# Patient Record
Sex: FEMALE | Race: WHITE | NOT HISPANIC OR LATINO
[De-identification: names, ages, dates, MRNs, and addresses within clinical notes are randomized per-mention and may not be internally consistent; named-entity substitution may affect disease eponyms.]

---

## 2017-04-05 ENCOUNTER — APPOINTMENT (OUTPATIENT)
Dept: ORTHOPEDIC SURGERY | Facility: CLINIC | Age: 82
End: 2017-04-05

## 2017-04-05 VITALS
HEART RATE: 76 BPM | DIASTOLIC BLOOD PRESSURE: 85 MMHG | SYSTOLIC BLOOD PRESSURE: 186 MMHG | WEIGHT: 112 LBS | BODY MASS INDEX: 21.14 KG/M2 | HEIGHT: 61 IN

## 2017-04-05 DIAGNOSIS — S70.01XA CONTUSION OF RIGHT HIP, INITIAL ENCOUNTER: ICD-10-CM

## 2017-04-05 DIAGNOSIS — M79.9 SOFT TISSUE DISORDER, UNSPECIFIED: ICD-10-CM

## 2017-04-05 DIAGNOSIS — Z96.649 PRESENCE OF UNSPECIFIED ARTIFICIAL HIP JOINT: ICD-10-CM

## 2017-04-08 PROBLEM — S70.01XA HEMATOMA OF RIGHT HIP, INITIAL ENCOUNTER: Status: ACTIVE | Noted: 2017-04-08

## 2017-04-08 PROBLEM — Z96.649 S/P HIP HEMIARTHROPLASTY: Status: ACTIVE | Noted: 2017-04-08

## 2018-05-29 ENCOUNTER — APPOINTMENT (OUTPATIENT)
Dept: ORTHOPEDIC SURGERY | Facility: CLINIC | Age: 83
End: 2018-05-29
Payer: MEDICARE

## 2018-05-29 VITALS
HEIGHT: 61 IN | DIASTOLIC BLOOD PRESSURE: 85 MMHG | WEIGHT: 112 LBS | HEART RATE: 76 BPM | BODY MASS INDEX: 21.14 KG/M2 | SYSTOLIC BLOOD PRESSURE: 179 MMHG

## 2018-05-29 DIAGNOSIS — M25.519 PAIN IN UNSPECIFIED SHOULDER: ICD-10-CM

## 2018-05-29 DIAGNOSIS — S83.91XA SPRAIN OF UNSPECIFIED SITE OF RIGHT KNEE, INITIAL ENCOUNTER: ICD-10-CM

## 2018-05-29 DIAGNOSIS — M71.21 SYNOVIAL CYST OF POPLITEAL SPACE [BAKER], RIGHT KNEE: ICD-10-CM

## 2018-05-29 DIAGNOSIS — M17.11 UNILATERAL PRIMARY OSTEOARTHRITIS, RIGHT KNEE: ICD-10-CM

## 2018-05-29 PROCEDURE — 99215 OFFICE O/P EST HI 40 MIN: CPT

## 2018-10-22 ENCOUNTER — APPOINTMENT (OUTPATIENT)
Dept: ORTHOPEDIC SURGERY | Facility: CLINIC | Age: 83
End: 2018-10-22
Payer: MEDICARE

## 2018-10-22 VITALS
SYSTOLIC BLOOD PRESSURE: 204 MMHG | HEIGHT: 61 IN | HEART RATE: 70 BPM | WEIGHT: 110 LBS | BODY MASS INDEX: 20.77 KG/M2 | DIASTOLIC BLOOD PRESSURE: 80 MMHG

## 2018-10-22 VITALS — SYSTOLIC BLOOD PRESSURE: 192 MMHG | HEART RATE: 69 BPM | DIASTOLIC BLOOD PRESSURE: 72 MMHG

## 2018-10-22 DIAGNOSIS — M16.10 UNILATERAL PRIMARY OSTEOARTHRITIS, UNSPECIFIED HIP: ICD-10-CM

## 2018-10-22 DIAGNOSIS — M70.61 TROCHANTERIC BURSITIS, RIGHT HIP: ICD-10-CM

## 2018-10-22 PROCEDURE — 73502 X-RAY EXAM HIP UNI 2-3 VIEWS: CPT

## 2018-10-22 PROCEDURE — 73552 X-RAY EXAM OF FEMUR 2/>: CPT | Mod: RT

## 2018-10-22 PROCEDURE — 99214 OFFICE O/P EST MOD 30 MIN: CPT

## 2018-10-26 PROBLEM — M70.61 GREATER TROCHANTERIC BURSITIS OF RIGHT HIP: Status: ACTIVE | Noted: 2018-10-26

## 2019-10-01 ENCOUNTER — INPATIENT (INPATIENT)
Facility: HOSPITAL | Age: 84
LOS: 5 days | Discharge: HOME | DRG: 872 | End: 2019-10-07
Attending: INTERNAL MEDICINE | Admitting: FAMILY MEDICINE
Payer: MEDICARE

## 2019-10-01 VITALS
TEMPERATURE: 100 F | HEART RATE: 63 BPM | RESPIRATION RATE: 18 BRPM | DIASTOLIC BLOOD PRESSURE: 64 MMHG | HEIGHT: 60 IN | SYSTOLIC BLOOD PRESSURE: 122 MMHG | WEIGHT: 98.99 LBS | OXYGEN SATURATION: 97 %

## 2019-10-01 DIAGNOSIS — L03.116 CELLULITIS OF LEFT LOWER LIMB: ICD-10-CM

## 2019-10-01 DIAGNOSIS — Z96.60 PRESENCE OF UNSPECIFIED ORTHOPEDIC JOINT IMPLANT: Chronic | ICD-10-CM

## 2019-10-01 DIAGNOSIS — A41.9 SEPSIS, UNSPECIFIED ORGANISM: ICD-10-CM

## 2019-10-01 DIAGNOSIS — D72.829 ELEVATED WHITE BLOOD CELL COUNT, UNSPECIFIED: ICD-10-CM

## 2019-10-01 DIAGNOSIS — I10 ESSENTIAL (PRIMARY) HYPERTENSION: ICD-10-CM

## 2019-10-01 DIAGNOSIS — Z98.89 OTHER SPECIFIED POSTPROCEDURAL STATES: Chronic | ICD-10-CM

## 2019-10-01 DIAGNOSIS — E78.5 HYPERLIPIDEMIA, UNSPECIFIED: ICD-10-CM

## 2019-10-01 DIAGNOSIS — Z29.9 ENCOUNTER FOR PROPHYLACTIC MEASURES, UNSPECIFIED: ICD-10-CM

## 2019-10-01 DIAGNOSIS — M81.0 AGE-RELATED OSTEOPOROSIS WITHOUT CURRENT PATHOLOGICAL FRACTURE: ICD-10-CM

## 2019-10-01 LAB
ALBUMIN SERPL ELPH-MCNC: 3.1 G/DL — LOW (ref 3.3–5)
ALP SERPL-CCNC: 94 U/L — SIGNIFICANT CHANGE UP (ref 40–120)
ALT FLD-CCNC: 21 U/L — SIGNIFICANT CHANGE UP (ref 12–78)
ANION GAP SERPL CALC-SCNC: 7 MMOL/L — SIGNIFICANT CHANGE UP (ref 5–17)
APPEARANCE UR: CLEAR — SIGNIFICANT CHANGE UP
APTT BLD: 31.3 SEC — SIGNIFICANT CHANGE UP (ref 28.5–37)
AST SERPL-CCNC: 17 U/L — SIGNIFICANT CHANGE UP (ref 15–37)
BACTERIA # UR AUTO: ABNORMAL
BASOPHILS # BLD AUTO: 0.02 K/UL — SIGNIFICANT CHANGE UP (ref 0–0.2)
BASOPHILS NFR BLD AUTO: 0.1 % — SIGNIFICANT CHANGE UP (ref 0–2)
BILIRUB SERPL-MCNC: 0.8 MG/DL — SIGNIFICANT CHANGE UP (ref 0.2–1.2)
BILIRUB UR-MCNC: NEGATIVE — SIGNIFICANT CHANGE UP
BUN SERPL-MCNC: 23 MG/DL — SIGNIFICANT CHANGE UP (ref 7–23)
CALCIUM SERPL-MCNC: 8.4 MG/DL — LOW (ref 8.5–10.1)
CHLORIDE SERPL-SCNC: 103 MMOL/L — SIGNIFICANT CHANGE UP (ref 96–108)
CK SERPL-CCNC: 57 U/L — SIGNIFICANT CHANGE UP (ref 26–192)
CO2 SERPL-SCNC: 26 MMOL/L — SIGNIFICANT CHANGE UP (ref 22–31)
COLOR SPEC: YELLOW — SIGNIFICANT CHANGE UP
CREAT SERPL-MCNC: 0.72 MG/DL — SIGNIFICANT CHANGE UP (ref 0.5–1.3)
DIFF PNL FLD: ABNORMAL
EOSINOPHIL # BLD AUTO: 0 K/UL — SIGNIFICANT CHANGE UP (ref 0–0.5)
EOSINOPHIL NFR BLD AUTO: 0 % — SIGNIFICANT CHANGE UP (ref 0–6)
EPI CELLS # UR: SIGNIFICANT CHANGE UP
GLUCOSE SERPL-MCNC: 188 MG/DL — HIGH (ref 70–99)
GLUCOSE UR QL: 100 MG/DL
HCT VFR BLD CALC: 37.1 % — SIGNIFICANT CHANGE UP (ref 34.5–45)
HGB BLD-MCNC: 12.1 G/DL — SIGNIFICANT CHANGE UP (ref 11.5–15.5)
IMM GRANULOCYTES NFR BLD AUTO: 0.6 % — SIGNIFICANT CHANGE UP (ref 0–1.5)
INR BLD: 1.12 RATIO — SIGNIFICANT CHANGE UP (ref 0.88–1.16)
KETONES UR-MCNC: NEGATIVE — SIGNIFICANT CHANGE UP
LACTATE SERPL-SCNC: 2.7 MMOL/L — HIGH (ref 0.7–2)
LACTATE SERPL-SCNC: 3 MMOL/L — HIGH (ref 0.7–2)
LEUKOCYTE ESTERASE UR-ACNC: ABNORMAL
LIDOCAIN IGE QN: 27 U/L — LOW (ref 73–393)
LYMPHOCYTES # BLD AUTO: 0.77 K/UL — LOW (ref 1–3.3)
LYMPHOCYTES # BLD AUTO: 5.1 % — LOW (ref 13–44)
MCHC RBC-ENTMCNC: 29.3 PG — SIGNIFICANT CHANGE UP (ref 27–34)
MCHC RBC-ENTMCNC: 32.6 GM/DL — SIGNIFICANT CHANGE UP (ref 32–36)
MCV RBC AUTO: 89.8 FL — SIGNIFICANT CHANGE UP (ref 80–100)
MONOCYTES # BLD AUTO: 0.46 K/UL — SIGNIFICANT CHANGE UP (ref 0–0.9)
MONOCYTES NFR BLD AUTO: 3.1 % — SIGNIFICANT CHANGE UP (ref 2–14)
NEUTROPHILS # BLD AUTO: 13.67 K/UL — HIGH (ref 1.8–7.4)
NEUTROPHILS NFR BLD AUTO: 91.1 % — HIGH (ref 43–77)
NITRITE UR-MCNC: POSITIVE
NRBC # BLD: 0 /100 WBCS — SIGNIFICANT CHANGE UP (ref 0–0)
PH UR: 5 — SIGNIFICANT CHANGE UP (ref 5–8)
PLATELET # BLD AUTO: 186 K/UL — SIGNIFICANT CHANGE UP (ref 150–400)
POTASSIUM SERPL-MCNC: 4.3 MMOL/L — SIGNIFICANT CHANGE UP (ref 3.5–5.3)
POTASSIUM SERPL-SCNC: 4.3 MMOL/L — SIGNIFICANT CHANGE UP (ref 3.5–5.3)
PROT SERPL-MCNC: 6.4 G/DL — SIGNIFICANT CHANGE UP (ref 6–8.3)
PROT UR-MCNC: 25 MG/DL
PROTHROM AB SERPL-ACNC: 12.7 SEC — SIGNIFICANT CHANGE UP (ref 10–12.9)
RBC # BLD: 4.13 M/UL — SIGNIFICANT CHANGE UP (ref 3.8–5.2)
RBC # FLD: 13.2 % — SIGNIFICANT CHANGE UP (ref 10.3–14.5)
RBC CASTS # UR COMP ASSIST: ABNORMAL /HPF (ref 0–4)
SODIUM SERPL-SCNC: 136 MMOL/L — SIGNIFICANT CHANGE UP (ref 135–145)
SP GR SPEC: 1.02 — SIGNIFICANT CHANGE UP (ref 1.01–1.02)
TROPONIN I SERPL-MCNC: 0.27 NG/ML — HIGH (ref 0.01–0.04)
UROBILINOGEN FLD QL: NEGATIVE — SIGNIFICANT CHANGE UP
WBC # BLD: 15.01 K/UL — HIGH (ref 3.8–10.5)
WBC # FLD AUTO: 15.01 K/UL — HIGH (ref 3.8–10.5)
WBC UR QL: ABNORMAL

## 2019-10-01 PROCEDURE — 71045 X-RAY EXAM CHEST 1 VIEW: CPT | Mod: 26

## 2019-10-01 PROCEDURE — 74176 CT ABD & PELVIS W/O CONTRAST: CPT | Mod: 26

## 2019-10-01 PROCEDURE — 99223 1ST HOSP IP/OBS HIGH 75: CPT | Mod: AI,GC

## 2019-10-01 PROCEDURE — 99285 EMERGENCY DEPT VISIT HI MDM: CPT

## 2019-10-01 PROCEDURE — 73590 X-RAY EXAM OF LOWER LEG: CPT | Mod: 26,LT

## 2019-10-01 PROCEDURE — 93010 ELECTROCARDIOGRAM REPORT: CPT

## 2019-10-01 RX ORDER — PIPERACILLIN AND TAZOBACTAM 4; .5 G/20ML; G/20ML
3.38 INJECTION, POWDER, LYOPHILIZED, FOR SOLUTION INTRAVENOUS EVERY 8 HOURS
Refills: 0 | Status: DISCONTINUED | OUTPATIENT
Start: 2019-10-01 | End: 2019-10-03

## 2019-10-01 RX ORDER — SODIUM CHLORIDE 9 MG/ML
1000 INJECTION INTRAMUSCULAR; INTRAVENOUS; SUBCUTANEOUS
Refills: 0 | Status: DISCONTINUED | OUTPATIENT
Start: 2019-10-01 | End: 2019-10-03

## 2019-10-01 RX ORDER — ENOXAPARIN SODIUM 100 MG/ML
40 INJECTION SUBCUTANEOUS DAILY
Refills: 0 | Status: DISCONTINUED | OUTPATIENT
Start: 2019-10-01 | End: 2019-10-07

## 2019-10-01 RX ORDER — SODIUM CHLORIDE 9 MG/ML
1000 INJECTION INTRAMUSCULAR; INTRAVENOUS; SUBCUTANEOUS
Refills: 0 | Status: COMPLETED | OUTPATIENT
Start: 2019-10-01 | End: 2019-10-01

## 2019-10-01 RX ORDER — SENNA PLUS 8.6 MG/1
2 TABLET ORAL AT BEDTIME
Refills: 0 | Status: DISCONTINUED | OUTPATIENT
Start: 2019-10-01 | End: 2019-10-07

## 2019-10-01 RX ORDER — SODIUM CHLORIDE 9 MG/ML
1000 INJECTION INTRAMUSCULAR; INTRAVENOUS; SUBCUTANEOUS ONCE
Refills: 0 | Status: COMPLETED | OUTPATIENT
Start: 2019-10-01 | End: 2019-10-01

## 2019-10-01 RX ORDER — PIPERACILLIN AND TAZOBACTAM 4; .5 G/20ML; G/20ML
3.38 INJECTION, POWDER, LYOPHILIZED, FOR SOLUTION INTRAVENOUS ONCE
Refills: 0 | Status: COMPLETED | OUTPATIENT
Start: 2019-10-01 | End: 2019-10-01

## 2019-10-01 RX ORDER — ATORVASTATIN CALCIUM 80 MG/1
20 TABLET, FILM COATED ORAL AT BEDTIME
Refills: 0 | Status: DISCONTINUED | OUTPATIENT
Start: 2019-10-01 | End: 2019-10-07

## 2019-10-01 RX ORDER — CYCLOSPORINE 0.5 MG/ML
1 EMULSION OPHTHALMIC
Refills: 0 | Status: DISCONTINUED | OUTPATIENT
Start: 2019-10-01 | End: 2019-10-07

## 2019-10-01 RX ORDER — ASPIRIN/CALCIUM CARB/MAGNESIUM 324 MG
325 TABLET ORAL ONCE
Refills: 0 | Status: COMPLETED | OUTPATIENT
Start: 2019-10-01 | End: 2019-10-01

## 2019-10-01 RX ORDER — VANCOMYCIN HCL 1 G
1000 VIAL (EA) INTRAVENOUS ONCE
Refills: 0 | Status: COMPLETED | OUTPATIENT
Start: 2019-10-01 | End: 2019-10-01

## 2019-10-01 RX ORDER — VANCOMYCIN HCL 1 G
750 VIAL (EA) INTRAVENOUS EVERY 24 HOURS
Refills: 0 | Status: DISCONTINUED | OUTPATIENT
Start: 2019-10-02 | End: 2019-10-04

## 2019-10-01 RX ORDER — METOPROLOL TARTRATE 50 MG
25 TABLET ORAL DAILY
Refills: 0 | Status: DISCONTINUED | OUTPATIENT
Start: 2019-10-01 | End: 2019-10-07

## 2019-10-01 RX ORDER — METOPROLOL TARTRATE 50 MG
0 TABLET ORAL
Qty: 0 | Refills: 0 | DISCHARGE

## 2019-10-01 RX ORDER — AMLODIPINE BESYLATE 2.5 MG/1
5 TABLET ORAL DAILY
Refills: 0 | Status: DISCONTINUED | OUTPATIENT
Start: 2019-10-01 | End: 2019-10-07

## 2019-10-01 RX ORDER — ASPIRIN/CALCIUM CARB/MAGNESIUM 324 MG
81 TABLET ORAL DAILY
Refills: 0 | Status: DISCONTINUED | OUTPATIENT
Start: 2019-10-01 | End: 2019-10-07

## 2019-10-01 RX ORDER — VANCOMYCIN HCL 1 G
1000 VIAL (EA) INTRAVENOUS EVERY 12 HOURS
Refills: 0 | Status: DISCONTINUED | OUTPATIENT
Start: 2019-10-01 | End: 2019-10-01

## 2019-10-01 RX ORDER — LACTOBACILLUS ACIDOPHILUS 100MM CELL
1 CAPSULE ORAL THREE TIMES A DAY
Refills: 0 | Status: DISCONTINUED | OUTPATIENT
Start: 2019-10-01 | End: 2019-10-07

## 2019-10-01 RX ADMIN — SODIUM CHLORIDE 75 MILLILITER(S): 9 INJECTION INTRAMUSCULAR; INTRAVENOUS; SUBCUTANEOUS at 21:13

## 2019-10-01 RX ADMIN — PIPERACILLIN AND TAZOBACTAM 25 GRAM(S): 4; .5 INJECTION, POWDER, LYOPHILIZED, FOR SOLUTION INTRAVENOUS at 21:13

## 2019-10-01 RX ADMIN — Medication 1000 MILLIGRAM(S): at 15:30

## 2019-10-01 RX ADMIN — SODIUM CHLORIDE 1000 MILLILITER(S): 9 INJECTION INTRAMUSCULAR; INTRAVENOUS; SUBCUTANEOUS at 14:19

## 2019-10-01 RX ADMIN — SODIUM CHLORIDE 1000 MILLILITER(S): 9 INJECTION INTRAMUSCULAR; INTRAVENOUS; SUBCUTANEOUS at 16:13

## 2019-10-01 RX ADMIN — ENOXAPARIN SODIUM 40 MILLIGRAM(S): 100 INJECTION SUBCUTANEOUS at 18:19

## 2019-10-01 RX ADMIN — SODIUM CHLORIDE 1000 MILLILITER(S): 9 INJECTION INTRAMUSCULAR; INTRAVENOUS; SUBCUTANEOUS at 14:20

## 2019-10-01 RX ADMIN — SODIUM CHLORIDE 1000 MILLILITER(S): 9 INJECTION INTRAMUSCULAR; INTRAVENOUS; SUBCUTANEOUS at 13:34

## 2019-10-01 RX ADMIN — PIPERACILLIN AND TAZOBACTAM 200 GRAM(S): 4; .5 INJECTION, POWDER, LYOPHILIZED, FOR SOLUTION INTRAVENOUS at 13:34

## 2019-10-01 RX ADMIN — Medication 1 TABLET(S): at 21:13

## 2019-10-01 RX ADMIN — Medication 250 MILLIGRAM(S): at 14:20

## 2019-10-01 RX ADMIN — CYCLOSPORINE 1 DROP(S): 0.5 EMULSION OPHTHALMIC at 21:13

## 2019-10-01 RX ADMIN — ATORVASTATIN CALCIUM 20 MILLIGRAM(S): 80 TABLET, FILM COATED ORAL at 21:13

## 2019-10-01 RX ADMIN — PIPERACILLIN AND TAZOBACTAM 3.38 GRAM(S): 4; .5 INJECTION, POWDER, LYOPHILIZED, FOR SOLUTION INTRAVENOUS at 14:18

## 2019-10-01 RX ADMIN — Medication 325 MILLIGRAM(S): at 14:38

## 2019-10-01 NOTE — H&P ADULT - HISTORY OF PRESENT ILLNESS
**CHARTING IN PROGRESS  89 y/o F with a PMHx of ?Cellulitis, HTN, HLD, Osteoporosis, IBS, and peptic ulcer presented to the ED with worsening LLE cellulitis with associated nausea and chills.      IN THE ED:  Temp 99.7, HR 63, / 64, Resp 18, SpO2 97, WBC 15.01, Lactate 3.0, Troponin .269, Given 1L NS, Given IV Zosyn x 1, IV Vancomycin x 1, EKG showed ______ , CXR showed ______ , AdditionalImaging showed ________ , AdditionalImaging showed ________ , AdditionalImaging showed ________     89 yo F p/w nausea, slight chills since last night. No cp/sob/palp. No known fever. No vomiting, diarrhea. Some recent constipation,. Some lower abd discomfort. Pt co LLE cellulitis, was treated last week - now again worsening x past 2 days. No neck / back pain. no dysuria/ hematuria. No agg/allev factors. No other inj or co. 89 y/o F with a PMHx of Cellulitis, HTN, HLD, Osteoporosis, IBS, and peptic ulcer presented to the ED with worsening LLE cellulitis with associated nausea and chills. Per patient, patient was diagnosed with cellulitis of the R LE last month, with associated chills and was given a 10 day course of antibiotics (unable to specify which one) from [facility], and her symptoms improved and the cellulitic rash went away completely, but patient continued to have intermittent chills throughout the month. 2 days ago, patient felt similar chills to what she felt last month with additional nausea. Yesterday, patient noticed worsening swelling and pain in the same R LE area. Additionally, patient accidently scratched herself on the back of her L leg several weeks ago, slightly lateral and inferior to her popliteal fossa, and her wound in the area has been healing slowly ever since. Patient denies chest pain, SOB, palpations, vomiting diarrhea. Of note, patient also complaining of recent constipation.      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 89 y/o F with a PMHx of Cellulitis, HTN, HLD, Osteoporosis, IBS, and peptic ulcer presented to the ED with worsening LLE cellulitis with associated nausea and chills. Per patient, patient was diagnosed with cellulitis of the L LE last month, with associated chills and was given a 10 day course of antibiotics (unable to specify which one) from [facility], and her symptoms improved and the cellulitic rash went away completely, but patient continued to have intermittent chills throughout the month. 2 days ago, patient felt similar chills to what she felt last month with additional nausea. Yesterday, patient noticed worsening swelling and pain in the same L LE area. Additionally, patient accidently scratched herself on the back of her R leg several weeks ago, slightly lateral and inferior to her popliteal fossa, and her wound in the area has been healing slowly ever since. Patient denies chest pain, SOB, palpations, vomiting diarrhea. Of note, patient also complaining of recent constipation.      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

## 2019-10-01 NOTE — H&P ADULT - NSHPPHYSICALEXAM_GEN_ALL_CORE
Physical Exam:  General: Well developed, well nourished, NAD  HEENT: NCAT, PERRLA, EOMI bl, moist mucous membranes   Neck: Supple, nontender, no mass  Neurology: A&Ox3, nonfocal, CN II-XII grossly intact, sensation intact   Respiratory: CTA B/L, No W/R/R  CV: RRR, +S1/S2, no murmurs, rubs or gallops  Abdominal: Soft, NT, ND +BSx4, no palpable masses  Extremities: No C/C/E, + peripheral pulses  Skin: R LE swelling, with redness, and slightly painful to touch. L LE wound covered with bandaging noted, intact and without discharge

## 2019-10-01 NOTE — H&P ADULT - PROBLEM SELECTOR PLAN 2
Previously had cellulitis last month, treated. Continued to have chills over a month  - S/p IV Vancomycin x1 + Zosyn x 1 in ED. Continue IV Vancomycin and Zosyn.   - S/p 2L NS  - f/u blood x2 and urine cultures

## 2019-10-01 NOTE — ED ADULT NURSE NOTE - OBJECTIVE STATEMENT
received pt in bed #4b Pt A&O w/ multiple complaints Constipated since yesterday, c/o chills & headache on/off x 1 month Pt also w/ left lower leg cellulitis x 1 month completed antibxs & still has redness. Pt seen by Dr toney Will monitor

## 2019-10-01 NOTE — H&P ADULT - PROBLEM SELECTOR PLAN 1
Presented with elevated WBC 15.01, Lactate 3.0  - S/p IV Vancomycin x1 + Zosyn x 1 in ED. Continue IV Vancomycin and Zosyn.   - S/p 2L NS  - Trend lactate  - f/u blood x2 and urine cultures

## 2019-10-01 NOTE — ED PROVIDER NOTE - OBJECTIVE STATEMENT
89 yo F p/w nausea, slight chills since last night. No cp/sob/palp. No known fever. No vomiting, diarrhea. Some recent constipation,. Some lower abd discomfort. Pt co LLE cellulitis, was treated last week - now again worsening x past 2 days. No neck / back pain. no dysuria/ hematuria. No agg/allev factors. No other inj or co.

## 2019-10-01 NOTE — H&P ADULT - NSICDXPASTSURGICALHX_GEN_ALL_CORE_FT
PAST SURGICAL HISTORY:  History of appendectomy     History of tonsillectomy     S/P hip replacement partial, left

## 2019-10-01 NOTE — ED PROVIDER NOTE - CHPI ED SYMPTOMS NEG
no fever/no burning urination/no chills/no blood in stool/no diarrhea/no abdominal distension/no hematuria

## 2019-10-01 NOTE — H&P ADULT - NSICDXPASTMEDICALHX_GEN_ALL_CORE_FT
PAST MEDICAL HISTORY:  Cellulitis     HTN (hypertension)     Hyperlipidemia     Irritable bowel syndrome     Osteoporosis     Peptic ulcer

## 2019-10-01 NOTE — H&P ADULT - ASSESSMENT
89 y/o F with a PMHx of ?Cellulitis, HTN, HLD, Osteoporosis, IBS, and peptic ulcer presented to the ED with worsening LLE cellulitis with associated nausea and chills.

## 2019-10-01 NOTE — H&P ADULT - NSHPSOCIALHISTORY_GEN_ALL_CORE
Lives with  Ambulates with      Denies current tobacco, alcohol and drug use Lives Alone,   several years ago. Has a 5 day a week, 10 hour a day, home health aid  ADLs - Per patient, mostly independent. Can cook and eat by herself, and wash herself. Home health aid provides assistance throughout the day with random activities.   Ambulates with a walker  Denies current tobacco, alcohol and drug use

## 2019-10-01 NOTE — ED PROVIDER NOTE - CARE PLAN
Principal Discharge DX:	Cellulitis of left lower extremity  Secondary Diagnosis:	Chills  Secondary Diagnosis:	Leukocytosis, unspecified type

## 2019-10-01 NOTE — ED ADULT NURSE REASSESSMENT NOTE - COMFORT CARE
assisted to bathroom/side rails up/wait time explained/warm blanket provided/ambulated to bathroom/plan of care explained

## 2019-10-01 NOTE — ED ADULT NURSE NOTE - PMH
Cellulitis    HTN (hypertension)    Hyperlipidemia    Irritable bowel syndrome    Osteoporosis    Peptic ulcer

## 2019-10-01 NOTE — H&P ADULT - NSHPREVIEWOFSYSTEMS_GEN_ALL_CORE
Constitutional: denies fever. Admits to chills  HEENT: denies blurry vision, difficulty hearing  Respiratory: denies SOB, SOLIS, cough, sputum production, wheezing  Cardiovascular: denies CP, palpitations  Gastrointestinal: denies vomiting, diarrhea, constipation, abdominal pain. Admits to nausea.  Genitourinary: denies dysuria, frequency, urgency, hematuria   Skin: Admits to R LE redness and swelling of 1 day duration, as well as a wound on her L LE that is slowly healing.  Musculoskeletal: denies myalgias, joint swelling, muscle weakness  Neurologic: denies weakness, dizziness, numbness/tingling. Admits to occasional headaches.   Psych: Denies changes in mood  ROS negative except as noted above

## 2019-10-01 NOTE — H&P ADULT - PROBLEM SELECTOR PLAN 5
Lovenox 40mg    IMPROVE VTE Individual Risk Assessment        RISK                                                          Points  [  ] Previous VTE                                                3  [  ] Thrombophilia                                             2  [  ] Lower limb paralysis                                   2        (unable to hold up >15 seconds)    [  ] Current Cancer                                             2         (within 6 months)  [  ] Immobilization > 24 hrs                              1  [  ] ICU/CCU stay > 24 hours                             1  [ x ] Age > 60                                                         1  IMPROVE VTE Score:         [     1    ]  Total Risk Factor Score:    0 - 1:   Consider IPC  >2 - 3:  Thromboprophylaxis required (enoxaparin or SQ heparin)        >4:   High Risk: Thromboprophylaxis required (enoxaparin or SQ heparin), optional add IPC  **If CONTRAINDICATION to enoxaparin or SQ heparin, USE IPCs**

## 2019-10-02 LAB
ALBUMIN SERPL ELPH-MCNC: 2.3 G/DL — LOW (ref 3.3–5)
ALP SERPL-CCNC: 67 U/L — SIGNIFICANT CHANGE UP (ref 40–120)
ALT FLD-CCNC: 28 U/L — SIGNIFICANT CHANGE UP (ref 12–78)
ANION GAP SERPL CALC-SCNC: 6 MMOL/L — SIGNIFICANT CHANGE UP (ref 5–17)
AST SERPL-CCNC: 26 U/L — SIGNIFICANT CHANGE UP (ref 15–37)
BASOPHILS # BLD AUTO: 0.04 K/UL — SIGNIFICANT CHANGE UP (ref 0–0.2)
BASOPHILS NFR BLD AUTO: 0.4 % — SIGNIFICANT CHANGE UP (ref 0–2)
BILIRUB SERPL-MCNC: 0.7 MG/DL — SIGNIFICANT CHANGE UP (ref 0.2–1.2)
BUN SERPL-MCNC: 16 MG/DL — SIGNIFICANT CHANGE UP (ref 7–23)
CALCIUM SERPL-MCNC: 7.7 MG/DL — LOW (ref 8.5–10.1)
CHLORIDE SERPL-SCNC: 111 MMOL/L — HIGH (ref 96–108)
CK MB BLD-MCNC: 1 % — SIGNIFICANT CHANGE UP (ref 0–3.5)
CK MB CFR SERPL CALC: 1.7 NG/ML — SIGNIFICANT CHANGE UP (ref 0–3.6)
CK SERPL-CCNC: 172 U/L — SIGNIFICANT CHANGE UP (ref 26–192)
CO2 SERPL-SCNC: 25 MMOL/L — SIGNIFICANT CHANGE UP (ref 22–31)
CREAT SERPL-MCNC: 0.51 MG/DL — SIGNIFICANT CHANGE UP (ref 0.5–1.3)
CRP SERPL-MCNC: 16.91 MG/DL — HIGH (ref 0–0.4)
EOSINOPHIL # BLD AUTO: 0.01 K/UL — SIGNIFICANT CHANGE UP (ref 0–0.5)
EOSINOPHIL NFR BLD AUTO: 0.1 % — SIGNIFICANT CHANGE UP (ref 0–6)
ERYTHROCYTE [SEDIMENTATION RATE] IN BLOOD: 17 MM/HR — SIGNIFICANT CHANGE UP (ref 0–20)
FERRITIN SERPL-MCNC: 230 NG/ML — HIGH (ref 15–150)
FOLATE SERPL-MCNC: 13.5 NG/ML — SIGNIFICANT CHANGE UP
GLUCOSE SERPL-MCNC: 119 MG/DL — HIGH (ref 70–99)
HCT VFR BLD CALC: 33.2 % — LOW (ref 34.5–45)
HGB BLD-MCNC: 10.6 G/DL — LOW (ref 11.5–15.5)
IMM GRANULOCYTES NFR BLD AUTO: 0.5 % — SIGNIFICANT CHANGE UP (ref 0–1.5)
IRON SATN MFR SERPL: 13 UG/DL — LOW (ref 30–160)
IRON SATN MFR SERPL: 6 % — LOW (ref 14–50)
LYMPHOCYTES # BLD AUTO: 1.18 K/UL — SIGNIFICANT CHANGE UP (ref 1–3.3)
LYMPHOCYTES # BLD AUTO: 11.2 % — LOW (ref 13–44)
MCHC RBC-ENTMCNC: 29 PG — SIGNIFICANT CHANGE UP (ref 27–34)
MCHC RBC-ENTMCNC: 31.9 GM/DL — LOW (ref 32–36)
MCV RBC AUTO: 91 FL — SIGNIFICANT CHANGE UP (ref 80–100)
MONOCYTES # BLD AUTO: 0.5 K/UL — SIGNIFICANT CHANGE UP (ref 0–0.9)
MONOCYTES NFR BLD AUTO: 4.8 % — SIGNIFICANT CHANGE UP (ref 2–14)
NEUTROPHILS # BLD AUTO: 8.71 K/UL — HIGH (ref 1.8–7.4)
NEUTROPHILS NFR BLD AUTO: 83 % — HIGH (ref 43–77)
NRBC # BLD: 0 /100 WBCS — SIGNIFICANT CHANGE UP (ref 0–0)
PLATELET # BLD AUTO: 145 K/UL — LOW (ref 150–400)
POTASSIUM SERPL-MCNC: 3.4 MMOL/L — LOW (ref 3.5–5.3)
POTASSIUM SERPL-SCNC: 3.4 MMOL/L — LOW (ref 3.5–5.3)
PROT SERPL-MCNC: 4.9 G/DL — LOW (ref 6–8.3)
RBC # BLD: 3.55 M/UL — LOW (ref 3.8–5.2)
RBC # BLD: 3.65 M/UL — LOW (ref 3.8–5.2)
RBC # FLD: 13.4 % — SIGNIFICANT CHANGE UP (ref 10.3–14.5)
RETICS #: 53.3 K/UL — SIGNIFICANT CHANGE UP (ref 25–125)
RETICS/RBC NFR: 1.5 % — SIGNIFICANT CHANGE UP (ref 0.5–2.5)
SODIUM SERPL-SCNC: 142 MMOL/L — SIGNIFICANT CHANGE UP (ref 135–145)
TIBC SERPL-MCNC: 201 UG/DL — LOW (ref 220–430)
TROPONIN I SERPL-MCNC: 0.4 NG/ML — HIGH (ref 0.01–0.04)
UIBC SERPL-MCNC: 188 UG/DL — SIGNIFICANT CHANGE UP (ref 110–370)
VIT B12 SERPL-MCNC: 238 PG/ML — SIGNIFICANT CHANGE UP (ref 232–1245)
WBC # BLD: 10.49 K/UL — SIGNIFICANT CHANGE UP (ref 3.8–10.5)
WBC # FLD AUTO: 10.49 K/UL — SIGNIFICANT CHANGE UP (ref 3.8–10.5)

## 2019-10-02 PROCEDURE — 99233 SBSQ HOSP IP/OBS HIGH 50: CPT

## 2019-10-02 RX ORDER — DOCUSATE SODIUM 100 MG
100 CAPSULE ORAL THREE TIMES A DAY
Refills: 0 | Status: DISCONTINUED | OUTPATIENT
Start: 2019-10-02 | End: 2019-10-07

## 2019-10-02 RX ORDER — POLYETHYLENE GLYCOL 3350 17 G/17G
17 POWDER, FOR SOLUTION ORAL DAILY
Refills: 0 | Status: DISCONTINUED | OUTPATIENT
Start: 2019-10-02 | End: 2019-10-07

## 2019-10-02 RX ORDER — ACETAMINOPHEN 500 MG
650 TABLET ORAL EVERY 6 HOURS
Refills: 0 | Status: DISCONTINUED | OUTPATIENT
Start: 2019-10-02 | End: 2019-10-07

## 2019-10-02 RX ADMIN — CYCLOSPORINE 1 DROP(S): 0.5 EMULSION OPHTHALMIC at 17:27

## 2019-10-02 RX ADMIN — Medication 81 MILLIGRAM(S): at 11:42

## 2019-10-02 RX ADMIN — CYCLOSPORINE 1 DROP(S): 0.5 EMULSION OPHTHALMIC at 05:55

## 2019-10-02 RX ADMIN — POLYETHYLENE GLYCOL 3350 17 GRAM(S): 17 POWDER, FOR SOLUTION ORAL at 11:41

## 2019-10-02 RX ADMIN — SODIUM CHLORIDE 75 MILLILITER(S): 9 INJECTION INTRAMUSCULAR; INTRAVENOUS; SUBCUTANEOUS at 12:31

## 2019-10-02 RX ADMIN — Medication 100 MILLIGRAM(S): at 11:41

## 2019-10-02 RX ADMIN — ATORVASTATIN CALCIUM 20 MILLIGRAM(S): 80 TABLET, FILM COATED ORAL at 22:05

## 2019-10-02 RX ADMIN — Medication 100 MILLIGRAM(S): at 13:31

## 2019-10-02 RX ADMIN — PIPERACILLIN AND TAZOBACTAM 25 GRAM(S): 4; .5 INJECTION, POWDER, LYOPHILIZED, FOR SOLUTION INTRAVENOUS at 22:05

## 2019-10-02 RX ADMIN — Medication 250 MILLIGRAM(S): at 13:30

## 2019-10-02 RX ADMIN — Medication 100 MILLIGRAM(S): at 22:05

## 2019-10-02 RX ADMIN — Medication 1 TABLET(S): at 13:31

## 2019-10-02 RX ADMIN — Medication 1 TABLET(S): at 22:05

## 2019-10-02 RX ADMIN — PIPERACILLIN AND TAZOBACTAM 25 GRAM(S): 4; .5 INJECTION, POWDER, LYOPHILIZED, FOR SOLUTION INTRAVENOUS at 05:55

## 2019-10-02 RX ADMIN — Medication 1 TABLET(S): at 05:54

## 2019-10-02 RX ADMIN — SODIUM CHLORIDE 75 MILLILITER(S): 9 INJECTION INTRAMUSCULAR; INTRAVENOUS; SUBCUTANEOUS at 22:12

## 2019-10-02 RX ADMIN — PIPERACILLIN AND TAZOBACTAM 25 GRAM(S): 4; .5 INJECTION, POWDER, LYOPHILIZED, FOR SOLUTION INTRAVENOUS at 15:15

## 2019-10-02 RX ADMIN — ENOXAPARIN SODIUM 40 MILLIGRAM(S): 100 INJECTION SUBCUTANEOUS at 11:42

## 2019-10-02 NOTE — PROGRESS NOTE ADULT - ASSESSMENT
89 y/o F with a PMHx of ?Cellulitis, HTN, HLD, Osteoporosis, IBS, and peptic ulcer presented to the ED with worsening LLE cellulitis with associated nausea and chills.

## 2019-10-02 NOTE — PROGRESS NOTE ADULT - PROBLEM SELECTOR PLAN 1
Resolved, with normal WBC, afebrile and and will follow with LA.  - c/w IVF and monitor LA.  - f/u blood x2 and urine cultures R/O for sepsis.   - normal WBC, afebrile and and will follow with LA.  - c/w IVF and monitor LA.  - Continue IV Vancomycin and d/c Zosyn.   - f/u blood x2 and urine cultures

## 2019-10-02 NOTE — CONSULT NOTE ADULT - SUBJECTIVE AND OBJECTIVE BOX
Patient is a 89y old  Female who presents with a chief complaint of Sepsis & Cellulitis (02 Oct 2019 11:38)      HPI:  89 y/o F with a PMHx of Cellulitis, HTN, HLD, Osteoporosis, IBS, and peptic ulcer presented to the ED with worsening LLE cellulitis with associated nausea and chills. Per patient, patient was diagnosed with cellulitis of the L LE last month, with associated chills and was given a 10 day course of antibiotics (unable to specify which one) from [facility], and her symptoms improved and the cellulitic rash went away completely, but patient continued to have intermittent chills throughout the month. 2 days ago, patient felt similar chills to what she felt last month with additional nausea. Yesterday, patient noticed worsening swelling and pain in the same L LE area. Additionally, patient accidently scratched herself on the back of her R leg several weeks ago, slightly lateral and inferior to her popliteal fossa, and her wound in the area has been healing slowly ever since. Patient denies chest pain, SOB, palpations, vomiting diarrhea. Of note, patient also complaining of recent constipation.      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   Pt states she had seen 2 neurosurgeons prior for eval of paraspinal tumor, and was recommend against surgery unless symptomatic.   Per pt, does not want invasive procedures unless absolutely necessary.   Also aware of pancrease lesion, and sees GI outpt.     Pt admitted with cellulitis, leg pain.     PAST MEDICAL & SURGICAL HISTORY:  Cellulitis  Irritable bowel syndrome  Osteoporosis  HTN (hypertension)  Peptic ulcer  Hyperlipidemia  History of tonsillectomy  History of appendectomy  S/P hip replacement: partial, left      HEALTH ISSUES - PROBLEM Dx:  Need for prophylactic measure: Need for prophylactic measure  Osteoporosis: Osteoporosis  Hyperlipidemia: Hyperlipidemia  HTN (hypertension): HTN (hypertension)  Leukocytosis, unspecified type: Leukocytosis, unspecified type  Cellulitis of left lower extremity: Cellulitis of left lower extremity  Sepsis: Sepsis          Cellulitis of left lower extremity (L03.116)  Cellulitis (L03.90)  DM (diabetes mellitus) (250.00)  Irritable bowel syndrome (564.1)  Osteoporosis (733.00)  HTN (hypertension) (401.9)  Peptic ulcer (533.90)  Hyperlipidemia (272.4)  History of tonsillectomy (V45.89)  History of appendectomy (V45.89)  S/P hip replacement (V43.64)  Leukocytosis, unspecified type (D72.829)  Chills (R68.83)      FAMILY HISTORY:  no family hx cancer      [SOCIAL HISTORY: ]     smoking:  non-smoker     EtOH:  no EtOh     illicit drugs:  denied     occupation:  retired      marital status:   x2yrs     Other:       [ALLERGIES/INTOLERANCES:]  Allergies     sulfa drugs (Hives)  Intolerances          [MEDICATIONS]  MEDICATIONS  (STANDING):  amLODIPine   Tablet 5 milliGRAM(s) Oral daily  aspirin  chewable 81 milliGRAM(s) Oral daily  atorvastatin 20 milliGRAM(s) Oral at bedtime  cycloSPORINE (RESTASIS) 0.05% Emulsion 1 Drop(s) Both EYES two times a day  docusate sodium 100 milliGRAM(s) Oral three times a day  enoxaparin Injectable 40 milliGRAM(s) SubCutaneous daily  lactobacillus acidophilus 1 Tablet(s) Oral three times a day  metoprolol succinate ER 25 milliGRAM(s) Oral daily  piperacillin/tazobactam IVPB.. 3.375 Gram(s) IV Intermittent every 8 hours  polyethylene glycol 3350 17 Gram(s) Oral daily  sodium chloride 0.9%. 1000 milliLiter(s) (75 mL/Hr) IV Continuous <Continuous>  vancomycin  IVPB 750 milliGRAM(s) IV Intermittent every 24 hours    MEDICATIONS  (PRN):  acetaminophen   Tablet .. 650 milliGRAM(s) Oral every 6 hours PRN Mild Pain (1 - 3)  bisacodyl 5 milliGRAM(s) Oral daily PRN Constipation  senna 2 Tablet(s) Oral at bedtime PRN Constipation        [REVIEW OF SYSTEMS: ]  CONSTITUTIONAL: normal, no fever, no shakes, no chills   EYES: No eye pain, no visual disturbances, no discharge  ENMT:  no discharge  NECK: No pain, no stiffness  BREASTS: No pain, no masses, no nipple discharge  RESPIRATORY: No cough, no wheezing, no chills, no hemoptysis; No shortness of breath  CARDIOVASCULAR: No chest pain, no palpitations, no dizziness, no leg swelling  GASTROINTESTINAL: No abdominal, no epigastric pain. No nausea, no vomiting, no hematemesis; No diarrhea , +constipation. No melena, no hematochezia.  GENITOURINARY: No dysuria, no frequency, no hematuria, no incontinence  NEUROLOGICAL: No headaches, no memory loss, no loss of strength, no numbness, no tremors  SKIN: No itching, no burning, no rashes, no lesions   LYMPH NODES: No enlarged glands  ENDOCRINE: No heat or cold intolerance; No hair loss  MUSCULOSKELETAL: No joint pain or swelling; No muscle, no back, +extremity pain, + edema  PSYCHIATRIC: No depression, no anxiety, no mood swings, no difficulty sleeping  HEME/LYMPH: No easy bruising, no bleeding gums      [VITALS SIGNS 24hrs]  Vital Signs Last 24 Hrs  T(C): 37.4 (02 Oct 2019 05:18), Max: 37.6 (01 Oct 2019 12:55)  T(F): 99.3 (02 Oct 2019 05:18), Max: 99.7 (01 Oct 2019 12:55)  HR: 78 (02 Oct 2019 05:18) (63 - 78)  BP: 106/46 (02 Oct 2019 05:56) (90/51 - 122/64)  BP(mean): 69 (01 Oct 2019 21:35) (69 - 78)  RR: 18 (02 Oct 2019 05:18) (16 - 18)  SpO2: 90% (02 Oct 2019 05:18) (90% - 99%)    [PHYSICAL EXAM]  General: adult in NAD,  WN,  WD.  HEENT: clear oropharynx, anicteric sclera, pink conjunctivae.  Neck: supple, no masses.  CV: normal S1S2, no murmur, no rubs, no gallops.  Lungs: clear to auscultation, no wheezes, no rales, no rhonchi.  Abdomen: soft, non-tender, non-distended, no hepatosplenomegaly, normal BS, no guarding.  Ext: no clubbing, no cyanosis, +edemaL>R ., +erythema L  Skin: no rashes,  no petechiae, no venous stasis changes.  Neuro: alert and oriented X4, no focal motor deficits.  LN: no SC ANASTACIO.      [LABS:]                        10.6   10.49 )-----------( 145      ( 02 Oct 2019 08:13 )             33.2     CBC Full  -  ( 02 Oct 2019 08:13 )  WBC Count : 10.49 K/uL  RBC Count : 3.65 M/uL  Hemoglobin : 10.6 g/dL  Hematocrit : 33.2 %  Platelet Count - Automated : 145 K/uL  Mean Cell Volume : 91.0 fl  Mean Cell Hemoglobin : 29.0 pg  Mean Cell Hemoglobin Concentration : 31.9 gm/dL  Auto Neutrophil # : 8.71 K/uL  Auto Lymphocyte # : 1.18 K/uL  Auto Monocyte # : 0.50 K/uL  Auto Eosinophil # : 0.01 K/uL  Auto Basophil # : 0.04 K/uL  Auto Neutrophil % : 83.0 %  Auto Lymphocyte % : 11.2 %  Auto Monocyte % : 4.8 %  Auto Eosinophil % : 0.1 %  Auto Basophil % : 0.4 %    10    142  |  111<H>  |  16  ----------------------------<  119<H>  3.4<L>   |  25  |  0.51    Ca    7.7<L>      02 Oct 2019 08:13    TPro  4.9<L>  /  Alb  2.3<L>  /  TBili  0.7  /  DBili  x   /  AST  26  /  ALT  28  /  AlkPhos  67  10-    PT/INR - ( 01 Oct 2019 13:46 )   PT: 12.7 sec;   INR: 1.12 ratio         PTT - ( 01 Oct 2019 13:46 )  PTT:31.3 sec  LIVER FUNCTIONS - ( 02 Oct 2019 08:13 )  Alb: 2.3 g/dL / Pro: 4.9 g/dL / ALK PHOS: 67 U/L / ALT: 28 U/L / AST: 26 U/L / GGT: x           CARDIAC MARKERS ( 01 Oct 2019 13:46 )  .269 ng/mL / x     / 57 U/L / x     / x          Urinalysis Basic - ( 01 Oct 2019 14:55 )    Color: Yellow / Appearance: Clear / S.020 / pH: x  Gluc: x / Ketone: Negative  / Bili: Negative / Urobili: Negative   Blood: x / Protein: 25 mg/dL / Nitrite: Positive   Leuk Esterase: Moderate / RBC: 3-5 /HPF / WBC 26-50   Sq Epi: x / Non Sq Epi: Occasional / Bacteria: Many        WBC  TREND (5 Days)  WBC Count: 10.49 K/uL (10-02 @ 08:13)  WBC Count: 15.01 K/uL (10-01 @ 13:46)    HGB  TREND (5 Days)  Hemoglobin: 10.6 g/dL (10-02 @ :13)  Hemoglobin: 12.1 g/dL (10-01 @ 13:46)    HCT  TREND (5 Days)  Hematocrit: 33.2 % (10-02 @ :13)  Hematocrit: 37.1 % (10-01 @ 13:46)    PLT  TREND (5 Days)  Platelet Count - Automated: 145 K/uL (10-02 @ 08:13)  Platelet Count - Automated: 186 K/uL (10-01 @ 13:46)      Anemia Studies                   [RADIOLOGY & ADDITIONAL STUDIES:]    < from: CT Abdomen and Pelvis No Cont (10.01.19 @ 13:57) >  EXAM:  CT ABDOMEN AND PELVIS                        PROCEDURE DATE:  10/01/2019    INTERPRETATION:  CLINICAL INFORMATION: Chills, weakness, left lower extremity cellulitis, abdominal discomfort, hard stools.  COMPARISON: None.    PROCEDURE: CT of the Abdomen and Pelvis was performed without intravenous contrast.   Intravenous contrast: None.  Oral contrast: None.  Sagittal and coronal reformats were performed.    FINDINGS:    LOWER CHEST: Minimal bibasilar dependent and platelike atelectasis.   Coronary artery atherosclerotic calcifications. Thoracic aortic atherosclerotic calcifications.    LIVER: Within normal limits.  BILE DUCTS: Mildly dilated common bile duct measuring 9-10 mm. No discrete choledocholithiasis.  GALLBLADDER: Surgically absent.  SPLEEN: Within normal limits.  PANCREAS: Atrophy of the pancreas. Cystic lesion in the pancreatic head measuring up to 2.3 cm.  ADRENALS: Within normal limits.  KIDNEYS/URETERS: No right or left hydronephrosis or nephrolithiasis.   Parapelvic renal cysts.    BLADDER: Poorly visualized due to streak artifact from bilateral femoral hardware.  REPRODUCTIVE ORGANS: Calcified uterine fibroids. The adnexa are grossly unremarkable.    BOWEL: No bowel obstruction. Appendix is not visualized. Few scattered colonic diverticula without evidence of diverticulitis. Rectum is poorly evaluated due to streak artifact from bilateral femoral hardware.  PERITONEUM: No ascites or pneumoperitoneum. No mesenteric adenopathy.  VESSELS: Atherosclerotic calcifications of the aortoiliac tree. Normal caliber abdominal aorta.  RETROPERITONEUM/LYMPH NODES: No lymphadenopathy. No retroperitoneal hematoma.  ABDOMINAL WALL: Masslike smooth ovoid density in the left paraspinal musculature at the level of L5 and the sacrum with density slightly higher than that of adjacent musculature measuring up to 9.8 x 3.4 x 4.7 cm with extension into a sacral foramina on the left.  BONES: Left hip total arthroplasty. Intramedullary prince with interlocking screw in the proximal right hip. Multilevel degenerative changes of the spine and lumbar dextroscoliosis.    IMPRESSION:   No acute intra-abdominal or pelvic findings on this unenhanced exam; evaluation of the pelvis is degraded by streak artifact from femoral hardware.    Cystic lesion in the pancreatic head measuring up to 2.3 cm which may represent a benign cyst, IPMN, or cystic neoplasm. Nonemergent MRI of the abdomen or correlation with prior imaging would be helpful to better characterize and to assess stability.    Masslike smooth ovoid density in the left paraspinal musculature at the level of L5 and the sacrum with density slightly higher than that of adjacent musculature measuring up to 9.8 x3.4 x 4.7 cm with extension into a sacral foramina on the left which could represent a neurofibroma or schwannoma. Nonemergent MRI of the lumbosacral spine would be helpful for better characterization.  < end of copied text >

## 2019-10-02 NOTE — GOALS OF CARE CONVERSATION - ADVANCED CARE PLANNING - CONVERSATION DETAILS
met pt with daughter, Eleazar, pt has hcp, at home, unsure if can locate before pt goes home. pt daughter is hcp. no directives in place at this time.  PC RN contact # given

## 2019-10-02 NOTE — CONSULT NOTE ADULT - ASSESSMENT
[ASSESSMENT and  PLAN]  Pancreatic head cystic lesion, uncertain etiology.     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     Cellulitis LLE.     Advanced age.   Cognitively intact  Refuses invasive procedures.       Anemia of chronic disease.   Mild thrombocytopenia    Mild leukocytosis due to infection.     Constipation  RECOMMENDATIONS  Follow CBC. Hgb adequate. No symptomatic.   Abx per medicine.     Oupt followup with usual GI for pancreatic cyst.   Oupt followup with usual neurosurgeon for mass.     DVT Prophylaxis: Lovenox    Constipation meds    Thank you for consulting us.     I have discussed the above plan of care with patient/family in detail. They expressed understanding of the treatment plan . Risks, benefits and alternatives discussed in detail. I have asked if they have any questions or concerns and appropriately addressed them; all questions answered to their satisfactions and in lay terms.

## 2019-10-02 NOTE — PROGRESS NOTE ADULT - SUBJECTIVE AND OBJECTIVE BOX
Patient is a 89y old  Female who presents with a chief complaint of Sepsis & Cellulitis (01 Oct 2019 15:24)      INTERVAL HPI: Pt seen and examined bedside, has no complaints. dnies any pain fever or chills.  OVERNIGHT EVENTS:  T(F): 99.3 (10-02-19 @ 05:18), Max: 99.7 (10-01-19 @ 12:55)  HR: 78 (10-02-19 @ 05:18) (63 - 78)  BP: 106/46 (10-02-19 @ 05:56) (90/51 - 122/64)  RR: 18 (10-02-19 @ 05:18) (16 - 18)  SpO2: 90% (10-02-19 @ 05:18) (90% - 99%)  Wt(kg): --  I&O's Summary    01 Oct 2019 07:01  -  02 Oct 2019 07:00  --------------------------------------------------------  IN: 600 mL / OUT: 0 mL / NET: 600 mL        REVIEW OF SYSTEM:    Constitutional: No fever, chills, fatigue  Neuro: No headache, numbness, weakness  Resp: No cough, wheezing, shortness of breath  CVS: No chest pain, palpitations, leg swelling  GI: No abdominal pain, nausea, vomiting, diarrhea   : No dysuria, frequency, incontinence  Skin: No itching, burning, rashes, or lesions   Msk: No joint pain or swelling  Psych: No depression, anxiety, mood swings          PHYSICAL EXAM:  GENERAL: NAD, well-developed  HEAD:  Atraumatic, Normocephalic  EYES: EOMI, PERRLA, conjunctiva and sclera clear  ENMT: No tonsillar erythema, exudates, or enlargement; Moist mucous membranes,   NECK: Supple, No JVD, Normal thyroid  HEART: Regular rate and rhythm; No murmurs, rubs, or gallops  RESPIRATORY: CTA B/L, No wheezing / rhonchi  ABDOMEN: Soft, Nontender, Nondistended; Bowel sounds present  NEUROLOGY: A&Ox3, nonfocal, moving all extremities.  EXTREMITIES:  2+ Peripheral Pulses, No clubbing, cyanosis, or edema  SKIN: warm, dry,  erythema on LLE, no rash or abnormal lesions        LABS:                        10.6   10.49 )-----------( 145      ( 02 Oct 2019 08:13 )             33.2     10    142  |  111<H>  |  16  ----------------------------<  119<H>  3.4<L>   |  25  |  0.51    Ca    7.7<L>      02 Oct 2019 08:13    TPro  4.9<L>  /  Alb  2.3<L>  /  TBili  0.7  /  DBili  x   /  AST  26  /  ALT  28  /  AlkPhos  67  10-02    PT/INR - ( 01 Oct 2019 13:46 )   PT: 12.7 sec;   INR: 1.12 ratio         PTT - ( 01 Oct 2019 13:46 )  PTT:31.3 sec  Urinalysis Basic - ( 01 Oct 2019 14:55 )    Color: Yellow / Appearance: Clear / S.020 / pH: x  Gluc: x / Ketone: Negative  / Bili: Negative / Urobili: Negative   Blood: x / Protein: 25 mg/dL / Nitrite: Positive   Leuk Esterase: Moderate / RBC: 3-5 /HPF / WBC 26-50   Sq Epi: x / Non Sq Epi: Occasional / Bacteria: Many      CAPILLARY BLOOD GLUCOSE    CT A/P:   No acute intra-abdominal or pelvic findings on this unenhanced exam;                 MEDICATIONS  (STANDING):  amLODIPine   Tablet 5 milliGRAM(s) Oral daily  aspirin  chewable 81 milliGRAM(s) Oral daily  atorvastatin 20 milliGRAM(s) Oral at bedtime  cycloSPORINE (RESTASIS) 0.05% Emulsion 1 Drop(s) Both EYES two times a day  docusate sodium 100 milliGRAM(s) Oral three times a day  enoxaparin Injectable 40 milliGRAM(s) SubCutaneous daily  lactobacillus acidophilus 1 Tablet(s) Oral three times a day  metoprolol succinate ER 25 milliGRAM(s) Oral daily  piperacillin/tazobactam IVPB.. 3.375 Gram(s) IV Intermittent every 8 hours  polyethylene glycol 3350 17 Gram(s) Oral daily  sodium chloride 0.9%. 1000 milliLiter(s) (75 mL/Hr) IV Continuous <Continuous>  vancomycin  IVPB 750 milliGRAM(s) IV Intermittent every 24 hours    MEDICATIONS  (PRN):  acetaminophen   Tablet .. 650 milliGRAM(s) Oral every 6 hours PRN Mild Pain (1 - 3)  bisacodyl 5 milliGRAM(s) Oral daily PRN Constipation  senna 2 Tablet(s) Oral at bedtime PRN Constipation Patient is a 89y old  Female who presents with a chief complaint of Sepsis & Cellulitis (01 Oct 2019 15:24)      INTERVAL HPI: Pt seen and examined bedside, has no complaints. denies any fever or chills. No urinary symptoms.   OVERNIGHT EVENTS:  T(F): 99.3 (10-02-19 @ 05:18), Max: 99.7 (10-01-19 @ 12:55)  HR: 78 (10-02-19 @ 05:18) (63 - 78)  BP: 106/46 (10-02-19 @ 05:56) (90/51 - 122/64)  RR: 18 (10-02-19 @ 05:18) (16 - 18)  SpO2: 90% (10-02-19 @ 05:18) (90% - 99%)  Wt(kg): --  I&O's Summary    01 Oct 2019 07:01  -  02 Oct 2019 07:00  --------------------------------------------------------  IN: 600 mL / OUT: 0 mL / NET: 600 mL        REVIEW OF SYSTEM:    Constitutional: No fever, chills, fatigue  Neuro: No headache, numbness, weakness  Resp: No cough, wheezing, shortness of breath  CVS: No chest pain, palpitations, leg swelling  GI: No abdominal pain, nausea, vomiting, diarrhea   : No dysuria, frequency, incontinence  Skin: No itching, burning, rashes, or lesions   Msk: LLE  pain and edema   Psych: No depression, anxiety, mood swings          PHYSICAL EXAM:  GENERAL: NAD, well-developed  HEAD:  Atraumatic, Normocephalic  EYES: EOMI, PERRLA, conjunctiva and sclera clear  ENMT: No tonsillar erythema, exudates, or enlargement; Moist mucous membranes,   NECK: Supple, No JVD, Normal thyroid  HEART: Regular rate and rhythm; No murmurs, rubs, or gallops  RESPIRATORY: CTA B/L, No wheezing / rhonchi  ABDOMEN: Soft, Nontender, Nondistended; Bowel sounds present  NEUROLOGY: A&Ox3, nonfocal, moving all extremities.  EXTREMITIES:  2+ Peripheral Pulses, + edema L>R  SKIN: warm, dry, erythema on LLE, and tenderness. RLE a laceration upper post leg.      LABS:                        10.6   10.49 )-----------( 145      ( 02 Oct 2019 08:13 )             33.2     10    142  |  111<H>  |  16  ----------------------------<  119<H>  3.4<L>   |  25  |  0.51    Ca    7.7<L>      02 Oct 2019 08:13    TPro  4.9<L>  /  Alb  2.3<L>  /  TBili  0.7  /  DBili  x   /  AST  26  /  ALT  28  /  AlkPhos  67  10-02    PT/INR - ( 01 Oct 2019 13:46 )   PT: 12.7 sec;   INR: 1.12 ratio         PTT - ( 01 Oct 2019 13:46 )  PTT:31.3 sec  Urinalysis Basic - ( 01 Oct 2019 14:55 )    Color: Yellow / Appearance: Clear / S.020 / pH: x  Gluc: x / Ketone: Negative  / Bili: Negative / Urobili: Negative   Blood: x / Protein: 25 mg/dL / Nitrite: Positive   Leuk Esterase: Moderate / RBC: 3-5 /HPF / WBC 26-50   Sq Epi: x / Non Sq Epi: Occasional / Bacteria: Many      CAPILLARY BLOOD GLUCOSE    CT A/P:   No acute intra-abdominal or pelvic findings on this unenhanced exam;                 MEDICATIONS  (STANDING):  amLODIPine   Tablet 5 milliGRAM(s) Oral daily  aspirin  chewable 81 milliGRAM(s) Oral daily  atorvastatin 20 milliGRAM(s) Oral at bedtime  cycloSPORINE (RESTASIS) 0.05% Emulsion 1 Drop(s) Both EYES two times a day  docusate sodium 100 milliGRAM(s) Oral three times a day  enoxaparin Injectable 40 milliGRAM(s) SubCutaneous daily  lactobacillus acidophilus 1 Tablet(s) Oral three times a day  metoprolol succinate ER 25 milliGRAM(s) Oral daily  piperacillin/tazobactam IVPB.. 3.375 Gram(s) IV Intermittent every 8 hours  polyethylene glycol 3350 17 Gram(s) Oral daily  sodium chloride 0.9%. 1000 milliLiter(s) (75 mL/Hr) IV Continuous <Continuous>  vancomycin  IVPB 750 milliGRAM(s) IV Intermittent every 24 hours    MEDICATIONS  (PRN):  acetaminophen   Tablet .. 650 milliGRAM(s) Oral every 6 hours PRN Mild Pain (1 - 3)  bisacodyl 5 milliGRAM(s) Oral daily PRN Constipation  senna 2 Tablet(s) Oral at bedtime PRN Constipation

## 2019-10-02 NOTE — PROGRESS NOTE ADULT - PROBLEM SELECTOR PLAN 5
Lovenox 40mg    IMPROVE VTE Individual Risk Assessment        RISK                                                          Points  [  ] Previous VTE                                                3  [  ] Thrombophilia                                             2  [  ] Lower limb paralysis                                   2        (unable to hold up >15 seconds)    [  ] Current Cancer                                             2         (within 6 months)  [  ] Immobilization > 24 hrs                              1  [  ] ICU/CCU stay > 24 hours                             1  [ x ] Age > 60                                                         1  IMPROVE VTE Score:         [     1    ]  Total Risk Factor Score:    0 - 1:   Consider IPC  >2 - 3:  Thromboprophylaxis required (enoxaparin or SQ heparin)        >4:   High Risk: Thromboprophylaxis required (enoxaparin or SQ heparin), optional add IPC  **If CONTRAINDICATION to enoxaparin or SQ heparin, USE IPCs** Lovenox 40mg

## 2019-10-02 NOTE — PROGRESS NOTE ADULT - PROBLEM SELECTOR PLAN 2
- S/p IV Vancomycin x1 + Zosyn x 1 in ED.   - Continue IV Vancomycin and d/c Zosyn.   - S/p 2L NS, c/w 75 cc/hr   - f/u blood x2 and urine cultures - S/p IV Vancomycin x1 + Zosyn x 1 in ED.   - Continue IV Vancomycin and d/c Zosyn.   - S/p 2L NS, c/w 75 cc/hr   - f/u blood x2 and urine cultures  - Elevation of LLE ( hx of venous stasis)

## 2019-10-02 NOTE — PROGRESS NOTE ADULT - ATTENDING COMMENTS
On CT A/P: has pancreatic cyst and neurofibroma. d/w Dr Paredes. pt has been following with GI and neurosurgeon as out pt.

## 2019-10-03 DIAGNOSIS — K59.00 CONSTIPATION, UNSPECIFIED: ICD-10-CM

## 2019-10-03 DIAGNOSIS — N39.0 URINARY TRACT INFECTION, SITE NOT SPECIFIED: ICD-10-CM

## 2019-10-03 DIAGNOSIS — D64.9 ANEMIA, UNSPECIFIED: ICD-10-CM

## 2019-10-03 LAB
BASOPHILS # BLD AUTO: 0.03 K/UL — SIGNIFICANT CHANGE UP (ref 0–0.2)
BASOPHILS NFR BLD AUTO: 0.3 % — SIGNIFICANT CHANGE UP (ref 0–2)
EOSINOPHIL # BLD AUTO: 0.06 K/UL — SIGNIFICANT CHANGE UP (ref 0–0.5)
EOSINOPHIL NFR BLD AUTO: 0.7 % — SIGNIFICANT CHANGE UP (ref 0–6)
HCT VFR BLD CALC: 30.4 % — LOW (ref 34.5–45)
HGB BLD-MCNC: 9.9 G/DL — LOW (ref 11.5–15.5)
IMM GRANULOCYTES NFR BLD AUTO: 0.5 % — SIGNIFICANT CHANGE UP (ref 0–1.5)
LYMPHOCYTES # BLD AUTO: 1.18 K/UL — SIGNIFICANT CHANGE UP (ref 1–3.3)
LYMPHOCYTES # BLD AUTO: 13.4 % — SIGNIFICANT CHANGE UP (ref 13–44)
MCHC RBC-ENTMCNC: 28.9 PG — SIGNIFICANT CHANGE UP (ref 27–34)
MCHC RBC-ENTMCNC: 32.6 GM/DL — SIGNIFICANT CHANGE UP (ref 32–36)
MCV RBC AUTO: 88.9 FL — SIGNIFICANT CHANGE UP (ref 80–100)
MONOCYTES # BLD AUTO: 0.53 K/UL — SIGNIFICANT CHANGE UP (ref 0–0.9)
MONOCYTES NFR BLD AUTO: 6 % — SIGNIFICANT CHANGE UP (ref 2–14)
NEUTROPHILS # BLD AUTO: 6.97 K/UL — SIGNIFICANT CHANGE UP (ref 1.8–7.4)
NEUTROPHILS NFR BLD AUTO: 79.1 % — HIGH (ref 43–77)
NRBC # BLD: 0 /100 WBCS — SIGNIFICANT CHANGE UP (ref 0–0)
PLATELET # BLD AUTO: 145 K/UL — LOW (ref 150–400)
RBC # BLD: 3.42 M/UL — LOW (ref 3.8–5.2)
RBC # FLD: 13.5 % — SIGNIFICANT CHANGE UP (ref 10.3–14.5)
WBC # BLD: 8.81 K/UL — SIGNIFICANT CHANGE UP (ref 3.8–10.5)
WBC # FLD AUTO: 8.81 K/UL — SIGNIFICANT CHANGE UP (ref 3.8–10.5)

## 2019-10-03 PROCEDURE — 99233 SBSQ HOSP IP/OBS HIGH 50: CPT | Mod: 25,GC

## 2019-10-03 RX ORDER — LACTULOSE 10 G/15ML
20 SOLUTION ORAL ONCE
Refills: 0 | Status: DISCONTINUED | OUTPATIENT
Start: 2019-10-03 | End: 2019-10-07

## 2019-10-03 RX ORDER — CEFUROXIME AXETIL 250 MG
500 TABLET ORAL EVERY 12 HOURS
Refills: 0 | Status: DISCONTINUED | OUTPATIENT
Start: 2019-10-03 | End: 2019-10-04

## 2019-10-03 RX ORDER — LACTULOSE 10 G/15ML
30 SOLUTION ORAL ONCE
Refills: 0 | Status: DISCONTINUED | OUTPATIENT
Start: 2019-10-03 | End: 2019-10-03

## 2019-10-03 RX ORDER — FUROSEMIDE 40 MG
20 TABLET ORAL ONCE
Refills: 0 | Status: COMPLETED | OUTPATIENT
Start: 2019-10-03 | End: 2019-10-03

## 2019-10-03 RX ORDER — FLUTICASONE PROPIONATE 50 MCG
1 SPRAY, SUSPENSION NASAL
Refills: 0 | Status: DISCONTINUED | OUTPATIENT
Start: 2019-10-03 | End: 2019-10-07

## 2019-10-03 RX ADMIN — Medication 1 TABLET(S): at 13:41

## 2019-10-03 RX ADMIN — Medication 250 MILLIGRAM(S): at 13:41

## 2019-10-03 RX ADMIN — CYCLOSPORINE 1 DROP(S): 0.5 EMULSION OPHTHALMIC at 17:41

## 2019-10-03 RX ADMIN — Medication 1 TABLET(S): at 21:28

## 2019-10-03 RX ADMIN — Medication 650 MILLIGRAM(S): at 05:44

## 2019-10-03 RX ADMIN — Medication 100 MILLIGRAM(S): at 05:43

## 2019-10-03 RX ADMIN — CYCLOSPORINE 1 DROP(S): 0.5 EMULSION OPHTHALMIC at 05:44

## 2019-10-03 RX ADMIN — ENOXAPARIN SODIUM 40 MILLIGRAM(S): 100 INJECTION SUBCUTANEOUS at 11:38

## 2019-10-03 RX ADMIN — Medication 20 MILLIGRAM(S): at 11:38

## 2019-10-03 RX ADMIN — AMLODIPINE BESYLATE 5 MILLIGRAM(S): 2.5 TABLET ORAL at 05:43

## 2019-10-03 RX ADMIN — Medication 100 MILLIGRAM(S): at 21:28

## 2019-10-03 RX ADMIN — POLYETHYLENE GLYCOL 3350 17 GRAM(S): 17 POWDER, FOR SOLUTION ORAL at 11:38

## 2019-10-03 RX ADMIN — Medication 81 MILLIGRAM(S): at 11:38

## 2019-10-03 RX ADMIN — PIPERACILLIN AND TAZOBACTAM 25 GRAM(S): 4; .5 INJECTION, POWDER, LYOPHILIZED, FOR SOLUTION INTRAVENOUS at 05:43

## 2019-10-03 RX ADMIN — Medication 25 MILLIGRAM(S): at 05:43

## 2019-10-03 RX ADMIN — ATORVASTATIN CALCIUM 20 MILLIGRAM(S): 80 TABLET, FILM COATED ORAL at 21:28

## 2019-10-03 RX ADMIN — Medication 500 MILLIGRAM(S): at 17:41

## 2019-10-03 RX ADMIN — Medication 100 MILLIGRAM(S): at 13:41

## 2019-10-03 RX ADMIN — Medication 650 MILLIGRAM(S): at 07:12

## 2019-10-03 RX ADMIN — Medication 1 SPRAY(S): at 21:28

## 2019-10-03 RX ADMIN — Medication 1 TABLET(S): at 05:43

## 2019-10-03 NOTE — PROGRESS NOTE ADULT - PROBLEM SELECTOR PLAN 2
- S/p IV Vancomycin x1 + Zosyn x 1 in ED.   - Continue IV Vancomycin and d/c Zosyn.   - S/p 2L NS  - f/u blood x2 and urine cultures  - Elevation of LLE ( hx of venous stasis) - S/p IV Vancomycin x1 + Zosyn x 1 in ED.   - Continue IV Vancomycin and d/c Zosyn.   - f/u blood x2 and urine cultures  - Elevation of LLE ( hx of venous stasis)

## 2019-10-03 NOTE — PROGRESS NOTE ADULT - ASSESSMENT
[ASSESSMENT and  PLAN]  Pancreatic head cystic lesion, uncertain etiology.     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     Cellulitis LLE. Stable.   Slight improvement.     Advanced age.   Cognitively intact  Refuses invasive procedures.       Anemia of chronic disease.   Mild thrombocytopenia, no change    Mild leukocytosis due to infection.     Constipation    RECOMMENDATIONS  Follow CBC. Hgb adequate. No symptomatic.   Abx per medicine team.   Monitor for improvement.     Oupt followup with usual GI for pancreatic cyst.  Oupt followup with usual neurosurgeon /orthopaedics for mass.   If refuses then follow with PMD      DVT Prophylaxis: Lovenox    Constipation meds    Thank you for consulting us.   No additional recommendations at current time.   Will sign off on case for now.   Please call, or re-consult if needed.

## 2019-10-03 NOTE — PROGRESS NOTE ADULT - PROBLEM SELECTOR PLAN 1
R/O for sepsis.   - normal WBC, afebrile and and will follow with [place].  - Will discontinue IVF due to LE edema   - Continue IV Vancomycin and d/c Zosyn.   - Blood cultures show no growth R/O for sepsis.   - normal WBC, afebrile   - Will discontinue IVF due to LE edema   - Continue IV Vancomycin and d/c Zosyn.   - Blood cultures show no growth

## 2019-10-03 NOTE — PROGRESS NOTE ADULT - SUBJECTIVE AND OBJECTIVE BOX
Patient is a 89y old  Female who presents with a chief complaint of Sepsis & Cellulitis (03 Oct 2019 12:38)  *******CHARTING IN PROGRESS*************    INTERVAL HPI/OVERNIGHT EVENTS: Patient seen and examined at bedside. Patient states she has had chills this morning.     MEDICATIONS  (STANDING):  amLODIPine   Tablet 5 milliGRAM(s) Oral daily  aspirin  chewable 81 milliGRAM(s) Oral daily  atorvastatin 20 milliGRAM(s) Oral at bedtime  cefuroxime   Tablet 500 milliGRAM(s) Oral every 12 hours  cycloSPORINE (RESTASIS) 0.05% Emulsion 1 Drop(s) Both EYES two times a day  docusate sodium 100 milliGRAM(s) Oral three times a day  enoxaparin Injectable 40 milliGRAM(s) SubCutaneous daily  lactobacillus acidophilus 1 Tablet(s) Oral three times a day  lactulose Syrup 20 Gram(s) Oral once  metoprolol succinate ER 25 milliGRAM(s) Oral daily  polyethylene glycol 3350 17 Gram(s) Oral daily  vancomycin  IVPB 750 milliGRAM(s) IV Intermittent every 24 hours    MEDICATIONS  (PRN):  acetaminophen   Tablet .. 650 milliGRAM(s) Oral every 6 hours PRN Mild Pain (1 - 3)  bisacodyl 5 milliGRAM(s) Oral daily PRN Constipation  fluticasone propionate 50 MICROgram(s)/spray Nasal Spray 1 Spray(s) Both Nostrils two times a day PRN Nasal congestion, rhinorrhea  senna 2 Tablet(s) Oral at bedtime PRN Constipation      Allergies    sulfa drugs (Hives)    Intolerances        REVIEW OF SYSTEMS:  CONSTITUTIONAL: No fever or chills  HEENT: No headache, no sore throat  RESPIRATORY: No cough, wheezing, or shortness of breath  CARDIOVASCULAR: No chest pain, palpitations, or leg swelling  GASTROINTESTINAL: No abd pain, nausea, vomiting, or diarrhea  GENITOURINARY: No dysuria, frequency, or hematuria  NEUROLOGICAL: No focal weakness or dizziness  MUSCULOSKELETAL: No myalgias     Vital Signs Last 24 Hrs  T(C): 36.4 (03 Oct 2019 13:27), Max: 36.7 (03 Oct 2019 05:55)  T(F): 97.5 (03 Oct 2019 13:27), Max: 98.1 (03 Oct 2019 05:55)  HR: 64 (03 Oct 2019 13:27) (64 - 79)  BP: 129/765 (03 Oct 2019 13:27) (117/62 - 131/66)  BP(mean): --  RR: 17 (03 Oct 2019 13:27) (17 - 19)  SpO2: 93% (03 Oct 2019 13:27) (85% - 93%)    PHYSICAL EXAM:  GENERAL: NAD  HEENT: EOMI, moist mucous membranes  CHEST/LUNG: CTA b/l, no rales, wheezes, or rhonchi  HEART: RRR, S1, S2  ABDOMEN: BS+, soft, nontender, nondistended  EXTREMITIES: No edema, cyanosis, or calf tenderness  NERVOUS SYSTEM: AA&Ox3    LABS:                        9.9    8.81  )-----------( 145      ( 03 Oct 2019 08:18 )             30.4     CBC Full  -  ( 03 Oct 2019 08:18 )  WBC Count : 8.81 K/uL  Hemoglobin : 9.9 g/dL  Hematocrit : 30.4 %  Platelet Count - Automated : 145 K/uL  Mean Cell Volume : 88.9 fl  Mean Cell Hemoglobin : 28.9 pg  Mean Cell Hemoglobin Concentration : 32.6 gm/dL  Auto Neutrophil # : 6.97 K/uL  Auto Lymphocyte # : 1.18 K/uL  Auto Monocyte # : 0.53 K/uL  Auto Eosinophil # : 0.06 K/uL  Auto Basophil # : 0.03 K/uL  Auto Neutrophil % : 79.1 %  Auto Lymphocyte % : 13.4 %  Auto Monocyte % : 6.0 %  Auto Eosinophil % : 0.7 %  Auto Basophil % : 0.3 %    03 Oct 2019 09:20    142    |  112    |  13     ----------------------------<  205    3.5     |  25     |  0.54     Ca    8.3        03 Oct 2019 09:20        Urinalysis Basic - ( 01 Oct 2019 14:55 )    Color: Yellow / Appearance: Clear / S.020 / pH: x  Gluc: x / Ketone: Negative  / Bili: Negative / Urobili: Negative   Blood: x / Protein: 25 mg/dL / Nitrite: Positive   Leuk Esterase: Moderate / RBC: 3-5 /HPF / WBC 26-50   Sq Epi: x / Non Sq Epi: Occasional / Bacteria: Many      CAPILLARY BLOOD GLUCOSE            Culture - Blood (collected 10-01-19 @ 21:13)  Source: .Blood Blood-Peripheral  Preliminary Report (10-02-19 @ 22:01):    No growth to date.    Culture - Blood (collected 10-01-19 @ 21:13)  Source: .Blood Blood-Peripheral  Preliminary Report (10-02-19 @ 22:01):    No growth to date.        RADIOLOGY & ADDITIONAL TESTS:    Personally reviewed.     Consultant(s) Notes Reviewed:  [x] YES  [ ] NO Patient is a 89y old  Female who presents with a chief complaint of Sepsis & Cellulitis (03 Oct 2019 12:38)    INTERVAL HPI/OVERNIGHT EVENTS: Patient seen and examined at bedside. Patient states she has had chills this morning. Also noticed that BL LE are more edematous.     MEDICATIONS  (STANDING):  amLODIPine   Tablet 5 milliGRAM(s) Oral daily  aspirin  chewable 81 milliGRAM(s) Oral daily  atorvastatin 20 milliGRAM(s) Oral at bedtime  cefuroxime   Tablet 500 milliGRAM(s) Oral every 12 hours  cycloSPORINE (RESTASIS) 0.05% Emulsion 1 Drop(s) Both EYES two times a day  docusate sodium 100 milliGRAM(s) Oral three times a day  enoxaparin Injectable 40 milliGRAM(s) SubCutaneous daily  lactobacillus acidophilus 1 Tablet(s) Oral three times a day  lactulose Syrup 20 Gram(s) Oral once  metoprolol succinate ER 25 milliGRAM(s) Oral daily  polyethylene glycol 3350 17 Gram(s) Oral daily  vancomycin  IVPB 750 milliGRAM(s) IV Intermittent every 24 hours    MEDICATIONS  (PRN):  acetaminophen   Tablet .. 650 milliGRAM(s) Oral every 6 hours PRN Mild Pain (1 - 3)  bisacodyl 5 milliGRAM(s) Oral daily PRN Constipation  fluticasone propionate 50 MICROgram(s)/spray Nasal Spray 1 Spray(s) Both Nostrils two times a day PRN Nasal congestion, rhinorrhea  senna 2 Tablet(s) Oral at bedtime PRN Constipation      Allergies    sulfa drugs (Hives)    Intolerances        REVIEW OF SYSTEMS:  CONSTITUTIONAL: No fever ++ chills  HEENT: No headache, no sore throat  RESPIRATORY: No cough, wheezing, or shortness of breath  CARDIOVASCULAR: No chest pain, palpitations,  + leg swelling  GASTROINTESTINAL: No abd pain, nausea, vomiting, or diarrhea  GENITOURINARY: No dysuria, frequency, or hematuria  NEUROLOGICAL: No focal weakness or dizziness  MUSCULOSKELETAL: No myalgias     Vital Signs Last 24 Hrs  T(C): 36.4 (03 Oct 2019 13:27), Max: 36.7 (03 Oct 2019 05:55)  T(F): 97.5 (03 Oct 2019 13:27), Max: 98.1 (03 Oct 2019 05:55)  HR: 64 (03 Oct 2019 13:27) (64 - 79)  BP: 129/765 (03 Oct 2019 13:27) (117/62 - 131/66)  BP(mean): --  RR: 17 (03 Oct 2019 13:27) (17 - 19)  SpO2: 93% (03 Oct 2019 13:27) (85% - 93%)    PHYSICAL EXAM:  GENERAL: NAD  HEENT: EOMI, moist mucous membranes  CHEST/LUNG: CTA b/l, no rales, wheezes, or rhonchi  HEART: RRR, S1, S2  ABDOMEN: BS+, soft, nontender, nondistended  EXTREMITIES: L lower extremity erythematous +water vesicles  NERVOUS SYSTEM: AA&Ox3    LABS:                        9.9    8.81  )-----------( 145      ( 03 Oct 2019 08:18 )             30.4     CBC Full  -  ( 03 Oct 2019 08:18 )  WBC Count : 8.81 K/uL  Hemoglobin : 9.9 g/dL  Hematocrit : 30.4 %  Platelet Count - Automated : 145 K/uL  Mean Cell Volume : 88.9 fl  Mean Cell Hemoglobin : 28.9 pg  Mean Cell Hemoglobin Concentration : 32.6 gm/dL  Auto Neutrophil # : 6.97 K/uL  Auto Lymphocyte # : 1.18 K/uL  Auto Monocyte # : 0.53 K/uL  Auto Eosinophil # : 0.06 K/uL  Auto Basophil # : 0.03 K/uL  Auto Neutrophil % : 79.1 %  Auto Lymphocyte % : 13.4 %  Auto Monocyte % : 6.0 %  Auto Eosinophil % : 0.7 %  Auto Basophil % : 0.3 %    03 Oct 2019 09:20    142    |  112    |  13     ----------------------------<  205    3.5     |  25     |  0.54     Ca    8.3        03 Oct 2019 09:20        Urinalysis Basic - ( 01 Oct 2019 14:55 )    Color: Yellow / Appearance: Clear / S.020 / pH: x  Gluc: x / Ketone: Negative  / Bili: Negative / Urobili: Negative   Blood: x / Protein: 25 mg/dL / Nitrite: Positive   Leuk Esterase: Moderate / RBC: 3-5 /HPF / WBC 26-50   Sq Epi: x / Non Sq Epi: Occasional / Bacteria: Many      CAPILLARY BLOOD GLUCOSE            Culture - Blood (collected 10-01-19 @ 21:13)  Source: .Blood Blood-Peripheral  Preliminary Report (10-02-19 @ 22:01):    No growth to date.    Culture - Blood (collected 10-01-19 @ 21:13)  Source: .Blood Blood-Peripheral  Preliminary Report (10-02-19 @ 22:01):    No growth to date.        RADIOLOGY & ADDITIONAL TESTS:    Personally reviewed.     Consultant(s) Notes Reviewed:  [x] YES  [ ] NO

## 2019-10-03 NOTE — PROGRESS NOTE ADULT - ASSESSMENT
89 y/o F with a PMHx of ?Cellulitis, HTN, HLD, Osteoporosis, IBS, and peptic ulcer presented to the ED with worsening LLE cellulitis with associated nausea and chills. Today pt w lower extremity edema like 2/2 IVF.

## 2019-10-03 NOTE — PROGRESS NOTE ADULT - SUBJECTIVE AND OBJECTIVE BOX
[INTERVAL HX: ]  Patient seen and examined;  Chart reviewed and events noted;   Pt notes more LE edema, IV abx change to PO.   Still feels red and hot to touch per pt.     Seen today, as apparently family did not know about masses.  Discussed with pt this AM. Saw GI for pancrease lesion but now thinking does not want EGD even if gets bigger, plans to follow with PMD.   States saw orthopaedic surgeon or neurosurgeon re mass, but did not want surgery, and not recommended by surgeons due to age and no significant sx.   Reiterates does not want aggressive surgery/measures.     Stated pt herself spoke with dtr, yesterday, and now they are aware.       Patient is a 89y Female with a known history of :  Cellulitis of left lower extremity (L03.116) [Active]  Cellulitis (L03.90) [Active]  DM (diabetes mellitus) (250.00) [Inactive]  Irritable bowel syndrome (564.1) [Active]  Osteoporosis (733.00) [Active]  HTN (hypertension) (401.9) [Active]  Peptic ulcer (533.90) [Active]  Hyperlipidemia (272.4) [Active]  History of tonsillectomy (V45.89) [Active]  History of appendectomy (V45.89) [Active]  S/P hip replacement (V43.64) [Active]    HPI:  89 y/o F with a PMHx of Cellulitis, HTN, HLD, Osteoporosis, IBS, and peptic ulcer presented to the ED with worsening LLE cellulitis with associated nausea and chills. Per patient, patient was diagnosed with cellulitis of the L LE last month, with associated chills and was given a 10 day course of antibiotics (unable to specify which one) from [facility], and her symptoms improved and the cellulitic rash went away completely, but patient continued to have intermittent chills throughout the month. 2 days ago, patient felt similar chills to what she felt last month with additional nausea. Yesterday, patient noticed worsening swelling and pain in the same L LE area. Additionally, patient accidently scratched herself on the back of her R leg several weeks ago, slightly lateral and inferior to her popliteal fossa, and her wound in the area has been healing slowly ever since. Patient denies chest pain, SOB, palpations, vomiting diarrhea. Of note, patient also complaining of recent constipation.      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 (01 Oct 2019 15:24)            MEDICATIONS  (STANDING):  amLODIPine   Tablet 5 milliGRAM(s) Oral daily  aspirin  chewable 81 milliGRAM(s) Oral daily  atorvastatin 20 milliGRAM(s) Oral at bedtime  cefuroxime   Tablet 500 milliGRAM(s) Oral every 12 hours  cycloSPORINE (RESTASIS) 0.05% Emulsion 1 Drop(s) Both EYES two times a day  docusate sodium 100 milliGRAM(s) Oral three times a day  enoxaparin Injectable 40 milliGRAM(s) SubCutaneous daily  lactobacillus acidophilus 1 Tablet(s) Oral three times a day  lactulose Syrup 20 Gram(s) Oral once  metoprolol succinate ER 25 milliGRAM(s) Oral daily  polyethylene glycol 3350 17 Gram(s) Oral daily  vancomycin  IVPB 750 milliGRAM(s) IV Intermittent every 24 hours    MEDICATIONS  (PRN):  acetaminophen   Tablet .. 650 milliGRAM(s) Oral every 6 hours PRN Mild Pain (1 - 3)  bisacodyl 5 milliGRAM(s) Oral daily PRN Constipation  fluticasone propionate 50 MICROgram(s)/spray Nasal Spray 1 Spray(s) Both Nostrils two times a day PRN Nasal congestion, rhinorrhea  senna 2 Tablet(s) Oral at bedtime PRN Constipation      Vital Signs Last 24 Hrs  T(C): 36.7 (03 Oct 2019 05:55), Max: 36.7 (03 Oct 2019 05:55)  T(F): 98.1 (03 Oct 2019 05:55), Max: 98.1 (03 Oct 2019 05:55)  HR: 79 (03 Oct 2019 05:55) (63 - 79)  BP: 131/66 (03 Oct 2019 05:55) (93/55 - 131/66)  BP(mean): --  RR: 18 (03 Oct 2019 05:55) (18 - 19)  SpO2: 91% (03 Oct 2019 05:55) (85% - 92%)      [PHYSICAL EXAM]  General: adult in NAD,  WN,  WD.  HEENT: clear oropharynx, anicteric sclera, pink conjunctivae.  Neck: supple, no masses.  CV: normal S1S2, no murmur, no rubs, no gallops.  Lungs: clear to auscultation, no wheezes, no rales, no rhonchi.  Abdomen: soft, non-tender, non-distended, no hepatosplenomegaly, normal BS, no guarding.  Ext: no clubbing, no cyanosis, +LLE erythema and edema.  Skin: no rashes,  no petechiae, no venous stasis changes.  Neuro: alert and oriented X4  , no focal motor deficits.  LN: no SC ANASTACIO.      [LABS:]                        9.9    8.81  )-----------( 145      ( 03 Oct 2019 08:18 )             30.4     10-03    142  |  112<H>  |  13  ----------------------------<  205<H>  3.5   |  25  |  0.54    Ca    8.3<L>      03 Oct 2019 09:20    TPro  4.9<L>  /  Alb  2.3<L>  /  TBili  0.7  /  DBili  x   /  AST  26  /  ALT  28  /  AlkPhos  67  10-02    PT/INR - ( 01 Oct 2019 13:46 )   PT: 12.7 sec;   INR: 1.12 ratio         PTT - ( 01 Oct 2019 13:46 )  PTT:31.3 sec              [RADIOLOGY STUDIES:]

## 2019-10-03 NOTE — PROGRESS NOTE ADULT - PROBLEM SELECTOR PROBLEM 6
Called patient regarding the e-advice message.   Unable to reach patient.   Left message for patient to return call.    Hyperlipidemia

## 2019-10-03 NOTE — PROGRESS NOTE ADULT - PROBLEM SELECTOR PLAN 3
UA+ nitrates and bacteria   - Ceftin 500 mg BID x5 days UA+ nitrates and bacteria  Cx + for Ecoli  ceftriaxone x3 days

## 2019-10-03 NOTE — PROGRESS NOTE ADULT - PROBLEM SELECTOR PLAN 4
h/h trending down likely 2/2 dilutional anemia   - Iron studies consistent with anemia of chronic disease  - Will continue to trend

## 2019-10-04 LAB
ANION GAP SERPL CALC-SCNC: 5 MMOL/L — SIGNIFICANT CHANGE UP (ref 5–17)
BASOPHILS # BLD AUTO: 0.03 K/UL — SIGNIFICANT CHANGE UP (ref 0–0.2)
BASOPHILS NFR BLD AUTO: 0.4 % — SIGNIFICANT CHANGE UP (ref 0–2)
BUN SERPL-MCNC: 9 MG/DL — SIGNIFICANT CHANGE UP (ref 7–23)
CALCIUM SERPL-MCNC: 8.2 MG/DL — LOW (ref 8.5–10.1)
CHLORIDE SERPL-SCNC: 110 MMOL/L — HIGH (ref 96–108)
CO2 SERPL-SCNC: 27 MMOL/L — SIGNIFICANT CHANGE UP (ref 22–31)
CREAT SERPL-MCNC: 0.35 MG/DL — LOW (ref 0.5–1.3)
EOSINOPHIL # BLD AUTO: 0.16 K/UL — SIGNIFICANT CHANGE UP (ref 0–0.5)
EOSINOPHIL NFR BLD AUTO: 2.2 % — SIGNIFICANT CHANGE UP (ref 0–6)
GLUCOSE SERPL-MCNC: 129 MG/DL — HIGH (ref 70–99)
HCT VFR BLD CALC: 30.2 % — LOW (ref 34.5–45)
HGB BLD-MCNC: 9.9 G/DL — LOW (ref 11.5–15.5)
IMM GRANULOCYTES NFR BLD AUTO: 0.3 % — SIGNIFICANT CHANGE UP (ref 0–1.5)
LYMPHOCYTES # BLD AUTO: 1.61 K/UL — SIGNIFICANT CHANGE UP (ref 1–3.3)
LYMPHOCYTES # BLD AUTO: 22.3 % — SIGNIFICANT CHANGE UP (ref 13–44)
MCHC RBC-ENTMCNC: 28.9 PG — SIGNIFICANT CHANGE UP (ref 27–34)
MCHC RBC-ENTMCNC: 32.8 GM/DL — SIGNIFICANT CHANGE UP (ref 32–36)
MCV RBC AUTO: 88 FL — SIGNIFICANT CHANGE UP (ref 80–100)
MONOCYTES # BLD AUTO: 0.55 K/UL — SIGNIFICANT CHANGE UP (ref 0–0.9)
MONOCYTES NFR BLD AUTO: 7.6 % — SIGNIFICANT CHANGE UP (ref 2–14)
NEUTROPHILS # BLD AUTO: 4.85 K/UL — SIGNIFICANT CHANGE UP (ref 1.8–7.4)
NEUTROPHILS NFR BLD AUTO: 67.2 % — SIGNIFICANT CHANGE UP (ref 43–77)
NRBC # BLD: 0 /100 WBCS — SIGNIFICANT CHANGE UP (ref 0–0)
PLATELET # BLD AUTO: 164 K/UL — SIGNIFICANT CHANGE UP (ref 150–400)
POTASSIUM SERPL-MCNC: 3 MMOL/L — LOW (ref 3.5–5.3)
POTASSIUM SERPL-SCNC: 3 MMOL/L — LOW (ref 3.5–5.3)
RBC # BLD: 3.43 M/UL — LOW (ref 3.8–5.2)
RBC # FLD: 13.1 % — SIGNIFICANT CHANGE UP (ref 10.3–14.5)
SODIUM SERPL-SCNC: 142 MMOL/L — SIGNIFICANT CHANGE UP (ref 135–145)
VANCOMYCIN TROUGH SERPL-MCNC: 3.3 UG/ML — LOW (ref 10–20)
WBC # BLD: 7.22 K/UL — SIGNIFICANT CHANGE UP (ref 3.8–10.5)
WBC # FLD AUTO: 7.22 K/UL — SIGNIFICANT CHANGE UP (ref 3.8–10.5)

## 2019-10-04 PROCEDURE — 99232 SBSQ HOSP IP/OBS MODERATE 35: CPT

## 2019-10-04 RX ORDER — POTASSIUM CHLORIDE 20 MEQ
40 PACKET (EA) ORAL ONCE
Refills: 0 | Status: COMPLETED | OUTPATIENT
Start: 2019-10-04 | End: 2019-10-04

## 2019-10-04 RX ORDER — CEFTAROLINE FOSAMIL 600 MG/20ML
600 POWDER, FOR SOLUTION INTRAVENOUS EVERY 12 HOURS
Refills: 0 | Status: COMPLETED | OUTPATIENT
Start: 2019-10-04 | End: 2019-10-07

## 2019-10-04 RX ORDER — FUROSEMIDE 40 MG
20 TABLET ORAL DAILY
Refills: 0 | Status: DISCONTINUED | OUTPATIENT
Start: 2019-10-04 | End: 2019-10-04

## 2019-10-04 RX ADMIN — ATORVASTATIN CALCIUM 20 MILLIGRAM(S): 80 TABLET, FILM COATED ORAL at 21:31

## 2019-10-04 RX ADMIN — AMLODIPINE BESYLATE 5 MILLIGRAM(S): 2.5 TABLET ORAL at 05:58

## 2019-10-04 RX ADMIN — Medication 1 TABLET(S): at 21:31

## 2019-10-04 RX ADMIN — Medication 100 MILLIGRAM(S): at 14:36

## 2019-10-04 RX ADMIN — Medication 500 MILLIGRAM(S): at 05:58

## 2019-10-04 RX ADMIN — ENOXAPARIN SODIUM 40 MILLIGRAM(S): 100 INJECTION SUBCUTANEOUS at 13:03

## 2019-10-04 RX ADMIN — CYCLOSPORINE 1 DROP(S): 0.5 EMULSION OPHTHALMIC at 17:36

## 2019-10-04 RX ADMIN — Medication 1 TABLET(S): at 05:58

## 2019-10-04 RX ADMIN — Medication 250 MILLIGRAM(S): at 14:36

## 2019-10-04 RX ADMIN — Medication 81 MILLIGRAM(S): at 13:03

## 2019-10-04 RX ADMIN — Medication 40 MILLIEQUIVALENT(S): at 13:03

## 2019-10-04 RX ADMIN — CEFTAROLINE FOSAMIL 50 MILLIGRAM(S): 600 POWDER, FOR SOLUTION INTRAVENOUS at 18:52

## 2019-10-04 RX ADMIN — Medication 100 MILLIGRAM(S): at 05:58

## 2019-10-04 RX ADMIN — Medication 20 MILLIGRAM(S): at 13:03

## 2019-10-04 RX ADMIN — Medication 1 TABLET(S): at 14:36

## 2019-10-04 RX ADMIN — CYCLOSPORINE 1 DROP(S): 0.5 EMULSION OPHTHALMIC at 05:58

## 2019-10-04 RX ADMIN — Medication 100 MILLIGRAM(S): at 21:32

## 2019-10-04 RX ADMIN — Medication 25 MILLIGRAM(S): at 05:58

## 2019-10-04 RX ADMIN — POLYETHYLENE GLYCOL 3350 17 GRAM(S): 17 POWDER, FOR SOLUTION ORAL at 08:48

## 2019-10-04 NOTE — PROGRESS NOTE ADULT - PROBLEM SELECTOR PLAN 1
R/O for sepsis.   - normal WBC, afebrile   - Will discontinue IVF due to LE edema   - Continue IV Vancomycin and d/c Zosyn.   - Blood cultures show no growth

## 2019-10-04 NOTE — PROGRESS NOTE ADULT - SUBJECTIVE AND OBJECTIVE BOX
Patient is a 89y old  Female who presents with a chief complaint of Sepsis & Cellulitis (03 Oct 2019 12:38)    INTERVAL HPI/OVERNIGHT EVENTS: Patient seen and examined at bedside. Pt feels her legs are filled with water again. She denies any F/CH/N/V. has pain in LLE   MEDICATIONS  (STANDING):  amLODIPine   Tablet 5 milliGRAM(s) Oral daily  aspirin  chewable 81 milliGRAM(s) Oral daily  atorvastatin 20 milliGRAM(s) Oral at bedtime  cycloSPORINE (RESTASIS) 0.05% Emulsion 1 Drop(s) Both EYES two times a day  docusate sodium 100 milliGRAM(s) Oral three times a day  enoxaparin Injectable 40 milliGRAM(s) SubCutaneous daily  lactobacillus acidophilus 1 Tablet(s) Oral three times a day  lactulose Syrup 20 Gram(s) Oral once  metoprolol succinate ER 25 milliGRAM(s) Oral daily  polyethylene glycol 3350 17 Gram(s) Oral daily  vancomycin  IVPB 750 milliGRAM(s) IV Intermittent every 24 hours    MEDICATIONS  (PRN):  acetaminophen   Tablet .. 650 milliGRAM(s) Oral every 6 hours PRN Mild Pain (1 - 3)  bisacodyl 5 milliGRAM(s) Oral daily PRN Constipation  fluticasone propionate 50 MICROgram(s)/spray Nasal Spray 1 Spray(s) Both Nostrils two times a day PRN Nasal congestion, rhinorrhea  senna 2 Tablet(s) Oral at bedtime PRN Constipation      Allergies    sulfa drugs (Hives)    Intolerances        REVIEW OF SYSTEMS:  CONSTITUTIONAL: No fever, chills  HEENT: No headache, no sore throat  RESPIRATORY: No cough, wheezing, or shortness of breath  CARDIOVASCULAR: No chest pain, palpitations,  + leg swelling  GASTROINTESTINAL: No abd pain, nausea, vomiting, or diarrhea  GENITOURINARY: No dysuria, frequency, or hematuria  NEUROLOGICAL: No focal weakness or dizziness  MUSCULOSKELETAL: pain and swelling of LLE    Vital Signs Last 24 Hrs  T(C): 36.9 (04 Oct 2019 05:30), Max: 36.9 (03 Oct 2019 21:52)  T(F): 98.4 (04 Oct 2019 05:30), Max: 98.4 (03 Oct 2019 21:52)  HR: 66 (04 Oct 2019 05:30) (64 - 75)  BP: 127/75 (04 Oct 2019 05:30) (127/75 - 135/71)  BP(mean): --  RR: 16 (04 Oct 2019 05:30) (16 - 17)  SpO2: 91% (04 Oct 2019 05:30) (91% - 94%)  ffilled  PHYSICAL EXAM:  GENERAL: NAD  HEENT: EOMI, moist mucous membranes  CHEST/LUNG: CTA b/l, no rales, wheezes, or rhonchi  HEART: RRR, S1, S2  ABDOMEN: BS+, soft, nontender, nondistended  EXTREMITIES: L lower extremity erythematous +water filled pockets on shin of leg.  NERVOUS SYSTEM: AA&Ox3    LABS:                        9.9    7.22  )-----------( 164      ( 04 Oct 2019 07:54 )             30.2     04 Oct 2019 07:54    142    |  110    |  9      ----------------------------<  129    3.0     |  27     |  0.35     Ca    8.2        04 Oct 2019 07:54          CAPILLARY BLOOD GLUCOSE        UCx       RADIOLOGY & ADDITIONAL TESTS:              Culture - Blood (collected 10-01-19 @ 21:13)  Source: .Blood Blood-Peripheral  Preliminary Report (10-02-19 @ 22:01):    No growth to date.    Culture - Blood (collected 10-01-19 @ 21:13)  Source: .Blood Blood-Peripheral  Preliminary Report (10-02-19 @ 22:01):    No growth to date.        RADIOLOGY & ADDITIONAL TESTS:    Personally reviewed.     Consultant(s) Notes Reviewed:  [x] YES  [ ] NO

## 2019-10-04 NOTE — PROGRESS NOTE ADULT - PROBLEM SELECTOR PLAN 2
- S/p IV Vancomycin x1 + Zosyn x 1 in ED.   - Continue IV Vancomycin and d/c Zosyn.   - blood x2 neg  - Elevation of LLE ( hx of venous stasis)  - lasix 20 mg x1 and replete K+

## 2019-10-04 NOTE — CONSULT NOTE ADULT - SUBJECTIVE AND OBJECTIVE BOX
Infectious Diseases Consult by Clifford Nesbitt MD    Reason for Consult :    HPI:  89 y/o F with a PMHx of Cellulitis, HTN, HLD, Osteoporosis, IBS, and peptic ulcer presented to the ED with worsening LLE cellulitis with associated nausea and chills. Per patient, patient was diagnosed with cellulitis of the L LE last month, with associated chills and was given a 10 day course of antibiotics (unable to specify which one) from [facility], and her symptoms improved and the cellulitic rash went away completely, but patient continued to have intermittent chills throughout the month. 2 days ago, patient felt similar chills to what she felt last month with additional nausea. Yesterday, patient noticed worsening swelling and pain in the same L LE area. Additionally, patient accidently scratched herself on the back of her R leg several weeks ago, slightly lateral and inferior to her popliteal fossa, and her wound in the area has been healing slowly ever since. Patient denies chest pain, SOB, palpations, vomiting diarrhea. Of note, patient also complaining of recent constipation.  She had seen Skye Henry and was advised to  wear compression stocking . Her left leg is more swollen than right leg since she  has left hip surgery . 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 (01 Oct 2019 15:24)    She has been afebrile , has normal WBC . She has very poor IV access . No hx of DVT in past , no hx of heart disease of CHF       Past Medical & Surgical Hx:  PAST MEDICAL & SURGICAL HISTORY:  Cellulitis  Irritable bowel syndrome  Osteoporosis  HTN (hypertension)  Peptic ulcer  Hyperlipidemia  History of tonsillectomy  History of appendectomy  S/P hip replacement: partial, left      Social History-- Retired   EtOH: denies   Tobacco: denies   Drug Use: denies       FAMILY HISTORY:  Allergies    sulfa drugs (Hives)    Intolerances  Home/ Out patient  Medications :  Home Medications:  amLODIPine 5 mg oral tablet: 1 tab(s) orally once a day (01 Oct 2019 14:41)  aspirin 81 mg oral tablet: 2 tab(s) orally once a day (01 Oct 2019 14:41)  atorvastatin 20 mg oral tablet: 1 tab(s) orally once a day (01 Oct 2019 14:41)  metoprolol succinate 25 mg oral tablet, extended release: 1 tab(s) orally once a day (01 Oct 2019 14:41)  MiraLax oral powder for reconstitution:  (01 Oct 2019 14:41)  Restasis 0.05% ophthalmic emulsion: 1 drop(s) to each affected eye every 12 hours  both eyes (01 Oct 2019 14:41)      Current Inpatient Medications :    ANTIBIOTICS:   vancomycin  IVPB 750 milliGRAM(s) IV Intermittent every 24 hours      OTHER RELEVANT MEDICATIONS :  acetaminophen   Tablet .. 650 milliGRAM(s) Oral every 6 hours PRN  amLODIPine   Tablet 5 milliGRAM(s) Oral daily  aspirin  chewable 81 milliGRAM(s) Oral daily  atorvastatin 20 milliGRAM(s) Oral at bedtime  bisacodyl 5 milliGRAM(s) Oral daily PRN  cycloSPORINE (RESTASIS) 0.05% Emulsion 1 Drop(s) Both EYES two times a day  docusate sodium 100 milliGRAM(s) Oral three times a day  enoxaparin Injectable 40 milliGRAM(s) SubCutaneous daily  fluticasone propionate 50 MICROgram(s)/spray Nasal Spray 1 Spray(s) Both Nostrils two times a day PRN  furosemide    Tablet 20 milliGRAM(s) Oral daily  lactulose Syrup 20 Gram(s) Oral once  metoprolol succinate ER 25 milliGRAM(s) Oral daily  polyethylene glycol 3350 17 Gram(s) Oral daily  senna 2 Tablet(s) Oral at bedtime PRN        ROS:  CONSTITUTIONAL:  Negative fever or chills, feels well, good appetite  EYES:  Negative  blurry vision or double vision  CARDIOVASCULAR:  Negative for chest pain or palpitations  RESPIRATORY:  Negative for cough, wheezing, or SOB   GASTROINTESTINAL:  Negative for nausea, vomiting, diarrhea, constipation, or abdominal pain  GENITOURINARY:  Negative frequency, urgency , dysuria or hematuria   NEUROLOGIC:  No headache, confusion, dizziness, lightheadedness  All other systems were reviewed and are negative          I&O's Detail    03 Oct 2019 07:01  -  04 Oct 2019 07:00  --------------------------------------------------------  IN:    IV PiggyBack: 250 mL    Oral Fluid: 340 mL  Total IN: 590 mL    OUT:  Total OUT: 0 mL    Total NET: 590 mL          Physical Exam:  Vital Signs Last 24 Hrs  T(C): 36.9 (04 Oct 2019 13:11), Max: 36.9 (03 Oct 2019 21:52)  T(F): 98.5 (04 Oct 2019 13:11), Max: 98.5 (04 Oct 2019 13:11)  HR: 68 (04 Oct 2019 13:11) (66 - 75)  BP: 133/69 (04 Oct 2019 13:11) (127/75 - 135/71)  BP(mean): --  RR: 17 (04 Oct 2019 13:11) (16 - 17)  SpO2: 95% (04 Oct 2019 13:11) (91% - 95%)      General: frail poorly nourished, in no acute distress  Eyes: sclera anicteric, pupils equal and reactive to light  ENMT: buccal mucosa moist, pharynx not injected  Neck: supple, trachea midline  Lungs: clear, no wheeze/rhonchi  Cardiovascular: regular rate and rhythm, S1 S2  Abdomen: soft, nontender, no organomegaly present, bowel sounds normal  Neurological:  alert and oriented x3, Cranial Nerves II-XII grossly intact  Skin: increased ecchymosis, paper thin skin  Lymph Nodes: no palpable cervical/supraclavicular lymph nodes enlargements  Extremities: no cyanosis/clubbing, has 2 + leg edema , diffuse erythema and skin blister , no skin break down , tender and warm to touch     Labs:                            9.9    7.22   )----------(  164       ( 04 Oct 2019 07:54 )               30.2      142    |  110    |  9      ----------------------------<  129        ( 04 Oct 2019 07:54 )  3.0     |  27     |  0.35     Ca    8.2        ( 04 Oct 2019 07:54 )    Sedimentation Rate, Erythrocyte (10.02.19 @ 14:10)    Sedimentation Rate, Erythrocyte: 17 mm/hr    C-Reactive Protein, Serum (10.02.19 @ 19:43)    C-Reactive Protein, Serum: 16.91 mg/dL    Lactate, Blood (10.01.19 @ 15:31)    Lactate, Blood: 2.7 mmol/L    Lactate, Blood (10.01.19 @ 13:46)    Lactate, Blood: 3.0 mmol/L      RECENT CULTURES:    Culture - Blood (collected 10-01-19 @ 21:13)  Source: .Blood Blood-Peripheral  Preliminary Report (10-02-19 @ 22:01):    No growth to date.    Culture - Blood (collected 10-01-19 @ 21:13)  Source: .Blood Blood-Peripheral  Preliminary Report (10-02-19 @ 22:01):    No growth to date.        RADIOLOGY & ADDITIONAL STUDIES:  CT Abdomen and Pelvis No Cont (10.01.19 @ 13:57) >  IMPRESSION:     No acute intra-abdominal or pelvic findings on this unenhanced exam;   evaluation of the pelvis is degraded by streak artifact from femoral   hardware.    Cystic lesion in the pancreatic head measuring up to 2.3 cm which may   represent a benign cyst, IPMN, or cystic neoplasm. Nonemergent MRI of the   abdomen or correlation with prior imaging would be helpful to better   characterize and to assess stability.    Masslike smooth ovoid density in the left paraspinal musculature at the   level of L5 and the sacrum with density slightly higher than that of   adjacent musculature measuring up to 9.8 x3.4 x 4.7 cm with extension   into a sacral foramina on the left which could represent a neurofibroma   or schwannoma. Nonemergent MRI of the lumbosacral spine would be helpful   for better characterization.      Assessment :     89 year old female with multiple medical problems with hx of probably chronic venous insufficiency left leg with recurrent cellulitis left leg was seen by vascular surgery Dr. Cheung suggested compression stockings, was placed on po abx x 2 weeks with  improvement , then relapsed . No systemic signs of infection  except elevated CRP. had elevated lactate on admission doubt she had sepsis . has very poor IV access and renal function . She has a left paraspinal mass , doubt if its causing the left leg swelling     Plan :   - will change abx to Teflaro 600 mg q 12 x 3 days then change to po doxycycline   - get procalcitonin, hga1c ,  - vascular surgery evaluation for compression dressing , message sent to DR. Cheung, he will see late tomorrow   - keep leg elevated   - trend cbc   - doubt need for diuretics as she has unilateral edema       Continue with present regime .  Appropriate use of antibiotics and adverse effects reviewed.      I have discussed the above plan of care with patient and her daughter in detail. They expressed understanding of the treatment plan . Risks, benefits and alternatives discussed in detail. I have asked if they have any questions or concerns and appropriately addressed them to the best of my ability .      > 65  minutes spent in direct patient care reviewing  the notes, lab data/ imaging , discussion with multidisciplinary team. All questions were addressed and answered to the best of my capacity .    Thank you for allowing me to participate in the care of your patient .      Clifford Nesbitt MD  [phone] Infectious Diseases Consult by Clifford Nesbitt MD    Reason for Consult :    HPI:  89 y/o F with a PMHx of Cellulitis, HTN, HLD, Osteoporosis, IBS, and peptic ulcer presented to the ED with worsening LLE cellulitis with associated nausea and chills. Per patient, patient was diagnosed with cellulitis of the L LE last month, with associated chills and was given a 10 day course of antibiotics (unable to specify which one) from [facility], and her symptoms improved and the cellulitic rash went away completely, but patient continued to have intermittent chills throughout the month. 2 days ago, patient felt similar chills to what she felt last month with additional nausea. Yesterday, patient noticed worsening swelling and pain in the same L LE area. Additionally, patient accidently scratched herself on the back of her R leg several weeks ago, slightly lateral and inferior to her popliteal fossa, and her wound in the area has been healing slowly ever since. Patient denies chest pain, SOB, palpations, vomiting diarrhea. Of note, patient also complaining of recent constipation.  She had seen Skye Henry and was advised to  wear compression stocking . Her left leg is more swollen than right leg since she  has left hip surgery . 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 (01 Oct 2019 15:24)    She has been afebrile , has normal WBC . She has very poor IV access . No hx of DVT in past , no hx of heart disease of CHF     No recent venous doppler done , had one last year       Past Medical & Surgical Hx:  PAST MEDICAL & SURGICAL HISTORY:  Cellulitis  Irritable bowel syndrome  Osteoporosis  HTN (hypertension)  Peptic ulcer  Hyperlipidemia  History of tonsillectomy  History of appendectomy  S/P hip replacement: partial, left      Social History-- Retired   EtOH: denies   Tobacco: denies   Drug Use: denies       FAMILY HISTORY:  Allergies    sulfa drugs (Hives)    Intolerances  Home/ Out patient  Medications :  Home Medications:  amLODIPine 5 mg oral tablet: 1 tab(s) orally once a day (01 Oct 2019 14:41)  aspirin 81 mg oral tablet: 2 tab(s) orally once a day (01 Oct 2019 14:41)  atorvastatin 20 mg oral tablet: 1 tab(s) orally once a day (01 Oct 2019 14:41)  metoprolol succinate 25 mg oral tablet, extended release: 1 tab(s) orally once a day (01 Oct 2019 14:41)  MiraLax oral powder for reconstitution:  (01 Oct 2019 14:41)  Restasis 0.05% ophthalmic emulsion: 1 drop(s) to each affected eye every 12 hours  both eyes (01 Oct 2019 14:41)      Current Inpatient Medications :    ANTIBIOTICS:   vancomycin  IVPB 750 milliGRAM(s) IV Intermittent every 24 hours      OTHER RELEVANT MEDICATIONS :  acetaminophen   Tablet .. 650 milliGRAM(s) Oral every 6 hours PRN  amLODIPine   Tablet 5 milliGRAM(s) Oral daily  aspirin  chewable 81 milliGRAM(s) Oral daily  atorvastatin 20 milliGRAM(s) Oral at bedtime  bisacodyl 5 milliGRAM(s) Oral daily PRN  cycloSPORINE (RESTASIS) 0.05% Emulsion 1 Drop(s) Both EYES two times a day  docusate sodium 100 milliGRAM(s) Oral three times a day  enoxaparin Injectable 40 milliGRAM(s) SubCutaneous daily  fluticasone propionate 50 MICROgram(s)/spray Nasal Spray 1 Spray(s) Both Nostrils two times a day PRN  furosemide    Tablet 20 milliGRAM(s) Oral daily  lactulose Syrup 20 Gram(s) Oral once  metoprolol succinate ER 25 milliGRAM(s) Oral daily  polyethylene glycol 3350 17 Gram(s) Oral daily  senna 2 Tablet(s) Oral at bedtime PRN        ROS:  CONSTITUTIONAL:  Negative fever or chills, feels well, good appetite  EYES:  Negative  blurry vision or double vision  CARDIOVASCULAR:  Negative for chest pain or palpitations  RESPIRATORY:  Negative for cough, wheezing, or SOB   GASTROINTESTINAL:  Negative for nausea, vomiting, diarrhea, constipation, or abdominal pain  GENITOURINARY:  Negative frequency, urgency , dysuria or hematuria   NEUROLOGIC:  No headache, confusion, dizziness, lightheadedness  All other systems were reviewed and are negative          I&O's Detail    03 Oct 2019 07:01  -  04 Oct 2019 07:00  --------------------------------------------------------  IN:    IV PiggyBack: 250 mL    Oral Fluid: 340 mL  Total IN: 590 mL    OUT:  Total OUT: 0 mL    Total NET: 590 mL          Physical Exam:  Vital Signs Last 24 Hrs  T(C): 36.9 (04 Oct 2019 13:11), Max: 36.9 (03 Oct 2019 21:52)  T(F): 98.5 (04 Oct 2019 13:11), Max: 98.5 (04 Oct 2019 13:11)  HR: 68 (04 Oct 2019 13:11) (66 - 75)  BP: 133/69 (04 Oct 2019 13:11) (127/75 - 135/71)  BP(mean): --  RR: 17 (04 Oct 2019 13:11) (16 - 17)  SpO2: 95% (04 Oct 2019 13:11) (91% - 95%)      General: frail poorly nourished, in no acute distress  Eyes: sclera anicteric, pupils equal and reactive to light  ENMT: buccal mucosa moist, pharynx not injected  Neck: supple, trachea midline  Lungs: clear, no wheeze/rhonchi  Cardiovascular: regular rate and rhythm, S1 S2  Abdomen: soft, nontender, no organomegaly present, bowel sounds normal  Neurological:  alert and oriented x3, Cranial Nerves II-XII grossly intact  Skin: increased ecchymosis, paper thin skin  Lymph Nodes: no palpable cervical/supraclavicular lymph nodes enlargements  Extremities: no cyanosis/clubbing, has 2 + leg edema , diffuse erythema and skin blister , no skin break down , tender and warm to touch     Labs:                            9.9    7.22   )----------(  164       ( 04 Oct 2019 07:54 )               30.2      142    |  110    |  9      ----------------------------<  129        ( 04 Oct 2019 07:54 )  3.0     |  27     |  0.35     Ca    8.2        ( 04 Oct 2019 07:54 )    Sedimentation Rate, Erythrocyte (10.02.19 @ 14:10)    Sedimentation Rate, Erythrocyte: 17 mm/hr    C-Reactive Protein, Serum (10.02.19 @ 19:43)    C-Reactive Protein, Serum: 16.91 mg/dL    Lactate, Blood (10.01.19 @ 15:31)    Lactate, Blood: 2.7 mmol/L    Lactate, Blood (10.01.19 @ 13:46)    Lactate, Blood: 3.0 mmol/L      RECENT CULTURES:    Culture - Blood (collected 10-01-19 @ 21:13)  Source: .Blood Blood-Peripheral  Preliminary Report (10-02-19 @ 22:01):    No growth to date.    Culture - Blood (collected 10-01-19 @ 21:13)  Source: .Blood Blood-Peripheral  Preliminary Report (10-02-19 @ 22:01):    No growth to date.        RADIOLOGY & ADDITIONAL STUDIES:  CT Abdomen and Pelvis No Cont (10.01.19 @ 13:57) >  IMPRESSION:     No acute intra-abdominal or pelvic findings on this unenhanced exam;   evaluation of the pelvis is degraded by streak artifact from femoral   hardware.    Cystic lesion in the pancreatic head measuring up to 2.3 cm which may   represent a benign cyst, IPMN, or cystic neoplasm. Nonemergent MRI of the   abdomen or correlation with prior imaging would be helpful to better   characterize and to assess stability.    Masslike smooth ovoid density in the left paraspinal musculature at the   level of L5 and the sacrum with density slightly higher than that of   adjacent musculature measuring up to 9.8 x3.4 x 4.7 cm with extension   into a sacral foramina on the left which could represent a neurofibroma   or schwannoma. Nonemergent MRI of the lumbosacral spine would be helpful   for better characterization.      Assessment :     89 year old female with multiple medical problems with hx of probably chronic venous insufficiency left leg with recurrent cellulitis left leg was seen by vascular surgery Dr. Cheung suggested compression stockings, was placed on po abx x 2 weeks with  improvement , then relapsed . No systemic signs of infection  except elevated CRP. had elevated lactate on admission doubt she had sepsis . has very poor IV access and renal function . She has a left paraspinal mass , doubt if its causing the left leg swelling     Plan :   - will change abx to Teflaro 600 mg q 12 x 3 days then change to po doxycycline   - get procalcitonin, hga1c ,  - vascular surgery evaluation for compression dressing , message sent to DR. Cheung, he will see late tomorrow   - keep leg elevated   - trend cbc   - doubt need for diuretics as she has unilateral edema   - venous doppler left leg r/o DVT      Continue with present regime .  Appropriate use of antibiotics and adverse effects reviewed.      I have discussed the above plan of care with patient and her daughter in detail. They expressed understanding of the treatment plan . Risks, benefits and alternatives discussed in detail. I have asked if they have any questions or concerns and appropriately addressed them to the best of my ability .      > 65  minutes spent in direct patient care reviewing  the notes, lab data/ imaging , discussion with multidisciplinary team. All questions were addressed and answered to the best of my capacity .    Thank you for allowing me to participate in the care of your patient .      Clifford Nesbitt MD  [phone]

## 2019-10-05 LAB
ANION GAP SERPL CALC-SCNC: 5 MMOL/L — SIGNIFICANT CHANGE UP (ref 5–17)
BASOPHILS # BLD AUTO: 0.05 K/UL — SIGNIFICANT CHANGE UP (ref 0–0.2)
BASOPHILS NFR BLD AUTO: 0.7 % — SIGNIFICANT CHANGE UP (ref 0–2)
BUN SERPL-MCNC: 8 MG/DL — SIGNIFICANT CHANGE UP (ref 7–23)
CALCIUM SERPL-MCNC: 8.5 MG/DL — SIGNIFICANT CHANGE UP (ref 8.5–10.1)
CHLORIDE SERPL-SCNC: 106 MMOL/L — SIGNIFICANT CHANGE UP (ref 96–108)
CO2 SERPL-SCNC: 30 MMOL/L — SIGNIFICANT CHANGE UP (ref 22–31)
CREAT SERPL-MCNC: 0.47 MG/DL — LOW (ref 0.5–1.3)
EOSINOPHIL # BLD AUTO: 0.16 K/UL — SIGNIFICANT CHANGE UP (ref 0–0.5)
EOSINOPHIL NFR BLD AUTO: 2.2 % — SIGNIFICANT CHANGE UP (ref 0–6)
GLUCOSE SERPL-MCNC: 193 MG/DL — HIGH (ref 70–99)
HBA1C BLD-MCNC: 7.2 % — HIGH (ref 4–5.6)
HCT VFR BLD CALC: 35 % — SIGNIFICANT CHANGE UP (ref 34.5–45)
HGB BLD-MCNC: 11.4 G/DL — LOW (ref 11.5–15.5)
IMM GRANULOCYTES NFR BLD AUTO: 0.4 % — SIGNIFICANT CHANGE UP (ref 0–1.5)
LYMPHOCYTES # BLD AUTO: 1.56 K/UL — SIGNIFICANT CHANGE UP (ref 1–3.3)
LYMPHOCYTES # BLD AUTO: 21.4 % — SIGNIFICANT CHANGE UP (ref 13–44)
MCHC RBC-ENTMCNC: 28.6 PG — SIGNIFICANT CHANGE UP (ref 27–34)
MCHC RBC-ENTMCNC: 32.6 GM/DL — SIGNIFICANT CHANGE UP (ref 32–36)
MCV RBC AUTO: 87.9 FL — SIGNIFICANT CHANGE UP (ref 80–100)
MONOCYTES # BLD AUTO: 0.5 K/UL — SIGNIFICANT CHANGE UP (ref 0–0.9)
MONOCYTES NFR BLD AUTO: 6.8 % — SIGNIFICANT CHANGE UP (ref 2–14)
NEUTROPHILS # BLD AUTO: 5 K/UL — SIGNIFICANT CHANGE UP (ref 1.8–7.4)
NEUTROPHILS NFR BLD AUTO: 68.5 % — SIGNIFICANT CHANGE UP (ref 43–77)
NRBC # BLD: 0 /100 WBCS — SIGNIFICANT CHANGE UP (ref 0–0)
PLATELET # BLD AUTO: 211 K/UL — SIGNIFICANT CHANGE UP (ref 150–400)
POTASSIUM SERPL-MCNC: 3.7 MMOL/L — SIGNIFICANT CHANGE UP (ref 3.5–5.3)
POTASSIUM SERPL-SCNC: 3.7 MMOL/L — SIGNIFICANT CHANGE UP (ref 3.5–5.3)
PROCALCITONIN SERPL-MCNC: 0.11 NG/ML — HIGH (ref 0–0.04)
RBC # BLD: 3.98 M/UL — SIGNIFICANT CHANGE UP (ref 3.8–5.2)
RBC # FLD: 13 % — SIGNIFICANT CHANGE UP (ref 10.3–14.5)
SODIUM SERPL-SCNC: 141 MMOL/L — SIGNIFICANT CHANGE UP (ref 135–145)
WBC # BLD: 7.3 K/UL — SIGNIFICANT CHANGE UP (ref 3.8–10.5)
WBC # FLD AUTO: 7.3 K/UL — SIGNIFICANT CHANGE UP (ref 3.8–10.5)

## 2019-10-05 PROCEDURE — 12345: CPT | Mod: NC

## 2019-10-05 PROCEDURE — 93971 EXTREMITY STUDY: CPT | Mod: 26,LT

## 2019-10-05 RX ADMIN — AMLODIPINE BESYLATE 5 MILLIGRAM(S): 2.5 TABLET ORAL at 06:19

## 2019-10-05 RX ADMIN — CYCLOSPORINE 1 DROP(S): 0.5 EMULSION OPHTHALMIC at 17:16

## 2019-10-05 RX ADMIN — ATORVASTATIN CALCIUM 20 MILLIGRAM(S): 80 TABLET, FILM COATED ORAL at 22:41

## 2019-10-05 RX ADMIN — CYCLOSPORINE 1 DROP(S): 0.5 EMULSION OPHTHALMIC at 06:19

## 2019-10-05 RX ADMIN — Medication 100 MILLIGRAM(S): at 06:19

## 2019-10-05 RX ADMIN — Medication 81 MILLIGRAM(S): at 15:28

## 2019-10-05 RX ADMIN — Medication 1 SPRAY(S): at 06:19

## 2019-10-05 RX ADMIN — CEFTAROLINE FOSAMIL 50 MILLIGRAM(S): 600 POWDER, FOR SOLUTION INTRAVENOUS at 06:18

## 2019-10-05 RX ADMIN — ENOXAPARIN SODIUM 40 MILLIGRAM(S): 100 INJECTION SUBCUTANEOUS at 14:04

## 2019-10-05 RX ADMIN — POLYETHYLENE GLYCOL 3350 17 GRAM(S): 17 POWDER, FOR SOLUTION ORAL at 09:12

## 2019-10-05 RX ADMIN — Medication 1 TABLET(S): at 14:04

## 2019-10-05 RX ADMIN — Medication 1 TABLET(S): at 06:19

## 2019-10-05 RX ADMIN — Medication 100 MILLIGRAM(S): at 22:41

## 2019-10-05 RX ADMIN — Medication 100 MILLIGRAM(S): at 14:04

## 2019-10-05 RX ADMIN — Medication 1 TABLET(S): at 22:41

## 2019-10-05 RX ADMIN — CEFTAROLINE FOSAMIL 50 MILLIGRAM(S): 600 POWDER, FOR SOLUTION INTRAVENOUS at 17:16

## 2019-10-05 RX ADMIN — Medication 25 MILLIGRAM(S): at 06:19

## 2019-10-05 NOTE — CONSULT NOTE ADULT - SUBJECTIVE AND OBJECTIVE BOX
History of Present Illness:  89y.o. Female with a history of chronic venous htn presents with recurrent LLE  cellulitis. She was admitted with worsening edema and erythema coupled with chills. Venous doppler is negative for DVT. I was requested to reevaluate her LE circulation.    PAST MEDICAL & SURGICAL HISTORY:  Cellulitis  Irritable bowel syndrome  Osteoporosis  HTN (hypertension)  Peptic ulcer  Hyperlipidemia  History of tonsillectomy  History of appendectomy  S/P hip replacement: partial, left      Allergies    sulfa drugs (Hives)    Intolerances        MEDICATIONS  (STANDING):  amLODIPine   Tablet 5 milliGRAM(s) Oral daily  aspirin  chewable 81 milliGRAM(s) Oral daily  atorvastatin 20 milliGRAM(s) Oral at bedtime  ceftaroline fosamil IVPB 600 milliGRAM(s) IV Intermittent every 12 hours  cycloSPORINE (RESTASIS) 0.05% Emulsion 1 Drop(s) Both EYES two times a day  docusate sodium 100 milliGRAM(s) Oral three times a day  enoxaparin Injectable 40 milliGRAM(s) SubCutaneous daily  lactobacillus acidophilus 1 Tablet(s) Oral three times a day  lactulose Syrup 20 Gram(s) Oral once  metoprolol succinate ER 25 milliGRAM(s) Oral daily  polyethylene glycol 3350 17 Gram(s) Oral daily    MEDICATIONS  (PRN):  acetaminophen   Tablet .. 650 milliGRAM(s) Oral every 6 hours PRN Mild Pain (1 - 3)  bisacodyl 5 milliGRAM(s) Oral daily PRN Constipation  fluticasone propionate 50 MICROgram(s)/spray Nasal Spray 1 Spray(s) Both Nostrils two times a day PRN Nasal congestion, rhinorrhea  senna 2 Tablet(s) Oral at bedtime PRN Constipation      Social History:  Smoking History:denies  Alcohol Use: denies    REVIEW OF SYSTEMS:  CONSTITUTIONAL:   ++ chills. No weakness, or  fevers.  EYES/ENT: No visual changes;  No vertigo or throat pain   NECK: No pain or stiffness  RESPIRATORY: No cough, wheezing, hemoptysis; No shortness of breath  CARDIOVASCULAR: No chest pain or palpitations  GASTROINTESTINAL: No abdominal or epigastric pain. No nausea, vomiting, or hematemesis; No diarrhea or constipation. No melena or hematochezia.  GENITOURINARY: No dysuria, frequency or hematuria  NEUROLOGICAL: No numbness or weakness  SKIN: No itching, burning, rashes, or lesions   Vascular:  No lower extremity claudication, pedal rest pain or digital ulcers.+++ edema and erythema of the lower left leg with inflammation and tenderness  All other review of systems is negative unless indicated above.    PHYSICAL EXAM:  General:  On exam, the patient is alert nontoxic appearing Female in no acute distress.  Vital Signs Last 24 Hrs  T(C): 36.9 (05 Oct 2019 05:12), Max: 36.9 (05 Oct 2019 05:12)  T(F): 98.5 (05 Oct 2019 05:12), Max: 98.5 (05 Oct 2019 05:12)  HR: 64 (05 Oct 2019 05:12) (64 - 71)  BP: 159/77 (05 Oct 2019 05:12) (155/70 - 159/77)  BP(mean): --  RR: 17 (05 Oct 2019 05:12) (17 - 17)  SpO2: 94% (05 Oct 2019 05:12) (93% - 94%)    Neck:  4+/4+ bilateral carotid pulses; no carotid bruit, no palpable cervical masses.  Heart:  Regular, no murmurs, rubs or gallops.    Lungs:  Clear to auscultation.    Chest:  No chest wall deformities  Symmetrical chest expansion.   Abdomen: Soft and nontender.  No palpable masses.  No rebound, guarding or rigidity.  Extremities:  There is 2+ edema of the left calf and ankle coupled with acute inflammation and erythema. Both feet  are warm, pink with normal capillary refill times.  There are no digital ulcers or heel decubiti.  The calf and thigh muscles are soft.   There are no palpable cords.   Holger's in is negative bilaterally.  There is no lower extremity  cyanosis.  On examination of the peripheral pulses:  Left leg femoral pulse is  4/4  , popliteal pulse is 4/4   ,PT Pulse is 3/4   , DP Pulse is 3/4   Right leg femoral pulse is  4/4  ,popliteal pulse is 4/4   , PT Pulse is  3/4 , DP Pulse is 3/4   Neurological:  There are no motor or sensory deficits in either lower extremity.                          11.4   7.30  )-----------( 211      ( 05 Oct 2019 09:10 )             35.0     10-05    141  |  106  |  8   ----------------------------<  193<H>  3.7   |  30  |  0.47<L>    Ca    8.5      05 Oct 2019 09:10              Radiology:

## 2019-10-05 NOTE — CONSULT NOTE ADULT - ASSESSMENT
89 y.o. female with chronic venous htn. presents with recurrent LLE cellulitis. She needs to elevate her legs above her heart level to decrease the LLE edema and to increase venous return to the heart. Continue IV antibiotics for 48 hours then transition to po antibiotics x 10 -14 days. After the edema and tenderness resolves then resume compression stockings daily. The patient and her daughter were given informed consent.

## 2019-10-05 NOTE — PROGRESS NOTE ADULT - SUBJECTIVE AND OBJECTIVE BOX
LEVON SRIVASTAVA is a 89yFemale , patient examined and chart reviewed.     INTERVAL HPI/ OVERNIGHT EVENTS:   Doing well. Afebrile.   Ambulated without difficulty with walker.    PAST MEDICAL & SURGICAL HISTORY:  Cellulitis  Irritable bowel syndrome  Osteoporosis  HTN (hypertension)  Peptic ulcer  Hyperlipidemia  History of tonsillectomy  History of appendectomy  S/P hip replacement: partial, left      For details regarding the patient's social history, family history, and other miscellaneous elements, please refer the initial infectious diseases consultation and/or the admitting history and physical examination for this admission.    ROS:  CONSTITUTIONAL:  Negative fever or chills  EYES:  Negative  blurry vision or double vision  CARDIOVASCULAR:  Negative for chest pain or palpitations  RESPIRATORY:  Negative for cough, wheezing, or SOB   GASTROINTESTINAL:  Negative for nausea, vomiting, diarrhea, constipation, or abdominal pain  GENITOURINARY:  Negative frequency, urgency or dysuria  NEUROLOGIC:  No headache, confusion, dizziness, lightheadedness  All other systems were reviewed and are negative     ALLERGIES:  sulfa drugs (Hives)      Current inpatient medications :    ANTIBIOTICS/RELEVANT:  ceftaroline fosamil IVPB 600 milliGRAM(s) IV Intermittent every 12 hours  lactobacillus acidophilus 1 Tablet(s) Oral three times a day      acetaminophen   Tablet .. 650 milliGRAM(s) Oral every 6 hours PRN  amLODIPine   Tablet 5 milliGRAM(s) Oral daily  aspirin  chewable 81 milliGRAM(s) Oral daily  atorvastatin 20 milliGRAM(s) Oral at bedtime  bisacodyl 5 milliGRAM(s) Oral daily PRN  cycloSPORINE (RESTASIS) 0.05% Emulsion 1 Drop(s) Both EYES two times a day  docusate sodium 100 milliGRAM(s) Oral three times a day  enoxaparin Injectable 40 milliGRAM(s) SubCutaneous daily  fluticasone propionate 50 MICROgram(s)/spray Nasal Spray 1 Spray(s) Both Nostrils two times a day PRN  lactulose Syrup 20 Gram(s) Oral once  metoprolol succinate ER 25 milliGRAM(s) Oral daily  polyethylene glycol 3350 17 Gram(s) Oral daily  senna 2 Tablet(s) Oral at bedtime PRN      Objective:    T(C): 36.9 (10-05-19 @ 05:12), Max: 36.9 (10-05-19 @ 05:12)  HR: 64 (10-05-19 @ 05:12) (64 - 71)  BP: 159/77 (10-05-19 @ 05:12) (155/70 - 159/77)  RR: 17 (10-05-19 @ 05:12) (17 - 17)  SpO2: 94% (10-05-19 @ 05:12) (93% - 94%)    Physical Exam:  General: no acute distress  Eyes: sclera anicteric, pupils equal and reactive to light  ENMT: buccal mucosa moist, pharynx not injected  Neck: supple, trachea midline  Lungs: clear, no wheeze/rhonchi  Cardiovascular: regular rate and rhythm, S1 S2  Abdomen: soft, nontender, no organomegaly present, bowel sounds normal  Neurological: alert and oriented x3, Cranial Nerves II-XII grossly intact  Skin: no increased ecchymosis/petechiae/purpura  Lymph Nodes: no palpable cervical/supraclavicular lymph nodes enlargements  Extremities: Left LE edema and erythema resolving  Paulie LE chronic stasis    LABS:                        11.4   7.30  )-----------( 211      ( 05 Oct 2019 09:10 )             35.0       10-05    141  |  106  |  8   ----------------------------<  193<H>  3.7   |  30  |  0.47<L>    Ca    8.5      05 Oct 2019 09:10    Vancomycin Level, Trough: 3.3 ug/mL (10-04 @ 16:00)    MICROBIOLOGY:  Culture - Blood (10.01.19 @ 21:13)    Specimen Source: .Blood Blood-Peripheral    Culture Results:   No growth to date.    RADIOLOGY & ADDITIONAL STUDIES:  CT Abdomen and Pelvis No Cont (10.01.19 @ 13:57) >  IMPRESSION:     No acute intra-abdominal or pelvic findings on this unenhanced exam;   evaluation of the pelvis is degraded by streak artifact from femoral   hardware.    Cystic lesion in the pancreatic head measuring up to 2.3 cm which may   represent a benign cyst, IPMN, or cystic neoplasm. Nonemergent MRI of the   abdomen or correlation with prior imaging would be helpful to better   characterize and to assess stability.    Masslike smooth ovoid density in the left paraspinal musculature at the   level of L5 and the sacrum with density slightly higher than that of   adjacent musculature measuring up to 9.8 x3.4 x 4.7 cm with extension   into a sacral foramina on the left which could represent a neurofibroma   or schwannoma. Nonemergent MRI of the lumbosacral spine would be helpful   for better characterization.      EXAM:  US DPLX LWR EXT VEINS LTD LT                            PROCEDURE DATE:  10/05/2019          INTERPRETATION:  CLINICAL INFORMATION: Left leg swelling    COMPARISON: 12/14/2015.    TECHNIQUE: Duplex sonography of the LEFT LOWER extremity veins with color   and spectral Doppler, with and without compression.      FINDINGS:    There is normal compressibility of the left common femoral, femoral and   popliteal veins.     The contralateral common femoral vein is patent.    Doppler examinationshows normal spontaneous and phasic flow.    No calf vein thrombosis is detected.    IMPRESSION:     No evidence of left lower extremity deep venous thrombosis.      Assessment :     89 year old female with multiple medical problems with hx of probably chronic venous insufficiency left leg with recurrent cellulitis left leg admitted with recurrent left leg cellulitis, Seen by vascular surgery.    Plan :   - Cont Teflaro 600 mg q 12 x 3 days then change to po doxycycline   - keep leg elevated   - trend cbc     D/w Dr Cheung    Continue with present regiment.  Appropriate use of antibiotics and adverse effects reviewed.      I have discussed the above plan of care with patient and daughter in detail. They expressed understanding of the  treatment plan . Risks, benefits and alternatives discussed in detail. I have asked if they have any questions or concerns and appropriately addressed them to the best of my ability .    > 35 minutes were spent in direct patient care reviewing notes, medications ,labs data/ imaging , discussion with multidisciplinary team.    Thank you for allowing me to participate in care of your patient .    Mannie Paredes MD  Infectious Disease  [phone]

## 2019-10-06 DIAGNOSIS — R22.9 LOCALIZED SWELLING, MASS AND LUMP, UNSPECIFIED: ICD-10-CM

## 2019-10-06 LAB
CULTURE RESULTS: SIGNIFICANT CHANGE UP
CULTURE RESULTS: SIGNIFICANT CHANGE UP
SPECIMEN SOURCE: SIGNIFICANT CHANGE UP
SPECIMEN SOURCE: SIGNIFICANT CHANGE UP

## 2019-10-06 PROCEDURE — 99233 SBSQ HOSP IP/OBS HIGH 50: CPT

## 2019-10-06 RX ADMIN — Medication 100 MILLIGRAM(S): at 21:26

## 2019-10-06 RX ADMIN — Medication 25 MILLIGRAM(S): at 06:28

## 2019-10-06 RX ADMIN — CYCLOSPORINE 1 DROP(S): 0.5 EMULSION OPHTHALMIC at 17:50

## 2019-10-06 RX ADMIN — AMLODIPINE BESYLATE 5 MILLIGRAM(S): 2.5 TABLET ORAL at 06:28

## 2019-10-06 RX ADMIN — Medication 81 MILLIGRAM(S): at 13:02

## 2019-10-06 RX ADMIN — ATORVASTATIN CALCIUM 20 MILLIGRAM(S): 80 TABLET, FILM COATED ORAL at 21:26

## 2019-10-06 RX ADMIN — Medication 1 TABLET(S): at 06:28

## 2019-10-06 RX ADMIN — POLYETHYLENE GLYCOL 3350 17 GRAM(S): 17 POWDER, FOR SOLUTION ORAL at 08:06

## 2019-10-06 RX ADMIN — Medication 1 TABLET(S): at 13:02

## 2019-10-06 RX ADMIN — CEFTAROLINE FOSAMIL 50 MILLIGRAM(S): 600 POWDER, FOR SOLUTION INTRAVENOUS at 06:28

## 2019-10-06 RX ADMIN — Medication 100 MILLIGRAM(S): at 13:02

## 2019-10-06 RX ADMIN — CYCLOSPORINE 1 DROP(S): 0.5 EMULSION OPHTHALMIC at 06:28

## 2019-10-06 RX ADMIN — CEFTAROLINE FOSAMIL 50 MILLIGRAM(S): 600 POWDER, FOR SOLUTION INTRAVENOUS at 17:49

## 2019-10-06 RX ADMIN — Medication 1 TABLET(S): at 21:26

## 2019-10-06 RX ADMIN — ENOXAPARIN SODIUM 40 MILLIGRAM(S): 100 INJECTION SUBCUTANEOUS at 13:02

## 2019-10-06 RX ADMIN — Medication 100 MILLIGRAM(S): at 06:28

## 2019-10-06 NOTE — PROGRESS NOTE ADULT - PROBLEM SELECTOR PLAN 2
- S/p IV Vancomycin x1 + Zosyn x 1 in ED.   - blood x2 neg  - Elevation of LLE ( hx of venous stasis)  - lasix 20 mg x1 and replete K+ cystic mass in pancreas head. daughter knows. heme/onc follow up out pt or in pstient if symptom worsen    there is a spinal mass too that family and patient know about

## 2019-10-06 NOTE — PROGRESS NOTE ADULT - PROBLEM SELECTOR PLAN 4
h/h trending down likely 2/2 dilutional anemia   - Iron studies consistent with anemia of chronic disease  - Will continue to trend UA+ nitrates and bacteria  Cx + for Ecoli  ceftriaxone x3 days completed

## 2019-10-06 NOTE — PROGRESS NOTE ADULT - PROBLEM SELECTOR PLAN 6
Chronic stable  - Continue home dose of Lipitor Chronic stable  - Continue home dose of amlodipine and metoprolol

## 2019-10-06 NOTE — PHARMACOTHERAPY INTERVENTION NOTE - COMMENTS
Spoke to Dr. Novak about entering stop date for Ceftaroline IV and start date and time for doxycycline PO as per Infectious Disease note.  Recommendation accepted and orders entered.

## 2019-10-06 NOTE — PROGRESS NOTE ADULT - SUBJECTIVE AND OBJECTIVE BOX
INTERVAL HPI/OVERNIGHT EVENTS:   Patient seen and examined. d/w daughter also. cont abc for now. chqnge to po doxy tomorrow    MEDICATIONS  (STANDING):  amLODIPine   Tablet 5 milliGRAM(s) Oral daily  aspirin  chewable 81 milliGRAM(s) Oral daily  atorvastatin 20 milliGRAM(s) Oral at bedtime  ceftaroline fosamil IVPB 600 milliGRAM(s) IV Intermittent every 12 hours  cycloSPORINE (RESTASIS) 0.05% Emulsion 1 Drop(s) Both EYES two times a day  docusate sodium 100 milliGRAM(s) Oral three times a day  enoxaparin Injectable 40 milliGRAM(s) SubCutaneous daily  lactobacillus acidophilus 1 Tablet(s) Oral three times a day  lactulose Syrup 20 Gram(s) Oral once  metoprolol succinate ER 25 milliGRAM(s) Oral daily  polyethylene glycol 3350 17 Gram(s) Oral daily    MEDICATIONS  (PRN):  acetaminophen   Tablet .. 650 milliGRAM(s) Oral every 6 hours PRN Mild Pain (1 - 3)  bisacodyl 5 milliGRAM(s) Oral daily PRN Constipation  fluticasone propionate 50 MICROgram(s)/spray Nasal Spray 1 Spray(s) Both Nostrils two times a day PRN Nasal congestion, rhinorrhea  senna 2 Tablet(s) Oral at bedtime PRN Constipation      REVIEW OF SYSTEMS:  See HPI,  all others negative    PHYSICAL EXAM:  Vital Signs Last 24 Hrs  T(C): 36.6 (06 Oct 2019 05:21), Max: 36.6 (06 Oct 2019 05:21)  T(F): 97.8 (06 Oct 2019 05:21), Max: 97.8 (06 Oct 2019 05:21)  HR: 63 (06 Oct 2019 05:21) (63 - 64)  BP: 149/79 (06 Oct 2019 05:21) (138/79 - 149/79)  BP(mean): --  RR: 16 (06 Oct 2019 05:21) (16 - 18)  SpO2: 94% (06 Oct 2019 05:21) (94% - 95%)    GENERAL: NAD, well-groomed, well-developed, awake, alert, oriented x 3, fluent and coherent speech  HEAD:  Atraumatic, Normocephalic  EYES: EOMI, PERRLA, conjunctiva and sclera clear  ENMT: No tonsillar erythema, exudates, or enlargement; Moist mucous membranes, Good dentition, No lesions  NECK: Supple, No JVD, No Cervical LAD, No thyromegaly, No thyroid nodules felt  NERVOUS SYSTEM:  Good concentration; Moving all 4 extremities; No gross sensory deficits, No facial droop  CHEST WALL: No masses  CHEST/LUNG: Clear to auscultation bilaterally; No rales, rhonchi, wheezing, or rubs  HEART: Regular rate and rhythm; No murmurs, rubs, or gallops  ABDOMEN: Soft, Nontender, Nondistended, Bowel sounds present, No palpable masses or organomegaly, No bruits  EXTREMITIES:  b/l le swelling improving  LYMPH: No lymphadenopathy  SKIN: No rashes or lesions    LABS:      Ca    8.5        05 Oct 2019 09:10             RADIOLOGY & ADDITIONAL TESTS: INTERVAL HPI/OVERNIGHT EVENTS:   Patient seen and examined. d/w daughter also. cont abc for now. chqnge to po doxy tomorrow. c/o cosntipation too    MEDICATIONS  (STANDING):  amLODIPine   Tablet 5 milliGRAM(s) Oral daily  aspirin  chewable 81 milliGRAM(s) Oral daily  atorvastatin 20 milliGRAM(s) Oral at bedtime  ceftaroline fosamil IVPB 600 milliGRAM(s) IV Intermittent every 12 hours  cycloSPORINE (RESTASIS) 0.05% Emulsion 1 Drop(s) Both EYES two times a day  docusate sodium 100 milliGRAM(s) Oral three times a day  enoxaparin Injectable 40 milliGRAM(s) SubCutaneous daily  lactobacillus acidophilus 1 Tablet(s) Oral three times a day  lactulose Syrup 20 Gram(s) Oral once  metoprolol succinate ER 25 milliGRAM(s) Oral daily  polyethylene glycol 3350 17 Gram(s) Oral daily    MEDICATIONS  (PRN):  acetaminophen   Tablet .. 650 milliGRAM(s) Oral every 6 hours PRN Mild Pain (1 - 3)  bisacodyl 5 milliGRAM(s) Oral daily PRN Constipation  fluticasone propionate 50 MICROgram(s)/spray Nasal Spray 1 Spray(s) Both Nostrils two times a day PRN Nasal congestion, rhinorrhea  senna 2 Tablet(s) Oral at bedtime PRN Constipation      REVIEW OF SYSTEMS:  See HPI,  all others negative    PHYSICAL EXAM:  Vital Signs Last 24 Hrs  T(C): 36.6 (06 Oct 2019 05:21), Max: 36.6 (06 Oct 2019 05:21)  T(F): 97.8 (06 Oct 2019 05:21), Max: 97.8 (06 Oct 2019 05:21)  HR: 63 (06 Oct 2019 05:21) (63 - 64)  BP: 149/79 (06 Oct 2019 05:21) (138/79 - 149/79)  BP(mean): --  RR: 16 (06 Oct 2019 05:21) (16 - 18)  SpO2: 94% (06 Oct 2019 05:21) (94% - 95%)    GENERAL: NAD, well-groomed, well-developed, awake, alert, oriented x 3, fluent and coherent speech  HEAD:  Atraumatic, Normocephalic  EYES: EOMI, PERRLA, conjunctiva and sclera clear  ENMT: No tonsillar erythema, exudates, or enlargement; Moist mucous membranes, Good dentition, No lesions  NECK: Supple, No JVD, No Cervical LAD, No thyromegaly, No thyroid nodules felt  NERVOUS SYSTEM:  Good concentration; Moving all 4 extremities; No gross sensory deficits, No facial droop  CHEST WALL: No masses  CHEST/LUNG: Clear to auscultation bilaterally; No rales, rhonchi, wheezing, or rubs  HEART: Regular rate and rhythm; No murmurs, rubs, or gallops  ABDOMEN: Soft, Nontender, Nondistended, Bowel sounds present, No palpable masses or organomegaly, No bruits  EXTREMITIES:  b/l le swelling improving  LYMPH: No lymphadenopathy  SKIN: No rashes or lesions    LABS:      Ca    8.5        05 Oct 2019 09:10             RADIOLOGY & ADDITIONAL TESTS:

## 2019-10-06 NOTE — PROGRESS NOTE ADULT - PROBLEM SELECTOR PLAN 1
R/O for sepsis.   - normal WBC, afebrile   - Blood cultures show no growth  - pt on teflaro and change to doxy tomorrow

## 2019-10-06 NOTE — PROGRESS NOTE ADULT - ASSESSMENT
89 y/o F with a PMHx of ?Cellulitis, HTN, HLD, Osteoporosis, IBS, and peptic ulcer presented to the ED with worsening LLE cellulitis with associated nausea and chills. 89 y/o F with a PMHx of ?Cellulitis, HTN, HLD, Osteoporosis, IBS, and peptic ulcer presented to the ED with worsening LLE cellulitis with associated nausea and chills.    10/6: also reviewed ct from 10/1 she has cystic lesionof pancreatic head that is a new finding. daughter knows about it. onco consult?

## 2019-10-06 NOTE — PROGRESS NOTE ADULT - PROBLEM SELECTOR PLAN 3
UA+ nitrates and bacteria  Cx + for Ecoli  ceftriaxone x3 days completed - S/p IV Vancomycin x1 + Zosyn x 1 in ED.   - blood x2 neg  - Elevation of LLE ( hx of venous stasis)  - lasix 20 mg x1 and replete K+

## 2019-10-06 NOTE — PROGRESS NOTE ADULT - PROBLEM SELECTOR PLAN 5
Chronic stable  - Continue home dose of amlodipine and metoprolol h/h trending down likely 2/2 dilutional anemia   - Iron studies consistent with anemia of chronic disease  - Will continue to trend

## 2019-10-06 NOTE — PROGRESS NOTE ADULT - SUBJECTIVE AND OBJECTIVE BOX
LEVON SRIVASTAVA is a 89yFemale , patient examined and chart reviewed.     INTERVAL HPI/ OVERNIGHT EVENTS:   Doing well. Afebrile.     PAST MEDICAL & SURGICAL HISTORY:  Cellulitis  Irritable bowel syndrome  Osteoporosis  HTN (hypertension)  Peptic ulcer  Hyperlipidemia  History of tonsillectomy  History of appendectomy  S/P hip replacement: partial, left      For details regarding the patient's social history, family history, and other miscellaneous elements, please refer the initial infectious diseases consultation and/or the admitting history and physical examination for this admission.    ROS:  CONSTITUTIONAL:  Negative fever or chills  EYES:  Negative  blurry vision or double vision  CARDIOVASCULAR:  Negative for chest pain or palpitations  RESPIRATORY:  Negative for cough, wheezing, or SOB   GASTROINTESTINAL:  Negative for nausea, vomiting, diarrhea, constipation, or abdominal pain  GENITOURINARY:  Negative frequency, urgency or dysuria  NEUROLOGIC:  No headache, confusion, dizziness, lightheadedness  All other systems were reviewed and are negative     ALLERGIES:  sulfa drugs (Hives)      Current inpatient medications :    ANTIBIOTICS/RELEVANT:  ceftaroline fosamil IVPB 600 milliGRAM(s) IV Intermittent every 12 hours    MEDICATIONS  (STANDING):  amLODIPine   Tablet 5 milliGRAM(s) Oral daily  aspirin  chewable 81 milliGRAM(s) Oral daily  atorvastatin 20 milliGRAM(s) Oral at bedtime  cycloSPORINE (RESTASIS) 0.05% Emulsion 1 Drop(s) Both EYES two times a day  docusate sodium 100 milliGRAM(s) Oral three times a day  enoxaparin Injectable 40 milliGRAM(s) SubCutaneous daily  lactobacillus acidophilus 1 Tablet(s) Oral three times a day  lactulose Syrup 20 Gram(s) Oral once  metoprolol succinate ER 25 milliGRAM(s) Oral daily  polyethylene glycol 3350 17 Gram(s) Oral daily    MEDICATIONS  (PRN):  acetaminophen   Tablet .. 650 milliGRAM(s) Oral every 6 hours PRN Mild Pain (1 - 3)  bisacodyl 5 milliGRAM(s) Oral daily PRN Constipation  fluticasone propionate 50 MICROgram(s)/spray Nasal Spray 1 Spray(s) Both Nostrils two times a day PRN Nasal congestion, rhinorrhea  senna 2 Tablet(s) Oral at bedtime PRN Constipation      Objective:  Vital Signs Last 24 Hrs  T(C): 36.6 (06 Oct 2019 20:47), Max: 36.6 (06 Oct 2019 05:21)  T(F): 97.9 (06 Oct 2019 20:47), Max: 97.9 (06 Oct 2019 20:47)  HR: 61 (06 Oct 2019 20:47) (61 - 70)  BP: 143/72 (06 Oct 2019 20:47) (137/62 - 149/79)  RR: 18 (06 Oct 2019 20:47) (16 - 18)  SpO2: 93% (06 Oct 2019 20:47) (93% - 94%)    Physical Exam:  General: no acute distress  Eyes: sclera anicteric, pupils equal and reactive to light  ENMT: buccal mucosa moist, pharynx not injected  Neck: supple, trachea midline  Lungs: clear, no wheeze/rhonchi  Cardiovascular: regular rate and rhythm, S1 S2  Abdomen: soft, nontender, no organomegaly present, bowel sounds normal  Neurological: alert and oriented x3, Cranial Nerves II-XII grossly intact  Skin: no increased ecchymosis/petechiae/purpura  Lymph Nodes: no palpable cervical/supraclavicular lymph nodes enlargements  Extremities: Left LE edema and erythema resolving nicely  Paulie LE chronic stasis    LABS:                        11.4   7.30  )-----------( 211      ( 05 Oct 2019 09:10 )             35.0   10-05    141  |  106  |  8   ----------------------------<  193<H>  3.7   |  30  |  0.47<L>    Ca    8.5      05 Oct 2019 09:10      MICROBIOLOGY:  Culture - Blood (10.01.19 @ 21:13)    Specimen Source: .Blood Blood-Peripheral    Culture Results:   No growth to date.    RADIOLOGY & ADDITIONAL STUDIES:  CT Abdomen and Pelvis No Cont (10.01.19 @ 13:57) >  IMPRESSION:     No acute intra-abdominal or pelvic findings on this unenhanced exam;   evaluation of the pelvis is degraded by streak artifact from femoral   hardware.    Cystic lesion in the pancreatic head measuring up to 2.3 cm which may   represent a benign cyst, IPMN, or cystic neoplasm. Nonemergent MRI of the   abdomen or correlation with prior imaging would be helpful to better   characterize and to assess stability.    Masslike smooth ovoid density in the left paraspinal musculature at the   level of L5 and the sacrum with density slightly higher than that of   adjacent musculature measuring up to 9.8 x3.4 x 4.7 cm with extension   into a sacral foramina on the left which could represent a neurofibroma   or schwannoma. Nonemergent MRI of the lumbosacral spine would be helpful   for better characterization.      EXAM:  US DPLX LWR EXT VEINS LTD LT                            PROCEDURE DATE:  10/05/2019          INTERPRETATION:  CLINICAL INFORMATION: Left leg swelling    COMPARISON: 12/14/2015.    TECHNIQUE: Duplex sonography of the LEFT LOWER extremity veins with color   and spectral Doppler, with and without compression.      FINDINGS:    There is normal compressibility of the left common femoral, femoral and   popliteal veins.     The contralateral common femoral vein is patent.    Doppler examinationshows normal spontaneous and phasic flow.    No calf vein thrombosis is detected.    IMPRESSION:     No evidence of left lower extremity deep venous thrombosis.      Assessment :     89 year old female with multiple medical problems with hx of probably chronic venous insufficiency left leg with recurrent cellulitis left leg admitted with recurrent left leg cellulitis, Seen by vascular surgery. Overall doing well.    Plan :   - Cont Teflaro 600 mg q 12 x 3 days then change to po doxycycline   - keep leg elevated   - trend cbc     D/w hospitalist    Continue with present regiment.  Appropriate use of antibiotics and adverse effects reviewed.      I have discussed the above plan of care with patient and daughter in detail. They expressed understanding of the  treatment plan . Risks, benefits and alternatives discussed in detail. I have asked if they have any questions or concerns and appropriately addressed them to the best of my ability .    > 35 minutes were spent in direct patient care reviewing notes, medications ,labs data/ imaging , discussion with multidisciplinary team.    Thank you for allowing me to participate in care of your patient .    Mannie Paredes MD  Infectious Disease  [phone]

## 2019-10-07 ENCOUNTER — TRANSCRIPTION ENCOUNTER (OUTPATIENT)
Age: 84
End: 2019-10-07

## 2019-10-07 VITALS
SYSTOLIC BLOOD PRESSURE: 129 MMHG | OXYGEN SATURATION: 96 % | DIASTOLIC BLOOD PRESSURE: 69 MMHG | HEART RATE: 68 BPM | TEMPERATURE: 98 F | RESPIRATION RATE: 18 BRPM

## 2019-10-07 LAB
APPEARANCE UR: CLEAR — SIGNIFICANT CHANGE UP
BILIRUB UR-MCNC: NEGATIVE — SIGNIFICANT CHANGE UP
COLOR SPEC: SIGNIFICANT CHANGE UP
DIFF PNL FLD: NEGATIVE — SIGNIFICANT CHANGE UP
GLUCOSE UR QL: 100 MG/DL
KETONES UR-MCNC: NEGATIVE — SIGNIFICANT CHANGE UP
LEUKOCYTE ESTERASE UR-ACNC: NEGATIVE — SIGNIFICANT CHANGE UP
NITRITE UR-MCNC: NEGATIVE — SIGNIFICANT CHANGE UP
PH UR: 6.5 — SIGNIFICANT CHANGE UP (ref 5–8)
PROT UR-MCNC: NEGATIVE — SIGNIFICANT CHANGE UP
SP GR SPEC: 1 — LOW (ref 1.01–1.02)
UROBILINOGEN FLD QL: NEGATIVE — SIGNIFICANT CHANGE UP

## 2019-10-07 PROCEDURE — 83036 HEMOGLOBIN GLYCOSYLATED A1C: CPT

## 2019-10-07 PROCEDURE — 74176 CT ABD & PELVIS W/O CONTRAST: CPT

## 2019-10-07 PROCEDURE — 87086 URINE CULTURE/COLONY COUNT: CPT

## 2019-10-07 PROCEDURE — 81001 URINALYSIS AUTO W/SCOPE: CPT

## 2019-10-07 PROCEDURE — 82553 CREATINE MB FRACTION: CPT

## 2019-10-07 PROCEDURE — 82550 ASSAY OF CK (CPK): CPT

## 2019-10-07 PROCEDURE — 83540 ASSAY OF IRON: CPT

## 2019-10-07 PROCEDURE — 83605 ASSAY OF LACTIC ACID: CPT

## 2019-10-07 PROCEDURE — 82746 ASSAY OF FOLIC ACID SERUM: CPT

## 2019-10-07 PROCEDURE — 99239 HOSP IP/OBS DSCHRG MGMT >30: CPT | Mod: 25

## 2019-10-07 PROCEDURE — 82728 ASSAY OF FERRITIN: CPT

## 2019-10-07 PROCEDURE — 87040 BLOOD CULTURE FOR BACTERIA: CPT

## 2019-10-07 PROCEDURE — 85652 RBC SED RATE AUTOMATED: CPT

## 2019-10-07 PROCEDURE — 71045 X-RAY EXAM CHEST 1 VIEW: CPT

## 2019-10-07 PROCEDURE — 84484 ASSAY OF TROPONIN QUANT: CPT

## 2019-10-07 PROCEDURE — 85045 AUTOMATED RETICULOCYTE COUNT: CPT

## 2019-10-07 PROCEDURE — 81003 URINALYSIS AUTO W/O SCOPE: CPT

## 2019-10-07 PROCEDURE — 96365 THER/PROPH/DIAG IV INF INIT: CPT

## 2019-10-07 PROCEDURE — 84145 PROCALCITONIN (PCT): CPT

## 2019-10-07 PROCEDURE — 83550 IRON BINDING TEST: CPT

## 2019-10-07 PROCEDURE — 73590 X-RAY EXAM OF LOWER LEG: CPT

## 2019-10-07 PROCEDURE — 94640 AIRWAY INHALATION TREATMENT: CPT

## 2019-10-07 PROCEDURE — 99285 EMERGENCY DEPT VISIT HI MDM: CPT | Mod: 25

## 2019-10-07 PROCEDURE — 80053 COMPREHEN METABOLIC PANEL: CPT

## 2019-10-07 PROCEDURE — 85027 COMPLETE CBC AUTOMATED: CPT

## 2019-10-07 PROCEDURE — 80048 BASIC METABOLIC PNL TOTAL CA: CPT

## 2019-10-07 PROCEDURE — 93971 EXTREMITY STUDY: CPT

## 2019-10-07 PROCEDURE — 93005 ELECTROCARDIOGRAM TRACING: CPT

## 2019-10-07 PROCEDURE — 86140 C-REACTIVE PROTEIN: CPT

## 2019-10-07 PROCEDURE — 36415 COLL VENOUS BLD VENIPUNCTURE: CPT

## 2019-10-07 PROCEDURE — 96367 TX/PROPH/DG ADDL SEQ IV INF: CPT

## 2019-10-07 PROCEDURE — 80202 ASSAY OF VANCOMYCIN: CPT

## 2019-10-07 PROCEDURE — 85610 PROTHROMBIN TIME: CPT

## 2019-10-07 PROCEDURE — 83690 ASSAY OF LIPASE: CPT

## 2019-10-07 PROCEDURE — 82607 VITAMIN B-12: CPT

## 2019-10-07 PROCEDURE — 85730 THROMBOPLASTIN TIME PARTIAL: CPT

## 2019-10-07 RX ORDER — LACTOBACILLUS ACIDOPHILUS 100MM CELL
1 CAPSULE ORAL
Qty: 63 | Refills: 0
Start: 2019-10-07 | End: 2019-10-27

## 2019-10-07 RX ORDER — FLUTICASONE PROPIONATE 50 MCG
1 SPRAY, SUSPENSION NASAL
Qty: 0 | Refills: 0 | DISCHARGE
Start: 2019-10-07

## 2019-10-07 RX ORDER — DOCUSATE SODIUM 100 MG
1 CAPSULE ORAL
Qty: 0 | Refills: 0 | DISCHARGE
Start: 2019-10-07

## 2019-10-07 RX ADMIN — Medication 100 MILLIGRAM(S): at 15:35

## 2019-10-07 RX ADMIN — CYCLOSPORINE 1 DROP(S): 0.5 EMULSION OPHTHALMIC at 17:07

## 2019-10-07 RX ADMIN — Medication 1 TABLET(S): at 06:27

## 2019-10-07 RX ADMIN — Medication 100 MILLIGRAM(S): at 06:27

## 2019-10-07 RX ADMIN — Medication 25 MILLIGRAM(S): at 06:27

## 2019-10-07 RX ADMIN — CYCLOSPORINE 1 DROP(S): 0.5 EMULSION OPHTHALMIC at 06:28

## 2019-10-07 RX ADMIN — ENOXAPARIN SODIUM 40 MILLIGRAM(S): 100 INJECTION SUBCUTANEOUS at 11:10

## 2019-10-07 RX ADMIN — AMLODIPINE BESYLATE 5 MILLIGRAM(S): 2.5 TABLET ORAL at 06:27

## 2019-10-07 RX ADMIN — CEFTAROLINE FOSAMIL 50 MILLIGRAM(S): 600 POWDER, FOR SOLUTION INTRAVENOUS at 06:27

## 2019-10-07 RX ADMIN — Medication 81 MILLIGRAM(S): at 11:10

## 2019-10-07 RX ADMIN — Medication 1 TABLET(S): at 15:35

## 2019-10-07 RX ADMIN — Medication 100 MILLIGRAM(S): at 17:07

## 2019-10-07 RX ADMIN — POLYETHYLENE GLYCOL 3350 17 GRAM(S): 17 POWDER, FOR SOLUTION ORAL at 08:42

## 2019-10-07 NOTE — PROGRESS NOTE ADULT - REASON FOR ADMISSION
Sepsis & Cellulitis

## 2019-10-07 NOTE — DISCHARGE NOTE PROVIDER - NSDCCPCAREPLAN_GEN_ALL_CORE_FT
PRINCIPAL DISCHARGE DIAGNOSIS  Diagnosis: Cellulitis of left lower extremity  Assessment and Plan of Treatment: You were hospitalized with cellulitis of the left leg. You received IV antibiotics and are being discharged on oral doxycycline. Please continue to take doxycycline as directed. Please also take probiotic as directed.  Please follow up with your PCP within 3 days of discharge from hospital.      SECONDARY DISCHARGE DIAGNOSES  Diagnosis: Chronic venous hypertension, bilateral  Assessment and Plan of Treatment: Please continue to wear compression stockings and elevate legs above the level of your heart while you are at rest. Please follow up with Dr. Cheung, vascular surgeon.    Diagnosis: Acute UTI  Assessment and Plan of Treatment: You were treated with Rocephin for a UTI. Please see your PCP if you develop painful urination, blood in your urine, difficulty urinating or if you become confused or disoriented.    Diagnosis: Constipation  Assessment and Plan of Treatment: Please continue miralax and colace.  Please follow up with your PCP as directed.    Diagnosis: Hyperlipidemia  Assessment and Plan of Treatment: Please continue to take atorvastatin as directed.    Diagnosis: Hypertension  Assessment and Plan of Treatment: Pleaes continue to take amlodipine and metoprolol as directed.    Diagnosis: Pancreatic mass  Assessment and Plan of Treatment: You have a known mass on your pancreas. Please follow up with your oncologist.

## 2019-10-07 NOTE — DISCHARGE NOTE PROVIDER - HOSPITAL COURSE
HPI        89 y/o F with a PMHx of Cellulitis, HTN, HLD, Osteoporosis, IBS, and peptic ulcer presented to the ED with worsening LLE cellulitis with associated nausea and chills. Per patient, patient was diagnosed with cellulitis of the L LE last month, with associated chills and was given a 10 day course of antibiotics (unable to specify which one) from [facility], and her symptoms improved and the cellulitic rash went away completely, but patient continued to have intermittent chills throughout the month. 2 days ago, patient felt similar chills to what she felt last month with additional nausea. Yesterday, patient noticed worsening swelling and pain in the same L LE area. Additionally, patient accidently scratched herself on the back of her R leg several weeks ago, slightly lateral and inferior to her popliteal fossa, and her wound in the area has been healing slowly ever since. Patient denies chest pain, SOB, palpations, vomiting diarrhea. Of note, patient also complaining of recent constipation.          IN THE ED:    Temp 99.7, HR 63, / 64, Resp 18, SpO2 97, WBC 15.01, Lactate 3.0, Troponin .269, Given 1L NS, Given IV Zosyn x 1, IV Vancomycin x 1, EKG showed Sinus Rhythm 78 bpm, with 1st degree AV block, CXR showed no active disease, X-ray tiba + fibula R showed no fracture or other acute process, CT of abdomen/pelvis showed a cystic lesion in pancreatic head (may be a benign cyst, IPMN or cystic neoplasm) and a smooth ovoid mass in the L paraspinal musculature at level of L5.        Hospital Course        Admitted for sepsis 2/2 to LLE cellulitis. Placed on vancomycin and Zosyn. They switched to Ceftin. Evaluated by Dr. Nesbitt, ID, who began ceftaroline for MRSA coverage. On day of discharge patient was started on po antibiotics. Evaluated by Dr. Cheung, vascular surgery for chronic venous hypertension. Patient's legs were elevated above the level of her heart while at rest. After edema and erythema improved, she resumed use of her compression stockings.        Constitutional: Denies fever, chills, general malaise    HEENT: Denies sore throat, runny nose, photophobia, blurry vision, double vision, eye pain, difficulty hearing, dizziness, dysphagia, epistaxis    Respiratory: Denies shortness of breath, dyspnea on exertion, cough, sputum production, wheezing, hemoptysis    Cardiovascular: Denies chest pain, palpitations, edema    Gastrointestinal: Denies nausea, vomiting, diarrhea, constipation, abdominal pain, melena, hematochezia     Genitourinary: + frequency    Skin/Breast: Denies rash, hives, itching    Musculoskeletal: Denies muscle pains, muscle weakness, joint pain or swelling    Neurologic: Denies syncope, loss of consciousness, headache, weakness, dizziness, paresthesias, numbness, tingling, confusion, dementia     Hematology/Oncology: Denies abnormal bruising, tender or enlarged lymph nodes     ROS negative except as noted above        Vital Signs Last 24 Hrs    T(C): 36.7 (07 Oct 2019 05:33), Max: 36.7 (07 Oct 2019 05:33)    T(F): 98 (07 Oct 2019 05:33), Max: 98 (07 Oct 2019 05:33)    HR: 65 (07 Oct 2019 05:33) (61 - 65)    BP: 149/76 (07 Oct 2019 05:33) (143/72 - 149/76)    BP(mean): --    RR: 18 (07 Oct 2019 05:33) (18 - 18)    SpO2: 92% (07 Oct 2019 05:33) (92% - 93%)        Physical Exam:    General: Well developed,  No Acute Distress    HEENT: Normocephallic Atraumatic, PERRLA, EOMI bl, moist mucous membranes     Neck: Supple, nontender, no mass    Neurology: AA&Ox3, CN II-XII grossly intact, sensation intact    Respiratory: Clear To Auscultation B/L, No Wheezes, rhonchi or rales    CV: Regular Rate and Rhythm, +S1/S2, no murmurs, rubs or gallops    Abdominal: Soft, Non-Tender, Non-Distended +Bowel Soundsx4    Extremities: Improved LE edema. No Clubbing, cyanosis, + peripheral pulses    Skin: Improved LE erythema, healing blisters on  BL LE, L>R HPI        89 y/o F with a PMHx of Cellulitis, HTN, HLD, Osteoporosis, IBS, and peptic ulcer presented to the ED with worsening LLE cellulitis with associated nausea and chills. Per patient, patient was diagnosed with cellulitis of the L LE last month, with associated chills and was given a 10 day course of antibiotics (unable to specify which one) from [facility], and her symptoms improved and the cellulitic rash went away completely, but patient continued to have intermittent chills throughout the month. 2 days ago, patient felt similar chills to what she felt last month with additional nausea. Yesterday, patient noticed worsening swelling and pain in the same L LE area. Additionally, patient accidently scratched herself on the back of her R leg several weeks ago, slightly lateral and inferior to her popliteal fossa, and her wound in the area has been healing slowly ever since. Patient denies chest pain, SOB, palpations, vomiting diarrhea. Of note, patient also complaining of recent constipation.          IN THE ED:    Temp 99.7, HR 63, / 64, Resp 18, SpO2 97, WBC 15.01, Lactate 3.0, Troponin .269, Given 1L NS, Given IV Zosyn x 1, IV Vancomycin x 1, EKG showed Sinus Rhythm 78 bpm, with 1st degree AV block, CXR showed no active disease, X-ray tiba + fibula R showed no fracture or other acute process, CT of abdomen/pelvis showed a cystic lesion in pancreatic head (may be a benign cyst, IPMN or cystic neoplasm) and a smooth ovoid mass in the L paraspinal musculature at level of L5.        Hospital Course        Admitted for sepsis 2/2 to LLE cellulitis. Placed on vancomycin and Zosyn. They switched to Ceftin for E. coli UTI . Evaluated by Dr. Nesbitt, ID, who began ceftaroline for MRSA coverage. On day of discharge patient was started on po antibiotics. Evaluated by Dr. Cheung, vascular surgery for chronic venous hypertension. Patient's legs were elevated above the level of her heart while at rest. After edema and erythema improved, she resumed use of her compression stockings. She was also treated for UTI. pt is stable for d/c on po antibx for 7 more days.         Constitutional: Denies fever, chills, general malaise    HEENT: Denies sore throat, runny nose, photophobia, blurry vision, double vision, eye pain, difficulty hearing, dizziness, dysphagia, epistaxis    Respiratory: Denies shortness of breath, dyspnea on exertion, cough, sputum production, wheezing, hemoptysis    Cardiovascular: Denies chest pain, palpitations, edema    Gastrointestinal: Denies nausea, vomiting, diarrhea, constipation, abdominal pain, melena, hematochezia     Genitourinary: + frequency    Skin/Breast: Denies rash, hives, itching    Musculoskeletal: Denies muscle pains, muscle weakness, joint pain or swelling    Neurologic: Denies syncope, loss of consciousness, headache, weakness, dizziness, paresthesias, numbness, tingling, confusion, dementia     Hematology/Oncology: Denies abnormal bruising, tender or enlarged lymph nodes     ROS negative except as noted above        Vital Signs Last 24 Hrs    T(C): 36.7 (07 Oct 2019 05:33), Max: 36.7 (07 Oct 2019 05:33)    T(F): 98 (07 Oct 2019 05:33), Max: 98 (07 Oct 2019 05:33)    HR: 65 (07 Oct 2019 05:33) (61 - 65)    BP: 149/76 (07 Oct 2019 05:33) (143/72 - 149/76)    BP(mean): --    RR: 18 (07 Oct 2019 05:33) (18 - 18)    SpO2: 92% (07 Oct 2019 05:33) (92% - 93%)        Physical Exam:    General: Well developed,  No Acute Distress    HEENT: Normocephallic Atraumatic, PERRLA, EOMI bl, moist mucous membranes     Neck: Supple, nontender, no mass    Neurology: AA&Ox3, CN II-XII grossly intact, sensation intact    Respiratory: Clear To Auscultation B/L, No Wheezes, rhonchi or rales    CV: Regular Rate and Rhythm, +S1/S2, no murmurs, rubs or gallops    Abdominal: Soft, Non-Tender, Non-Distended +Bowel Soundsx4    Extremities: Improved LE edema. No Clubbing, cyanosis, + peripheral pulses    Skin: Improved LE erythema, healing blisters on  BL LE, L>R HPI        89 y/o F with a PMHx of Cellulitis, HTN, HLD, Osteoporosis, IBS, and peptic ulcer presented to the ED with worsening LLE cellulitis with associated nausea and chills. Per patient, patient was diagnosed with cellulitis of the L LE last month, with associated chills and was given a 10 day course of antibiotics (unable to specify which one) from [facility], and her symptoms improved and the cellulitic rash went away completely, but patient continued to have intermittent chills throughout the month. 2 days ago, patient felt similar chills to what she felt last month with additional nausea. Yesterday, patient noticed worsening swelling and pain in the same L LE area. Additionally, patient accidently scratched herself on the back of her R leg several weeks ago, slightly lateral and inferior to her popliteal fossa, and her wound in the area has been healing slowly ever since. Patient denies chest pain, SOB, palpations, vomiting diarrhea. Of note, patient also complaining of recent constipation.          IN THE ED:    Temp 99.7, HR 63, / 64, Resp 18, SpO2 97, WBC 15.01, Lactate 3.0, Troponin .269, Given 1L NS, Given IV Zosyn x 1, IV Vancomycin x 1, EKG showed Sinus Rhythm 78 bpm, with 1st degree AV block, CXR showed no active disease, X-ray tiba + fibula R showed no fracture or other acute process, CT of abdomen/pelvis showed a cystic lesion in pancreatic head (may be a benign cyst, IPMN or cystic neoplasm) and a smooth ovoid mass in the L paraspinal musculature at level of L5.        Hospital Course        Admitted for sepsis 2/2 to LLE cellulitis. Placed on vancomycin and Zosyn. They switched to Ceftin for E. coli UTI . Evaluated by Dr. Nesbitt, ID, who began ceftaroline for MRSA coverage. On day of discharge patient was started on po antibiotics. Evaluated by Dr. Cheung, vascular surgery for chronic venous hypertension. Patient's legs were elevated above the level of her heart while at rest. After edema and erythema improved, she resumed use of her compression stockings. She was also treated for UTI. pt is stable for d/c on po antibx for 7 more days. To follow with GI for pancreatic cyst and neurology for paraspinal smooth ovoid mass in the L paraspinal musculature at level of L5. Pt is aware and has been following regularly.        Constitutional: Denies fever, chills, general malaise    HEENT: Denies sore throat, runny nose, photophobia, blurry vision, double vision, eye pain, difficulty hearing, dizziness, dysphagia, epistaxis    Respiratory: Denies shortness of breath, dyspnea on exertion, cough, sputum production, wheezing, hemoptysis    Cardiovascular: Denies chest pain, palpitations, edema    Gastrointestinal: Denies nausea, vomiting, diarrhea, constipation, abdominal pain, melena, hematochezia     Genitourinary: + frequency    Skin/Breast: Denies rash, hives, itching    Musculoskeletal: Denies muscle pains, muscle weakness, joint pain or swelling    Neurologic: Denies syncope, loss of consciousness, headache, weakness, dizziness, paresthesias, numbness, tingling, confusion, dementia     Hematology/Oncology: Denies abnormal bruising, tender or enlarged lymph nodes     ROS negative except as noted above        Vital Signs Last 24 Hrs    T(C): 36.7 (07 Oct 2019 05:33), Max: 36.7 (07 Oct 2019 05:33)    T(F): 98 (07 Oct 2019 05:33), Max: 98 (07 Oct 2019 05:33)    HR: 65 (07 Oct 2019 05:33) (61 - 65)    BP: 149/76 (07 Oct 2019 05:33) (143/72 - 149/76)    BP(mean): --    RR: 18 (07 Oct 2019 05:33) (18 - 18)    SpO2: 92% (07 Oct 2019 05:33) (92% - 93%)        Physical Exam:    General: Well developed,  No Acute Distress    HEENT: Normocephallic Atraumatic, PERRLA, EOMI bl, moist mucous membranes     Neck: Supple, nontender, no mass    Neurology: AA&Ox3, CN II-XII grossly intact, sensation intact    Respiratory: Clear To Auscultation B/L, No Wheezes, rhonchi or rales    CV: Regular Rate and Rhythm, +S1/S2, no murmurs, rubs or gallops    Abdominal: Soft, Non-Tender, Non-Distended +Bowel Soundsx4    Extremities: Improved LE edema. No Clubbing, cyanosis, + peripheral pulses    Skin: Improved LE erythema, healing blisters on  BL LE, L>R

## 2019-10-07 NOTE — DISCHARGE NOTE PROVIDER - CARE PROVIDER_API CALL
Thomas Crowder)  Internal Medicine  [address] [place] [address]  Phone: [phone]  Fax: [phone]  Follow Up Time:     Andrew Cheung)  Surgery  [address], [address]  [place], [place] [id number]  Phone: [phone]  Fax: [phone]  Follow Up Time:

## 2019-10-07 NOTE — PROGRESS NOTE ADULT - SUBJECTIVE AND OBJECTIVE BOX
ID progress note     Name: LEVON SRIVASTAVA  Age: 89y  Gender: Female  MRN: [id number]    Interval History-- Events noted, doing well, improved left leg swelling and erythema , keeping leg elevated, changed to po abx   Notes reviewed    Past Medical History--  Cellulitis  DM (diabetes mellitus)  Irritable bowel syndrome  Osteoporosis  HTN (hypertension)  Peptic ulcer  Hyperlipidemia  History of tonsillectomy  History of appendectomy  S/P hip replacement      For details regarding the patient's social history, family history, and other miscellaneous elements, please refer the initial infectious diseases consultation and/or the admitting history and physical examination for this admission.    Allergies--  Allergies    sulfa drugs (Hives)    Intolerances        Medications--  Antibiotics:  doxycycline hyclate Capsule 100 milliGRAM(s) Oral every 12 hours    Immunologic:    Other:  acetaminophen   Tablet .. PRN  amLODIPine   Tablet  aspirin  chewable  atorvastatin  bisacodyl PRN  cycloSPORINE (RESTASIS) 0.05% Emulsion  docusate sodium  enoxaparin Injectable  fluticasone propionate 50 MICROgram(s)/spray Nasal Spray PRN  lactobacillus acidophilus  lactulose Syrup  metoprolol succinate ER  polyethylene glycol 3350  senna PRN      Review of Systems--  A 10-point review of systems was obtained.     Pertinent positives and negatives--  Constitutional: No fevers. No Chills. No Rigors.   Cardiovascular: No chest pain. No palpitations.  Respiratory: No shortness of breath. No cough.  Gastrointestinal: No nausea or vomiting. No diarrhea or constipation.   Psychiatric: Pleasant. Appropriate affect.    Review of systems otherwise negative except as previously noted.    Physical Examination--  Vital Signs: T(F): 98 (10-07-19 @ 05:33), Max: 98 (10-07-19 @ 05:33)  HR: 65 (10-07-19 @ 05:33)  BP: 149/76 (10-07-19 @ 05:33)  RR: 18 (10-07-19 @ 05:33)  SpO2: 92% (10-07-19 @ 05:33)  Wt(kg): --  General: Nontoxic-appearing Female in no acute distress.  HEENT: AT/NC. PERRL. EOMI. Anicteric. Conjunctiva pink and moist. Oropharynx clear. Dentition fair.  Neck: Not rigid. No sense of mass.  Nodes: None palpable.  Lungs: Clear bilaterally without rales, wheezing or rhonchi  Heart: Regular rate and rhythm. No Murmur. No rub. No gallop. No palpable thrill.  Abdomen: Bowel sounds present and normoactive. Soft. Nondistended. Nontender. No sense of mass. No organomegaly.  Back: No spinal tenderness. No costovertebral angle tenderness.   Extremities: No cyanosis or clubbing. has 1 + leg edema , varicose veins   Skin: Warm. Dry. Good turgor. No rash. No vasculitic stigmata.  Psychiatric: Appropriate affect and mood for situation.         Laboratory Studies--  CBC      Chemistries          Culture Data    Culture - Blood (collected 01 Oct 2019 21:13)  Source: .Blood Blood-Peripheral  Final Report (06 Oct 2019 22:01):    No growth at 5 days.    Culture - Blood (collected 01 Oct 2019 21:13)  Source: .Blood Blood-Peripheral  Final Report (06 Oct 2019 22:01):    No growth at 5 days.    RECENT CULTURES  RADIOLOGY:  US Duplex Venous Lower Ext Ltd, Left (10.05.19 @ 14:19) >  FINDINGS:    There is normal compressibility of the left common femoral, femoral and   popliteal veins.     The contralateral common femoral vein is patent.    Doppler examinationshows normal spontaneous and phasic flow.    No calf vein thrombosis is detected.    IMPRESSION:     No evidence of left lower extremity deep venous thrombosis.    CT Abdomen and Pelvis No Cont (10.01.19 @ 13:57) >  IMPRESSION:     No acute intra-abdominal or pelvic findings on this unenhanced exam;   evaluation of the pelvis is degraded by streak artifact from femoral   hardware.    Cystic lesion in the pancreatic head measuring up to 2.3 cm which may   represent a benign cyst, IPMN, or cystic neoplasm. Nonemergent MRI of the   abdomen or correlation with prior imaging would be helpful to better   characterize and to assess stability.    Masslike smooth ovoid density in the left paraspinal musculature at the   level of L5 and the sacrum with density slightly higher than that of   adjacent musculature measuring up to 9.8 x3.4 x 4.7 cm with extension   into a sacral foramina on the left which could represent a neurofibroma   or schwannoma. Nonemergent MRI of the lumbosacral spine would be helpful   for better characterization.    Assessment--  89 year old female with multiple medical problems with hx of probably chronic venous insufficiency left leg with recurrent cellulitis left leg admitted with recurrent left leg cellulitis, Seen by vascular surgery. Overall doing well.    Plan :   - will change to po doxycycline 100 mg bid x 7 days   - keep leg elevated   - trend cbc   - stable for dc   - need to wear compressing stocking   - follow up with Dr. Cheung     D/w hospitalist    Continue with present regiment.  Appropriate use of antibiotics and adverse effects reviewed.    I have discussed the above plan of care with patient and family in detail. They expressed understanding of the treatment plan . Risks, benefits and alternatives discussed in detail. I have asked if they have any questions or concerns and appropriately addressed them to the best of my ability .      > 35 minutes spent in direct patient care reviewing  the notes, lab data/ imaging , discussion with multidisciplinary team. All questions were addressed and answered to the best of my capacity .    Thank you for allowing me to participate in the care of your patient .        Clifford Nesbitt MD  [phone]

## 2019-10-07 NOTE — DISCHARGE NOTE NURSING/CASE MANAGEMENT/SOCIAL WORK - PATIENT PORTAL LINK FT
You can access the FollowMyHealth Patient Portal offered by [facility] by registering at the following website: [email or web address]. By joining [facility]’s FollowMyHealth portal, you will also be able to view your health information using other applications (apps) compatible with our system.

## 2019-10-08 LAB
CULTURE RESULTS: NO GROWTH — SIGNIFICANT CHANGE UP
SPECIMEN SOURCE: SIGNIFICANT CHANGE UP

## 2019-10-18 ENCOUNTER — INPATIENT (INPATIENT)
Facility: HOSPITAL | Age: 84
LOS: 6 days | Discharge: HOME | DRG: 871 | End: 2019-10-25
Attending: HOSPITALIST | Admitting: FAMILY MEDICINE
Payer: MEDICARE

## 2019-10-18 VITALS
RESPIRATION RATE: 18 BRPM | TEMPERATURE: 98 F | SYSTOLIC BLOOD PRESSURE: 126 MMHG | OXYGEN SATURATION: 96 % | DIASTOLIC BLOOD PRESSURE: 67 MMHG | HEIGHT: 61 IN | HEART RATE: 74 BPM | WEIGHT: 100.09 LBS

## 2019-10-18 DIAGNOSIS — I10 ESSENTIAL (PRIMARY) HYPERTENSION: ICD-10-CM

## 2019-10-18 DIAGNOSIS — K27.9 PEPTIC ULCER, SITE UNSPECIFIED, UNSPECIFIED AS ACUTE OR CHRONIC, WITHOUT HEMORRHAGE OR PERFORATION: ICD-10-CM

## 2019-10-18 DIAGNOSIS — Z29.9 ENCOUNTER FOR PROPHYLACTIC MEASURES, UNSPECIFIED: ICD-10-CM

## 2019-10-18 DIAGNOSIS — Z98.89 OTHER SPECIFIED POSTPROCEDURAL STATES: Chronic | ICD-10-CM

## 2019-10-18 DIAGNOSIS — Z96.60 PRESENCE OF UNSPECIFIED ORTHOPEDIC JOINT IMPLANT: Chronic | ICD-10-CM

## 2019-10-18 DIAGNOSIS — E78.5 HYPERLIPIDEMIA, UNSPECIFIED: ICD-10-CM

## 2019-10-18 DIAGNOSIS — L03.116 CELLULITIS OF LEFT LOWER LIMB: ICD-10-CM

## 2019-10-18 DIAGNOSIS — K59.00 CONSTIPATION, UNSPECIFIED: ICD-10-CM

## 2019-10-18 PROBLEM — L03.90 CELLULITIS, UNSPECIFIED: Chronic | Status: ACTIVE | Noted: 2019-10-01

## 2019-10-18 LAB
ALBUMIN SERPL ELPH-MCNC: 3.3 G/DL — SIGNIFICANT CHANGE UP (ref 3.3–5)
ALP SERPL-CCNC: 88 U/L — SIGNIFICANT CHANGE UP (ref 40–120)
ALT FLD-CCNC: 25 U/L — SIGNIFICANT CHANGE UP (ref 12–78)
ANION GAP SERPL CALC-SCNC: 7 MMOL/L — SIGNIFICANT CHANGE UP (ref 5–17)
APPEARANCE UR: CLEAR — SIGNIFICANT CHANGE UP
APTT BLD: 33.1 SEC — SIGNIFICANT CHANGE UP (ref 28.5–37)
AST SERPL-CCNC: 16 U/L — SIGNIFICANT CHANGE UP (ref 15–37)
BASOPHILS # BLD AUTO: 0.07 K/UL — SIGNIFICANT CHANGE UP (ref 0–0.2)
BASOPHILS NFR BLD AUTO: 0.6 % — SIGNIFICANT CHANGE UP (ref 0–2)
BILIRUB SERPL-MCNC: 0.7 MG/DL — SIGNIFICANT CHANGE UP (ref 0.2–1.2)
BILIRUB UR-MCNC: NEGATIVE — SIGNIFICANT CHANGE UP
BUN SERPL-MCNC: 21 MG/DL — SIGNIFICANT CHANGE UP (ref 7–23)
CALCIUM SERPL-MCNC: 8.9 MG/DL — SIGNIFICANT CHANGE UP (ref 8.5–10.1)
CHLORIDE SERPL-SCNC: 105 MMOL/L — SIGNIFICANT CHANGE UP (ref 96–108)
CO2 SERPL-SCNC: 27 MMOL/L — SIGNIFICANT CHANGE UP (ref 22–31)
COLOR SPEC: YELLOW — SIGNIFICANT CHANGE UP
CREAT SERPL-MCNC: 0.6 MG/DL — SIGNIFICANT CHANGE UP (ref 0.5–1.3)
CRP SERPL-MCNC: 8.64 MG/DL — HIGH (ref 0–0.4)
DIFF PNL FLD: NEGATIVE — SIGNIFICANT CHANGE UP
EOSINOPHIL # BLD AUTO: 0 K/UL — SIGNIFICANT CHANGE UP (ref 0–0.5)
EOSINOPHIL NFR BLD AUTO: 0 % — SIGNIFICANT CHANGE UP (ref 0–6)
ERYTHROCYTE [SEDIMENTATION RATE] IN BLOOD: 12 MM/HR — SIGNIFICANT CHANGE UP (ref 0–20)
GLUCOSE SERPL-MCNC: 185 MG/DL — HIGH (ref 70–99)
GLUCOSE UR QL: 50 MG/DL
HCT VFR BLD CALC: 38.7 % — SIGNIFICANT CHANGE UP (ref 34.5–45)
HGB BLD-MCNC: 12.4 G/DL — SIGNIFICANT CHANGE UP (ref 11.5–15.5)
IMM GRANULOCYTES NFR BLD AUTO: 0.9 % — SIGNIFICANT CHANGE UP (ref 0–1.5)
INR BLD: 1.11 RATIO — SIGNIFICANT CHANGE UP (ref 0.88–1.16)
KETONES UR-MCNC: NEGATIVE — SIGNIFICANT CHANGE UP
LACTATE SERPL-SCNC: 1.9 MMOL/L — SIGNIFICANT CHANGE UP (ref 0.7–2)
LEUKOCYTE ESTERASE UR-ACNC: NEGATIVE — SIGNIFICANT CHANGE UP
LYMPHOCYTES # BLD AUTO: 0.63 K/UL — LOW (ref 1–3.3)
LYMPHOCYTES # BLD AUTO: 5.1 % — LOW (ref 13–44)
MCHC RBC-ENTMCNC: 28.8 PG — SIGNIFICANT CHANGE UP (ref 27–34)
MCHC RBC-ENTMCNC: 32 GM/DL — SIGNIFICANT CHANGE UP (ref 32–36)
MCV RBC AUTO: 90 FL — SIGNIFICANT CHANGE UP (ref 80–100)
MONOCYTES # BLD AUTO: 0.31 K/UL — SIGNIFICANT CHANGE UP (ref 0–0.9)
MONOCYTES NFR BLD AUTO: 2.5 % — SIGNIFICANT CHANGE UP (ref 2–14)
NEUTROPHILS # BLD AUTO: 11.33 K/UL — HIGH (ref 1.8–7.4)
NEUTROPHILS NFR BLD AUTO: 90.9 % — HIGH (ref 43–77)
NITRITE UR-MCNC: NEGATIVE — SIGNIFICANT CHANGE UP
NRBC # BLD: 0 /100 WBCS — SIGNIFICANT CHANGE UP (ref 0–0)
PH UR: 5 — SIGNIFICANT CHANGE UP (ref 5–8)
PLATELET # BLD AUTO: 268 K/UL — SIGNIFICANT CHANGE UP (ref 150–400)
POTASSIUM SERPL-MCNC: 4.2 MMOL/L — SIGNIFICANT CHANGE UP (ref 3.5–5.3)
POTASSIUM SERPL-SCNC: 4.2 MMOL/L — SIGNIFICANT CHANGE UP (ref 3.5–5.3)
PROCALCITONIN SERPL-MCNC: 0.3 NG/ML — HIGH (ref 0–0.04)
PROT SERPL-MCNC: 6.6 G/DL — SIGNIFICANT CHANGE UP (ref 6–8.3)
PROT UR-MCNC: NEGATIVE — SIGNIFICANT CHANGE UP
PROTHROM AB SERPL-ACNC: 12.7 SEC — SIGNIFICANT CHANGE UP (ref 10–12.9)
RBC # BLD: 4.3 M/UL — SIGNIFICANT CHANGE UP (ref 3.8–5.2)
RBC # FLD: 13.8 % — SIGNIFICANT CHANGE UP (ref 10.3–14.5)
SODIUM SERPL-SCNC: 139 MMOL/L — SIGNIFICANT CHANGE UP (ref 135–145)
SP GR SPEC: 1.02 — SIGNIFICANT CHANGE UP (ref 1.01–1.02)
UROBILINOGEN FLD QL: NEGATIVE — SIGNIFICANT CHANGE UP
VANCOMYCIN TROUGH SERPL-MCNC: 13.8 UG/ML — SIGNIFICANT CHANGE UP (ref 10–20)
WBC # BLD: 12.45 K/UL — HIGH (ref 3.8–10.5)
WBC # FLD AUTO: 12.45 K/UL — HIGH (ref 3.8–10.5)

## 2019-10-18 PROCEDURE — 93010 ELECTROCARDIOGRAM REPORT: CPT

## 2019-10-18 PROCEDURE — 99223 1ST HOSP IP/OBS HIGH 75: CPT | Mod: GC,AI

## 2019-10-18 PROCEDURE — 71045 X-RAY EXAM CHEST 1 VIEW: CPT | Mod: 26

## 2019-10-18 PROCEDURE — 99285 EMERGENCY DEPT VISIT HI MDM: CPT

## 2019-10-18 PROCEDURE — 93970 EXTREMITY STUDY: CPT | Mod: 26

## 2019-10-18 RX ORDER — CEFTRIAXONE 500 MG/1
INJECTION, POWDER, FOR SOLUTION INTRAMUSCULAR; INTRAVENOUS
Refills: 0 | Status: DISCONTINUED | OUTPATIENT
Start: 2019-10-18 | End: 2019-10-21

## 2019-10-18 RX ORDER — METOPROLOL TARTRATE 50 MG
25 TABLET ORAL DAILY
Refills: 0 | Status: DISCONTINUED | OUTPATIENT
Start: 2019-10-18 | End: 2019-10-25

## 2019-10-18 RX ORDER — SENNA PLUS 8.6 MG/1
2 TABLET ORAL AT BEDTIME
Refills: 0 | Status: DISCONTINUED | OUTPATIENT
Start: 2019-10-18 | End: 2019-10-18

## 2019-10-18 RX ORDER — AMLODIPINE BESYLATE 2.5 MG/1
5 TABLET ORAL DAILY
Refills: 0 | Status: DISCONTINUED | OUTPATIENT
Start: 2019-10-18 | End: 2019-10-21

## 2019-10-18 RX ORDER — ATORVASTATIN CALCIUM 80 MG/1
20 TABLET, FILM COATED ORAL AT BEDTIME
Refills: 0 | Status: DISCONTINUED | OUTPATIENT
Start: 2019-10-18 | End: 2019-10-25

## 2019-10-18 RX ORDER — ASPIRIN/CALCIUM CARB/MAGNESIUM 324 MG
81 TABLET ORAL DAILY
Refills: 0 | Status: DISCONTINUED | OUTPATIENT
Start: 2019-10-18 | End: 2019-10-25

## 2019-10-18 RX ORDER — POLYETHYLENE GLYCOL 3350 17 G/17G
17 POWDER, FOR SOLUTION ORAL DAILY
Refills: 0 | Status: DISCONTINUED | OUTPATIENT
Start: 2019-10-18 | End: 2019-10-18

## 2019-10-18 RX ORDER — SODIUM CHLORIDE 9 MG/ML
1000 INJECTION INTRAMUSCULAR; INTRAVENOUS; SUBCUTANEOUS ONCE
Refills: 0 | Status: COMPLETED | OUTPATIENT
Start: 2019-10-18 | End: 2019-10-18

## 2019-10-18 RX ORDER — DOCUSATE SODIUM 100 MG
100 CAPSULE ORAL THREE TIMES A DAY
Refills: 0 | Status: DISCONTINUED | OUTPATIENT
Start: 2019-10-18 | End: 2019-10-25

## 2019-10-18 RX ORDER — CEFTRIAXONE 500 MG/1
1000 INJECTION, POWDER, FOR SOLUTION INTRAMUSCULAR; INTRAVENOUS EVERY 24 HOURS
Refills: 0 | Status: DISCONTINUED | OUTPATIENT
Start: 2019-10-19 | End: 2019-10-21

## 2019-10-18 RX ORDER — SENNA PLUS 8.6 MG/1
1 TABLET ORAL DAILY
Refills: 0 | Status: DISCONTINUED | OUTPATIENT
Start: 2019-10-18 | End: 2019-10-25

## 2019-10-18 RX ORDER — FAMOTIDINE 10 MG/ML
20 INJECTION INTRAVENOUS DAILY
Refills: 0 | Status: DISCONTINUED | OUTPATIENT
Start: 2019-10-18 | End: 2019-10-25

## 2019-10-18 RX ORDER — ENOXAPARIN SODIUM 100 MG/ML
40 INJECTION SUBCUTANEOUS DAILY
Refills: 0 | Status: DISCONTINUED | OUTPATIENT
Start: 2019-10-18 | End: 2019-10-25

## 2019-10-18 RX ORDER — POLYETHYLENE GLYCOL 3350 17 G/17G
17 POWDER, FOR SOLUTION ORAL DAILY
Refills: 0 | Status: DISCONTINUED | OUTPATIENT
Start: 2019-10-18 | End: 2019-10-25

## 2019-10-18 RX ORDER — LACTOBACILLUS ACIDOPHILUS 100MM CELL
1 CAPSULE ORAL THREE TIMES A DAY
Refills: 0 | Status: DISCONTINUED | OUTPATIENT
Start: 2019-10-18 | End: 2019-10-25

## 2019-10-18 RX ORDER — VANCOMYCIN HCL 1 G
1000 VIAL (EA) INTRAVENOUS EVERY 24 HOURS
Refills: 0 | Status: DISCONTINUED | OUTPATIENT
Start: 2019-10-19 | End: 2019-10-19

## 2019-10-18 RX ORDER — DOCUSATE SODIUM 100 MG
100 CAPSULE ORAL
Refills: 0 | Status: DISCONTINUED | OUTPATIENT
Start: 2019-10-18 | End: 2019-10-18

## 2019-10-18 RX ORDER — ACETAMINOPHEN 500 MG
650 TABLET ORAL EVERY 8 HOURS
Refills: 0 | Status: DISCONTINUED | OUTPATIENT
Start: 2019-10-18 | End: 2019-10-20

## 2019-10-18 RX ORDER — SODIUM CHLORIDE 9 MG/ML
350 INJECTION INTRAMUSCULAR; INTRAVENOUS; SUBCUTANEOUS ONCE
Refills: 0 | Status: COMPLETED | OUTPATIENT
Start: 2019-10-18 | End: 2019-10-18

## 2019-10-18 RX ORDER — VANCOMYCIN HCL 1 G
1000 VIAL (EA) INTRAVENOUS ONCE
Refills: 0 | Status: COMPLETED | OUTPATIENT
Start: 2019-10-18 | End: 2019-10-18

## 2019-10-18 RX ORDER — CEFTRIAXONE 500 MG/1
1000 INJECTION, POWDER, FOR SOLUTION INTRAMUSCULAR; INTRAVENOUS ONCE
Refills: 0 | Status: COMPLETED | OUTPATIENT
Start: 2019-10-18 | End: 2019-10-18

## 2019-10-18 RX ORDER — FLUTICASONE PROPIONATE 50 MCG
1 SPRAY, SUSPENSION NASAL
Refills: 0 | Status: DISCONTINUED | OUTPATIENT
Start: 2019-10-18 | End: 2019-10-25

## 2019-10-18 RX ADMIN — Medication 1 TABLET(S): at 22:07

## 2019-10-18 RX ADMIN — Medication 1 TABLET(S): at 16:42

## 2019-10-18 RX ADMIN — Medication 250 MILLIGRAM(S): at 10:04

## 2019-10-18 RX ADMIN — SODIUM CHLORIDE 1000 MILLILITER(S): 9 INJECTION INTRAMUSCULAR; INTRAVENOUS; SUBCUTANEOUS at 10:04

## 2019-10-18 RX ADMIN — Medication 1000 MILLIGRAM(S): at 11:05

## 2019-10-18 RX ADMIN — CEFTRIAXONE 100 MILLIGRAM(S): 500 INJECTION, POWDER, FOR SOLUTION INTRAMUSCULAR; INTRAVENOUS at 13:51

## 2019-10-18 RX ADMIN — Medication 650 MILLIGRAM(S): at 17:16

## 2019-10-18 RX ADMIN — SODIUM CHLORIDE 1000 MILLILITER(S): 9 INJECTION INTRAMUSCULAR; INTRAVENOUS; SUBCUTANEOUS at 11:05

## 2019-10-18 RX ADMIN — SODIUM CHLORIDE 1050 MILLILITER(S): 9 INJECTION INTRAMUSCULAR; INTRAVENOUS; SUBCUTANEOUS at 11:00

## 2019-10-18 RX ADMIN — ATORVASTATIN CALCIUM 20 MILLIGRAM(S): 80 TABLET, FILM COATED ORAL at 22:07

## 2019-10-18 NOTE — ED ADULT NURSE REASSESSMENT NOTE - NS ED NURSE REASSESS COMMENT FT1
Pt rectal temp 102.2, Dr Manuel choi RN obtained PRN tylenol order, Receiving DEEP Don aware of pt's current temp.

## 2019-10-18 NOTE — ED PROVIDER NOTE - PROGRESS NOTE DETAILS
results dw pt and family, pt still has angry red leg but not as angry as before no sq air   aware of need for admissoin case dw dr koroma accepts case will consult id and vascular

## 2019-10-18 NOTE — GOALS OF CARE CONVERSATION - ADVANCED CARE PLANNING - CONVERSATION DETAILS
met pt, sleeping with daughter, Farhana present, Farhana provided copy of hcp & living will of her wishes.  daughter prefers to allow pt to rest for a while more, will followup. PC RN contact # given met pt, sleeping with daughterFarhana present, Farhana provided copy of hcp & living will of her wishes.  daughter prefers to allow pt to rest for a while more, will followup. PC RN contact # given    addendum: 10/18/19: 1540hrs: revisited pt, continues to be sleeping, will follow up with pt next visit. daughters, Farhana & Eleazar, hcp, feel pt is A&O when awake & should make her own decisions.

## 2019-10-18 NOTE — H&P ADULT - HISTORY OF PRESENT ILLNESS
pt is a 89 yo f who was just discharged from this facility for cellulitis and sepsis of due to poor veins in legs. she follow with dr ocampo vascular dr nikkie de paz is pmd h xo perla chol osteoporosis, htn sp bl hip surgery, gb and appy, urachal surgery as child. never a smoker not a drinker bib daughter for eval and care because once the out pt abx ended the redness on the affected left leg began to return  	now the infection is above the knee to the lateral thigh. pt has bl le pain no fever no chills   prior to visit and after admission pt had been walking even to the beach. with her walker    In ED:   Vitals:   Labs: WBC 12.45, glucose 185, lactate neg.   Doppler LE: No evidence of deep venous thrombosis in either lower extremity.  CXR: No active disease.  ECG:   S/P one dose vanc, 2 NS boluses, continuous pulse ox, pending UA and blood and urine cultures. pt is a 89 yo f who was just discharged from this facility for cellulitis and sepsis of due to poor veins in legs. 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 never a smoker not a drinker bib daughter for eval and care because once the out pt abx ended the redness on the affected left leg began to return  	now the infection is above the knee to the lateral thigh. pt has bl le pain no fever no chills   prior to visit and after admission pt had been walking even to the beach. with her walker    In ED:   Vitals: Temp 98.4, HR 74, /67, RR 18, SPO2 on RA 96.   Labs: WBC 12.45, glucose 185, lactate neg.   Doppler LE: No evidence of deep venous thrombosis in either lower extremity.  CXR: No active disease.  ECG:   S/P one dose vanc, 2 NS boluses, continuous pulse ox, pending UA and blood and urine cultures. Pt is a 89 yo F w/ pmh Cellulitis, HTN (hypertension), Hyperlipidemia, Irritable bowel syndrome, Osteoporosis, and Peptic ulcer that presents to the ED w/ L LE pain and erythema. Pt was just discharged from this facility for cellulitis and sepsis of due to poor veins in legs on October 7th 2019. She follows with Dr Cheung vascular. Pt w/ daughter at bedside. They reports she was discharged on po antibiotics which the pt finished yesterday. Since that time the redness began to get worse in her L LE. It spans from ankle to upper thigh. Pt reports she had bad chills yesterday, but no fever. In addition she had some heart palpitations and nausea. Right now she denies nausea. Pt denies fever, chest pain, sob, abdominal pain, urinary frequency, diarrhea, vomiting. She admits to urinary burning.     In ED:   Vitals: Temp 98.4, HR 74, /67, RR 18, SPO2 on RA 96.   Labs: WBC 12.45, glucose 185, lactate neg.   Doppler LE: No evidence of deep venous thrombosis in either lower extremity.  CXR: No active disease.  ECG:   S/P one dose vanc, 2 NS boluses, continuous pulse ox, UA negative and blood and urine cultures pending. Pt is a 89 yo F w/ pmh Cellulitis, HTN (hypertension), Hyperlipidemia, Irritable bowel syndrome, Osteoporosis, and Peptic ulcer that presents to the ED w/ L LE pain and erythema. Pt was just discharged from this facility for cellulitis and sepsis of due to poor veins in legs on October 7th 2019. She follows with Dr Cheung vascular. Pt w/ daughter at bedside. They reports she was discharged on po antibiotics which the pt finished yesterday. Since that time the redness began to get worse in her L LE. It spans from ankle to upper thigh. Pt reports she had bad chills yesterday, but no fever. In addition she had some heart palpitations and nausea. Right now she denies nausea. Pt denies fever, chest pain, sob, abdominal pain, urinary frequency, diarrhea, vomiting. She admits to urinary burning.     In ED:   Vitals: Temp 98.4, HR 74, /67, RR 18, SPO2 on RA 96.   Labs: WBC 12.45, glucose 185, lactate neg.   Doppler LE: No evidence of deep venous thrombosis in either lower extremity.  CXR: No active disease.  ECG: SR w/ first degree AV block w/ PACs.   S/P one dose vanc, 2 NS boluses, continuous pulse ox, UA negative and blood and urine cultures pending.

## 2019-10-18 NOTE — H&P ADULT - PROBLEM SELECTOR PLAN 1
-  -ID consulted, Dr. NELLY Nesbitt, f/u recs -Pt recently discharged on Oct. 7th w/ cellulitis and UTI  -Pt finished oral course of antibiotic yesterday, w/o resolution of symptoms  -Nasal swab to r/o staph  -Rocephin and vancomycin   -Vanc trough  -reevaluate antibiotic course w/ pending results of nasal swab and blood/urine cultures.   -Leg elevation.   -ID consulted, Dr. NELLY Nesbitt, recs appreciated.   -Vascular consulted, Dr. Cheung, f/u recs.

## 2019-10-18 NOTE — H&P ADULT - ATTENDING COMMENTS
89 yo F w/ pmh Cellulitis, HTN (hypertension), Hyperlipidemia, Irritable bowel syndrome, Osteoporosis, and Peptic ulcer that presents to the ED w/ L LE pain and erythema. Pt is being admitted for recurrent L LE Cellulitis. Plan: cont iv antibx, apprec ID recs, vascular Dr Cheung consulted to see on sunday, apprec palliative team collaboration for [facility], monitor clinical course

## 2019-10-18 NOTE — ED PROVIDER NOTE - OBJECTIVE STATEMENT
pt is a 89 yo f who was just discharged from this facility for cellulitis and sepsis of due to poor veins in legs. 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 never a smoker not a drinker bib daughter for eval and care because once the out pt abx ended the redness on the affected left leg began to return  now the infection is above the knee to the lateral thigh. pt has bl le pain no fever no chills   prior to visit and after admission pt had been walking even to the beach. with her walker

## 2019-10-18 NOTE — H&P ADULT - NSHPREVIEWOFSYSTEMS_GEN_ALL_CORE
CONSTITUTIONAL: denies fever, fatigue, weakness. admits chills.   HEENT: denies blurred vision, sore throat  SKIN: L Ext erythema  CARDIOVASCULAR: denies chest pain, chest pressure. admits palpitations  RESPIRATORY: denies shortness of breath, sputum production  GASTROINTESTINAL: denies vomiting, diarrhea, abdominal pain., admits nausea  GENITOURINARY: denies dysuria, discharge  NEUROLOGICAL: denies numbness, headache, focal weakness  MUSCULOSKELETAL: denies new joint pain, muscle aches  HEMATOLOGIC: denies gross bleeding, bruising  LYMPHATICS: denies enlarged lymph nodes, extremity swelling  PSYCHIATRIC: denies recent changes in anxiety, depression  ENDOCRINOLOGIC: denies sweating, cold or heat intolerance

## 2019-10-18 NOTE — ED ADULT NURSE NOTE - OBJECTIVE STATEMENT
Pt to ED c/o left leg cellulitis. Pt just finished PO Abx 2 days ago, left leg has extensive redness, warmth & swelling that extends up to the left thigh, afebrile, c/o chills, skin intact, will continue to monitor

## 2019-10-18 NOTE — H&P ADULT - NSHPSOCIALHISTORY_GEN_ALL_CORE
Non smoker. No alcohol or drug use. Pt lives alone w/ aid that comes in the home to help her with ADLs. Pt ambulates w/ walker.

## 2019-10-18 NOTE — CONSULT NOTE ADULT - SUBJECTIVE AND OBJECTIVE BOX
Infectious Diseases Consult by Clifford Nesbitt MD    Reason for Consult :    HPI:  pt is a 89 yo f who was just discharged from this facility for cellulitis and sepsis of due to poor veins in legs. she follow with dr ocampo vascular dr nikkie de paz is pmd h xo perla chol osteoporosis, htn sp bl hip surgery, gb and appy, urachal surgery as child. never a smoker not a drinker bib daughter for eval and care because once the out pt abx ended the redness on the affected left leg began to return  	now the infection is above the knee to the lateral thigh. pt has bl le pain no fever no chills   prior to visit and after admission pt had [place] the [place]. with her walker    In ED:   Vitals: Temp 98.4, HR 74, /67, RR 18, SPO2 on RA 96.   Labs: WBC 12.45, glucose 185, lactate neg.   Doppler LE: No evidence of deep venous thrombosis in either lower extremity.  CXR: No active disease.  ECG:   S/P one dose vanc, 2 NS boluses, continuous pulse ox, pending UA and blood and urine cultures. (18 Oct 2019 12:17)        Past Medical & Surgical Hx:  PAST MEDICAL & SURGICAL HISTORY:  Cellulitis  Irritable bowel syndrome  Osteoporosis  HTN (hypertension)  Peptic ulcer  Hyperlipidemia  History of tonsillectomy  History of appendectomy  S/P hip replacement: partial, left      Social History--  EtOH: denies ***  Tobacco: denies ***  Drug Use: denies ***    Travel/Environmental/Occupational History:  *** inserth T/E/O Hx ***    FAMILY HISTORY:      Allergies    sulfa drugs (Hives)    Intolerances        Home/ Out patient  Medications :  Home Medications:  amLODIPine 5 mg oral tablet: 1 tab(s) orally once a day (18 Oct 2019 12:10)  aspirin 81 mg oral tablet: 2 tab(s) orally once a day (18 Oct 2019 12:10)  atorvastatin 20 mg oral tablet: 1 tab(s) orally once a day (18 Oct 2019 12:10)  docusate sodium 100 mg oral capsule: 1 cap(s) orally 3 times a day (18 Oct 2019 12:10)  fluticasone 50 mcg/inh nasal spray: 1 spray(s) nasal 2 times a day, As needed, Nasal congestion, rhinorrhea (18 Oct 2019 12:10)  metoprolol succinate 25 mg oral tablet, extended release: 1 tab(s) orally once a day (18 Oct 2019 12:10)  MiraLax oral powder for reconstitution:  (18 Oct 2019 12:10)  Pepcid 20 mg oral tablet: 1 tab(s) orally once a day (at bedtime) (18 Oct 2019 12:46)  Restasis 0.05% ophthalmic emulsion: 1 drop(s) to each affected eye every 12 hours  both eyes (18 Oct 2019 12:10)      Current Inpatient Medications :    ANTIBIOTICS:       OTHER RELEVANT MEDICATIONS :      ROS:  Unable to obtain due to :     ROS:  CONSTITUTIONAL:  Negative fever or chills, feels well, good appetite  EYES:  Negative  blurry vision or double vision  CARDIOVASCULAR:  Negative for chest pain or palpitations  RESPIRATORY:  Negative for cough, wheezing, or SOB   GASTROINTESTINAL:  Negative for nausea, vomiting, diarrhea, constipation, or abdominal pain  GENITOURINARY:  Negative frequency, urgency , dysuria or hematuria   NEUROLOGIC:  No headache, confusion, dizziness, lightheadedness  All other systems were reviewed and are negative          I&O's Detail      Physical Exam:  Vital Signs Last 24 Hrs  T(C): 36.9 (18 Oct 2019 09:07), Max: 36.9 (18 Oct 2019 09:07)  T(F): 98.4 (18 Oct 2019 09:07), Max: 98.4 (18 Oct 2019 09:07)  HR: 74 (18 Oct 2019 09:07) (74 - 74)  BP: 126/67 (18 Oct 2019 09:07) (126/67 - 126/67)  BP(mean): --  RR: 18 (18 Oct 2019 09:07) (18 - 18)  SpO2: 96% (18 Oct 2019 09:07) (96% - 96%)  Height (cm): 154.94 (10-18 @ 09:07)  Weight (kg): 45.4 (10-18 @ 09:07)  BMI (kg/m2): 18.9 (10-18 @ 09:07)  BSA (m2): 1.41 (10-18 @ 09:07)    General: well developed well nourished, in no acute distress  Eyes: sclera anicteric, pupils equal and reactive to light  ENMT: buccal mucosa moist, pharynx not injected  Neck: supple, trachea midline  Lungs: clear, no wheeze/rhonchi  Cardiovascular: regular rate and rhythm, S1 S2  Abdomen: soft, nontender, no organomegaly present, bowel sounds normal  Neurological:  alert and oriented x3, Cranial Nerves II-XII grossly intact  Skin:no increased ecchymosis/petechiae/purpura  Lymph Nodes: no palpable cervical/supraclavicular lymph nodes enlargements  Extremities: no cyanosis/clubbing/edema    Labs:                          12.4   12.45 )-----------( 268      ( 18 Oct 2019 09:56 )             38.7         RECENT CULTURES:          RADIOLOGY & ADDITIONAL STUDIES:    Assessment :             Plan :       Continue with present regime .  Approptiate use of antibiotics and adverse effects reviewed.      I have discussed the above plan of care with patient/family in detail. They expressed understanding of the treatment plan . Risks, benefits and alternatives discussed in detail. I have asked if they have any questions or concerns and appropriately addressed them to the best of my ability .      > 45 minutes spent in direct patient care reviewing  the notes, lab data/ imaging , discussion with multidisciplinary team. All questions were addressed and answered to the best of my capacity .    Thank you for allowing me to participate in the care of your patient .      Clifford Nesbitt MD  [phone] Infectious Diseases Consult by Clifford Nesbitt MD    Reason for Consult :    HPI:  89 y/o F with a PMHx of Cellulitis, HTN, HLD, Osteoporosis, IBS, and peptic ulcer presented to the ED with worsening LLE cellulitis with associated nausea and chills, she was just dc from hospital 1 week ago when she was  admitted and treated with abx for cellulitis left leg . Per patient, patient was diagnosed with cellulitis of the L LE last month, with associated chills and was given a 10 day course of antibiotics (unable to specify which one) from [facility], and her symptoms improved and the cellulitic rash went away completely, but patient continued to have intermittent chills throughout the month. 2 days ago, patient felt similar chills to what she felt last month with additional nausea. Yesterday, patient noticed worsening swelling and pain in the same L LE area. She wears venous compression stockings . She is now noted to  have pain and redness extending to left thigh. She claims she has severe chills , she was afebrile in ER     She follows with Dr. Cheung, she does keep her leg elevated over a pillow , Her left leg is more swollen than right leg since she  has left hip surgery . 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   Labs: WBC 12.45, glucose 185, lactate neg.   Doppler LE: No evidence of deep venous thrombosis in either lower extremity.  CXR: No active disease.  ECG:   S/P one dose vanc, 2 NS boluses, continuous pulse ox, pending UA and blood and urine cultures. (18 Oct 2019 12:17)        Past Medical & Surgical Hx:  PAST MEDICAL & SURGICAL HISTORY:  Cellulitis  Irritable bowel syndrome  Osteoporosis  HTN (hypertension)  Peptic ulcer  Hyperlipidemia  History of tonsillectomy  History of appendectomy  S/P hip replacement: partial, left    Social History-- Retired   EtOH: denies   Tobacco: denies   Drug Use: denies       FAMILY HISTORY:      Allergies    sulfa drugs (Hives)    Intolerances        Home/ Out patient  Medications :  Home Medications:  amLODIPine 5 mg oral tablet: 1 tab(s) orally once a day (18 Oct 2019 12:10)  aspirin 81 mg oral tablet: 2 tab(s) orally once a day (18 Oct 2019 12:10)  atorvastatin 20 mg oral tablet: 1 tab(s) orally once a day (18 Oct 2019 12:10)  docusate sodium 100 mg oral capsule: 1 cap(s) orally 3 times a day (18 Oct 2019 12:10)  fluticasone 50 mcg/inh nasal spray: 1 spray(s) nasal 2 times a day, As needed, Nasal congestion, rhinorrhea (18 Oct 2019 12:10)  metoprolol succinate 25 mg oral tablet, extended release: 1 tab(s) orally once a day (18 Oct 2019 12:10)  MiraLax oral powder for reconstitution:  (18 Oct 2019 12:10)  Pepcid 20 mg oral tablet: 1 tab(s) orally once a day (at bedtime) (18 Oct 2019 12:46)  Restasis 0.05% ophthalmic emulsion: 1 drop(s) to each affected eye every 12 hours  both eyes (18 Oct 2019 12:10)      Current Inpatient Medications :    ANTIBIOTICS:       OTHER RELEVANT MEDICATIONS :    ROS:  CONSTITUTIONAL:  Positive for  chills, feels well, good appetite  EYES:  Negative  blurry vision or double vision  CARDIOVASCULAR:  Negative for chest pain or palpitations  RESPIRATORY:  Negative for cough, wheezing, or SOB   GASTROINTESTINAL:  Negative for nausea, vomiting, diarrhea, constipation, or abdominal pain  GENITOURINARY:  Negative frequency, urgency , dysuria or hematuria   NEUROLOGIC:  No headache, confusion, dizziness, lightheadedness  All other systems were reviewed and are negative          I&O's Detail      Physical Exam:  Vital Signs Last 24 Hrs  T(C): 36.9 (18 Oct 2019 09:07), Max: 36.9 (18 Oct 2019 09:07)  T(F): 98.4 (18 Oct 2019 09:07), Max: 98.4 (18 Oct 2019 09:07)  HR: 74 (18 Oct 2019 09:07) (74 - 74)  BP: 126/67 (18 Oct 2019 09:07) (126/67 - 126/67)  BP(mean): --  RR: 18 (18 Oct 2019 09:07) (18 - 18)  SpO2: 96% (18 Oct 2019 09:07) (96% - 96%)  Height (cm): 154.94 (10-18 @ 09:07)  Weight (kg): 45.4 (10-18 @ 09:07)  BMI (kg/m2): 18.9 (10-18 @ 09:07)  BSA (m2): 1.41 (10-18 @ 09:07)    General: well developed well nourished, in no acute distress  Eyes: sclera anicteric, pupils equal and reactive to light  ENMT: buccal mucosa moist, pharynx not injected  Neck: supple, trachea midline  Lungs: clear, no wheeze/rhonchi  Cardiovascular: regular rate and rhythm, S1 S2  Abdomen: soft, nontender, no organomegaly present, bowel sounds normal  Neurological:  alert and oriented x3, Cranial Nerves II-XII grossly intact  Skin: no increased ecchymosis/petechiae/purpura  Lymph Nodes: no palpable cervical/supraclavicular lymph nodes enlargements  Extremities: no cyanosis/clubbing, chronci venous dermatitis both LE , diffuse erythema left leg with ascending lymphangitis left leg, tenderness posterior thigh edema, dry skin     Labs:                            12.4   12.45  )----------(  268       ( 18 Oct 2019 09:56 )               38.7      139    |  105    |  21     ----------------------------<  185        ( 18 Oct 2019 09:56 )  4.2     |  27     |  0.60     Ca    8.9        ( 18 Oct 2019 09:56 )    TPro  6.6    /  Alb  3.3    /  TBili  0.7    /  DBili  x      /  AST  16     /  ALT  25     /  AlkPhos  88     ( 18 Oct 2019 09:56 )    LIVER FUNCTIONS - ( 18 Oct 2019 09:56 )  Alb: 3.3 g/dL / Pro: 6.6 g/dL / ALK PHOS: 88 U/L / ALT: 25 U/L / AST: 16 U/L / GGT: x           PT/INR -  12.7 sec / 1.11 ratio   ( 18 Oct 2019 09:56 )       PTT -  33.1 sec   ( 18 Oct 2019 09:56 )  CAPILLARY BLOOD GLUCOSE        Urinalysis Basic - ( 18 Oct 2019 12:37 )    Color: Yellow / Appearance: Clear / S.020 / pH: x  Gluc: x / Ketone: Negative  / Bili: Negative / Urobili: Negative   Blood: x / Protein: Negative / Nitrite: Negative   Leuk Esterase: Negative / RBC: x / WBC x   Sq Epi: x / Non Sq Epi: x / Bacteria: x    Lactate, Blood: 1.9 mmol/L (10-18-19 @ 09:56)          RECENT CULTURES:    RADIOLOGY & ADDITIONAL STUDIES:  Xray Chest 1 View- PORTABLE-Urgent (10.18.19 @ 10:53) >  INTERPRETATION:  Admission.    AP chest. Prior 10/1/2019.    No change heart mediastinum. No consolidation or effusion. Old right rib   fracture. Severe degenerative change bilateral shoulders.    Impression: No active disease    < from: US Duplex Venous Lower Ext Complete, Bilateral (10.18.19 @ 10:28) >  FINDINGS:    There is normal compressibility of the bilateral common femoral, femoral   and popliteal veins.     Doppler examination shows normal spontaneous and phasicflow.    No calf vein thrombosis is detected.    IMPRESSION:     No evidence of deep venous thrombosis in either lower extremity.      Assessment :     89 year old female with multiple medical problems with hx of probably chronic venous insufficiency left leg with recurrent cellulitis left leg was seen by vascular surgery Dr. Cheung suggested compression stockings, was placed on po abx x 2 weeks with  improvement , then relapsed . No systemic signs of infection  except her left leg is more swollen, erythematous and warm and painful , so she has cellulitis with lymphangitis is . has very poor IV access and renal function . She has a left paraspinal mass , doubt if its causing the left leg swelling . She also has  symptoms of frequent urination     Plan :   - will add Rocephin 1 grams q 24  to Vanco 1 gm daily   - screen for MRSA   - follow cs   - check sed rate, CRP and PCT     Continue with present regime .  Appropriate use of antibiotics and adverse effects reviewed.      I have discussed the above plan of care with patient and her daughter present at bedside in detail. They expressed understanding of the treatment plan . Risks, benefits and alternatives discussed in detail. I have asked if they have any questions or concerns and appropriately addressed them to the best of my ability . She has advanced directives and She is DNR/DNI       > 55 minutes spent in direct patient care reviewing  the notes, lab data/ imaging , discussion with multidisciplinary team. All questions were addressed and answered to the best of my capacity .    Thank you for allowing me to participate in the care of your patient .      Clifford Nesbitt MD  [phone]

## 2019-10-18 NOTE — ED PROVIDER NOTE - CONSTITUTIONAL, MLM
normal... elderly w female thin with kyphotic spine in no distress, awake, alert, oriented to person, place, time/situation and in no apparent distress.

## 2019-10-18 NOTE — ED PROVIDER NOTE - MUSCULOSKELETAL, MLM
Spine appears kyphotic threre is pain to palp left leg with cellulitis of the lower leg and ascending redness up to lateral thigh

## 2019-10-18 NOTE — H&P ADULT - NSHPPHYSICALEXAM_GEN_ALL_CORE
T(C): 36.9 (10-18-19 @ 09:07), Max: 36.9 (10-18-19 @ 09:07)  HR: 74 (10-18-19 @ 09:07) (74 - 74)  BP: 126/67 (10-18-19 @ 09:07) (126/67 - 126/67)  RR: 18 (10-18-19 @ 09:07) (18 - 18)  SpO2: 96% (10-18-19 @ 09:07) (96% - 96%)    GENERAL: patient appears well, no acute distress, appropriate, pleasant  EYES: sclera clear, no exudates  ENMT: oropharynx clear without erythema, no exudates, moist mucous membranes  NECK: supple, soft  LUNGS: good air entry bilaterally, clear to auscultation, symmetric breath sounds, no wheezing or rhonchi appreciated  HEART: soft S1/S2, regular rate and rhythm, no murmurs noted, no lower extremity edema  GASTROINTESTINAL: abdomen is soft, nontender, nondistended, normoactive bowel sounds, no palpable masses  INTEGUMENT: erythema spanning L LE from ankle up past knee to post and ant thigh. No associated edema. + warmth.   MUSCULOSKELETAL: no clubbing or cyanosis, no obvious deformity. Pain in L LE likely 2/2 to cellulitis.   NEUROLOGIC: awake, alert, oriented x3, good muscle tone in 4 extremities, no obvious sensory deficits  PSYCHIATRIC: mood is good, affect is congruent, linear and logical thought process  HEME/LYMPH: ecchymosis on ant L UE and L LE.

## 2019-10-18 NOTE — H&P ADULT - PROBLEM SELECTOR PLAN 6
- DVT ppx: Lovenox 40mg.  IMPROVE VTE Individual Risk Assessment  RISK                                                                Points  [  ] Previous VTE                                                  3  [  ] Thrombophilia                                               2  [  ] Lower limb paralysis                                      2        (unable to hold up >15 seconds)    [  ] Current Cancer                                              2         (within 6 months)  [  ] Immobilization > 24 hrs                                1  [  ] ICU/CCU stay > 24 hours                              1  [  ] Age > 60                                                      1  IMPROVE VTE Score __1_______  IMPROVE Score 0-1: Low Risk, No VTE prophylaxis required for most patients, encourage ambulation.   IMPROVE Score 2-3: At risk, pharmacologic VTE prophylaxis is indicated for most patients (in the absence of a contraindication)  IMPROVE Score > or = 4: High Risk, pharmacologic VTE prophylaxis is indicated for most patients (in the absence of a contraindication) - DVT ppx: Lovenox 40mg.  Fall protocol  Aspiration precautions.   IMPROVE VTE Individual Risk Assessment  RISK                                                                Points  [  ] Previous VTE                                                  3  [  ] Thrombophilia                                               2  [  ] Lower limb paralysis                                      2        (unable to hold up >15 seconds)    [  ] Current Cancer                                              2         (within 6 months)  [  ] Immobilization > 24 hrs                                1  [  ] ICU/CCU stay > 24 hours                              1  [  ] Age > 60                                                      1  IMPROVE VTE Score __1_______  IMPROVE Score 0-1: Low Risk, No VTE prophylaxis required for most patients, encourage ambulation.   IMPROVE Score 2-3: At risk, pharmacologic VTE prophylaxis is indicated for most patients (in the absence of a contraindication)  IMPROVE Score > or = 4: High Risk, pharmacologic VTE prophylaxis is indicated for most patients (in the absence of a contraindication)

## 2019-10-18 NOTE — ED PROVIDER NOTE - NEUROLOGICAL, MLM
71 y.o male w/ hx of DM, Cad w/ stents, MI hypothyroid, DVT on xaralto, ETELVINA presents to the ED for evaluation of neck pain which started 30 minutes prior to arrival.  Pt states that he developed acute sharp, stabbing pain on right side of neck radiating to back while at [employer].  Lasted for a few minutes and resolved.  Now experiencing intermittent pain of neck.  Concerned because he had MI in 2000 which had similar presentation.  Pt adds no other complaints at this time.  Denies chest pain, dyspnea, abd pain, N/V/D, edema of lower extremities, orthopnea. 71 y.o male w/ hx of DM, Cad w/ stents, MI hypothyroid, DVT on xaralto, ETELVINA presents to the ED for evaluation of neck pain which started 30 minutes prior to arrival.  Pt states that he developed acute sharp, stabbing pain on right side of neck radiating to back while at [employer].  Lasted for a few minutes and resolved.  Now experiencing intermittent pain of neck.  Concerned because he had MI in 2000 which had similar presentation.  Pt adds no other complaints at this time.  Denies chest pain, dyspnea, abd pain, N/V/D, edema of lower extremities, orthopnea. states that he had NM stress test 6 months ago which was WNL.   Follows w/ Dr. Snider, Cardiology. Alert and oriented, no focal deficits, no motor or sensory deficits. 71 y.o male w/ hx of DM, Cad w/ stents, MI hypothyroid, DVT on xaralto, ETELVINA presents to the ED for evaluation of neck pain which started 30 minutes prior to arrival.  Pt states that he developed acute sharp, stabbing pain on right side of neck radiating to back while at [employer].  Lasted for a few minutes and resolved.  Now experiencing intermittent pain of neck.  Concerned because he had MI in 2000 which had similar presentation.  Pt adds no other complaints at this time.  Denies chest pain, dyspnea, abd pain, N/V/D, edema of lower extremities, orthopnea. states that he had NM stress test 6 months ago which was WNL.   Follows w/ Dr. Rizzo, Cardiology.

## 2019-10-19 ENCOUNTER — TRANSCRIPTION ENCOUNTER (OUTPATIENT)
Age: 84
End: 2019-10-19

## 2019-10-19 LAB
ANION GAP SERPL CALC-SCNC: 6 MMOL/L — SIGNIFICANT CHANGE UP (ref 5–17)
BUN SERPL-MCNC: 13 MG/DL — SIGNIFICANT CHANGE UP (ref 7–23)
CALCIUM SERPL-MCNC: 8 MG/DL — LOW (ref 8.5–10.1)
CHLORIDE SERPL-SCNC: 110 MMOL/L — HIGH (ref 96–108)
CO2 SERPL-SCNC: 27 MMOL/L — SIGNIFICANT CHANGE UP (ref 22–31)
CREAT SERPL-MCNC: 0.32 MG/DL — LOW (ref 0.5–1.3)
CULTURE RESULTS: SIGNIFICANT CHANGE UP
GLUCOSE SERPL-MCNC: 95 MG/DL — SIGNIFICANT CHANGE UP (ref 70–99)
HBA1C BLD-MCNC: 7.1 % — HIGH (ref 4–5.6)
HCT VFR BLD CALC: 31.4 % — LOW (ref 34.5–45)
HGB BLD-MCNC: 10.1 G/DL — LOW (ref 11.5–15.5)
MAGNESIUM SERPL-MCNC: 2 MG/DL — SIGNIFICANT CHANGE UP (ref 1.6–2.6)
MCHC RBC-ENTMCNC: 28.8 PG — SIGNIFICANT CHANGE UP (ref 27–34)
MCHC RBC-ENTMCNC: 32.2 GM/DL — SIGNIFICANT CHANGE UP (ref 32–36)
MCV RBC AUTO: 89.5 FL — SIGNIFICANT CHANGE UP (ref 80–100)
MRSA PCR RESULT.: SIGNIFICANT CHANGE UP
NRBC # BLD: 0 /100 WBCS — SIGNIFICANT CHANGE UP (ref 0–0)
PHOSPHATE SERPL-MCNC: 2.9 MG/DL — SIGNIFICANT CHANGE UP (ref 2.5–4.5)
PLATELET # BLD AUTO: 193 K/UL — SIGNIFICANT CHANGE UP (ref 150–400)
POTASSIUM SERPL-MCNC: 3.6 MMOL/L — SIGNIFICANT CHANGE UP (ref 3.5–5.3)
POTASSIUM SERPL-SCNC: 3.6 MMOL/L — SIGNIFICANT CHANGE UP (ref 3.5–5.3)
RBC # BLD: 3.51 M/UL — LOW (ref 3.8–5.2)
RBC # FLD: 13.6 % — SIGNIFICANT CHANGE UP (ref 10.3–14.5)
S AUREUS DNA NOSE QL NAA+PROBE: SIGNIFICANT CHANGE UP
SODIUM SERPL-SCNC: 143 MMOL/L — SIGNIFICANT CHANGE UP (ref 135–145)
SPECIMEN SOURCE: SIGNIFICANT CHANGE UP
WBC # BLD: 6.39 K/UL — SIGNIFICANT CHANGE UP (ref 3.8–10.5)
WBC # FLD AUTO: 6.39 K/UL — SIGNIFICANT CHANGE UP (ref 3.8–10.5)

## 2019-10-19 PROCEDURE — 99233 SBSQ HOSP IP/OBS HIGH 50: CPT

## 2019-10-19 RX ORDER — CYCLOSPORINE 0.5 MG/ML
1 EMULSION OPHTHALMIC
Refills: 0 | Status: DISCONTINUED | OUTPATIENT
Start: 2019-10-19 | End: 2019-10-25

## 2019-10-19 RX ORDER — ACETAMINOPHEN 500 MG
500 TABLET ORAL ONCE
Refills: 0 | Status: COMPLETED | OUTPATIENT
Start: 2019-10-19 | End: 2019-10-19

## 2019-10-19 RX ADMIN — Medication 81 MILLIGRAM(S): at 12:51

## 2019-10-19 RX ADMIN — Medication 250 MILLIGRAM(S): at 10:44

## 2019-10-19 RX ADMIN — Medication 100 MILLIGRAM(S): at 11:44

## 2019-10-19 RX ADMIN — POLYETHYLENE GLYCOL 3350 17 GRAM(S): 17 POWDER, FOR SOLUTION ORAL at 08:27

## 2019-10-19 RX ADMIN — Medication 500 MILLIGRAM(S): at 09:06

## 2019-10-19 RX ADMIN — Medication 1 TABLET(S): at 14:14

## 2019-10-19 RX ADMIN — CYCLOSPORINE 1 DROP(S): 0.5 EMULSION OPHTHALMIC at 22:08

## 2019-10-19 RX ADMIN — Medication 1 TABLET(S): at 22:08

## 2019-10-19 RX ADMIN — ATORVASTATIN CALCIUM 20 MILLIGRAM(S): 80 TABLET, FILM COATED ORAL at 22:08

## 2019-10-19 RX ADMIN — ENOXAPARIN SODIUM 40 MILLIGRAM(S): 100 INJECTION SUBCUTANEOUS at 12:52

## 2019-10-19 RX ADMIN — Medication 25 MILLIGRAM(S): at 07:02

## 2019-10-19 RX ADMIN — Medication 500 MILLIGRAM(S): at 10:06

## 2019-10-19 RX ADMIN — CEFTRIAXONE 100 MILLIGRAM(S): 500 INJECTION, POWDER, FOR SOLUTION INTRAMUSCULAR; INTRAVENOUS at 14:14

## 2019-10-19 RX ADMIN — Medication 1 TABLET(S): at 07:02

## 2019-10-19 RX ADMIN — AMLODIPINE BESYLATE 5 MILLIGRAM(S): 2.5 TABLET ORAL at 07:02

## 2019-10-19 NOTE — DISCHARGE NOTE PROVIDER - INSTRUCTIONS
Low salt, low cholesterol diet is recommended. Low salt, low cholesterol, limited carbohydrate diet is recommended.

## 2019-10-19 NOTE — DISCHARGE NOTE PROVIDER - HOSPITAL COURSE
FROM ADMISSION H+P:     HPI:    Pt is a 89 yo F w/ pmh Cellulitis, HTN (hypertension), Hyperlipidemia, Irritable bowel syndrome, Osteoporosis, and Peptic ulcer that presents to the ED w/ L LE pain and erythema. Pt was just discharged from this facility for cellulitis and sepsis of due to poor veins in legs on October 7th 2019. She follows with Dr Cheung vascular. Pt w/ daughter at bedside. They reports she was discharged on po antibiotics which the pt finished yesterday. Since that time the redness began to get worse in her L LE. It spans from ankle to upper thigh. Pt reports she had bad chills yesterday, but no fever. In addition she had some heart palpitations and nausea. Right now she denies nausea. Pt denies fever, chest pain, sob, abdominal pain, urinary frequency, diarrhea, vomiting. She admits to urinary burning.         ---    HOSPITAL COURSE:     Pt admitted w/ sepsis 2/2 LLE cellulitis. Started on vanco and ceftriaxone. Vanco d/c'd after MRSA nasal screen resulted negative. Leukocytosis resolved. Symptoms began to improve.         ---    CONSULTANTS:     ISABEL Grossman (Shah) (Skye)        ---    TIME SPENT:    The total amount of time spent reviewing the hospital notes, laboratory values, imaging findings, assessing/counseling the patient, discussing with consultant physicians, social work, nursing staff took -- minutes        ---    Primary care provider was made aware of plan for discharge:      [  ] NO     [  ] YES FROM ADMISSION H+P:     HPI:    Pt is a 89 yo F w/ pmh Cellulitis, HTN (hypertension), Hyperlipidemia, Irritable bowel syndrome, Osteoporosis, and Peptic ulcer that presents to the ED w/ L LE pain and erythema. Pt was just discharged from this facility for cellulitis and sepsis of due to poor veins in legs on October 7th 2019. She follows with Dr Skye washburn. Pt w/ daughter at bedside. They reports she was discharged on po antibiotics which the pt finished yesterday. Since that time the redness began to get worse in her L LE. It spans from ankle to upper thigh. Pt reports she had bad chills yesterday, but no fever. In addition she had some heart palpitations and nausea. Right now she denies nausea. Pt denies fever, chest pain, sob, abdominal pain, urinary frequency, diarrhea, vomiting. She admits to urinary burning.         ---    HOSPITAL COURSE:     Pt admitted w/ sepsis 2/2 LLE cellulitis. Started on vanco and ceftriaxone. Vanco d/c'd after MRSA nasal screen resulted negative. Leukocytosis resolved. Symptoms began to improve. Transitioned to po abx. Cultures negative. Stable for d/c home on po abx with close outpatient follow up with vascular and primary care provider. Family in agreement w/ tx plan.         ---    CONSULTANTS:     ISABEL Lagunas)    Chauncey (Skye)        ---    TIME SPENT:    The total amount of time spent reviewing the hospital notes, laboratory values, imaging findings, assessing/counseling the patient, discussing with consultant physicians, social work, nursing staff took -- minutes        ---    Primary care provider was made aware of plan for discharge:      [  ] NO     [  ] YES FROM ADMISSION H+P:     HPI:    Pt is a 89 yo F w/ pmh Cellulitis, HTN (hypertension), Hyperlipidemia, Irritable bowel syndrome, Osteoporosis, and Peptic ulcer that presents to the ED w/ L LE pain and erythema. Pt was just discharged from this facility for cellulitis and sepsis of due to poor veins in legs on October 7th 2019. She follows with Dr Cheung vascular. Pt w/ daughter at bedside. They reports she was discharged on po antibiotics which the pt finished yesterday. Since that time the redness began to get worse in her L LE. It spans from ankle to upper thigh. Pt reports she had bad chills yesterday, but no fever. In addition she had some heart palpitations and nausea. Right now she denies nausea. Pt denies fever, chest pain, sob, abdominal pain, urinary frequency, diarrhea, vomiting. She admits to urinary burning.         ---    HOSPITAL COURSE:     Pt admitted w/ sepsis 2/2 LLE cellulitis. Started on vanco and ceftriaxone. Vanco d/c'd after MRSA nasal screen resulted negative. Leukocytosis resolved. Pt. antibiotic regimen changed to IV Zosyn with improvement of her cellulitis.   LE dopplers were negative for DVT.  Echo showed EF 65%.  Urine culture was unremarkable.  1/4 blood culture bottles grew out gram positive cocci in clusters.          ---    CONSULTANTS:     ISABEL Grossman (Shah) (Skye)        ---    TIME SPENT:    The total amount of time spent reviewing the hospital notes, laboratory values, imaging findings, assessing/counseling the patient, discussing with consultant physicians, social work, nursing staff took -- minutes        ---    Primary care provider was made aware of plan for discharge:      [  ] NO     [  ] YES FROM ADMISSION H+P:     HPI:    Pt is a 89 yo F w/ pmh Cellulitis, HTN (hypertension), Hyperlipidemia, Irritable bowel syndrome, Osteoporosis, and Peptic ulcer that presents to the ED w/ L LE pain and erythema. Pt was just discharged from this facility for cellulitis and sepsis of due to poor veins in legs on October 7th 2019. She follows with Dr Cheung vascular. Pt w/ daughter at bedside. They reports she was discharged on po antibiotics which the pt finished yesterday. Since that time the redness began to get worse in her L LE. It spans from ankle to upper thigh. Pt reports she had bad chills yesterday, but no fever. In addition she had some heart palpitations and nausea. Right now she denies nausea. Pt denies fever, chest pain, sob, abdominal pain, urinary frequency, diarrhea, vomiting. She admits to urinary burning.         ---    HOSPITAL COURSE:     Pt admitted w/ sepsis 2/2 LLE cellulitis. Started on vanco and ceftriaxone. Vanco d/c'd after MRSA nasal screen resulted negative. Leukocytosis resolved. Pt. antibiotic regimen changed to IV Zosyn with improvement of her cellulitis.   LE dopplers were negative for DVT.  Echo showed EF 65%.  Urine culture was unremarkable.  1/4 blood culture bottles grew out gram positive cocci in clusters.  Repeat blood cultures showed no growth.  Per Id recommendations patient to be transitioned to oral antibiotics for additional 3 days.         On day of discharge patient is in no distress.  Afebrile with improvement of LLE cellulitis.  Pt. advised not to wear her compression stockings until follow up evaluation with Vascular surgery Dr. Cheung.          Completion of discharge in 35 minutes.    ---    CONSULTANTS:     ID (Laz)    Chauncey (Skye) FROM ADMISSION H+P:     HPI:    Pt is a 89 yo F w/ pmh Cellulitis, HTN (hypertension), Hyperlipidemia, Irritable bowel syndrome, Osteoporosis, and Peptic ulcer that presents to the ED w/ L LE pain and erythema. Pt was just discharged from this facility for cellulitis and sepsis of due to poor veins in legs on October 7th 2019. She follows with Dr Cheung vascular. Pt w/ daughter at bedside. They reports she was discharged on po antibiotics which the pt finished yesterday. Since that time the redness began to get worse in her L LE. It spans from ankle to upper thigh. Pt reports she had bad chills yesterday, but no fever. In addition she had some heart palpitations and nausea. Right now she denies nausea. Pt denies fever, chest pain, sob, abdominal pain, urinary frequency, diarrhea, vomiting. She admits to urinary burning.         ---    HOSPITAL COURSE:     Pt admitted w/ sepsis 2/2 LLE cellulitis. Started on vanco and ceftriaxone. Vanco d/c'd after MRSA nasal screen resulted negative. Leukocytosis resolved. Pt. antibiotic regimen changed to IV Zosyn with improvement of her cellulitis.   LE dopplers were negative for DVT.  Echo showed EF 65%.  Urine culture was unremarkable.  1/4 blood culture bottles grew out gram positive cocci in clusters.  Repeat blood cultures showed no growth.  Per Id recommendations no further antibiotics needed upon discharge.        On day of discharge patient is in no distress.  Afebrile with improvement of LLE cellulitis.  Pt. advised not to wear her compression stockings until follow up evaluation with Vascular surgery Dr. Cheung.          Completion of discharge in 35 minutes.    ---    CONSULTANTS:     ISABEL Lagunas)    Chauncey (Skye)

## 2019-10-19 NOTE — DISCHARGE NOTE PROVIDER - PROVIDER TOKENS
PROVIDER:[TOKEN:[[id number]]],PROVIDER:[TOKEN:[[id number]]] PROVIDER:[TOKEN:[[id number]]],PROVIDER:[TOKEN:[[id number]]],PROVIDER:[TOKEN:[[id number]]]

## 2019-10-19 NOTE — DISCHARGE NOTE PROVIDER - CARE PROVIDER_API CALL
Thomas Crowder)  Internal Medicine  [address] [place] [address]  Phone: [phone]  Fax: [phone]  Follow Up Time:     Andrew Cheung)  Surgery  [address], [address]  [place], [place] [id number]  Phone: [phone]  Fax: [phone]  Follow Up Time: Thomas Crowder (MD)  Internal Medicine  [address] [facility] [address] [place] [address]  Phone: [phone]  Fax: [phone]  Follow Up Time:     Andrew Cheung (MD)  Surgery  [address] [facility], [address]  [place], [place] [address]  Phone: [phone]  Fax: [phone]  Follow Up Time:     Bandar Odom; PhD)  Infectious Disease; Internal Medicine  [address] [facility] [address]  [place] [address]  Phone: [phone]  Fax: [phone]  Follow Up Time:

## 2019-10-19 NOTE — DISCHARGE NOTE PROVIDER - CARE PROVIDERS DIRECT ADDRESSES
,DirectAddress_Unknown,DirectAddress_Unknown ,DirectAddress_Unknown,DirectAddress_Unknown,[email or web address]

## 2019-10-19 NOTE — DISCHARGE NOTE PROVIDER - NSDCFUADDINST_GEN_ALL_CORE_FT
Please call to schedule your follow up appointments with your doctors (primary care doctor, vascular surgeon)  Please take your medications as detailed in your medication reconciliation  Please return to the ED for worsening of your medical condition Please call to schedule your follow up appointments with your doctors (primary care doctor, vascular surgeon in 1 week)  Please take your medications as detailed in your medication reconciliation Please call to schedule your follow up appointments with your doctors (primary care doctor, vascular surgeon, and ID in 1 week)  Please take your medications as detailed in your medication reconciliation

## 2019-10-19 NOTE — PROGRESS NOTE ADULT - SUBJECTIVE AND OBJECTIVE BOX
ID progress note    Name: LEVON SRIVASTAVA  Age: 89y  Gender: Female  MRN: [id number]    Interval History-- Events noted , feels better, slight improved erythema of the left leg. NO chills , Tmax 102 yesterday   Notes reviewed    Past Medical History--  Cellulitis  DM (diabetes mellitus)  Irritable bowel syndrome  Osteoporosis  HTN (hypertension)  Peptic ulcer  Hyperlipidemia  History of tonsillectomy  History of appendectomy  S/P hip replacement      For details regarding the patient's social history, family history, and other miscellaneous elements, please refer the initial infectious diseases consultation and/or the admitting history and physical examination for this admission.    Allergies--  Allergies    sulfa drugs (Hives)    Intolerances        Medications--  Antibiotics:  cefTRIAXone   IVPB      cefTRIAXone   IVPB 1000 milliGRAM(s) IV Intermittent every 24 hours    Immunologic:    Other:  acetaminophen   Tablet .. PRN  amLODIPine   Tablet  aspirin enteric coated  atorvastatin  docusate sodium PRN  enoxaparin Injectable  famotidine    Tablet PRN  fluticasone propionate 50 MICROgram(s)/spray Nasal Spray PRN  lactobacillus acidophilus  metoprolol succinate ER  polyethylene glycol 3350 PRN  senna PRN      Review of Systems--  A 10-point review of systems was obtained.     Pertinent positives and negatives--  Constitutional: Pos for  fevers. No Chills. No Rigors.   Cardiovascular: No chest pain. No palpitations.  Respiratory: No shortness of breath. No cough.  Gastrointestinal: No nausea or vomiting. No diarrhea or constipation.   Psychiatric: Pleasant. Appropriate affect.    Review of systems otherwise negative except as previously noted.    Physical Examination--  Vital Signs: T(F): 98.4 (10-19-19 @ 07:21), Max: 102.2 (10-18-19 @ 17:00)  HR: 65 (10-19-19 @ 07:21)  BP: 123/73 (10-19-19 @ 07:21)  RR: 17 (10-19-19 @ 07:21)  SpO2: 94% (10-19-19 @ 07:21)  Wt(kg): --      General: well developed well nourished, in no acute distress  Eyes: sclera anicteric, pupils equal and reactive to light  ENMT: buccal mucosa moist, pharynx not injected  Neck: supple, trachea midline  Lungs: clear, no wheeze/rhonchi  Cardiovascular: regular rate and rhythm, S1 S2  Abdomen: soft, nontender, no organomegaly present, bowel sounds normal  Neurological:  alert and oriented x3, Cranial Nerves II-XII grossly intact  Skin: no increased ecchymosis/petechiae/purpura  Lymph Nodes: no palpable cervical/supraclavicular lymph nodes enlargements  Extremities: no cyanosis/clubbing, chronci venous dermatitis both LE , diffuse erythema left leg with ascending lymphangitis left leg, tenderness posterior thigh edema, dry skin     Laboratory Studies--  CBC                        10.1   6.39  )-----------( 193      ( 19 Oct 2019 06:44 )             31.4       Chemistries  10-19    143  |  110<H>  |  13  ----------------------------<  95  3.6   |  27  |  0.32<L>    Ca    8.0<L>      19 Oct 2019 06:44  Phos  2.9     10-19  Mg     2.0     10-19    TPro  6.6  /  Alb  3.3  /  TBili  0.7  /  DBili  x   /  AST  16  /  ALT  25  /  AlkPhos  88  10-18    Sedimentation Rate, Erythrocyte (10.18.19 @ 13:49)    Sedimentation Rate, Erythrocyte: 12 mm/hr    C-Reactive Protein, Serum (10.18.19 @ 21:23)    C-Reactive Protein, Serum: 8.64 mg/dL    Procalcitonin, Serum (10.18.19 @ 13:49)    Procalcitonin, Serum: 0.30:     Culture Data    Culture - Urine (collected 18 Oct 2019 20:42)  Source: .Urine Clean Catch (Midstream)  Final Report (19 Oct 2019 15:37):    <10,000 CFU/mL Normal Urogenital Heather    MRSA/MSSA PCR (10.18.19 @ 23:06)    MRSA PCR Result.: NotDetec:    Staph Aureus PCR Result: NotDetec        RADIOLOGY:  Xray Chest 1 View- PORTABLE-Urgent (10.18.19 @ 10:53) >  INTERPRETATION:  Admission.    AP chest. Prior 10/1/2019.    No change heart mediastinum. No consolidation or effusion. Old right rib   fracture. Severe degenerative change bilateral shoulders.    Impression: No active disease    US Duplex Venous Lower Ext Complete, Bilateral (10.18.19 @ 10:28) >  FINDINGS:    There is normal compressibility of the bilateral common femoral, femoral   and popliteal veins.     Doppler examination shows normal spontaneous and phasicflow.    No calf vein thrombosis is detected.    IMPRESSION:     No evidence of deep venous thrombosis in either lower extremity.      Assessment :     89 year old female with multiple medical problems with hx of probably chronic venous insufficiency left leg with recurrent cellulitis left leg was seen by vascular surgery Dr. Cheung suggested compression stockings, was placed on po abx x 2 weeks with  improvement , then relapsed . No systemic signs of infection  except her left leg is more swollen, erythematous and warm and painful , so she has cellulitis with lymphangitis is . has very poor IV access and renal function . She has a left paraspinal mass , doubt if its causing the left leg swelling . She also has  symptoms of frequent urination     I believe the compression stockings are causing significant irritation of the skin causing cellulitis     Plan :   - will continue with  Rocephin 1 grams q 24 , Vanco discontinued as  MRSA screen is negative   - follow cs   - can ambulate as tolerated     Continue with present regime .  Appropriate use of antibiotics and adverse effects reviewed.    I have discussed the above plan of care with patient and family in detail. They expressed understanding of the treatment plan . Risks, benefits and alternatives discussed in detail. I have asked if they have any questions or concerns and appropriately addressed them to the best of my ability .      > 35 minutes spent in direct patient care reviewing  the notes, lab data/ imaging , discussion with multidisciplinary team. All questions were addressed and answered to the best of my capacity .    Thank you for allowing me to participate in the care of your patient .        Clifford Nesbitt MD  [phone]

## 2019-10-19 NOTE — PROGRESS NOTE ADULT - SUBJECTIVE AND OBJECTIVE BOX
Patient is a 89y old  Female who presents with a chief complaint of L LE Cellulitis (18 Oct 2019 12:49)      FROM ADMISSION H+P:   HPI:  Pt is a 89 yo F w/ pmh Cellulitis, HTN (hypertension), Hyperlipidemia, Irritable bowel syndrome, Osteoporosis, and Peptic ulcer that presents to the ED w/ L LE pain and erythema. Pt was just discharged from this facility for cellulitis and sepsis of due to poor veins in legs on 2019. She follows with Dr Skye washburn. Pt w/ daughter at bedside. They reports she was discharged on po antibiotics which the pt finished yesterday. Since that time the redness began to get worse in her L LE. It spans from ankle to upper thigh. Pt reports she had bad chills yesterday, but no fever. In addition she had some heart palpitations and nausea. Right now she denies nausea. Pt denies fever, chest pain, sob, abdominal pain, urinary frequency, diarrhea, vomiting. She admits to urinary burning.       ----  INTERVAL HPI/OVERNIGHT EVENTS: Pt seen and evaluated at the bedside. No acute overnight events occurred. Admitted last evening. Pt reports improving leg edema. Daughter @ bedside w/ many questions regarding tx plan and disposition planning. Was febrile in the ED but has been afebrile since. Pt states that edema in legs is improved w/ keeping legs elevated. Pain improving. Chills resolved after starting IV abx. No other complaints at this time.     ----  PAST MEDICAL & SURGICAL HISTORY:  Cellulitis  Irritable bowel syndrome  Osteoporosis  HTN (hypertension)  Peptic ulcer  Hyperlipidemia  History of tonsillectomy  History of appendectomy  S/P hip replacement: partial, left      FAMILY HISTORY:      Allergies    sulfa drugs (Hives)    Intolerances        ----  REVIEW OF SYSTEMS:  CONSTITUTIONAL: denies fever, chills   HEENT: denies blurred vision, sore throat  SKIN: redness and edema improving since presentation  CARDIOVASCULAR: denies chest pain, chest pressure, palpitations  RESPIRATORY: denies shortness of breath, sputum production  GASTROINTESTINAL: denies nausea, vomiting, diarrhea, abdominal pain - admits reduced appetite  NEUROLOGICAL: denies numbness, headache, focal weakness  MUSCULOSKELETAL: RLE edema and redness improving   LYMPHATICS: denies enlarged lymph nodes, admits LLE extremity swelling  PSYCHIATRIC: denies recent changes in anxiety, depression  ENDOCRINOLOGIC: denies sweating, cold or heat intolerance    ----  PHYSICAL EXAM:  GENERAL: patient appears age appropriate, comfortable, thin, interactive   ENMT: oropharynx clear without erythema, moist mucous membranes  LUNGS: good air entry bilaterally, clear to auscultation, symmetric breath sounds, no wheezing or rhonchi appreciated  HEART: soft S1/S2, regular rate and rhythm, no murmurs noted, trace noted edema to b/l LE but L>R  GASTROINTESTINAL: abdomen is soft, nontender, nondistended, normoactive bowel sounds, no palpable masses  INTEGUMENT: there is warmth and erythema extending from L ankle upward proximal above the knee, rash is macular, there may be some early formation of blistered area but no drainage, lesion is dry  MUSCULOSKELETAL: no clubbing or cyanosis, no obvious deformity  NEUROLOGIC: awake, alert, oriented x3, good muscle tone in 4 extremities, no obvious sensory deficits    T(C): 36.9 (10-19-19 @ 07:21), Max: 39 (10-18-19 @ 17:00)  HR: 65 (10-19-19 @ 07:21) (56 - 74)  BP: 123/73 (10-19-19 @ 07:21) (99/58 - 129/61)  RR: 17 (10-19-19 @ 07:21) (14 - 17)  SpO2: 94% (10-19-19 @ 07:21) (93% - 98%)  Wt(kg): --    ----  I&O's Summary    18 Oct 2019 07:  -  19 Oct 2019 07:00  --------------------------------------------------------  IN: 0 mL / OUT: 500 mL / NET: -500 mL    19 Oct 2019 07:  -  19 Oct 2019 12:44  --------------------------------------------------------  IN: 480 mL / OUT: 200 mL / NET: 280 mL        LABS:                        10.1   6.39  )-----------( 193      ( 19 Oct 2019 06:44 )             31.4     10-    143  |  110<H>  |  13  ----------------------------<  95  3.6   |  27  |  0.32<L>    Ca    8.0<L>      19 Oct 2019 06:44  Phos  2.9     10  Mg     2.0     10-19    TPro  6.6  /  Alb  3.3  /  TBili  0.7  /  DBili  x   /  AST  16  /  ALT  25  /  AlkPhos  88  10-    PT/INR - ( 18 Oct 2019 09:56 )   PT: 12.7 sec;   INR: 1.11 ratio         PTT - ( 18 Oct 2019 09:56 )  PTT:33.1 sec  Urinalysis Basic - ( 18 Oct 2019 12:37 )    Color: Yellow / Appearance: Clear / S.020 / pH: x  Gluc: x / Ketone: Negative  / Bili: Negative / Urobili: Negative   Blood: x / Protein: Negative / Nitrite: Negative   Leuk Esterase: Negative / RBC: x / WBC x   Sq Epi: x / Non Sq Epi: x / Bacteria: x      CAPILLARY BLOOD GLUCOSE                    ----  Personally reviewed:  Vital sign trends: [ x ] yes    [  ] no     [  ] n/a  Laboratory results: [ x ] yes    [  ] no     [  ] n/a  Radiology results: [ x ] yes    [  ] no     [  ] n/a  Culture results: [  ] yes    [  ] no     [ x ] n/a  Consultant recommendations: [x  ] yes    [  ] no     [  ] n/a

## 2019-10-19 NOTE — DISCHARGE NOTE PROVIDER - NSDCCPCAREPLAN_GEN_ALL_CORE_FT
PRINCIPAL DISCHARGE DIAGNOSIS  Diagnosis: Cellulitis of left lower extremity  Assessment and Plan of Treatment: sepsis due to left lower extremity cellulitis was present on admission. PRINCIPAL DISCHARGE DIAGNOSIS  Diagnosis: Cellulitis of left lower extremity  Assessment and Plan of Treatment: sepsis due to left lower extremity cellulitis was present on admission.      SECONDARY DISCHARGE DIAGNOSES  Diagnosis: Type 2 diabetes mellitus with diabetic dermatitis, with long-term current use of insulin  Assessment and Plan of Treatment: continue limited carbohydrate diet    Diagnosis: Hyperlipidemia  Assessment and Plan of Treatment: continue cholesterol lowering medication    Diagnosis: HTN (hypertension)  Assessment and Plan of Treatment: Norvasc was discontinued as it may be contributing to your lower extremity edema.  Instead we have prescribed losartan in its place.    Diagnosis: Venous insufficiency of left lower extremity  Assessment and Plan of Treatment: Do not wear your compression stockings until follow up evaluation with Dr. Cheung. PRINCIPAL DISCHARGE DIAGNOSIS  Diagnosis: Cellulitis of left lower extremity  Assessment and Plan of Treatment: sepsis due to left lower extremity cellulitis was present on admission.  Completed course of antibiotic.      SECONDARY DISCHARGE DIAGNOSES  Diagnosis: Type 2 diabetes mellitus with diabetic dermatitis, with long-term current use of insulin  Assessment and Plan of Treatment: continue limited carbohydrate diet    Diagnosis: Hyperlipidemia  Assessment and Plan of Treatment: continue cholesterol lowering medication    Diagnosis: HTN (hypertension)  Assessment and Plan of Treatment: Norvasc was discontinued as it may be contributing to your lower extremity edema.  Instead we have prescribed losartan in its place.    Diagnosis: Venous insufficiency of left lower extremity  Assessment and Plan of Treatment: Do not wear your compression stockings until follow up evaluation with Dr. Cheung.

## 2019-10-20 LAB
ANION GAP SERPL CALC-SCNC: 5 MMOL/L — SIGNIFICANT CHANGE UP (ref 5–17)
BUN SERPL-MCNC: 14 MG/DL — SIGNIFICANT CHANGE UP (ref 7–23)
CALCIUM SERPL-MCNC: 8.1 MG/DL — LOW (ref 8.5–10.1)
CHLORIDE SERPL-SCNC: 112 MMOL/L — HIGH (ref 96–108)
CO2 SERPL-SCNC: 29 MMOL/L — SIGNIFICANT CHANGE UP (ref 22–31)
CREAT SERPL-MCNC: 0.4 MG/DL — LOW (ref 0.5–1.3)
GLUCOSE SERPL-MCNC: 121 MG/DL — HIGH (ref 70–99)
HCT VFR BLD CALC: 31.1 % — LOW (ref 34.5–45)
HGB BLD-MCNC: 10 G/DL — LOW (ref 11.5–15.5)
MAGNESIUM SERPL-MCNC: 1.9 MG/DL — SIGNIFICANT CHANGE UP (ref 1.6–2.6)
MCHC RBC-ENTMCNC: 28.7 PG — SIGNIFICANT CHANGE UP (ref 27–34)
MCHC RBC-ENTMCNC: 32.2 GM/DL — SIGNIFICANT CHANGE UP (ref 32–36)
MCV RBC AUTO: 89.1 FL — SIGNIFICANT CHANGE UP (ref 80–100)
NRBC # BLD: 0 /100 WBCS — SIGNIFICANT CHANGE UP (ref 0–0)
PHOSPHATE SERPL-MCNC: 3 MG/DL — SIGNIFICANT CHANGE UP (ref 2.5–4.5)
PLATELET # BLD AUTO: 192 K/UL — SIGNIFICANT CHANGE UP (ref 150–400)
POTASSIUM SERPL-MCNC: 4 MMOL/L — SIGNIFICANT CHANGE UP (ref 3.5–5.3)
POTASSIUM SERPL-SCNC: 4 MMOL/L — SIGNIFICANT CHANGE UP (ref 3.5–5.3)
RBC # BLD: 3.49 M/UL — LOW (ref 3.8–5.2)
RBC # FLD: 13.5 % — SIGNIFICANT CHANGE UP (ref 10.3–14.5)
SODIUM SERPL-SCNC: 146 MMOL/L — HIGH (ref 135–145)
WBC # BLD: 4.43 K/UL — SIGNIFICANT CHANGE UP (ref 3.8–10.5)
WBC # FLD AUTO: 4.43 K/UL — SIGNIFICANT CHANGE UP (ref 3.8–10.5)

## 2019-10-20 PROCEDURE — 99233 SBSQ HOSP IP/OBS HIGH 50: CPT

## 2019-10-20 RX ORDER — ACETAMINOPHEN 500 MG
650 TABLET ORAL EVERY 6 HOURS
Refills: 0 | Status: DISCONTINUED | OUTPATIENT
Start: 2019-10-20 | End: 2019-10-25

## 2019-10-20 RX ADMIN — Medication 1 TABLET(S): at 23:09

## 2019-10-20 RX ADMIN — CYCLOSPORINE 1 DROP(S): 0.5 EMULSION OPHTHALMIC at 17:24

## 2019-10-20 RX ADMIN — ATORVASTATIN CALCIUM 20 MILLIGRAM(S): 80 TABLET, FILM COATED ORAL at 23:08

## 2019-10-20 RX ADMIN — Medication 100 MILLIGRAM(S): at 08:14

## 2019-10-20 RX ADMIN — Medication 650 MILLIGRAM(S): at 00:36

## 2019-10-20 RX ADMIN — FAMOTIDINE 20 MILLIGRAM(S): 10 INJECTION INTRAVENOUS at 06:40

## 2019-10-20 RX ADMIN — Medication 25 MILLIGRAM(S): at 06:30

## 2019-10-20 RX ADMIN — Medication 1 TABLET(S): at 06:29

## 2019-10-20 RX ADMIN — CYCLOSPORINE 1 DROP(S): 0.5 EMULSION OPHTHALMIC at 06:30

## 2019-10-20 RX ADMIN — AMLODIPINE BESYLATE 5 MILLIGRAM(S): 2.5 TABLET ORAL at 06:29

## 2019-10-20 RX ADMIN — Medication 650 MILLIGRAM(S): at 23:07

## 2019-10-20 RX ADMIN — Medication 1 TABLET(S): at 13:52

## 2019-10-20 RX ADMIN — POLYETHYLENE GLYCOL 3350 17 GRAM(S): 17 POWDER, FOR SOLUTION ORAL at 08:14

## 2019-10-20 RX ADMIN — ENOXAPARIN SODIUM 40 MILLIGRAM(S): 100 INJECTION SUBCUTANEOUS at 13:51

## 2019-10-20 RX ADMIN — CEFTRIAXONE 100 MILLIGRAM(S): 500 INJECTION, POWDER, FOR SOLUTION INTRAMUSCULAR; INTRAVENOUS at 13:52

## 2019-10-20 RX ADMIN — Medication 650 MILLIGRAM(S): at 01:30

## 2019-10-20 RX ADMIN — Medication 81 MILLIGRAM(S): at 13:51

## 2019-10-20 NOTE — CONSULT NOTE ADULT - ASSESSMENT
89 y.o. female with chronic venous htn and LLE varicose veins presents with recurrent cellulitis of the left leg. I agree with a short course of IV antibiotics and then transition to oral antibiotics. The patient needs to use compression stockings daily after the inflammation subsides and elevate her legs above her heart level qhs.

## 2019-10-20 NOTE — CONSULT NOTE ADULT - SUBJECTIVE AND OBJECTIVE BOX
History of Present Illness:  89y..o.  Female with a history of varicose veins and venous htn. presents with recurrent cellulitis of the LLE. She was recently discharged from [facility] for a similar problem.  On admission she was confused coupled with fever and leucocytosis. She is currently on IV antibiotics and is doing better. I was requested to reevaluate her LE circulation. Venous doppler is negative for DVT.    PAST MEDICAL & SURGICAL HISTORY:  Cellulitis  Irritable bowel syndrome  Osteoporosis  HTN (hypertension)  Peptic ulcer  Hyperlipidemia  History of tonsillectomy  History of appendectomy  S/P hip replacement: partial, left      Allergies    sulfa drugs (Hives)    Intolerances        MEDICATIONS  (STANDING):  amLODIPine   Tablet 5 milliGRAM(s) Oral daily  aspirin enteric coated 81 milliGRAM(s) Oral daily  atorvastatin 20 milliGRAM(s) Oral at bedtime  cefTRIAXone   IVPB      cefTRIAXone   IVPB 1000 milliGRAM(s) IV Intermittent every 24 hours  cycloSPORINE (RESTASIS) 0.05% Emulsion 1 Drop(s) Both EYES two times a day  enoxaparin Injectable 40 milliGRAM(s) SubCutaneous daily  lactobacillus acidophilus 1 Tablet(s) Oral three times a day  metoprolol succinate ER 25 milliGRAM(s) Oral daily    MEDICATIONS  (PRN):  acetaminophen   Tablet .. 650 milliGRAM(s) Oral every 6 hours PRN Temp greater or equal to 38C (100.4F), Mild Pain (1 - 3)  docusate sodium 100 milliGRAM(s) Oral three times a day PRN Constipation  famotidine    Tablet 20 milliGRAM(s) Oral daily PRN gi upset  fluticasone propionate 50 MICROgram(s)/spray Nasal Spray 1 Spray(s) Both Nostrils two times a day PRN Nasal congestion, rhinorrhea  polyethylene glycol 3350 17 Gram(s) Oral daily PRN Constipation  senna 1 Tablet(s) Oral daily PRN Constipation      Social History:  Smoking History:denies  Alcohol Use: denies    REVIEW OF SYSTEMS:  CONSTITUTIONAL: No weakness, fevers or chills  EYES/ENT: No visual changes;  No vertigo or throat pain   NECK: No pain or stiffness  RESPIRATORY: No cough, wheezing, hemoptysis; No shortness of breath  CARDIOVASCULAR: No chest pain or palpitations  GASTROINTESTINAL: No abdominal or epigastric pain. No nausea, vomiting, or hematemesis; No diarrhea or constipation. No melena or hematochezia.  GENITOURINARY: No dysuria, frequency or hematuria  NEUROLOGICAL: No numbness or weakness  SKIN: ++ burning  along the outer left calf with inflammation  Vascular:  No lower extremity claudication, pedal rest pain or digital ulcers  All other review of systems is negative unless indicated above.    PHYSICAL EXAM:  General:  On exam, the patient is alert nontoxic appearing Female in no acute distress.  Vital Signs Last 24 Hrs  T(C): 36.9 (20 Oct 2019 07:53), Max: 36.9 (19 Oct 2019 23:50)  T(F): 98.4 (20 Oct 2019 07:53), Max: 98.4 (19 Oct 2019 23:50)  HR: 66 (20 Oct 2019 07:53) (64 - 68)  BP: 157/69 (20 Oct 2019 07:53) (112/69 - 169/72)  BP(mean): --  RR: 17 (20 Oct 2019 07:53) (16 - 17)  SpO2: 97% (20 Oct 2019 07:53) (95% - 98%)    Neck:  4+/4+ bilateral carotid pulses; no carotid bruit, no palpable cervical masses.  Heart:  Regular, no murmurs, rubs or gallops.    Lungs:  Clear to auscultation.    Chest:  No chest wall deformities  Symmetrical chest expansion.   Abdomen: Soft and nontender.  No palpable masses.  No rebound, guarding or rigidity.  Extremities:  There is edema and acute inflammation of the left calf and distal thigh with mild tenderness along the lateral left thigh. No abscess or evidence for compartment syndrome.  There are 1-4 mm varicose veins of the left calf with incompetent bilateral ankle  veins. Both  feet are warm, pink with normal capillary refill times.  There are no digital ulcers or heel decubiti.  The calf and thigh muscles are soft.  There are no palpable cords.  Holger's sign  is negative bilaterally.  There is no lower extremity  cyanosis.  On examination of the peripheral pulses:  Left leg femoral pulse is 4/4   , popliteal pulse is 4/4   ,PT Pulse is 3/4   , DP Pulse is 3/4   Right leg femoral pulse is 4/4   ,popliteal pulse is   4/4 , PT Pulse is 3/4  , DP Pulse is 3/4   Neurological:  There are no motor or sensory deficits in either lower extremity.                          10.0   4.43  )-----------( 192      ( 20 Oct 2019 07:23 )             31.1     10-20    146<H>  |  112<H>  |  14  ----------------------------<  121<H>  4.0   |  29  |  0.40<L>    Ca    8.1<L>      20 Oct 2019 07:23  Phos  3.0     10-20  Mg     1.9     10-20    TPro  6.6  /  Alb  3.3  /  TBili  0.7  /  DBili  x   /  AST  16  /  ALT  25  /  AlkPhos  88  10-18        PT/INR - ( 18 Oct 2019 09:56 )   PT: 12.7 sec;   INR: 1.11 ratio         PTT - ( 18 Oct 2019 09:56 )  PTT:33.1 sec    Radiology:

## 2019-10-20 NOTE — PROGRESS NOTE ADULT - SUBJECTIVE AND OBJECTIVE BOX
Patient is a 89y old  Female who presents with a chief complaint of L LE Cellulitis (20 Oct 2019 10:25)      FROM ADMISSION H+P:   HPI:  Pt is a 89 yo F w/ pmh Cellulitis, HTN (hypertension), Hyperlipidemia, Irritable bowel syndrome, Osteoporosis, and Peptic ulcer that presents to the ED w/ L LE pain and erythema. Pt was just discharged from this facility for cellulitis and sepsis of due to poor veins in legs on October 7th 2019. She follows with Dr Skye washburn. Pt w/ daughter at bedside. They reports she was discharged on po antibiotics which the pt finished yesterday. Since that time the redness began to get worse in her L LE. It spans from ankle to upper thigh. Pt reports she had bad chills yesterday, but no fever. In addition she had some heart palpitations and nausea. Right now she denies nausea. Pt denies fever, chest pain, sob, abdominal pain, urinary frequency, diarrhea, vomiting. She admits to urinary burning.      ----  INTERVAL HPI/OVERNIGHT EVENTS: Pt seen and evaluated at the bedside. No acute overnight events occurred. Pt states that redness and LLE discomfort have persisted. Pt and family are concerned about chronicity of the pt's symptoms and they are concerned that this "may never go away" or "may come back again right away" but they do feel that overall the pt clinically is improving. More comfortable. More interactive. No fever/chills. Tolerating diet. Urinary normally.     ----  PAST MEDICAL & SURGICAL HISTORY:  Cellulitis  Irritable bowel syndrome  Osteoporosis  HTN (hypertension)  Peptic ulcer  Hyperlipidemia  History of tonsillectomy  History of appendectomy  S/P hip replacement: partial, left      FAMILY HISTORY:      Allergies    sulfa drugs (Hives)    Intolerances        ----  REVIEW OF SYSTEMS:  CONSTITUTIONAL: denies fever, chills   HEENT: denies blurred vision, sore throat  SKIN: redness and edema improving since presentation  CARDIOVASCULAR: denies chest pain, chest pressure, palpitations  RESPIRATORY: denies shortness of breath, sputum production  GASTROINTESTINAL: denies nausea, vomiting, diarrhea, abdominal pain - admits appetite improving  NEUROLOGICAL: denies numbness, headache, focal weakness  MUSCULOSKELETAL: RLE edema and redness improving   LYMPHATICS: denies enlarged lymph nodes, admits LLE extremity swelling    ----  PHYSICAL EXAM:  GENERAL: patient appears age appropriate, comfortable, thin, interactive   ENMT: oropharynx clear without erythema, moist mucous membranes  LUNGS: good air entry bilaterally, clear to auscultation, symmetric breath sounds, no wheezing or rhonchi appreciated  HEART: soft S1/S2, regular rate and rhythm, no murmurs noted, trace noted edema to b/l LE but L>R  GASTROINTESTINAL: abdomen is soft, nontender, nondistended, normoactive bowel sounds, no palpable masses  INTEGUMENT: there is warmth and erythema but this is improving. it is affecting the knee and a small area medial and just distal to knee. there is a cluster of varicose veins under the area that appears somewhat edematous and warm but no fluctuance. mild ttp of this affected area noted.   MUSCULOSKELETAL: no clubbing or cyanosis, no obvious deformity  NEUROLOGIC: awake, alert, oriented x3, good muscle tone in 4 extremities, no obvious sensory deficits    T(C): 36.9 (10-20-19 @ 07:53), Max: 36.9 (10-19-19 @ 23:50)  HR: 66 (10-20-19 @ 07:53) (64 - 68)  BP: 157/69 (10-20-19 @ 07:53) (112/69 - 169/72)  RR: 17 (10-20-19 @ 07:53) (16 - 17)  SpO2: 97% (10-20-19 @ 07:53) (95% - 98%)  Wt(kg): --    ----  I&O's Summary    19 Oct 2019 07:01  -  20 Oct 2019 07:00  --------------------------------------------------------  IN: 970 mL / OUT: 700 mL / NET: 270 mL        LABS:                        10.0   4.43  )-----------( 192      ( 20 Oct 2019 07:23 )             31.1     10-20    146<H>  |  112<H>  |  14  ----------------------------<  121<H>  4.0   |  29  |  0.40<L>    Ca    8.1<L>      20 Oct 2019 07:23  Phos  3.0     10-20  Mg     1.9     10-20          CAPILLARY BLOOD GLUCOSE          10-18 @ 20:42   <10,000 CFU/mL Normal Urogenital Heather  --  --  10-18 @ 16:45   No growth to date.  --  --            ----  Personally reviewed:  Vital sign trends: [ x ] yes    [  ] no     [  ] n/a  Laboratory results: [ x ] yes    [  ] no     [  ] n/a  Radiology results: [ x ] yes    [  ] no     [  ] n/a  Culture results: [  x] yes    [  ] no     [  ] n/a  Consultant recommendations: [ x ] yes    [  ] no     [  ] n/a

## 2019-10-20 NOTE — PROGRESS NOTE ADULT - SUBJECTIVE AND OBJECTIVE BOX
ID Progress note    Name: LEVON SRIVASTAVA  Age: 89y  Gender: Female  MRN: [id number]    Interval History-- Events noted, complaints of burning pain left leg, erythema improved left leg, still pain medial aspect left knee  Notes reviewed    Past Medical History--  Cellulitis  DM (diabetes mellitus)  Irritable bowel syndrome  Osteoporosis  HTN (hypertension)  Peptic ulcer  Hyperlipidemia  History of tonsillectomy  History of appendectomy  S/P hip replacement      For details regarding the patient's social history, family history, and other miscellaneous elements, please refer the initial infectious diseases consultation and/or the admitting history and physical examination for this admission.    Allergies--  Allergies    sulfa drugs (Hives)    Intolerances        Medications--  Antibiotics:  cefTRIAXone   IVPB      cefTRIAXone   IVPB 1000 milliGRAM(s) IV Intermittent every 24 hours    Immunologic:    Other:  acetaminophen   Tablet .. PRN  amLODIPine   Tablet  aspirin enteric coated  atorvastatin  cycloSPORINE (RESTASIS) 0.05% Emulsion  docusate sodium PRN  enoxaparin Injectable  famotidine    Tablet PRN  fluticasone propionate 50 MICROgram(s)/spray Nasal Spray PRN  lactobacillus acidophilus  metoprolol succinate ER  polyethylene glycol 3350 PRN  senna PRN      Review of Systems--  Review of systems unable to be obtained secondary to clinical condition.    Physical Examination--    Vital Signs: T(F): 98.4 (10-20-19 @ 07:53), Max: 98.4 (10-19-19 @ 23:50)  HR: 66 (10-20-19 @ 07:53)  BP: 157/69 (10-20-19 @ 07:53)  RR: 17 (10-20-19 @ 07:53)  SpO2: 97% (10-20-19 @ 07:53)  Wt(kg): --    General: well developed well nourished, in no acute distress  Eyes: sclera anicteric, pupils equal and reactive to light  ENMT: buccal mucosa moist, pharynx not injected  Neck: supple, trachea midline  Lungs: clear, no wheeze/rhonchi  Cardiovascular: regular rate and rhythm, S1 S2  Abdomen: soft, nontender, no organomegaly present, bowel sounds normal  Neurological:  alert and oriented x3, Cranial Nerves II-XII grossly intact  Skin: no increased ecchymosis/petechiae/purpura  Lymph Nodes: no palpable cervical/supraclavicular lymph nodes enlargements  Extremities: no cyanosis/clubbing, chronci venous dermatitis both LE , decreased  erythema left leg with ascending lymphangitis left leg, decreased tenderness left medal knee and  posterior thigh edema, dry skin     Laboratory Studies--  CBC                        10.0   4.43  )-----------( 192      ( 20 Oct 2019 07:23 )             31.1       Chemistries  10-20    146<H>  |  112<H>  |  14  ----------------------------<  121<H>  4.0   |  29  |  0.40<L>    Ca    8.1<L>      20 Oct 2019 07:23  Phos  3.0     10-20  Mg     1.9     10-20      Sedimentation Rate, Erythrocyte (10.18.19 @ 13:49)    Sedimentation Rate, Erythrocyte: 12 mm/hr    C-Reactive Protein, Serum (10.18.19 @ 21:23)    C-Reactive Protein, Serum: 8.64 mg/dL    Procalcitonin, Serum (10.18.19 @ 13:49)    Procalcitonin, Serum: 0.30:   Culture Data    Culture - Urine (collected 18 Oct 2019 20:42)  Source: .Urine Clean Catch (Midstream)  Final Report (19 Oct 2019 15:37):    <10,000 CFU/mL Normal Urogenital Heather    Culture - Blood (collected 18 Oct 2019 16:45)  Source: .Blood Blood-Peripheral  Preliminary Report (19 Oct 2019 17:01):    No growth to date.    Culture - Blood (collected 18 Oct 2019 16:45)  Source: .Blood Blood-Peripheral  Preliminary Report (19 Oct 2019 17:01):    No growth to date.    MRSA/MSSA PCR (10.18.19 @ 23:06)    MRSA PCR Result.: NotDetec:    Staph Aureus PCR Result: NotDetec        Radiology:  Xray Chest 1 View- PORTABLE-Urgent (10.18.19 @ 10:53) >  INTERPRETATION:  Admission.    AP chest. Prior 10/1/2019.    No change heart mediastinum. No consolidation or effusion. Old right rib   fracture. Severe degenerative change bilateral shoulders.    Impression: No active disease    US Duplex Venous Lower Ext Complete, Bilateral (10.18.19 @ 10:28) >  FINDINGS:    There is normal compressibility of the bilateral common femoral, femoral   and popliteal veins.     Doppler examination shows normal spontaneous and phasicflow.    No calf vein thrombosis is detected.    IMPRESSION:     No evidence of deep venous thrombosis in either lower extremity.      Assessment :     89 year old female with multiple medical problems with hx of probably chronic venous insufficiency left leg with recurrent cellulitis left leg was seen by vascular surgery Dr. Cheung suggested compression stockings, was placed on po abx x 2 weeks with  improvement , then relapsed . No systemic signs of infection  except her left leg is more swollen, erythematous and warm and painful , so she has cellulitis with lymphangitis is . has very poor IV access and renal function . She has a left paraspinal mass , doubt if its causing the left leg swelling . She also has  symptoms of frequent urination     I believe the compression stockings are causing significant irritation of the skin causing cellulitis     Plan :   - will continue with  Rocephin 1 grams q 24 , Vanco discontinued as  MRSA screen is negative   - as her cs are negative, if left leg improves and she  is able to walk  okay , then cna be changed to po Keflex 500 mg tid x7 days on dc   - follow with vascular surgery on dc   - can ambulate as tolerated     Continue with present regime .  Appropriate use of antibiotics and adverse effects reviewed.    I have discussed the above plan of care with patient/family in detail. They expressed understanding of the treatment plan . Risks, benefits and alternatives discussed in detail. I have asked if they have any questions or concerns and appropriately addressed them to the best of my ability .      > 35 minutes spent in direct patient care reviewing  the notes, lab data/ imaging , discussion with multidisciplinary team. All questions were addressed and answered to the best of my capacity .    Thank you for allowing me to participate in the care of your patient .        Clifford Nesbitt MD  [phone]

## 2019-10-21 DIAGNOSIS — E11.620 TYPE 2 DIABETES MELLITUS WITH DIABETIC DERMATITIS: ICD-10-CM

## 2019-10-21 LAB
ANION GAP SERPL CALC-SCNC: 6 MMOL/L — SIGNIFICANT CHANGE UP (ref 5–17)
BUN SERPL-MCNC: 17 MG/DL — SIGNIFICANT CHANGE UP (ref 7–23)
CALCIUM SERPL-MCNC: 8.4 MG/DL — LOW (ref 8.5–10.1)
CHLORIDE SERPL-SCNC: 111 MMOL/L — HIGH (ref 96–108)
CO2 SERPL-SCNC: 29 MMOL/L — SIGNIFICANT CHANGE UP (ref 22–31)
CREAT SERPL-MCNC: 0.42 MG/DL — LOW (ref 0.5–1.3)
GLUCOSE SERPL-MCNC: 115 MG/DL — HIGH (ref 70–99)
HCT VFR BLD CALC: 32.1 % — LOW (ref 34.5–45)
HGB BLD-MCNC: 10.1 G/DL — LOW (ref 11.5–15.5)
MAGNESIUM SERPL-MCNC: 2 MG/DL — SIGNIFICANT CHANGE UP (ref 1.6–2.6)
MCHC RBC-ENTMCNC: 28 PG — SIGNIFICANT CHANGE UP (ref 27–34)
MCHC RBC-ENTMCNC: 31.5 GM/DL — LOW (ref 32–36)
MCV RBC AUTO: 88.9 FL — SIGNIFICANT CHANGE UP (ref 80–100)
NRBC # BLD: 0 /100 WBCS — SIGNIFICANT CHANGE UP (ref 0–0)
PLATELET # BLD AUTO: 205 K/UL — SIGNIFICANT CHANGE UP (ref 150–400)
POTASSIUM SERPL-MCNC: 4.2 MMOL/L — SIGNIFICANT CHANGE UP (ref 3.5–5.3)
POTASSIUM SERPL-SCNC: 4.2 MMOL/L — SIGNIFICANT CHANGE UP (ref 3.5–5.3)
RBC # BLD: 3.61 M/UL — LOW (ref 3.8–5.2)
RBC # FLD: 13.2 % — SIGNIFICANT CHANGE UP (ref 10.3–14.5)
SODIUM SERPL-SCNC: 146 MMOL/L — HIGH (ref 135–145)
WBC # BLD: 4.31 K/UL — SIGNIFICANT CHANGE UP (ref 3.8–10.5)
WBC # FLD AUTO: 4.31 K/UL — SIGNIFICANT CHANGE UP (ref 3.8–10.5)

## 2019-10-21 PROCEDURE — 99233 SBSQ HOSP IP/OBS HIGH 50: CPT

## 2019-10-21 PROCEDURE — 93306 TTE W/DOPPLER COMPLETE: CPT | Mod: 26

## 2019-10-21 RX ORDER — PIPERACILLIN AND TAZOBACTAM 4; .5 G/20ML; G/20ML
3.38 INJECTION, POWDER, LYOPHILIZED, FOR SOLUTION INTRAVENOUS ONCE
Refills: 0 | Status: COMPLETED | OUTPATIENT
Start: 2019-10-21 | End: 2019-10-21

## 2019-10-21 RX ORDER — PIPERACILLIN AND TAZOBACTAM 4; .5 G/20ML; G/20ML
3.38 INJECTION, POWDER, LYOPHILIZED, FOR SOLUTION INTRAVENOUS EVERY 8 HOURS
Refills: 0 | Status: DISCONTINUED | OUTPATIENT
Start: 2019-10-21 | End: 2019-10-25

## 2019-10-21 RX ADMIN — AMLODIPINE BESYLATE 5 MILLIGRAM(S): 2.5 TABLET ORAL at 06:53

## 2019-10-21 RX ADMIN — Medication 650 MILLIGRAM(S): at 01:00

## 2019-10-21 RX ADMIN — PIPERACILLIN AND TAZOBACTAM 200 GRAM(S): 4; .5 INJECTION, POWDER, LYOPHILIZED, FOR SOLUTION INTRAVENOUS at 13:44

## 2019-10-21 RX ADMIN — Medication 1 TABLET(S): at 13:43

## 2019-10-21 RX ADMIN — Medication 81 MILLIGRAM(S): at 11:23

## 2019-10-21 RX ADMIN — PIPERACILLIN AND TAZOBACTAM 25 GRAM(S): 4; .5 INJECTION, POWDER, LYOPHILIZED, FOR SOLUTION INTRAVENOUS at 22:13

## 2019-10-21 RX ADMIN — POLYETHYLENE GLYCOL 3350 17 GRAM(S): 17 POWDER, FOR SOLUTION ORAL at 07:49

## 2019-10-21 RX ADMIN — ENOXAPARIN SODIUM 40 MILLIGRAM(S): 100 INJECTION SUBCUTANEOUS at 11:23

## 2019-10-21 RX ADMIN — CYCLOSPORINE 1 DROP(S): 0.5 EMULSION OPHTHALMIC at 18:55

## 2019-10-21 RX ADMIN — Medication 1 TABLET(S): at 06:53

## 2019-10-21 RX ADMIN — Medication 100 MILLIGRAM(S): at 06:53

## 2019-10-21 RX ADMIN — CYCLOSPORINE 1 DROP(S): 0.5 EMULSION OPHTHALMIC at 06:53

## 2019-10-21 RX ADMIN — ATORVASTATIN CALCIUM 20 MILLIGRAM(S): 80 TABLET, FILM COATED ORAL at 22:14

## 2019-10-21 RX ADMIN — Medication 25 MILLIGRAM(S): at 06:53

## 2019-10-21 RX ADMIN — Medication 1 TABLET(S): at 22:14

## 2019-10-21 NOTE — PROGRESS NOTE ADULT - SUBJECTIVE AND OBJECTIVE BOX
ID Progress note     Name: LEVON SRIVASTAVA  Age: 89y  Gender: Female  MRN: [id number]    Interval History-- Events noted, still with pain and swelling left leg , been ambulating and keeping her leg elevated, has pain and discomfort around the left knee   Notes reviewed    Past Medical History--  Cellulitis  DM (diabetes mellitus)  Irritable bowel syndrome  Osteoporosis  HTN (hypertension)  Peptic ulcer  Hyperlipidemia  History of tonsillectomy  History of appendectomy  S/P hip replacement      For details regarding the patient's social history, family history, and other miscellaneous elements, please refer the initial infectious diseases consultation and/or the admitting history and physical examination for this admission.    Allergies--  Allergies    sulfa drugs (Hives)    Intolerances        Medications--  Antibiotics:  cefTRIAXone   IVPB      cefTRIAXone   IVPB 1000 milliGRAM(s) IV Intermittent every 24 hours    Immunologic:    Other:  acetaminophen   Tablet .. PRN  amLODIPine   Tablet  aspirin enteric coated  atorvastatin  cycloSPORINE (RESTASIS) 0.05% Emulsion  docusate sodium PRN  enoxaparin Injectable  famotidine    Tablet PRN  fluticasone propionate 50 MICROgram(s)/spray Nasal Spray PRN  lactobacillus acidophilus  metoprolol succinate ER  polyethylene glycol 3350 PRN  senna PRN      Review of Systems--  Review of systems unable to be obtained secondary to clinical condition.    Physical Examination--    Vital Signs: T(F): 97.8 (10-21-19 @ 07:01), Max: 97.8 (10-21-19 @ 07:01)  HR: 68 (10-21-19 @ 07:01)  BP: 134/74 (10-21-19 @ 07:01)  RR: 16 (10-21-19 @ 07:01)  SpO2: 98% (10-21-19 @ 07:01)  Wt(kg): --      General: well developed well nourished, in no acute distress  Eyes: sclera anicteric, pupils equal and reactive to light  ENMT: buccal mucosa moist, pharynx not injected  Neck: supple, trachea midline  Lungs: clear, no wheeze/rhonchi  Cardiovascular: regular rate and rhythm, S1 S2  Abdomen: soft, nontender, no organomegaly present, bowel sounds normal  Neurological:  alert and oriented x3, Cranial Nerves II-XII grossly intact  Skin: no increased ecchymosis/petechiae/purpura  Lymph Nodes: no palpable cervical/supraclavicular lymph nodes enlargements  Extremities: no cyanosis/clubbing, chronci venous dermatitis both LE , decreased  erythema left leg with ascending lymphangitis left leg, decreased tenderness left medal knee and  posterior thigh edema, dry skin   Laboratory Studies--  CBC                        10.1   4.31  )-----------( 205      ( 21 Oct 2019 06:54 )             32.1       Chemistries  10-21    146<H>  |  111<H>  |  17  ----------------------------<  115<H>  4.2   |  29  |  0.42<L>    Ca    8.4<L>      21 Oct 2019 06:54  Phos  3.0     10-20  Mg     2.0     10-21      Sedimentation Rate, Erythrocyte (10.18.19 @ 13:49)    Sedimentation Rate, Erythrocyte: 12 mm/hr    C-Reactive Protein, Serum (10.18.19 @ 21:23)    C-Reactive Protein, Serum: 8.64 mg/dL    Procalcitonin, Serum (10.18.19 @ 13:49)    Procalcitonin, Serum: 0.30:   Culture Data    Culture - Urine (collected 18 Oct 2019 20:42)  Source: .Urine Clean Catch (Midstream)  Final Report (19 Oct 2019 15:37):    <10,000 CFU/mL Normal Urogenital Heather    Culture - Blood (collected 18 Oct 2019 16:45)  Source: .Blood Blood-Peripheral  Preliminary Report (19 Oct 2019 17:01):    No growth to date.    Culture - Blood (collected 18 Oct 2019 16:45)  Source: .Blood Blood-Peripheral  Preliminary Report (19 Oct 2019 17:01):    No growth to date.      MRSA/MSSA PCR (10.18.19 @ 23:06)    MRSA PCR Result.: NotDetec:    Staph Aureus PCR Result: NotDetec        Radiology:  Xray Chest 1 View- PORTABLE-Urgent (10.18.19 @ 10:53) >  INTERPRETATION:  Admission.    AP chest. Prior 10/1/2019.    No change heart mediastinum. No consolidation or effusion. Old right rib   fracture. Severe degenerative change bilateral shoulders.    Impression: No active disease    US Duplex Venous Lower Ext Complete, Bilateral (10.18.19 @ 10:28) >  FINDINGS:    There is normal compressibility of the bilateral common femoral, femoral   and popliteal veins.     Doppler examination shows normal spontaneous and phasicflow.    No calf vein thrombosis is detected.    IMPRESSION:     No evidence of deep venous thrombosis in either lower extremity.      Assessment :     89 year old female with multiple medical problems with hx of probably chronic venous insufficiency left leg with recurrent cellulitis left leg was seen by vascular surgery Dr. Cheung suggested compression stockings, was placed on po abx x 2 weeks with  improvement , then relapsed . No systemic signs of infection  except her left leg is more swollen, erythematous and warm and painful , so she has cellulitis with lymphangitis is . has very poor IV access and renal function . She has a left paraspinal mass , doubt if its causing the left leg swelling . She also has  symptoms of frequent urination     I believe the compression stockings are causing significant irritation of the skin causing cellulitis     Plan :   - will change to Zosyn as she has not responded well to Rocephin , Vanco discontinued as  MRSA screen is negative   - may need compression ace wraps if ok with vascular surgery    - follow with vascular surgery on dc   - can ambulate as tolerated     Continue with present regime .  Appropriate use of antibiotics and adverse effects reviewed.    I have discussed the above plan of care with patient/family in detail. They expressed understanding of the treatment plan . Risks, benefits and alternatives discussed in detail. I have asked if they have any questions or concerns and appropriately addressed them to the best of my ability .      > 35 minutes spent in direct patient care reviewing  the notes, lab data/ imaging , discussion with multidisciplinary team. All questions were addressed and answered to the best of my capacity .    Thank you for allowing me to participate in the care of your patient .        Clifford Nesbitt MD  [phone]

## 2019-10-21 NOTE — PROGRESS NOTE ADULT - SUBJECTIVE AND OBJECTIVE BOX
Patient is a 89y old  Female who presents with a chief complaint of L LE Cellulitis (21 Oct 2019 10:49)      FROM ADMISSION H+P:   HPI:  Pt is a 89 yo F w/ pmh Cellulitis, HTN (hypertension), Hyperlipidemia, Irritable bowel syndrome, Osteoporosis, and Peptic ulcer that presents to the ED w/ L LE pain and erythema. Pt was just discharged from this facility for cellulitis and sepsis of due to poor veins in legs on October 7th 2019. She follows with Dr Skye washburn. Pt w/ daughter at bedside. They reports she was discharged on po antibiotics which the pt finished yesterday. Since that time the redness began to get worse in her L LE. It spans from ankle to upper thigh. Pt reports she had bad chills yesterday, but no fever. In addition she had some heart palpitations and nausea. Right now she denies nausea. Pt denies fever, chest pain, sob, abdominal pain, urinary frequency, diarrhea, vomiting. She admits to urinary burning.      ----  INTERVAL HPI/OVERNIGHT EVENTS: Pt seen and evaluated at the bedside. No acute overnight events occurred. Pt subjectively feels that symptoms worsening. She feels that the redness has moved proximally and she feels that the redness is circumferentially affecting the knee. Denies limited AROM of the L knee. Pt has been ambulating in the hallways without discomfort or pain. She feels overall better, denies fever/chills but is very concerned regarding redness. Admits that warmth associated w/ the redness is improving today though.     ----  PAST MEDICAL & SURGICAL HISTORY:  Cellulitis  Irritable bowel syndrome  Osteoporosis  HTN (hypertension)  Peptic ulcer  Hyperlipidemia  History of tonsillectomy  History of appendectomy  S/P hip replacement: partial, left      FAMILY HISTORY:      Allergies    sulfa drugs (Hives)    Intolerances        ----  REVIEW OF SYSTEMS:  CONSTITUTIONAL: denies fever, chills, weakness, malaise, confusion  HEENT: denies blurred vision, sore throat  SKIN: as per HPI  CARDIOVASCULAR: denies chest pain, chest pressure, palpitations  RESPIRATORY: denies shortness of breath, sputum production  GASTROINTESTINAL: denies nausea, vomiting, diarrhea, abdominal pain. admits that appetite is normal today  NEUROLOGICAL: denies numbness, headache, focal weakness  MUSCULOSKELETAL: as per HPI   LYMPHATICS: denies enlarged lymph nodes, admits LLE extremity swelling    ----  PHYSICAL EXAM:  GENERAL: patient appears age appropriate, comfortable, thin, interactive, sitting up in chair @ bedside  ENMT: oropharynx clear without erythema, moist mucous membranes  LUNGS: good air entry bilaterally, clear to auscultation, symmetric breath sounds, no wheezing or rhonchi appreciated  HEART: soft S1/S2, regular rate and rhythm, no murmurs noted, trace noted edema to b/l LE but L>R  GASTROINTESTINAL: abdomen is soft, nontender, nondistended, normoactive bowel sounds, no palpable masses  INTEGUMENT: redness to LLE near the knee, appears less warm, less edematous, less tender  MUSCULOSKELETAL: no clubbing or cyanosis, no obvious deformity, good AROM of L knee without pain  NEUROLOGIC: awake, alert, oriented x3, good muscle tone in 4 extremities, no obvious sensory deficits    T(C): 36.5 (10-21-19 @ 15:40), Max: 36.6 (10-21-19 @ 07:01)  HR: 66 (10-21-19 @ 15:40) (65 - 70)  BP: 120/65 (10-21-19 @ 15:40) (120/65 - 171/73)  RR: 16 (10-21-19 @ 15:40) (16 - 18)  SpO2: 99% (10-21-19 @ 15:40) (97% - 99%)  Wt(kg): --    ----  I&O's Summary    20 Oct 2019 07:01  -  21 Oct 2019 07:00  --------------------------------------------------------  IN: 50 mL / OUT: 0 mL / NET: 50 mL        LABS:                        10.1   4.31  )-----------( 205      ( 21 Oct 2019 06:54 )             32.1     10-21    146<H>  |  111<H>  |  17  ----------------------------<  115<H>  4.2   |  29  |  0.42<L>    Ca    8.4<L>      21 Oct 2019 06:54  Phos  3.0     10-20  Mg     2.0     10-21          CAPILLARY BLOOD GLUCOSE          10-18 @ 20:42   <10,000 CFU/mL Normal Urogenital Heather  --  --  10-18 @ 16:45   No growth to date.  --  --            ----  Personally reviewed:  Vital sign trends: [ x ] yes    [  ] no     [  ] n/a  Laboratory results: [ x ] yes    [  ] no     [  ] n/a - hgba1c is 7.1  Radiology results: [  ] yes    [  ] no     [ x ] n/a  Culture results: [ x ] yes    [  ] no     [  ] n/a  Consultant recommendations: [ x ] yes    [  ] no     [  ] n/a

## 2019-10-22 DIAGNOSIS — I87.2 VENOUS INSUFFICIENCY (CHRONIC) (PERIPHERAL): ICD-10-CM

## 2019-10-22 LAB
ANION GAP SERPL CALC-SCNC: 9 MMOL/L — SIGNIFICANT CHANGE UP (ref 5–17)
BUN SERPL-MCNC: 16 MG/DL — SIGNIFICANT CHANGE UP (ref 7–23)
CALCIUM SERPL-MCNC: 8.9 MG/DL — SIGNIFICANT CHANGE UP (ref 8.5–10.1)
CHLORIDE SERPL-SCNC: 109 MMOL/L — HIGH (ref 96–108)
CO2 SERPL-SCNC: 27 MMOL/L — SIGNIFICANT CHANGE UP (ref 22–31)
CREAT SERPL-MCNC: 0.47 MG/DL — LOW (ref 0.5–1.3)
GLUCOSE SERPL-MCNC: 109 MG/DL — HIGH (ref 70–99)
HCT VFR BLD CALC: 34.3 % — LOW (ref 34.5–45)
HGB BLD-MCNC: 11 G/DL — LOW (ref 11.5–15.5)
MAGNESIUM SERPL-MCNC: 2.2 MG/DL — SIGNIFICANT CHANGE UP (ref 1.6–2.6)
MCHC RBC-ENTMCNC: 28.6 PG — SIGNIFICANT CHANGE UP (ref 27–34)
MCHC RBC-ENTMCNC: 32.1 GM/DL — SIGNIFICANT CHANGE UP (ref 32–36)
MCV RBC AUTO: 89.3 FL — SIGNIFICANT CHANGE UP (ref 80–100)
NRBC # BLD: 0 /100 WBCS — SIGNIFICANT CHANGE UP (ref 0–0)
PHOSPHATE SERPL-MCNC: 3.7 MG/DL — SIGNIFICANT CHANGE UP (ref 2.5–4.5)
PLATELET # BLD AUTO: 217 K/UL — SIGNIFICANT CHANGE UP (ref 150–400)
POTASSIUM SERPL-MCNC: 3.9 MMOL/L — SIGNIFICANT CHANGE UP (ref 3.5–5.3)
POTASSIUM SERPL-SCNC: 3.9 MMOL/L — SIGNIFICANT CHANGE UP (ref 3.5–5.3)
RBC # BLD: 3.84 M/UL — SIGNIFICANT CHANGE UP (ref 3.8–5.2)
RBC # FLD: 13.3 % — SIGNIFICANT CHANGE UP (ref 10.3–14.5)
SODIUM SERPL-SCNC: 145 MMOL/L — SIGNIFICANT CHANGE UP (ref 135–145)
WBC # BLD: 4.76 K/UL — SIGNIFICANT CHANGE UP (ref 3.8–10.5)
WBC # FLD AUTO: 4.76 K/UL — SIGNIFICANT CHANGE UP (ref 3.8–10.5)

## 2019-10-22 PROCEDURE — 99233 SBSQ HOSP IP/OBS HIGH 50: CPT

## 2019-10-22 RX ORDER — LOSARTAN POTASSIUM 100 MG/1
25 TABLET, FILM COATED ORAL DAILY
Refills: 0 | Status: DISCONTINUED | OUTPATIENT
Start: 2019-10-22 | End: 2019-10-25

## 2019-10-22 RX ADMIN — FAMOTIDINE 20 MILLIGRAM(S): 10 INJECTION INTRAVENOUS at 17:34

## 2019-10-22 RX ADMIN — Medication 81 MILLIGRAM(S): at 11:41

## 2019-10-22 RX ADMIN — Medication 1 TABLET(S): at 05:37

## 2019-10-22 RX ADMIN — POLYETHYLENE GLYCOL 3350 17 GRAM(S): 17 POWDER, FOR SOLUTION ORAL at 08:45

## 2019-10-22 RX ADMIN — Medication 100 MILLIGRAM(S): at 05:39

## 2019-10-22 RX ADMIN — CYCLOSPORINE 1 DROP(S): 0.5 EMULSION OPHTHALMIC at 05:37

## 2019-10-22 RX ADMIN — CYCLOSPORINE 1 DROP(S): 0.5 EMULSION OPHTHALMIC at 17:34

## 2019-10-22 RX ADMIN — PIPERACILLIN AND TAZOBACTAM 25 GRAM(S): 4; .5 INJECTION, POWDER, LYOPHILIZED, FOR SOLUTION INTRAVENOUS at 15:39

## 2019-10-22 RX ADMIN — ENOXAPARIN SODIUM 40 MILLIGRAM(S): 100 INJECTION SUBCUTANEOUS at 11:41

## 2019-10-22 RX ADMIN — Medication 1 TABLET(S): at 15:39

## 2019-10-22 RX ADMIN — ATORVASTATIN CALCIUM 20 MILLIGRAM(S): 80 TABLET, FILM COATED ORAL at 21:59

## 2019-10-22 RX ADMIN — Medication 25 MILLIGRAM(S): at 05:37

## 2019-10-22 RX ADMIN — Medication 1 TABLET(S): at 21:59

## 2019-10-22 RX ADMIN — PIPERACILLIN AND TAZOBACTAM 25 GRAM(S): 4; .5 INJECTION, POWDER, LYOPHILIZED, FOR SOLUTION INTRAVENOUS at 05:37

## 2019-10-22 NOTE — PROGRESS NOTE ADULT - PROBLEM SELECTOR PLAN 6
-senna, miralax  and colace as needed.
- DVT ppx: Lovenox 40mg.  Fall protocol  Aspiration precautions.   IMPROVE VTE Individual Risk Assessment  RISK                                                                Points  [  ] Previous VTE                                                  3  [  ] Thrombophilia                                               2  [  ] Lower limb paralysis                                      2        (unable to hold up >15 seconds)    [  ] Current Cancer                                              2         (within 6 months)  [  ] Immobilization > 24 hrs                                1  [  ] ICU/CCU stay > 24 hours                              1  [  ] Age > 60                                                      1  IMPROVE VTE Score __1_______  IMPROVE Score 0-1: Low Risk, No VTE prophylaxis required for most patients, encourage ambulation.   IMPROVE Score 2-3: At risk, pharmacologic VTE prophylaxis is indicated for most patients (in the absence of a contraindication)  IMPROVE Score > or = 4: High Risk, pharmacologic VTE prophylaxis is indicated for most patients (in the absence of a contraindication)
- DVT ppx: Lovenox 40mg.  Fall protocol  Aspiration precautions.   IMPROVE VTE Individual Risk Assessment  RISK                                                                Points  [  ] Previous VTE                                                  3  [  ] Thrombophilia                                               2  [  ] Lower limb paralysis                                      2        (unable to hold up >15 seconds)    [  ] Current Cancer                                              2         (within 6 months)  [  ] Immobilization > 24 hrs                                1  [  ] ICU/CCU stay > 24 hours                              1  [  ] Age > 60                                                      1  IMPROVE VTE Score __1_______  IMPROVE Score 0-1: Low Risk, No VTE prophylaxis required for most patients, encourage ambulation.   IMPROVE Score 2-3: At risk, pharmacologic VTE prophylaxis is indicated for most patients (in the absence of a contraindication)  IMPROVE Score > or = 4: High Risk, pharmacologic VTE prophylaxis is indicated for most patients (in the absence of a contraindication)
-senna, miralax  and colace as needed.

## 2019-10-22 NOTE — PROGRESS NOTE ADULT - ATTENDING COMMENTS
The tx plan was discussed in detail with the patient and family members at the bedside. Their questions and concerns were addressed to the best of my ability. They are in agreement with the plan as detailed above. They demonstrated adequate understanding of the counseling which I have provided.
The tx plan was discussed in detail with the patient and family members at the bedside. Their questions and concerns were addressed to the best of my ability. They are in agreement with the plan as detailed above. They demonstrated adequate understanding of the counseling which I have provided.    D/c planning

## 2019-10-22 NOTE — DIETITIAN INITIAL EVALUATION ADULT. - PROBLEM SELECTOR PLAN 1
-Pt recently discharged on Oct. 7th w/ cellulitis and UTI  -Pt finished oral course of antibiotic yesterday, w/o resolution of symptoms  -Nasal swab to r/o staph  -Rocephin and vancomycin   -Vanc trough  -reevaluate antibiotic course w/ pending results of nasal swab and blood/urine cultures.   -Leg elevation.   -ID consulted, Dr. NELLY Nesbitt, recs appreciated.   -Vascular consulted, Dr. Cheung, f/u recs.

## 2019-10-22 NOTE — PROGRESS NOTE ADULT - PROBLEM SELECTOR PLAN 2
Chronic stable  - Continue home dose of amlodipine and metoprolol.
Chronic stable  - Continue home dose of amlodipine and metoprolol.  - amlodipine may contribute to b/l LE edema but BP's have been elevated. may consider diuretic instead of amlodipine. will d/c family and pt
Chronic stable  - Continue home dose of amlodipine and metoprolol. BP intermittently elevated  - amlodipine may contribute to b/l LE edema but BP's have been elevated  - d/c amlodipine, start losartan tomorrow
Chronic stable  - Continue home dose of amlodipine and metoprolol. BP intermittently elevated. up to170 systolic today  - amlodipine may contribute to b/l LE edema but BP's have been elevated. may consider diuretic instead of amlodipine. will d/c family and pt

## 2019-10-22 NOTE — CONSULT NOTE ADULT - SUBJECTIVE AND OBJECTIVE BOX
HPI:  Pt is a 89 yo F w/ pmh Cellulitis, HTN (hypertension), Hyperlipidemia, Irritable bowel syndrome, Osteoporosis, and Peptic ulcer that presents to the ED w/ L LE pain and erythema. Pt was just discharged from this facility for cellulitis and sepsis of due to poor veins in legs on October 7th 2019. She follows with Dr Cheung vascular. Pt w/ daughter at bedside. They reports she was discharged on po antibiotics which the pt finished yesterday. Since that time the redness began to get worse in her L LE. It spans from ankle to upper thigh. Pt reports she had bad chills yesterday, but no fever. In addition she had some heart palpitations and nausea. Right now she denies nausea. Pt denies fever, chest pain, sob, abdominal pain, urinary frequency, diarrhea, vomiting. She admitted to urinary burning.     Initially started on Vancomycin but seen by Dr Kaushik Nesbitt and ceftriaxone added. Escalated to Zosyn 10/21 with lack of response. Family requesting a second opinion and also there may be some family concerns about having outpt ID follow up.      PAST MEDICAL & SURGICAL HISTORY:  Cellulitis  Irritable bowel syndrome  Osteoporosis  HTN (hypertension)  Peptic ulcer  Hyperlipidemia  History of tonsillectomy  History of appendectomy  S/P hip replacement: partial, left      Antimicrobials  piperacillin/tazobactam IVPB.. 3.375 Gram(s) IV Intermittent every 8 hours      Immunological      Other  acetaminophen   Tablet .. 650 milliGRAM(s) Oral every 6 hours PRN  aspirin enteric coated 81 milliGRAM(s) Oral daily  atorvastatin 20 milliGRAM(s) Oral at bedtime  cycloSPORINE (RESTASIS) 0.05% Emulsion 1 Drop(s) Both EYES two times a day  docusate sodium 100 milliGRAM(s) Oral three times a day PRN  enoxaparin Injectable 40 milliGRAM(s) SubCutaneous daily  famotidine    Tablet 20 milliGRAM(s) Oral daily PRN  fluticasone propionate 50 MICROgram(s)/spray Nasal Spray 1 Spray(s) Both Nostrils two times a day PRN  lactobacillus acidophilus 1 Tablet(s) Oral three times a day  metoprolol succinate ER 25 milliGRAM(s) Oral daily  polyethylene glycol 3350 17 Gram(s) Oral daily PRN  senna 1 Tablet(s) Oral daily PRN      Allergies    sulfa drugs (Hives)    Intolerances    SOCIAL HISTORY: retired     FAMILY HISTORY: noncontrib      ROS:    EYES:  Negative  blurry vision or double vision  GASTROINTESTINAL:  Negative for nausea, vomiting, diarrhea  -otherwise negative except for subjective    Vital Signs Last 24 Hrs  T(C): 36.7 (22 Oct 2019 07:06), Max: 36.7 (21 Oct 2019 23:44)  T(F): 98 (22 Oct 2019 07:06), Max: 98.1 (21 Oct 2019 23:44)  HR: 60 (22 Oct 2019 07:06) (60 - 66)  BP: 148/77 (22 Oct 2019 07:06) (120/65 - 148/77)  BP(mean): --  RR: 17 (22 Oct 2019 07:06) (16 - 17)  SpO2: 98% (22 Oct 2019 07:06) (95% - 99%)    PE:  WDWN in no distress  HEENT:  NC, PERRL, sclerae anicteric, conjunctivae clear, EOMI.  Sinuses nontender, no nasal exudate.  No buccal or pharyngeal lesions, erythema or exudate  Neck:  Supple, no adenopathy, JCD  Lungs:  No accessory muscle use, bilaterally clear to auscultation  Cor:  RRR, S1, S2, no murmur appreciated  Abd:  Symmetric, normoactive BS.  Soft, nontender, no masses, guarding or rebound.  Liver and spleen not enlarged  Extrem:  No cyanosis , dilated veins, erythema, warm in LLE  Skin:  No rashes.  Neuro: grossly intact  Musc: moving all limbs freely, no focal deficits, noted kyphosis    LABS:                        11.0   4.76  )-----------( 217      ( 22 Oct 2019 07:19 )             34.3       WBC Count: 4.76 K/uL (10-22-19 @ 07:19)  WBC Count: 4.31 K/uL (10-21-19 @ 06:54)  WBC Count: 4.43 K/uL (10-20-19 @ 07:23)  WBC Count: 6.39 K/uL (10-19-19 @ 06:44)  WBC Count: 12.45 K/uL (10-18-19 @ 09:56)      10-22    145  |  109<H>  |  16  ----------------------------<  109<H>  3.9   |  27  |  0.47<L>    Ca    8.9      22 Oct 2019 07:19  Phos  3.7     10-22  Mg     2.2     10-22        Creatinine, Serum: 0.47 mg/dL (10-22-19 @ 07:19)  Creatinine, Serum: 0.42 mg/dL (10-21-19 @ 06:54)  Creatinine, Serum: 0.40 mg/dL (10-20-19 @ 07:23)  Creatinine, Serum: 0.32 mg/dL (10-19-19 @ 06:44)  Creatinine, Serum: 0.60 mg/dL (10-18-19 @ 09:56)          Vancomycin Level, Trough: 13.8 ug/mL (10-18-19 @ 14:50)      MICROBIOLOGY:      RADIOLOGY & ADDITIONAL STUDIES:    --

## 2019-10-22 NOTE — PROGRESS NOTE ADULT - PROBLEM SELECTOR PLAN 7
- DVT ppx: Lovenox 40mg.  Fall protocol  Aspiration precautions.   IMPROVE VTE Individual Risk Assessment  RISK                                                                Points  [  ] Previous VTE                                                  3  [  ] Thrombophilia                                               2  [  ] Lower limb paralysis                                      2        (unable to hold up >15 seconds)    [  ] Current Cancer                                              2         (within 6 months)  [  ] Immobilization > 24 hrs                                1  [  ] ICU/CCU stay > 24 hours                              1  [  ] Age > 60                                                      1  IMPROVE VTE Score __1_______  IMPROVE Score 0-1: Low Risk, No VTE prophylaxis required for most patients, encourage ambulation.   IMPROVE Score 2-3: At risk, pharmacologic VTE prophylaxis is indicated for most patients (in the absence of a contraindication)  IMPROVE Score > or = 4: High Risk, pharmacologic VTE prophylaxis is indicated for most patients (in the absence of a contraindication)
- DVT ppx: Lovenox 40mg.  Fall protocol  Aspiration precautions.   IMPROVE VTE Individual Risk Assessment  RISK                                                                Points  [  ] Previous VTE                                                  3  [  ] Thrombophilia                                               2  [  ] Lower limb paralysis                                      2        (unable to hold up >15 seconds)    [  ] Current Cancer                                              2         (within 6 months)  [  ] Immobilization > 24 hrs                                1  [  ] ICU/CCU stay > 24 hours                              1  [  ] Age > 60                                                      1  IMPROVE VTE Score __1_______  IMPROVE Score 0-1: Low Risk, No VTE prophylaxis required for most patients, encourage ambulation.   IMPROVE Score 2-3: At risk, pharmacologic VTE prophylaxis is indicated for most patients (in the absence of a contraindication)  IMPROVE Score > or = 4: High Risk, pharmacologic VTE prophylaxis is indicated for most patients (in the absence of a contraindication)

## 2019-10-22 NOTE — PROGRESS NOTE ADULT - PROBLEM SELECTOR PLAN 3
new onset diabetes  - no intervention necessary given pt's advanced age and a1c essentially at goal near 7  - will provide counseling regarding management and place nutrition consult  - acei/arb to be added on d/c if renal indices stable
-Continue home dose pantoprazole.
-Continue home dose pantoprazole.  -Should avoid long term use
new onset diabetes  - no intervention necessary given pt's advanced age and a1c essentially at goal near 7  - will provide counseling regarding management and place nutrition consult  - acei/arb to be added

## 2019-10-22 NOTE — PROGRESS NOTE ADULT - PROBLEM SELECTOR PLAN 4
-Continue home dose pantoprazole.  -Should avoid long term use
Chronic stable  - Continue home dose of Lipitor.
-Continue home dose pantoprazole.  -Should avoid long term use
Chronic stable  - Continue home dose of Lipitor.

## 2019-10-22 NOTE — CONSULT NOTE ADULT - ASSESSMENT
89 yo F w/ pmh Cellulitis, HTN (hypertension), Hyperlipidemia, Irritable bowel syndrome, Osteoporosis, and Peptic ulcer tadmiotted with recurrent LLE cellulitis  Initially started on Vancomycin but seen by Dr Kaushik Nesbitt and ceftriaxone added. Escalated to Zosyn 10/21 with lack of response. Family requesting a second opinion and also there may be some family concerns about having outpt ID follow up.

## 2019-10-22 NOTE — DIETITIAN INITIAL EVALUATION ADULT. - ADD RECOMMEND
patient refusing glucerna supplement at this time will provide food choices and encourage continued PO

## 2019-10-22 NOTE — CONSULT NOTE ADULT - PROBLEM SELECTOR RECOMMENDATION 3
good but not tight control.    Thank you for consulting us and involving us in the management of this most interesting and challenging case. Will defer to Dr Nesbitt for future inpatient management as I do concur with his current management.    Please Call with any further questions

## 2019-10-22 NOTE — CHART NOTE - NSCHARTNOTEFT_GEN_A_CORE
Upon Nutritional Assessment by the Registered Dietitian your patient was determined to meet criteria / has evidence of the following diagnosis/diagnoses:          [ ]  Mild Protein Calorie Malnutrition        [x ]  Moderate Protein Calorie Malnutrition        [ ] Severe Protein Calorie Malnutrition        [ ] Unspecified Protein Calorie Malnutrition        [ ] Underweight / BMI <19        [ ] Morbid Obesity / BMI > 40      Findings as based on:  •  Comprehensive nutrition assessment and consultation  •  Food acceptance and intake status from observations by staff    nutrition focused physical exam with moderate clavicle and shoulder muscle mass loss and mild temple loss and mild orbital buccal and triceps fat loss. with weight loss ~ 16% but ? time period.     Treatment:    The following diet has been recommended:  continue dash tlc consistent cho diet . encourage PO intake provide preferences .     PROVIDER Section:     By signing this assessment you are acknowledging and agree with the diagnosis/diagnoses assigned by the Registered Dietitian    Comments:

## 2019-10-22 NOTE — DIETITIAN INITIAL EVALUATION ADULT. - PROBLEM SELECTOR PLAN 6
- DVT ppx: Lovenox 40mg.  Fall protocol  Aspiration precautions.   IMPROVE VTE Individual Risk Assessment  RISK                                                                Points  [  ] Previous VTE                                                  3  [  ] Thrombophilia                                               2  [  ] Lower limb paralysis                                      2        (unable to hold up >15 seconds)    [  ] Current Cancer                                              2         (within 6 months)  [  ] Immobilization > 24 hrs                                1  [  ] ICU/CCU stay > 24 hours                              1  [  ] Age > 60                                                      1  IMPROVE VTE Score __1_______  IMPROVE Score 0-1: Low Risk, No VTE prophylaxis required for most patients, encourage ambulation.   IMPROVE Score 2-3: At risk, pharmacologic VTE prophylaxis is indicated for most patients (in the absence of a contraindication)  IMPROVE Score > or = 4: High Risk, pharmacologic VTE prophylaxis is indicated for most patients (in the absence of a contraindication)

## 2019-10-22 NOTE — PROGRESS NOTE ADULT - ASSESSMENT
Pt is a 89 yo F w/ pmh Cellulitis, HTN (hypertension), Hyperlipidemia, Irritable bowel syndrome, Osteoporosis, and Peptic ulcer that presents to the ED w/ L LE pain and erythema. Pt is being admitted for recurrent L LE Cellulitis.
Pt is a 89 yo F w/ pmh Cellulitis, HTN (hypertension), Hyperlipidemia, Irritable bowel syndrome, Osteoporosis, and Peptic ulcer that presents to the ED w/ L LE pain and erythema. Pt is being admitted for recurrent L LE Cellulitis.

## 2019-10-22 NOTE — PROGRESS NOTE ADULT - NSHPATTENDINGPLANDISCUSS_GEN_ALL_CORE
Was A Bandage Applied: Yes
pt, family, RN, ID - re: tx plan, disposition planning, above detailed plan
pt, family, RN, ID, SW, CM - re: tx plan, disposition planning, above detailed plan
pt, family, RN, ID, [facility] - re: tx plan, disposition planning, above detailed plan
pt, family, RN, ID, SW, CM, ID (Laz/Lei) - re: tx plan, disposition planning, above detailed plan

## 2019-10-22 NOTE — PROGRESS NOTE ADULT - PROBLEM SELECTOR PLAN 1
sepsis POA 2/2 LLE cellulitis. pt failed outpatient therapy  -MRSA screen is negative, d/c'd vancomycin on 10/19  - abx changed to zosyn and now w/ improvement  - BCx ngtd, UCx ngtd  - Leg elevation. avoid compression in setting of possible active infection.   - ID (Laz) and vascular (Skye) have been consulted   - added acidophilus tid w/ meals  - ID (Lei) consulted for 2nd opinion, agrees w/ management for now
sepsis POA 2/2 LLE cellulitis. pt failed outpatient therapy.   -Pt recently discharged on Oct. 7th w/ cellulitis and UTI  -Pt finished oral course of antibiotic 1d prior to arrival, w/o complete resolution of symptoms  -MRSA screen is negative, d/c'd vancomycin on 10/19  -c/w ceftriaxone for now. unclear if this is true recurrence of cellulitis or if changes are more related to chronic venous stasis. in the setting of fever/leukocytosis, will treat for cellulitis and recommend serial clinical examinations. compression stockings may be causing skin irritation that is mimicking the appearance of cellulitis  - BCx ngtd, UCx ngtd  - Leg elevation. avoid compression in setting of possible active infection.   - ID (Laz) and vascular (Skye) have been consulted - discussed w/ both today. planning to complete course of po abx as outpatient. d/c planning possible for as early as tomorrow  - added acidophilus tid w/ meals
sepsis POA 2/2 LLE cellulitis. pt failed outpatient therapy.   -Pt recently discharged on Oct. 7th w/ cellulitis and UTI  -Pt finished oral course of antibiotic 1d prior to arrival, w/o complete resolution of symptoms  -MRSA screen is negative, will d/c vancomycin  -c/w ceftriaxone for now. unclear if this is true recurrence of cellulitis or if changes are more related to chronic venous stasis. in the setting of fever/leukocytosis, will treat for cellulitis and recommend serial clinical examinations  - blood cx's pending. ucx's pending as well but U/A is benign and pt has no urinary symptoms to suggest infection at this time  -Leg elevation. avoid compression in setting of possible active infection.   - ID (Laz) and vascular (Skye) have been consulted  - add acidophilus tid w/ meals
sepsis POA 2/2 LLE cellulitis. pt failed outpatient therapy.   -Pt recently discharged on Oct. 7th w/ cellulitis and UTI  -Pt finished oral course of antibiotic 1d prior to arrival, w/o complete resolution of symptoms  -MRSA screen is negative, d/c'd vancomycin on 10/19  - abx changed to zosyn today  - BCx ngtd, UCx ngtd  - Leg elevation. avoid compression in setting of possible active infection.   - ID (Laz) and vascular (Skye) have been consulted   - added acidophilus tid w/ meals

## 2019-10-22 NOTE — DIETITIAN INITIAL EVALUATION ADULT. - OTHER INFO
per patient and family , patient with good PO intake. no problems chewing swallowing NKFA, states has sweet tooth knows to cut back on concentrated sweets doesn't want to follow consistent cho diet just wants to reduce sweets . does not like ensure or glucerna supplement. verbal review of low carbohydrate food choices. patient reports weight loss unknown time frame at least 5 years. of ~20# nutrition focused physical exam completed with mild orbital , and buccal and triceps fat loss and moderate clavicle /shoulder muscle mass loss and mild temple loss noted.

## 2019-10-22 NOTE — PROGRESS NOTE ADULT - PROBLEM SELECTOR PROBLEM 3
Peptic ulcer
Peptic ulcer
Type 2 diabetes mellitus with diabetic dermatitis, with long-term current use of insulin
Type 2 diabetes mellitus with diabetic dermatitis, with long-term current use of insulin

## 2019-10-22 NOTE — PROGRESS NOTE ADULT - SUBJECTIVE AND OBJECTIVE BOX
Patient is a 89y old  Female who presents with a chief complaint of L LE Cellulitis (22 Oct 2019 10:05)      FROM ADMISSION H+P:   HPI:  Pt is a 89 yo F w/ pmh Cellulitis, HTN (hypertension), Hyperlipidemia, Irritable bowel syndrome, Osteoporosis, and Peptic ulcer that presents to the ED w/ L LE pain and erythema. Pt was just discharged from this facility for cellulitis and sepsis of due to poor veins in legs on October 7th 2019. She follows with Dr Skye washburn. Pt w/ daughter at bedside. They reports she was discharged on po antibiotics which the pt finished yesterday. Since that time the redness began to get worse in her L LE. It spans from ankle to upper thigh. Pt reports she had bad chills yesterday, but no fever. In addition she had some heart palpitations and nausea. Right now she denies nausea. Pt denies fever, chest pain, sob, abdominal pain, urinary frequency, diarrhea, vomiting. She admits to urinary burning.     ----  INTERVAL HPI/OVERNIGHT EVENTS: Pt seen and evaluated at the bedside. No acute overnight events occurred. Pt states that since starting zosyn, symptoms are improving. No fever/chills. Feeling overall improved. No fatigue. No weakness.     ----  PAST MEDICAL & SURGICAL HISTORY:  Cellulitis  Irritable bowel syndrome  Osteoporosis  HTN (hypertension)  Peptic ulcer  Hyperlipidemia  History of tonsillectomy  History of appendectomy  S/P hip replacement: partial, left      FAMILY HISTORY:      Allergies    sulfa drugs (Hives)    Intolerances        ----  REVIEW OF SYSTEMS:  CONSTITUTIONAL: denies fever, chills, weakness, malaise, confusion  HEENT: denies blurred vision, sore throat  SKIN: erythema improving  CARDIOVASCULAR: denies chest pain, chest pressure, palpitations  RESPIRATORY: denies shortness of breath, sputum production  GASTROINTESTINAL: denies nausea, vomiting, diarrhea, abdominal pain, admits normal appetite  NEUROLOGICAL: denies numbness, headache, focal weakness  MUSCULOSKELETAL: denies leg pain, denies hip pain  LYMPHATICS: denies enlarged lymph nodes, admits LLE extremity swelling    ----  PHYSICAL EXAM:  GENERAL: patient appears age appropriate, comfortable, thin, interactive, sitting up in chair @ bedside  ENMT: oropharynx clear without erythema, moist mucous membranes  LUNGS: good air entry bilaterally, clear to auscultation, symmetric breath sounds, no wheezing or rhonchi appreciated  HEART: soft S1/S2, regular rate and rhythm, no murmurs noted, trace noted edema to b/l LE but L>R  GASTROINTESTINAL: abdomen is soft, nontender, nondistended, normoactive bowel sounds, no palpable masses  INTEGUMENT: redness to LLE near the knee, appears less warm, less edematous, less tender  MUSCULOSKELETAL: no clubbing or cyanosis, no obvious deformity, good AROM of L knee without pain  NEUROLOGIC: awake, alert, oriented x3, good muscle tone in 4 extremities, no obvious sensory deficits    T(C): 36.4 (10-22-19 @ 16:07), Max: 36.7 (10-21-19 @ 23:44)  HR: 61 (10-22-19 @ 16:07) (60 - 65)  BP: 134/62 (10-22-19 @ 16:07) (134/62 - 148/77)  RR: 18 (10-22-19 @ 16:07) (17 - 18)  SpO2: 92% (10-22-19 @ 16:07) (92% - 98%)  Wt(kg): --    ----  I&O's Summary    21 Oct 2019 07:01  -  22 Oct 2019 07:00  --------------------------------------------------------  IN: 100 mL / OUT: 0 mL / NET: 100 mL        LABS:                        11.0   4.76  )-----------( 217      ( 22 Oct 2019 07:19 )             34.3     10-22    145  |  109<H>  |  16  ----------------------------<  109<H>  3.9   |  27  |  0.47<L>    Ca    8.9      22 Oct 2019 07:19  Phos  3.7     10-22  Mg     2.2     10-22          CAPILLARY BLOOD GLUCOSE                    ----  Personally reviewed:  Vital sign trends: [ x ] yes    [  ] no     [  ] n/a  Laboratory results: [x  ] yes    [  ] no     [  ] n/a  Radiology results: [  ] yes    [  ] no     [ x ] n/a  Culture results: [  ] yes    [  ] no     [ x ] n/a  Consultant recommendations: [  x] yes    [  ] no     [  ] n/a

## 2019-10-22 NOTE — CONSULT NOTE ADULT - PROBLEM SELECTOR RECOMMENDATION 9
concur with Dr Nesbitt's excellent and thoughtful management as discussed with patient. Nonpurulent and neg MRSA screen so agree with no further Vanco and history suggestive of resistant GNR as cause and thus anticipate good response to Zosyn. Happy to follow patient in out patient setting post discharge.

## 2019-10-22 NOTE — PROGRESS NOTE ADULT - PROBLEM SELECTOR PLAN 5
Chronic stable  - Continue home dose of Lipitor.
-senna, miralax  and colace as needed.
-senna, miralax  and colace as needed.
Chronic stable  - Continue home dose of Lipitor.

## 2019-10-22 NOTE — PROGRESS NOTE ADULT - PROBLEM SELECTOR PROBLEM 1
Cellulitis of left lower extremity

## 2019-10-23 LAB
-  CANDIDA ALBICANS: SIGNIFICANT CHANGE UP
-  CANDIDA GLABRATA: SIGNIFICANT CHANGE UP
-  CANDIDA KRUSEI: SIGNIFICANT CHANGE UP
-  CANDIDA PARAPSILOSIS: SIGNIFICANT CHANGE UP
-  CANDIDA TROPICALIS: SIGNIFICANT CHANGE UP
-  COAGULASE NEGATIVE STAPHYLOCOCCUS: SIGNIFICANT CHANGE UP
-  K. PNEUMONIAE GROUP: SIGNIFICANT CHANGE UP
-  KPC RESISTANCE GENE: SIGNIFICANT CHANGE UP
-  STREPTOCOCCUS SP. (NOT GRP A, B OR S PNEUMONIAE): SIGNIFICANT CHANGE UP
A BAUMANNII DNA SPEC QL NAA+PROBE: SIGNIFICANT CHANGE UP
ANION GAP SERPL CALC-SCNC: 5 MMOL/L — SIGNIFICANT CHANGE UP (ref 5–17)
BUN SERPL-MCNC: 19 MG/DL — SIGNIFICANT CHANGE UP (ref 7–23)
CALCIUM SERPL-MCNC: 9.2 MG/DL — SIGNIFICANT CHANGE UP (ref 8.5–10.1)
CHLORIDE SERPL-SCNC: 108 MMOL/L — SIGNIFICANT CHANGE UP (ref 96–108)
CO2 SERPL-SCNC: 31 MMOL/L — SIGNIFICANT CHANGE UP (ref 22–31)
CREAT SERPL-MCNC: 0.57 MG/DL — SIGNIFICANT CHANGE UP (ref 0.5–1.3)
CULTURE RESULTS: SIGNIFICANT CHANGE UP
E CLOAC COMP DNA BLD POS QL NAA+PROBE: SIGNIFICANT CHANGE UP
E COLI DNA BLD POS QL NAA+NON-PROBE: SIGNIFICANT CHANGE UP
ENTEROCOC DNA BLD POS QL NAA+NON-PROBE: SIGNIFICANT CHANGE UP
ENTEROCOC DNA BLD POS QL NAA+NON-PROBE: SIGNIFICANT CHANGE UP
GLUCOSE SERPL-MCNC: 113 MG/DL — HIGH (ref 70–99)
GP B STREP DNA BLD POS QL NAA+NON-PROBE: SIGNIFICANT CHANGE UP
GRAM STN FLD: SIGNIFICANT CHANGE UP
HAEM INFLU DNA BLD POS QL NAA+NON-PROBE: SIGNIFICANT CHANGE UP
HCT VFR BLD CALC: 36.6 % — SIGNIFICANT CHANGE UP (ref 34.5–45)
HGB BLD-MCNC: 11.5 G/DL — SIGNIFICANT CHANGE UP (ref 11.5–15.5)
K OXYTOCA DNA BLD POS QL NAA+NON-PROBE: SIGNIFICANT CHANGE UP
L MONOCYTOG DNA BLD POS QL NAA+NON-PROBE: SIGNIFICANT CHANGE UP
MCHC RBC-ENTMCNC: 28 PG — SIGNIFICANT CHANGE UP (ref 27–34)
MCHC RBC-ENTMCNC: 31.4 GM/DL — LOW (ref 32–36)
MCV RBC AUTO: 89.3 FL — SIGNIFICANT CHANGE UP (ref 80–100)
METHOD TYPE: SIGNIFICANT CHANGE UP
MRSA SPEC QL CULT: SIGNIFICANT CHANGE UP
MSSA DNA SPEC QL NAA+PROBE: SIGNIFICANT CHANGE UP
N MEN ISLT CULT: SIGNIFICANT CHANGE UP
NRBC # BLD: 0 /100 WBCS — SIGNIFICANT CHANGE UP (ref 0–0)
P AERUGINOSA DNA BLD POS NAA+NON-PROBE: SIGNIFICANT CHANGE UP
PLATELET # BLD AUTO: 223 K/UL — SIGNIFICANT CHANGE UP (ref 150–400)
POTASSIUM SERPL-MCNC: 4.4 MMOL/L — SIGNIFICANT CHANGE UP (ref 3.5–5.3)
POTASSIUM SERPL-MCNC: 5.5 MMOL/L — HIGH (ref 3.5–5.3)
POTASSIUM SERPL-SCNC: 4.4 MMOL/L — SIGNIFICANT CHANGE UP (ref 3.5–5.3)
POTASSIUM SERPL-SCNC: 5.5 MMOL/L — HIGH (ref 3.5–5.3)
RBC # BLD: 4.1 M/UL — SIGNIFICANT CHANGE UP (ref 3.8–5.2)
RBC # FLD: 13.3 % — SIGNIFICANT CHANGE UP (ref 10.3–14.5)
S MARCESCENS DNA BLD POS NAA+NON-PROBE: SIGNIFICANT CHANGE UP
S PNEUM DNA BLD POS QL NAA+NON-PROBE: SIGNIFICANT CHANGE UP
S PYO DNA BLD POS QL NAA+NON-PROBE: SIGNIFICANT CHANGE UP
SODIUM SERPL-SCNC: 144 MMOL/L — SIGNIFICANT CHANGE UP (ref 135–145)
SPECIMEN SOURCE: SIGNIFICANT CHANGE UP
WBC # BLD: 4.69 K/UL — SIGNIFICANT CHANGE UP (ref 3.8–10.5)
WBC # FLD AUTO: 4.69 K/UL — SIGNIFICANT CHANGE UP (ref 3.8–10.5)

## 2019-10-23 PROCEDURE — 99232 SBSQ HOSP IP/OBS MODERATE 35: CPT

## 2019-10-23 RX ADMIN — ENOXAPARIN SODIUM 40 MILLIGRAM(S): 100 INJECTION SUBCUTANEOUS at 12:41

## 2019-10-23 RX ADMIN — Medication 100 MILLIGRAM(S): at 16:16

## 2019-10-23 RX ADMIN — PIPERACILLIN AND TAZOBACTAM 25 GRAM(S): 4; .5 INJECTION, POWDER, LYOPHILIZED, FOR SOLUTION INTRAVENOUS at 16:14

## 2019-10-23 RX ADMIN — PIPERACILLIN AND TAZOBACTAM 25 GRAM(S): 4; .5 INJECTION, POWDER, LYOPHILIZED, FOR SOLUTION INTRAVENOUS at 00:09

## 2019-10-23 RX ADMIN — Medication 1 TABLET(S): at 16:14

## 2019-10-23 RX ADMIN — PIPERACILLIN AND TAZOBACTAM 25 GRAM(S): 4; .5 INJECTION, POWDER, LYOPHILIZED, FOR SOLUTION INTRAVENOUS at 08:22

## 2019-10-23 RX ADMIN — LOSARTAN POTASSIUM 25 MILLIGRAM(S): 100 TABLET, FILM COATED ORAL at 07:14

## 2019-10-23 RX ADMIN — Medication 1 TABLET(S): at 21:11

## 2019-10-23 RX ADMIN — Medication 25 MILLIGRAM(S): at 07:15

## 2019-10-23 RX ADMIN — Medication 81 MILLIGRAM(S): at 12:41

## 2019-10-23 RX ADMIN — Medication 1 TABLET(S): at 07:14

## 2019-10-23 RX ADMIN — CYCLOSPORINE 1 DROP(S): 0.5 EMULSION OPHTHALMIC at 07:14

## 2019-10-23 RX ADMIN — ATORVASTATIN CALCIUM 20 MILLIGRAM(S): 80 TABLET, FILM COATED ORAL at 21:11

## 2019-10-23 RX ADMIN — FAMOTIDINE 20 MILLIGRAM(S): 10 INJECTION INTRAVENOUS at 21:13

## 2019-10-23 RX ADMIN — POLYETHYLENE GLYCOL 3350 17 GRAM(S): 17 POWDER, FOR SOLUTION ORAL at 08:23

## 2019-10-23 NOTE — PROGRESS NOTE ADULT - SUBJECTIVE AND OBJECTIVE BOX
CHIEF COMPLAINT/INTERVAL HISTORY:  Pt. seen and evaluated for left LE cellulitis.  Pt. is in no distress.  Tolerating IV antibiotic.  Reports cellulitis seems to be improving.      REVIEW OF SYSTEMS:  No fever, CP, SOB, or abdominal pain.     Vital Signs Last 24 Hrs  T(C): 36.6 (23 Oct 2019 07:41), Max: 36.6 (22 Oct 2019 21:12)  T(F): 97.8 (23 Oct 2019 07:41), Max: 97.8 (22 Oct 2019 21:12)  HR: 61 (23 Oct 2019 07:41) (56 - 62)  BP: 163/85 (23 Oct 2019 07:41) (134/62 - 172/77)  BP(mean): --  RR: 20 (23 Oct 2019 07:41) (16 - 20)  SpO2: 98% (23 Oct 2019 07:41) (92% - 99%)    PHYSICAL EXAM:  GENERAL: NAD  HEENT: EOMI, hearing normal, conjunctiva and sclera clear  Chest: CTA bilaterally, no wheezing  CV: S1S2, RRR,   GI: soft, +BS, NT/ND  Musculoskeletal: mild discomfort on palpation of LLE, mild erythema of LLE  Psychiatric: affect nL, mood nL  Skin: warm and dry    LABS:                        11.5   4.69  )-----------( 223      ( 23 Oct 2019 06:56 )             36.6     10-23    144  |  108  |  19  ----------------------------<  113<H>  5.5<H>   |  31  |  0.57    Ca    9.2      23 Oct 2019 06:56  Phos  3.7     10-22  Mg     2.2     10-22      Assessment and Plan:  -Sepsis POA 2/2 left LE cellulitis:  Improving.  MRSA PCR negative.   Blood Cx NGTD.  LE doppler negative for DVT.  Continue Zosyn 3.375gm IV Q8h.  ID f/u  -HTN:  continue Losartan 25mg PO daily and Toprol XL 25mg PO daily  -HLD:  continue Lipitor 20mg PO QHS  -Type 2 DM:  continue constistent carbohydrate diet.   -Hx of Peptic Ulcer:  continue Pepcid 20mg PO daily PRN  -Constipation:  continue Colace, Senna, and miralax PRN  -VTE ppx: Lovenox 40mg SQ daily

## 2019-10-23 NOTE — PROGRESS NOTE ADULT - SUBJECTIVE AND OBJECTIVE BOX
ID Progress note     Name: LEVON SRIVASTAVA  Age: 89y  Gender: Female  MRN: [id number]    Interval History-- events noted, feels slightly betetr, improved erythema on IV Zosyn. sen by Dr. Odom for 2nd ID opinion, concurs with current management . Blood cs 1 out of 2 drom 10/18 pos for [facility] . She is afebrile and has negative MRSA screen   Notes reviewed    Past Medical History--  Cellulitis  DM (diabetes mellitus)  Irritable bowel syndrome  Osteoporosis  HTN (hypertension)  Peptic ulcer  Hyperlipidemia  History of tonsillectomy  History of appendectomy  S/P hip replacement      For details regarding the patient's social history, family history, and other miscellaneous elements, please refer the initial infectious diseases consultation and/or the admitting history and physical examination for this admission.    Allergies--  Allergies    sulfa drugs (Hives)    Intolerances        Medications--  Antibiotics:  piperacillin/tazobactam IVPB.. 3.375 Gram(s) IV Intermittent every 8 hours    Immunologic:    Other:  acetaminophen   Tablet .. PRN  aspirin enteric coated  atorvastatin  cycloSPORINE (RESTASIS) 0.05% Emulsion  docusate sodium PRN  enoxaparin Injectable  famotidine    Tablet PRN  fluticasone propionate 50 MICROgram(s)/spray Nasal Spray PRN  lactobacillus acidophilus  losartan  metoprolol succinate ER  polyethylene glycol 3350 PRN  senna PRN      Review of Systems--  Review of systems unable to be obtained secondary to clinical condition.    Physical Examination--    Vital Signs: T(F): 97.4 (10-23-19 @ 11:46), Max: 97.8 (10-22-19 @ 21:12)  HR: 62 (10-23-19 @ 11:46)  BP: 161/75 (10-23-19 @ 11:46)  RR: 18 (10-23-19 @ 11:46)  SpO2: 98% (10-23-19 @ 11:46)  Wt(kg): --      Laboratory Studies--  CBC                        11.5   4.69  )-----------( 223      ( 23 Oct 2019 06:56 )             36.6       Chemistries  10-23    x   |  x   |  x   ----------------------------<  x   4.4   |  x   |  x     Ca    9.2      23 Oct 2019 06:56  Phos  3.7     10-22  Mg     2.2     10-22    Sedimentation Rate, Erythrocyte (10.18.19 @ 13:49)    Sedimentation Rate, Erythrocyte: 12 mm/hr    C-Reactive Protein, Serum (10.18.19 @ 21:23)    C-Reactive Protein, Serum: 8.64 mg/dL    Procalcitonin, Serum (10.18.19 @ 13:49)    Procalcitonin, Serum: 0.30:     Culture Data    Culture - Urine (collected 18 Oct 2019 20:42)  Source: .Urine Clean Catch (Midstream)  Final Report (19 Oct 2019 15:37):    <10,000 CFU/mL Normal Urogenital Heather    Culture - Blood (collected 18 Oct 2019 16:45)  Source: .Blood Blood-Peripheral  Gram Stain (23 Oct 2019 11:58):    Growth in aerobic bottle: Gram Positive Cocci in Clusters  Preliminary Report (23 Oct 2019 11:59):    Growth in aerobic bottle: Gram Positive Cocci in Clusters    "Due to technical problems, Proteus sp. will Not be reported as part of    the BCID panel until further notice"    ***Blood Panel PCR results on this specimen are available    approximately 3 hours after the Gram stain result.***    Gram stain, PCR, and/or culture results may not always    correspond due to difference in methodologies.    ************************************************************    This PCR assay was performed using [employer].    The following targets are tested for: Enterococcus,    vancomycin resistant enterococci, Listeria monocytogenes,    coagulase negative staphylococci, S. aureus,    methicillin resistant S. aureus, Streptococcus agalactiae    (Group B), S. pneumoniae, S.pyogenes (Group A),    Acinetobacter baumannii, Enterobacter cloacae, E. coli,    Klebsiella oxytoca, K. pneumoniae, Proteus sp.,    Serratia marcescens, Haemophilus influenzae,    Neisseria meningitidis, Pseudomonas aeruginosa, Candida    albicans, C. glabrata, C krusei, C parapsilosis,    C. tropicalis and the KPC resistance gene.  Organism: Blood Culture PCR (23 Oct 2019 13:23)  Organism: Blood Culture PCR (23 Oct 2019 13:23)    Culture - Blood (collected 18 Oct 2019 16:45)  Source: .Blood Blood-Peripheral  Preliminary Report (19 Oct 2019 17:01):    No growth to date.    MRSA/MSSA PCR (10.18.19 @ 23:06)    MRSA PCR Result.: Marlin:    Staph Aureus PCR Result: NotDetedrake    Radiology:  Xray Chest 1 View- PORTABLE-Urgent (10.18.19 @ 10:53) >  INTERPRETATION:  Admission.    AP chest. Prior 10/1/2019.    No change heart mediastinum. No consolidation or effusion. Old right rib   fracture. Severe degenerative change bilateral shoulders.    Impression: No active disease    US Duplex Venous Lower Ext Complete, Bilateral (10.18.19 @ 10:28) >  FINDINGS:    There is normal compressibility of the bilateral common femoral, femoral   and popliteal veins.     Doppler examination shows normal spontaneous and phasicflow.    No calf vein thrombosis is detected.    IMPRESSION:     No evidence of deep venous thrombosis in either lower extremity.      Assessment :     89 year old female with multiple medical problems with hx of probably chronic venous insufficiency left leg with recurrent cellulitis left leg was seen by vascular surgery Dr. Cheung suggested compression stockings, was placed on po abx x 2 weeks with  improvement , then relapsed . No systemic signs of infection  except her left leg is more swollen, erythematous and warm and painful , so she has cellulitis with lymphangitis is . has very poor IV access and renal function . She has a left paraspinal mass , doubt if its causing the left leg swelling . She also has  symptoms of frequent urination     She is now reported to have [facility] bacteremia , could be a skin contaminant as it grew after 5 days of admission . She has negative MRSA screen     Plan:  - will repeat blood cs x2 today, no need to give vancomycin as she is MRSA negative   - will continue wiht Zosyn   - hold dc till final ID of initial positive blood cs   - may need ace bandage compression once cellulitis resolves if ok with vascular surgery     Continue with present regime .  Appropriate use of antibiotics and adverse effects reviewed.    I have discussed the above plan of care with patient and her 2 daughters in detail. They expressed understanding of the treatment plan . Risks, benefits and alternatives discussed in detail. I have asked if they have any questions or concerns and appropriately addressed them to the best of my ability .      > 35 minutes spent in direct patient care reviewing  the notes, lab data/ imaging , discussion with multidisciplinary team. All questions were addressed and answered to the best of my capacity .    Thank you for allowing me to participate in the care of your patient .        Clifford Nesbitt MD  [phone]

## 2019-10-24 LAB
ANION GAP SERPL CALC-SCNC: 4 MMOL/L — LOW (ref 5–17)
BUN SERPL-MCNC: 21 MG/DL — SIGNIFICANT CHANGE UP (ref 7–23)
CALCIUM SERPL-MCNC: 9.2 MG/DL — SIGNIFICANT CHANGE UP (ref 8.5–10.1)
CHLORIDE SERPL-SCNC: 106 MMOL/L — SIGNIFICANT CHANGE UP (ref 96–108)
CO2 SERPL-SCNC: 33 MMOL/L — HIGH (ref 22–31)
CREAT SERPL-MCNC: 0.55 MG/DL — SIGNIFICANT CHANGE UP (ref 0.5–1.3)
GLUCOSE SERPL-MCNC: 122 MG/DL — HIGH (ref 70–99)
HCT VFR BLD CALC: 38.2 % — SIGNIFICANT CHANGE UP (ref 34.5–45)
HGB BLD-MCNC: 11.9 G/DL — SIGNIFICANT CHANGE UP (ref 11.5–15.5)
MAGNESIUM SERPL-MCNC: 2.4 MG/DL — SIGNIFICANT CHANGE UP (ref 1.6–2.6)
MCHC RBC-ENTMCNC: 27.9 PG — SIGNIFICANT CHANGE UP (ref 27–34)
MCHC RBC-ENTMCNC: 31.2 GM/DL — LOW (ref 32–36)
MCV RBC AUTO: 89.5 FL — SIGNIFICANT CHANGE UP (ref 80–100)
NRBC # BLD: 0 /100 WBCS — SIGNIFICANT CHANGE UP (ref 0–0)
PLATELET # BLD AUTO: 248 K/UL — SIGNIFICANT CHANGE UP (ref 150–400)
POTASSIUM SERPL-MCNC: 5 MMOL/L — SIGNIFICANT CHANGE UP (ref 3.5–5.3)
POTASSIUM SERPL-SCNC: 5 MMOL/L — SIGNIFICANT CHANGE UP (ref 3.5–5.3)
RBC # BLD: 4.27 M/UL — SIGNIFICANT CHANGE UP (ref 3.8–5.2)
RBC # FLD: 13.4 % — SIGNIFICANT CHANGE UP (ref 10.3–14.5)
SODIUM SERPL-SCNC: 143 MMOL/L — SIGNIFICANT CHANGE UP (ref 135–145)
WBC # BLD: 6.14 K/UL — SIGNIFICANT CHANGE UP (ref 3.8–10.5)
WBC # FLD AUTO: 6.14 K/UL — SIGNIFICANT CHANGE UP (ref 3.8–10.5)

## 2019-10-24 PROCEDURE — 99232 SBSQ HOSP IP/OBS MODERATE 35: CPT

## 2019-10-24 RX ADMIN — CYCLOSPORINE 1 DROP(S): 0.5 EMULSION OPHTHALMIC at 06:05

## 2019-10-24 RX ADMIN — PIPERACILLIN AND TAZOBACTAM 25 GRAM(S): 4; .5 INJECTION, POWDER, LYOPHILIZED, FOR SOLUTION INTRAVENOUS at 00:15

## 2019-10-24 RX ADMIN — CYCLOSPORINE 1 DROP(S): 0.5 EMULSION OPHTHALMIC at 20:33

## 2019-10-24 RX ADMIN — POLYETHYLENE GLYCOL 3350 17 GRAM(S): 17 POWDER, FOR SOLUTION ORAL at 08:32

## 2019-10-24 RX ADMIN — Medication 81 MILLIGRAM(S): at 11:36

## 2019-10-24 RX ADMIN — PIPERACILLIN AND TAZOBACTAM 25 GRAM(S): 4; .5 INJECTION, POWDER, LYOPHILIZED, FOR SOLUTION INTRAVENOUS at 16:55

## 2019-10-24 RX ADMIN — Medication 1 TABLET(S): at 13:56

## 2019-10-24 RX ADMIN — LOSARTAN POTASSIUM 25 MILLIGRAM(S): 100 TABLET, FILM COATED ORAL at 06:05

## 2019-10-24 RX ADMIN — Medication 1 TABLET(S): at 06:05

## 2019-10-24 RX ADMIN — ENOXAPARIN SODIUM 40 MILLIGRAM(S): 100 INJECTION SUBCUTANEOUS at 11:36

## 2019-10-24 RX ADMIN — ATORVASTATIN CALCIUM 20 MILLIGRAM(S): 80 TABLET, FILM COATED ORAL at 21:51

## 2019-10-24 RX ADMIN — Medication 1 TABLET(S): at 21:51

## 2019-10-24 RX ADMIN — PIPERACILLIN AND TAZOBACTAM 25 GRAM(S): 4; .5 INJECTION, POWDER, LYOPHILIZED, FOR SOLUTION INTRAVENOUS at 08:34

## 2019-10-24 NOTE — PROGRESS NOTE ADULT - SUBJECTIVE AND OBJECTIVE BOX
CHIEF COMPLAINT/INTERVAL HISTORY:  Pt. seen and evaluated for left LE cellulitis.  Pt. is in no distress.  Reports that LLE is improving.  Tolerating IV antibiotic.      REVIEW OF SYSTEMS:  No fever, CP, SOB, or abdominal pain.     Vital Signs Last 24 Hrs  T(C): 36.5 (24 Oct 2019 05:28), Max: 36.6 (23 Oct 2019 23:17)  T(F): 97.7 (24 Oct 2019 05:28), Max: 97.9 (23 Oct 2019 23:17)  HR: 57 (24 Oct 2019 05:28) (57 - 70)  BP: 147/76 (24 Oct 2019 05:28) (120/75 - 161/75)  BP(mean): --  RR: 17 (24 Oct 2019 05:28) (17 - 18)  SpO2: 99% (24 Oct 2019 05:28) (92% - 99%)    PHYSICAL EXAM:  GENERAL: NAD  HEENT: EOMI, hearing normal, conjunctiva and sclera clear  Chest: CTA bilaterally, no wheezing  CV: S1S2, RRR,   GI: soft, +BS, NT/ND  Musculoskeletal: decreasing edema and erythema of the LLE  Psychiatric: affect nL, mood nL  Skin: warm and dry, decreasing erythema of LLE    LABS:                        11.9   6.14  )-----------( 248      ( 24 Oct 2019 06:38 )             38.2     10-24    143  |  106  |  21  ----------------------------<  122<H>  5.0   |  33<H>  |  0.55    Ca    9.2      24 Oct 2019 06:38  Mg     2.4     10-24      Assessment and Plan:  -Sepsis POA 2/2 left LE cellulitis:  Improving.  MRSA PCR negative.  1/4 blood cx bottle with gram positive cocci in clusters.  Check final blood cx results and repeat blood cx.  LE doppler negative for DVT.  Continue Zosyn 3.375gm IV Q8h.  ID f/u  -HTN:  continue Losartan 25mg PO daily and Toprol XL 25mg PO daily  -HLD:  continue Lipitor 20mg PO QHS  -Type 2 DM:  continue constistent carbohydrate diet.   -Hx of Peptic Ulcer:  continue Pepcid 20mg PO daily PRN  -Constipation:  continue Colace, Senna, and miralax PRN  -VTE ppx: Lovenox 40mg SQ daily CHIEF COMPLAINT/INTERVAL HISTORY:  Pt. seen and evaluated for left LE cellulitis.  Pt. is in no distress.  Reports that LLE is improving.  Tolerating IV antibiotic.      REVIEW OF SYSTEMS:  No fever, CP, SOB, or abdominal pain.     Vital Signs Last 24 Hrs  T(C): 36.5 (24 Oct 2019 05:28), Max: 36.6 (23 Oct 2019 23:17)  T(F): 97.7 (24 Oct 2019 05:28), Max: 97.9 (23 Oct 2019 23:17)  HR: 57 (24 Oct 2019 05:28) (57 - 70)  BP: 147/76 (24 Oct 2019 05:28) (120/75 - 161/75)  BP(mean): --  RR: 17 (24 Oct 2019 05:28) (17 - 18)  SpO2: 99% (24 Oct 2019 05:28) (92% - 99%)    PHYSICAL EXAM:  GENERAL: NAD  HEENT: EOMI, hearing normal, conjunctiva and sclera clear  Chest: CTA bilaterally, no wheezing  CV: S1S2, RRR,   GI: soft, +BS, NT/ND  Musculoskeletal: decreasing edema and erythema of the LLE  Psychiatric: affect nL, mood nL  Skin: warm and dry, decreasing erythema of LLE    LABS:                        11.9   6.14  )-----------( 248      ( 24 Oct 2019 06:38 )             38.2     10-24    143  |  106  |  21  ----------------------------<  122<H>  5.0   |  33<H>  |  0.55    Ca    9.2      24 Oct 2019 06:38  Mg     2.4     10-24      Assessment and Plan:  -Sepsis POA 2/2 left LE cellulitis:  Improving.  MRSA PCR negative.  1/4 blood cx bottle with gram positive cocci in clusters.  Check final blood cx results and repeat blood cx.  LE doppler negative for DVT.  Continue Zosyn 3.375gm IV Q8h.  ID f/u  -Confusion on presentation:  2/2 metabolic encephalopathy from sepsis due to cellulitis.  Resolved.   -HTN:  continue Losartan 25mg PO daily and Toprol XL 25mg PO daily  -HLD:  continue Lipitor 20mg PO QHS  -Type 2 DM:  continue constistent carbohydrate diet.   -Hx of Peptic Ulcer:  continue Pepcid 20mg PO daily PRN  -Constipation:  continue Colace, Senna, and miralax PRN  -VTE ppx: Lovenox 40mg SQ daily

## 2019-10-24 NOTE — PROGRESS NOTE ADULT - SUBJECTIVE AND OBJECTIVE BOX
ID Progress note    Name: LEVON SRIVASTAVA  Age: 89y  Gender: Female  MRN: [id number]    Interval History-- events noted, improved left leg erythema. no new complaints . Repeat blood cs are negative so far   Notes reviewed    Past Medical History--  Cellulitis  DM (diabetes mellitus)  Irritable bowel syndrome  Osteoporosis  HTN (hypertension)  Peptic ulcer  Hyperlipidemia  History of tonsillectomy  History of appendectomy  S/P hip replacement      For details regarding the patient's social history, family history, and other miscellaneous elements, please refer the initial infectious diseases consultation and/or the admitting history and physical examination for this admission.    Allergies--  Allergies    sulfa drugs (Hives)    Intolerances        Medications--  Antibiotics:  piperacillin/tazobactam IVPB.. 3.375 Gram(s) IV Intermittent every 8 hours    Immunologic:    Other:  acetaminophen   Tablet .. PRN  aspirin enteric coated  atorvastatin  cycloSPORINE (RESTASIS) 0.05% Emulsion  docusate sodium PRN  enoxaparin Injectable  famotidine    Tablet PRN  fluticasone propionate 50 MICROgram(s)/spray Nasal Spray PRN  lactobacillus acidophilus  losartan  metoprolol succinate ER  polyethylene glycol 3350 PRN  senna PRN      Review of Systems--  Review of systems unable to be obtained secondary to clinical condition.    Physical Examination--    Vital Signs: T(F): 97.6 (10-24-19 @ 16:04), Max: 98.1 (10-24-19 @ 08:11)  HR: 64 (10-24-19 @ 16:04)  BP: 136/75 (10-24-19 @ 16:04)  RR: 18 (10-24-19 @ 16:04)  SpO2: 100% (10-24-19 @ 16:04)  Wt(kg): --  General: Nontoxic-appearing Female in no acute distress.  HEENT: AT/NC. PERRL. EOMI. Anicteric. Conjunctiva pink and moist. Oropharynx clear. Dentition fair.  Neck: Not rigid. No sense of mass.  Nodes: None palpable.  Lungs: Clear bilaterally without rales, wheezing or rhonchi  Heart: Regular rate and rhythm. No Murmur. No rub. No gallop. No palpable thrill.  Abdomen: Bowel sounds present and normoactive. Soft. Nondistended. Nontender. No sense of mass. No organomegaly.  Back: No spinal tenderness. No costovertebral angle tenderness.   Extremities: No cyanosis or clubbing. No edema.   Skin: Warm. Dry. Good turgor. No rash. No vasculitic stigmata.  Psychiatric: Appropriate affect and mood for situation.         Laboratory Studies--  CBC                        11.9   6.14  )-----------( 248      ( 24 Oct 2019 06:38 )             38.2       Chemistries  10-24    143  |  106  |  21  ----------------------------<  122<H>  5.0   |  33<H>  |  0.55    Ca    9.2      24 Oct 2019 06:38  Mg     2.4     10-24        Culture Data    Culture - Urine (collected 18 Oct 2019 20:42)  Source: .Urine Clean Catch (Midstream)  Final Report (19 Oct 2019 15:37):    <10,000 CFU/mL Normal Urogenital Heather    Culture - Blood (collected 18 Oct 2019 16:45)  Source: .Blood Blood-Peripheral  Gram Stain (23 Oct 2019 11:58):    Growth in aerobic bottle: Gram Positive Cocci in Clusters  Preliminary Report (23 Oct 2019 11:59):    Growth in aerobic bottle: Gram Positive Cocci in Clusters    "Due to technical problems, Proteus sp. will Not be reported as part of    the BCID panel until further notice"    ***Blood Panel PCR results on this specimen are available    approximately 3 hours after the Gram stain result.***    Gram stain, PCR, and/or culture results may not always    correspond due to difference in methodologies.    ************************************************************    This PCR assay was performed using [employer].    The following targets are tested for: Enterococcus,    vancomycin resistant enterococci, Listeria monocytogenes,    coagulase negative staphylococci, S. aureus,    methicillin resistant S. aureus, Streptococcus agalactiae    (Group B), S. pneumoniae, S.pyogenes (Group A),    Acinetobacter baumannii, Enterobacter cloacae, E. coli,    Klebsiella oxytoca, K. pneumoniae, Proteus sp.,    Serratia marcescens, Haemophilus influenzae,    Neisseria meningitidis, Pseudomonas aeruginosa, Candida    albicans, C. glabrata, C krusei, C parapsilosis,    C. tropicalis and the KPC resistance gene.  Organism: Blood Culture PCR (23 Oct 2019 13:23)  Organism: Blood Culture PCR (23 Oct 2019 13:23)    Culture - Blood (collected 18 Oct 2019 16:45)  Source: .Blood Blood-Peripheral  Final Report (23 Oct 2019 17:01):    No growth at 5 days.      Radiology:      Xray Chest 1 View- PORTABLE-Urgent (10.18.19 @ 10:53) >  INTERPRETATION:  Admission.    AP chest. Prior 10/1/2019.    No change heart mediastinum. No consolidation or effusion. Old right rib   fracture. Severe degenerative change bilateral shoulders.    Impression: No active disease    US Duplex Venous Lower Ext Complete, Bilateral (10.18.19 @ 10:28) >  FINDINGS:    There is normal compressibility of the bilateral common femoral, femoral   and popliteal veins.     Doppler examination shows normal spontaneous and phasicflow.    No calf vein thrombosis is detected.    IMPRESSION:     No evidence of deep venous thrombosis in either lower extremity.      Assessment :     89 year old female with multiple medical problems with hx of probably chronic venous insufficiency left leg with recurrent cellulitis left leg was seen by vascular surgery Dr. Cheung suggested compression stockings, was placed on po abx x 2 weeks with  improvement , then relapsed . No systemic signs of infection  except her left leg is more swollen, erythematous and warm and painful , so she has cellulitis with lymphangitis is . has very poor IV access and renal function . She has a left paraspinal mass , doubt if its causing the left leg swelling . She also has  symptoms of frequent urination     She is now reported to have [facility] bacteremia , await final ID of it , most likely could be a skin contaminant as it grew after 5 days of admission . She has negative MRSA screen     Plan:  - will repeat blood cs x 2 today, no need to give vancomycin as she is MRSA negative   - will continue with Zosyn , change  to po Augmentin 500 mg bid x 3 more days if repeat blood cs are negative and we have ID of the positive Blood cs   - she is advised not to wear the compression stocking till she sees Dr. Cheung   - may need ace bandage compression once cellulitis resolves if ok with vascular surgery     Continue with present regime .  Appropriate use of antibiotics and adverse effects reviewed.    I have discussed the above plan of care with patient/family in detail. They expressed understanding of the treatment plan . Risks, benefits and alternatives discussed in detail. I have asked if they have any questions or concerns and appropriately addressed them to the best of my ability .      > 35 minutes spent in direct patient care reviewing  the notes, lab data/ imaging , discussion with multidisciplinary team. All questions were addressed and answered to the best of my capacity .    Thank you for allowing me to participate in the care of your patient .        Clifford Nesbitt MD  [phone]

## 2019-10-25 ENCOUNTER — TRANSCRIPTION ENCOUNTER (OUTPATIENT)
Age: 84
End: 2019-10-25

## 2019-10-25 VITALS — SYSTOLIC BLOOD PRESSURE: 144 MMHG | DIASTOLIC BLOOD PRESSURE: 70 MMHG

## 2019-10-25 LAB
ANION GAP SERPL CALC-SCNC: 4 MMOL/L — LOW (ref 5–17)
BUN SERPL-MCNC: 19 MG/DL — SIGNIFICANT CHANGE UP (ref 7–23)
CALCIUM SERPL-MCNC: 9 MG/DL — SIGNIFICANT CHANGE UP (ref 8.5–10.1)
CHLORIDE SERPL-SCNC: 106 MMOL/L — SIGNIFICANT CHANGE UP (ref 96–108)
CO2 SERPL-SCNC: 32 MMOL/L — HIGH (ref 22–31)
CREAT SERPL-MCNC: 0.56 MG/DL — SIGNIFICANT CHANGE UP (ref 0.5–1.3)
GLUCOSE SERPL-MCNC: 126 MG/DL — HIGH (ref 70–99)
HCT VFR BLD CALC: 38.2 % — SIGNIFICANT CHANGE UP (ref 34.5–45)
HGB BLD-MCNC: 12.1 G/DL — SIGNIFICANT CHANGE UP (ref 11.5–15.5)
MCHC RBC-ENTMCNC: 28.6 PG — SIGNIFICANT CHANGE UP (ref 27–34)
MCHC RBC-ENTMCNC: 31.7 GM/DL — LOW (ref 32–36)
MCV RBC AUTO: 90.3 FL — SIGNIFICANT CHANGE UP (ref 80–100)
NRBC # BLD: 0 /100 WBCS — SIGNIFICANT CHANGE UP (ref 0–0)
PLATELET # BLD AUTO: 259 K/UL — SIGNIFICANT CHANGE UP (ref 150–400)
POTASSIUM SERPL-MCNC: 4.7 MMOL/L — SIGNIFICANT CHANGE UP (ref 3.5–5.3)
POTASSIUM SERPL-SCNC: 4.7 MMOL/L — SIGNIFICANT CHANGE UP (ref 3.5–5.3)
RBC # BLD: 4.23 M/UL — SIGNIFICANT CHANGE UP (ref 3.8–5.2)
RBC # FLD: 13.3 % — SIGNIFICANT CHANGE UP (ref 10.3–14.5)
SODIUM SERPL-SCNC: 142 MMOL/L — SIGNIFICANT CHANGE UP (ref 135–145)
WBC # BLD: 7.19 K/UL — SIGNIFICANT CHANGE UP (ref 3.8–10.5)
WBC # FLD AUTO: 7.19 K/UL — SIGNIFICANT CHANGE UP (ref 3.8–10.5)

## 2019-10-25 PROCEDURE — 99239 HOSP IP/OBS DSCHRG MGMT >30: CPT

## 2019-10-25 RX ORDER — LOSARTAN POTASSIUM 100 MG/1
1 TABLET, FILM COATED ORAL
Qty: 0 | Refills: 0 | DISCHARGE
Start: 2019-10-25

## 2019-10-25 RX ORDER — AMLODIPINE BESYLATE 2.5 MG/1
1 TABLET ORAL
Qty: 0 | Refills: 0 | DISCHARGE

## 2019-10-25 RX ORDER — POLYETHYLENE GLYCOL 3350 17 G/17G
0 POWDER, FOR SOLUTION ORAL
Qty: 0 | Refills: 0 | DISCHARGE

## 2019-10-25 RX ORDER — LOSARTAN POTASSIUM 100 MG/1
1 TABLET, FILM COATED ORAL
Qty: 30 | Refills: 0
Start: 2019-10-25

## 2019-10-25 RX ADMIN — Medication 650 MILLIGRAM(S): at 05:59

## 2019-10-25 RX ADMIN — LOSARTAN POTASSIUM 25 MILLIGRAM(S): 100 TABLET, FILM COATED ORAL at 05:56

## 2019-10-25 RX ADMIN — Medication 81 MILLIGRAM(S): at 13:35

## 2019-10-25 RX ADMIN — Medication 1 TABLET(S): at 05:56

## 2019-10-25 RX ADMIN — Medication 25 MILLIGRAM(S): at 05:56

## 2019-10-25 RX ADMIN — CYCLOSPORINE 1 DROP(S): 0.5 EMULSION OPHTHALMIC at 09:23

## 2019-10-25 RX ADMIN — Medication 1 TABLET(S): at 13:36

## 2019-10-25 RX ADMIN — PIPERACILLIN AND TAZOBACTAM 25 GRAM(S): 4; .5 INJECTION, POWDER, LYOPHILIZED, FOR SOLUTION INTRAVENOUS at 00:18

## 2019-10-25 RX ADMIN — PIPERACILLIN AND TAZOBACTAM 25 GRAM(S): 4; .5 INJECTION, POWDER, LYOPHILIZED, FOR SOLUTION INTRAVENOUS at 09:27

## 2019-10-25 NOTE — PROGRESS NOTE ADULT - SUBJECTIVE AND OBJECTIVE BOX
ID Progress note     Name: LEVON SRIVASTAVA  Age: 89y  Gender: Female  MRN: [id number]    Interval History-- Event snmoted, feels well, complaints of diarrhea from abx. Improved erythema left leg . Repeat blood cs negative, still no final ID on single positive BC from 10/18, called Micro lab, its a slow grower   Notes reviewed    Past Medical History--  Cellulitis  DM (diabetes mellitus)  Irritable bowel syndrome  Osteoporosis  HTN (hypertension)  Peptic ulcer  Hyperlipidemia  History of tonsillectomy  History of appendectomy  S/P hip replacement      For details regarding the patient's social history, family history, and other miscellaneous elements, please refer the initial infectious diseases consultation and/or the admitting history and physical examination for this admission.    Allergies--  Allergies    sulfa drugs (Hives)    Intolerances        Medications--  Antibiotics:  piperacillin/tazobactam IVPB.. 3.375 Gram(s) IV Intermittent every 8 hours    Immunologic:    Other:  acetaminophen   Tablet .. PRN  aspirin enteric coated  atorvastatin  cycloSPORINE (RESTASIS) 0.05% Emulsion  enoxaparin Injectable  famotidine    Tablet PRN  fluticasone propionate 50 MICROgram(s)/spray Nasal Spray PRN  lactobacillus acidophilus  losartan  metoprolol succinate ER      Review of Systems--  Review of systems unable to be obtained secondary to clinical condition.    Physical Examination--    Vital Signs: T(F): 97.3 (10-25-19 @ 07:20), Max: 97.7 (10-25-19 @ 00:00)  HR: 62 (10-25-19 @ 07:20)  BP: 144/70 (10-25-19 @ 08:42)  RR: 18 (10-25-19 @ 07:20)  SpO2: 100% (10-25-19 @ 07:20)  Wt(kg): --  General: Nontoxic-appearing Female in no acute distress.  HEENT: AT/NC. PERRL. EOMI. Anicteric. Conjunctiva pink and moist. Oropharynx clear. Dentition fair.  Neck: Not rigid. No sense of mass.  Nodes: None palpable.  Lungs: Clear bilaterally without rales, wheezing or rhonchi  Heart: Regular rate and rhythm. No Murmur. No rub. No gallop. No palpable thrill.  Abdomen: Bowel sounds present and normoactive. Soft. Nondistended. Nontender. No sense of mass. No organomegaly.  Back: No spinal tenderness. No costovertebral angle tenderness.   Extremities: No cyanosis or clubbing. left leg- improved erythema, varicose veins    Skin: Warm. Dry. Good turgor. No rash. No vasculitic stigmata.  Psychiatric: Appropriate affect and mood for situation.         Laboratory Studies--  CBC                        12.1   7.19  )-----------( 259      ( 25 Oct 2019 07:50 )             38.2       Chemistries  10-25    142  |  106  |  19  ----------------------------<  126<H>  4.7   |  32<H>  |  0.56    Ca    9.0      25 Oct 2019 07:50  Mg     2.4     10-24        Culture Data    Culture - Blood (collected 23 Oct 2019 20:45)  Source: .Blood Blood-Peripheral  Preliminary Report (24 Oct 2019 21:01):    No growth to date.    Culture - Blood (collected 23 Oct 2019 20:45)  Source: .Blood Blood-Peripheral  Preliminary Report (24 Oct 2019 21:01):    No growth to date.    Culture - Urine (collected 18 Oct 2019 20:42)  Source: .Urine Clean Catch (Midstream)  Final Report (19 Oct 2019 15:37):    <10,000 CFU/mL Normal Urogenital Heather    Culture - Blood (collected 18 Oct 2019 16:45)  Source: .Blood Blood-Peripheral  Gram Stain (23 Oct 2019 11:58):    Growth in aerobic bottle: Gram Positive Cocci in Clusters  Preliminary Report (23 Oct 2019 11:59):    Growth in aerobic bottle: Gram Positive Cocci in Clusters    "Due to technical problems, Proteus sp. will Not be reported as part of    the BCID panel until further notice"    ***Blood Panel PCR results on this specimen are available    approximately 3 hours after the Gram stain result.***    Gram stain, PCR, and/or culture results may not always    correspond due to difference in methodologies.    ************************************************************    This PCR assay was performed using [employer].    The following targets are tested for: Enterococcus,    vancomycin resistant enterococci, Listeria monocytogenes,    coagulase negative staphylococci, S. aureus,    methicillin resistant S. aureus, Streptococcus agalactiae    (Group B), S. pneumoniae, S.pyogenes (Group A),    Acinetobacter baumannii, Enterobacter cloacae, E. coli,    Klebsiella oxytoca, K. pneumoniae, Proteus sp.,    Serratia marcescens, Haemophilus influenzae,    Neisseria meningitidis, Pseudomonas aeruginosa, Candida    albicans, C. glabrata, C krusei, C parapsilosis,    C. tropicalis and the KPC resistance gene.  Organism: Blood Culture PCR (23 Oct 2019 13:23)  Organism: Blood Culture PCR (23 Oct 2019 13:23)    Culture - Blood (collected 18 Oct 2019 16:45)  Source: .Blood Blood-Peripheral  Final Report (23 Oct 2019 17:01):    No growth at 5 days.    Radiology:  US Duplex Venous Lower Ext Complete, Bilateral (10.18.19 @ 10:28) >  FINDINGS:    There is normal compressibility of the bilateral common femoral, femoral   and popliteal veins.     Doppler examination shows normal spontaneous and phasicflow.    No calf vein thrombosis is detected.    IMPRESSION:     No evidence of deep venous thrombosis in either lower extremity.      Assessment :     89 year old female with multiple medical problems with hx of probably chronic venous insufficiency left leg with recurrent cellulitis left leg was seen by vascular surgery Dr. Cheung suggested compression stockings, was placed on po abx x 2 weeks with  improvement , then relapsed . No systemic signs of infection  except her left leg is more swollen, erythematous and warm and painful , so she has cellulitis with lymphangitis is . has very poor IV access and renal function . She has a left paraspinal mass , doubt if its causing the left leg swelling . She also has  symptoms of frequent urination     She is now reported to have [facility] bacteremia , await final ID of it , most likely could be a skin contaminant as it grew after 5 days of admission . She has negative MRSA screen     Plan:  - will dc Zosyn , no need for any more abx as she already got over 7 days of abx and is having diarrhea    - she is advised not to wear the compression stocking till she sees Dr. Cheung   - can be dc home today   - follow up with Dr. Lei Portillo ( [facility]) as out patient for ID in 1 week     Continue with present regime .  Appropriate use of antibiotics and adverse effects reviewed.    I have discussed the above plan of care with patient/family in detail. They expressed understanding of the treatment plan . Risks, benefits and alternatives discussed in detail. I have asked if they have any questions or concerns and appropriately addressed them to the best of my ability .      > 35 minutes spent in direct patient care reviewing  the notes, lab data/ imaging , discussion with multidisciplinary team. All questions were addressed and answered to the best of my capacity .    Thank you for allowing me to participate in the care of your patient .        Clifford Nesbitt MD  [phone]

## 2019-10-25 NOTE — DISCHARGE NOTE NURSING/CASE MANAGEMENT/SOCIAL WORK - PATIENT PORTAL LINK FT
You can access the FollowMyHealth Patient Portal offered by [facility] by registering at the following website: [email or web address]. By joining [employer]’s FollowMyHealth portal, you will also be able to view your health information using other applications (apps) compatible with our system.

## 2019-10-25 NOTE — PROGRESS NOTE ADULT - PROVIDER SPECIALTY LIST ADULT
Hospitalist
Infectious Disease
Hospitalist
Hospitalist

## 2019-10-25 NOTE — PROGRESS NOTE ADULT - REASON FOR ADMISSION
L LE Cellulitis

## 2019-10-25 NOTE — CHART NOTE - NSCHARTNOTEFT_GEN_A_CORE
Assessment: patient and family In with discharge RN. for discharge today. seen for 3 day malnutrition follow up. tolerating diet loose stools overnight bowel regiment discontinued.     Factors impacting intake: [ ] none [ ] nausea  [ ] vomiting [ ] diarrhea [ ] constipation  [ ]chewing problems [ ] swallowing issues  [ ] other:     Diet Presciption: Diet, DASH/TLC:   Sodium & Cholesterol Restricted  Consistent Carbohydrate {Evening Snack} (10-21-19 @ 15:56)    Intake: 80%    Current Weight: Weight 10/23 wt 100.7#      Pertinent Medications: MEDICATIONS  (STANDING):  aspirin enteric coated 81 milliGRAM(s) Oral daily  atorvastatin 20 milliGRAM(s) Oral at bedtime  cycloSPORINE (RESTASIS) 0.05% Emulsion 1 Drop(s) Both EYES two times a day  enoxaparin Injectable 40 milliGRAM(s) SubCutaneous daily  lactobacillus acidophilus 1 Tablet(s) Oral three times a day  losartan 25 milliGRAM(s) Oral daily  metoprolol succinate ER 25 milliGRAM(s) Oral daily  piperacillin/tazobactam IVPB.. 3.375 Gram(s) IV Intermittent every 8 hours    MEDICATIONS  (PRN):  acetaminophen   Tablet .. 650 milliGRAM(s) Oral every 6 hours PRN Temp greater or equal to 38C (100.4F), Mild Pain (1 - 3)  famotidine    Tablet 20 milliGRAM(s) Oral daily PRN gi upset  fluticasone propionate 50 MICROgram(s)/spray Nasal Spray 1 Spray(s) Both Nostrils two times a day PRN Nasal congestion, rhinorrhea    Pertinent Labs: 10-25 Na142 mmol/L Glu 126 mg/dL<H> K+ 4.7 mmol/L Cr  0.56 mg/dL BUN 19 mg/dL 10-22 Phos 3.7 mg/dL 10-19 [id number] 7.1 %<H>    Skin: cellulitis left LLE    Estimated Needs:   [x ] no change since previous assessment  [ ] recalculated:     Previous Nutrition Diagnosis:   [ ] Inadequate Energy Intake [ ]Inadequate Oral Intake [ ] Excessive Energy Intake   [ ] Underweight [ ] Increased Nutrient Needs [ ] Overweight/Obesity   [ ] Altered GI Function [ ] Unintended Weight Loss [ ] Food & Nutrition Related Knowledge Deficit [x ] Malnutrition  chronic moderate malnutrition     Nutrition Diagnosis is [ x] ongoing  [ ] resolved [ ] not applicable     New Nutrition Diagnosis: [x ] not applicable       Interventions:   Recommend  [ ] Change Diet To:  [ ] Nutrition Supplement  [ ] Nutrition Support  [ x] Other: continue diet as ordered     Monitoring and Evaluation:   [x ] PO intake [ x ] Tolerance to diet prescription [ x ] weights [ x ] labs[ x ] follow up per protocol  [ ] other:

## 2019-10-25 NOTE — PHYSICAL THERAPY INITIAL EVALUATION ADULT - ADDITIONAL COMMENTS
Lives alone in house with 1 small step over threshold of front door. Has HHA 5 days/8 hours. Does not do stairs inside. Stays first floor. Owns a rollator.

## 2019-10-25 NOTE — PROGRESS NOTE ADULT - SUBJECTIVE AND OBJECTIVE BOX
CHIEF COMPLAINT/INTERVAL HISTORY:  Pt. seen and evaluated for LLE cellulitis.  Pt. is in no distress.  Tolerating IV antibiotic.  Reports cellulitis is improved.  Pt. reported having some loose stool over the night.      REVIEW OF SYSTEMS:  No fever, CP, SOB, or abdominal pain.     Vital Signs Last 24 Hrs  T(C): 36.3 (25 Oct 2019 07:20), Max: 36.5 (25 Oct 2019 00:00)  T(F): 97.3 (25 Oct 2019 07:20), Max: 97.7 (25 Oct 2019 00:00)  HR: 62 (25 Oct 2019 07:20) (60 - 65)  BP: 168/73 (25 Oct 2019 07:20) (136/75 - 168/73)  BP(mean): --  RR: 18 (25 Oct 2019 07:20) (18 - 18)  SpO2: 100% (25 Oct 2019 07:20) (96% - 100%)    PHYSICAL EXAM:  GENERAL: NAD  HEENT: EOMI, hearing normal, conjunctiva and sclera clear  Chest: CTA bilaterally, no wheezing  CV: S1S2, RRR,   GI: soft, +BS, NT/ND  Musculoskeletal: decreased edema, warmth, and erythema of LLE  Psychiatric: affect nL, mood nL  Skin: warm and dry, decreased erythema of LLE    LABS:                        12.1   7.19  )-----------( 259      ( 25 Oct 2019 07:50 )             38.2     10-25    142  |  106  |  19  ----------------------------<  126<H>  4.7   |  32<H>  |  0.56    Ca    9.0      25 Oct 2019 07:50  Mg     2.4     10-24      Assessment and Plan:  -Sepsis POA 2/2 left LE cellulitis:  Improving.  MRSA PCR negative.  1/4 blood cx bottle with gram positive cocci in clusters.  Awaiting final speciation of  blood cx results from 10/18.  Repeat blood cx NGTD.  LE doppler negative for DVT.  Continue Zosyn 3.375gm IV Q8h.  ID f/u  -Confusion on presentation:  2/2 metabolic encephalopathy from sepsis due to cellulitis.  Resolved.   -HTN:  continue Losartan 25mg PO daily and Toprol XL 25mg PO daily  -HLD:  continue Lipitor 20mg PO QHS  -Type 2 DM:  continue constistent carbohydrate diet.   -Hx of Peptic Ulcer:  continue Pepcid 20mg PO daily PRN  -Constipation:  Will discontinue bowel regimen as patient reports having loose stool over the night.   -VTE ppx: Lovenox 40mg SQ daily

## 2019-11-12 PROCEDURE — 84132 ASSAY OF SERUM POTASSIUM: CPT

## 2019-11-12 PROCEDURE — 97162 PT EVAL MOD COMPLEX 30 MIN: CPT

## 2019-11-12 PROCEDURE — 99285 EMERGENCY DEPT VISIT HI MDM: CPT | Mod: 25

## 2019-11-12 PROCEDURE — 83735 ASSAY OF MAGNESIUM: CPT

## 2019-11-12 PROCEDURE — 87150 DNA/RNA AMPLIFIED PROBE: CPT

## 2019-11-12 PROCEDURE — 85730 THROMBOPLASTIN TIME PARTIAL: CPT

## 2019-11-12 PROCEDURE — 85610 PROTHROMBIN TIME: CPT

## 2019-11-12 PROCEDURE — 84145 PROCALCITONIN (PCT): CPT

## 2019-11-12 PROCEDURE — 87040 BLOOD CULTURE FOR BACTERIA: CPT

## 2019-11-12 PROCEDURE — 86140 C-REACTIVE PROTEIN: CPT

## 2019-11-12 PROCEDURE — 93306 TTE W/DOPPLER COMPLETE: CPT

## 2019-11-12 PROCEDURE — 71045 X-RAY EXAM CHEST 1 VIEW: CPT

## 2019-11-12 PROCEDURE — 87086 URINE CULTURE/COLONY COUNT: CPT

## 2019-11-12 PROCEDURE — 85027 COMPLETE CBC AUTOMATED: CPT

## 2019-11-12 PROCEDURE — 87641 MR-STAPH DNA AMP PROBE: CPT

## 2019-11-12 PROCEDURE — 36415 COLL VENOUS BLD VENIPUNCTURE: CPT

## 2019-11-12 PROCEDURE — 93970 EXTREMITY STUDY: CPT

## 2019-11-12 PROCEDURE — 87640 STAPH A DNA AMP PROBE: CPT

## 2019-11-12 PROCEDURE — 81003 URINALYSIS AUTO W/O SCOPE: CPT

## 2019-11-12 PROCEDURE — 80053 COMPREHEN METABOLIC PANEL: CPT

## 2019-11-12 PROCEDURE — 80048 BASIC METABOLIC PNL TOTAL CA: CPT

## 2019-11-12 PROCEDURE — 84100 ASSAY OF PHOSPHORUS: CPT

## 2019-11-12 PROCEDURE — 85652 RBC SED RATE AUTOMATED: CPT

## 2019-11-12 PROCEDURE — 80202 ASSAY OF VANCOMYCIN: CPT

## 2019-11-12 PROCEDURE — 83605 ASSAY OF LACTIC ACID: CPT

## 2019-11-12 PROCEDURE — 83036 HEMOGLOBIN GLYCOSYLATED A1C: CPT

## 2019-11-12 PROCEDURE — 93005 ELECTROCARDIOGRAM TRACING: CPT

## 2019-11-26 LAB
CULTURE RESULTS: SIGNIFICANT CHANGE UP
ORGANISM # SPEC MICROSCOPIC CNT: SIGNIFICANT CHANGE UP
ORGANISM # SPEC MICROSCOPIC CNT: SIGNIFICANT CHANGE UP
SPECIMEN SOURCE: SIGNIFICANT CHANGE UP

## 2020-06-01 NOTE — PATIENT PROFILE ADULT - NSPROSPIRITUALVALUESFT_GEN_A_NUR
Care of pt received from Kiera Suh RN. Plan to give medications as per MD order, pt is stable at this time, will continue to monitor. PO challenge initiated. Patient is pending discharge. Lydia Quinonez RN denies

## 2021-01-29 NOTE — ED PROVIDER NOTE - LOCATION
left lower leg Purse String (Intermediate) Text: Given the location of the defect and the characteristics of the surrounding skin a purse string intermediate closure was deemed most appropriate.  Undermining was performed circumfirentially around the surgical defect.  A purse string suture was then placed and tightened.

## 2021-01-31 NOTE — DISCHARGE NOTE PROVIDER - NSDCQMERRANDS_GEN_ALL_CORE
Symptoms: WI- chest congestion, wheezing , coughing     Onset: 5 days ago  Location / description: [place] wife reports that pt was seen yesterday by PCP for yearly physical and pt had a chest X ray as pt was congested.  Still waiting for results, triage RN informed that results were negative.  Denies pt has fever or chest pain.  Pt admits to mild SOB which he did not have yesterday.  Has a productive cough of yellow phlegm.  Precipitating Factors: unknown.  Pain Scale (1-10), 10 highest: 0/10  Associated Symptoms: as above  What  improves / worsens symptoms: none/ none  Symptom specific medications: inhaler use x 3 so far and 'it helps' and also taking benzonatate 3 x daily  Recent visits (last 3-4 weeks) for same reason or recent surgery:  1/29/21 PCP, also CXR which was negative  Did the patient have a positive coronavirus screening?: No  If yes, date of Covid-19 exposure:  n/a    PLAN:  Directed to Emergency Department    Patient/Caller refuses to follow recommendations.      Reason for Disposition  • Difficulty breathing    Protocols used: COUGH - ACUTE [id number]       Yes

## 2021-03-02 ENCOUNTER — APPOINTMENT (OUTPATIENT)
Dept: ORTHOPEDIC SURGERY | Facility: CLINIC | Age: 86
End: 2021-03-02
Payer: MEDICARE

## 2021-03-02 VITALS
DIASTOLIC BLOOD PRESSURE: 84 MMHG | HEART RATE: 66 BPM | TEMPERATURE: 97.2 F | HEIGHT: 61 IN | SYSTOLIC BLOOD PRESSURE: 179 MMHG

## 2021-03-02 DIAGNOSIS — M17.12 UNILATERAL PRIMARY OSTEOARTHRITIS, LEFT KNEE: ICD-10-CM

## 2021-03-02 DIAGNOSIS — M17.11 UNILATERAL PRIMARY OSTEOARTHRITIS, RIGHT KNEE: ICD-10-CM

## 2021-03-02 PROCEDURE — 99213 OFFICE O/P EST LOW 20 MIN: CPT | Mod: 25

## 2021-03-02 PROCEDURE — 20610 DRAIN/INJ JOINT/BURSA W/O US: CPT | Mod: LT

## 2021-03-02 RX ADMIN — METHYLPREDNISOLONE ACETATE 2 MG/ML: 40 INJECTION, SUSPENSION INTRALESIONAL; INTRAMUSCULAR; INTRASYNOVIAL; SOFT TISSUE at 00:00

## 2021-03-02 RX ADMIN — LIDOCAINE HYDROCHLORIDE 3 %: 10 INJECTION, SOLUTION INFILTRATION; PERINEURAL at 00:00

## 2021-03-03 RX ORDER — METHYLPRED ACET/NACL,ISO-OS/PF 40 MG/ML
40 VIAL (ML) INJECTION
Qty: 1 | Refills: 0 | Status: COMPLETED | OUTPATIENT
Start: 2021-03-02

## 2021-03-03 RX ORDER — LIDOCAINE HYDROCHLORIDE 10 MG/ML
1 INJECTION, SOLUTION INFILTRATION; PERINEURAL
Refills: 0 | Status: COMPLETED | OUTPATIENT
Start: 2021-03-02

## 2021-03-29 NOTE — ED ADULT TRIAGE NOTE - NS ED NURSE BANDS TYPE
[id number] # for  services for ong. Talked to Viki harmon grandson. Message given to Andrey Greer. He will have to call back to check. Name band;

## 2021-07-13 ENCOUNTER — INPATIENT (INPATIENT)
Facility: HOSPITAL | Age: 86
LOS: 5 days | Discharge: NURSING, REHAB OR LONG-TERM CARE | DRG: 113 | End: 2021-07-19
Attending: FAMILY MEDICINE | Admitting: STUDENT IN AN ORGANIZED HEALTH CARE EDUCATION/TRAINING PROGRAM
Payer: MEDICARE

## 2021-07-13 VITALS
SYSTOLIC BLOOD PRESSURE: 192 MMHG | OXYGEN SATURATION: 98 % | RESPIRATION RATE: 16 BRPM | DIASTOLIC BLOOD PRESSURE: 104 MMHG | WEIGHT: 104.94 LBS | HEART RATE: 87 BPM | HEIGHT: 61 IN

## 2021-07-13 DIAGNOSIS — Z96.60 PRESENCE OF UNSPECIFIED ORTHOPEDIC JOINT IMPLANT: Chronic | ICD-10-CM

## 2021-07-13 DIAGNOSIS — Z98.89 OTHER SPECIFIED POSTPROCEDURAL STATES: Chronic | ICD-10-CM

## 2021-07-13 LAB
ALBUMIN SERPL ELPH-MCNC: 3.1 G/DL — LOW (ref 3.3–5)
ALP SERPL-CCNC: 84 U/L — SIGNIFICANT CHANGE UP (ref 40–120)
ALT FLD-CCNC: 30 U/L — SIGNIFICANT CHANGE UP (ref 12–78)
ANION GAP SERPL CALC-SCNC: 6 MMOL/L — SIGNIFICANT CHANGE UP (ref 5–17)
APTT BLD: 31.2 SEC — SIGNIFICANT CHANGE UP (ref 27.5–35.5)
AST SERPL-CCNC: 20 U/L — SIGNIFICANT CHANGE UP (ref 15–37)
BASOPHILS # BLD AUTO: 0.07 K/UL — SIGNIFICANT CHANGE UP (ref 0–0.2)
BASOPHILS NFR BLD AUTO: 0.9 % — SIGNIFICANT CHANGE UP (ref 0–2)
BILIRUB SERPL-MCNC: 0.8 MG/DL — SIGNIFICANT CHANGE UP (ref 0.2–1.2)
BUN SERPL-MCNC: 25 MG/DL — HIGH (ref 7–23)
CALCIUM SERPL-MCNC: 9 MG/DL — SIGNIFICANT CHANGE UP (ref 8.5–10.1)
CHLORIDE SERPL-SCNC: 108 MMOL/L — SIGNIFICANT CHANGE UP (ref 96–108)
CO2 SERPL-SCNC: 28 MMOL/L — SIGNIFICANT CHANGE UP (ref 22–31)
CREAT SERPL-MCNC: 0.61 MG/DL — SIGNIFICANT CHANGE UP (ref 0.5–1.3)
EOSINOPHIL # BLD AUTO: 0.08 K/UL — SIGNIFICANT CHANGE UP (ref 0–0.5)
EOSINOPHIL NFR BLD AUTO: 1.1 % — SIGNIFICANT CHANGE UP (ref 0–6)
GLUCOSE SERPL-MCNC: 213 MG/DL — HIGH (ref 70–99)
HCT VFR BLD CALC: 39.4 % — SIGNIFICANT CHANGE UP (ref 34.5–45)
HGB BLD-MCNC: 12.7 G/DL — SIGNIFICANT CHANGE UP (ref 11.5–15.5)
IMM GRANULOCYTES NFR BLD AUTO: 0.5 % — SIGNIFICANT CHANGE UP (ref 0–1.5)
INR BLD: 1.04 RATIO — SIGNIFICANT CHANGE UP (ref 0.88–1.16)
LYMPHOCYTES # BLD AUTO: 1.77 K/UL — SIGNIFICANT CHANGE UP (ref 1–3.3)
LYMPHOCYTES # BLD AUTO: 23.4 % — SIGNIFICANT CHANGE UP (ref 13–44)
MCHC RBC-ENTMCNC: 28 PG — SIGNIFICANT CHANGE UP (ref 27–34)
MCHC RBC-ENTMCNC: 32.2 GM/DL — SIGNIFICANT CHANGE UP (ref 32–36)
MCV RBC AUTO: 86.8 FL — SIGNIFICANT CHANGE UP (ref 80–100)
MONOCYTES # BLD AUTO: 0.58 K/UL — SIGNIFICANT CHANGE UP (ref 0–0.9)
MONOCYTES NFR BLD AUTO: 7.7 % — SIGNIFICANT CHANGE UP (ref 2–14)
NEUTROPHILS # BLD AUTO: 5.03 K/UL — SIGNIFICANT CHANGE UP (ref 1.8–7.4)
NEUTROPHILS NFR BLD AUTO: 66.4 % — SIGNIFICANT CHANGE UP (ref 43–77)
NRBC # BLD: 0 /100 WBCS — SIGNIFICANT CHANGE UP (ref 0–0)
PLATELET # BLD AUTO: 178 K/UL — SIGNIFICANT CHANGE UP (ref 150–400)
POTASSIUM SERPL-MCNC: 4.5 MMOL/L — SIGNIFICANT CHANGE UP (ref 3.5–5.3)
POTASSIUM SERPL-SCNC: 4.5 MMOL/L — SIGNIFICANT CHANGE UP (ref 3.5–5.3)
PROT SERPL-MCNC: 6 G/DL — SIGNIFICANT CHANGE UP (ref 6–8.3)
PROTHROM AB SERPL-ACNC: 12.2 SEC — SIGNIFICANT CHANGE UP (ref 10.6–13.6)
RBC # BLD: 4.54 M/UL — SIGNIFICANT CHANGE UP (ref 3.8–5.2)
RBC # FLD: 13.3 % — SIGNIFICANT CHANGE UP (ref 10.3–14.5)
SODIUM SERPL-SCNC: 142 MMOL/L — SIGNIFICANT CHANGE UP (ref 135–145)
WBC # BLD: 7.57 K/UL — SIGNIFICANT CHANGE UP (ref 3.8–10.5)
WBC # FLD AUTO: 7.57 K/UL — SIGNIFICANT CHANGE UP (ref 3.8–10.5)

## 2021-07-13 PROCEDURE — 72125 CT NECK SPINE W/O DYE: CPT | Mod: 26,MA

## 2021-07-13 PROCEDURE — 73502 X-RAY EXAM HIP UNI 2-3 VIEWS: CPT | Mod: 26,RT

## 2021-07-13 PROCEDURE — 71045 X-RAY EXAM CHEST 1 VIEW: CPT | Mod: 26

## 2021-07-13 PROCEDURE — 73080 X-RAY EXAM OF ELBOW: CPT | Mod: 26,RT

## 2021-07-13 PROCEDURE — 73552 X-RAY EXAM OF FEMUR 2/>: CPT | Mod: 26,RT

## 2021-07-13 PROCEDURE — 70486 CT MAXILLOFACIAL W/O DYE: CPT | Mod: 26,MA

## 2021-07-13 PROCEDURE — 99285 EMERGENCY DEPT VISIT HI MDM: CPT | Mod: 25

## 2021-07-13 PROCEDURE — 73590 X-RAY EXAM OF LOWER LEG: CPT | Mod: 26,LT

## 2021-07-13 PROCEDURE — 70450 CT HEAD/BRAIN W/O DYE: CPT | Mod: 26,MA

## 2021-07-13 PROCEDURE — 12011 RPR F/E/E/N/L/M 2.5 CM/<: CPT

## 2021-07-13 PROCEDURE — 73030 X-RAY EXAM OF SHOULDER: CPT | Mod: 26,RT

## 2021-07-13 PROCEDURE — 73562 X-RAY EXAM OF KNEE 3: CPT | Mod: 26,LT

## 2021-07-13 RX ORDER — MORPHINE SULFATE 50 MG/1
2 CAPSULE, EXTENDED RELEASE ORAL ONCE
Refills: 0 | Status: DISCONTINUED | OUTPATIENT
Start: 2021-07-13 | End: 2021-07-13

## 2021-07-13 RX ORDER — ONDANSETRON 8 MG/1
4 TABLET, FILM COATED ORAL ONCE
Refills: 0 | Status: COMPLETED | OUTPATIENT
Start: 2021-07-13 | End: 2021-07-13

## 2021-07-13 RX ADMIN — MORPHINE SULFATE 2 MILLIGRAM(S): 50 CAPSULE, EXTENDED RELEASE ORAL at 21:15

## 2021-07-13 RX ADMIN — MORPHINE SULFATE 2 MILLIGRAM(S): 50 CAPSULE, EXTENDED RELEASE ORAL at 23:19

## 2021-07-13 RX ADMIN — ONDANSETRON 4 MILLIGRAM(S): 8 TABLET, FILM COATED ORAL at 20:58

## 2021-07-13 RX ADMIN — MORPHINE SULFATE 2 MILLIGRAM(S): 50 CAPSULE, EXTENDED RELEASE ORAL at 20:57

## 2021-07-13 NOTE — ED ADULT TRIAGE NOTE - CHIEF COMPLAINT QUOTE
Pt had trip and fall at home - hit head denies LOC - laceration to right side of head- c/o right shoulder and elbow pain

## 2021-07-13 NOTE — ED PROVIDER NOTE - SKIN, MLM
+large skin tear noted to R elbow, +jagged 2.5cm laceration noted to lateral R eyebrow with surrounding ecchymosis extending to R upper eyelid

## 2021-07-13 NOTE — ED ADULT NURSE NOTE - OBJECTIVE STATEMENT
Pt. received alert and oriented x4 with chief complaint of trip and fall hurting right arm/right leg/right side of face. Pt. presents w/ deformity to right shoulder, skin tear to right anterior lower extremity/right orbital swelling and bruising/laceration to right forehead. Pt. also presents w/ stage 1 pressure ulcer to sacral area. Pt. denies LOC.

## 2021-07-13 NOTE — ED PROVIDER NOTE - CLINICAL SUMMARY MEDICAL DECISION MAKING FREE TEXT BOX
89 y/o F with hx of Cellulitis HTN (hypertension) Hyperlipidemia Irritable bowel syndrome Osteoporosis Peptic ulcer on ASA 81mg BIBA s/p mechanical fall at home today. Pt states that she was walking at home with walker, knee buckled and fell hitting her head. States that she has laceration to her R eyebrow, R shoulder pain and R hip/groin pain. States that she has hx of R femur surgery and partial left hip replacement. States that she also has chronic L knee pain but pain in L knee is worse since fall. C/o pain to site of laceration of eyebrow/forehead R with mild dizziness. C/o skin tear to R elbow. Pt denies LOC, use of blood thinners, n/v, vision changes, pain inside R eye, numbness, tingling, neck/back pain, cp, sob, abdominal pain, other injuries/symptoms. Pt is RHD. PE as above; will get ct head/cspine, xrays, labs, pain meds, lac repair, reassess

## 2021-07-13 NOTE — ED PROVIDER NOTE - ATTENDING CONTRIBUTION TO CARE
I have personally performed a face to face diagnostic evaluation on this patient.  I have reviewed the PA note and agree with the history, exam, and plan of care, except as noted.  History and Exam by me shows  mechanical fall c head lac, right shoulder pain and right groin pain.   pt is in nad.  a n o x 3.  head- right forehead lac about 3 cm.  right shoulder- diffuse swelling, tender c limited rom.  right groin- mild tenderness c limited rom due to pain.  Left knee- chronic swelling and tenderness from djd.  Admit for ALTON.

## 2021-07-13 NOTE — ED PROVIDER NOTE - ENMT, MLM
Airway patent. Mouth with normal mucosa. +ecchymosis noted to R upper eyelid, no maxillary/nasal tenderness, jaw B NT, able to open/close mouth without difficulty

## 2021-07-13 NOTE — ED PROVIDER NOTE - OBJECTIVE STATEMENT
89 y/o F with hx of Cellulitis HTN (hypertension) Hyperlipidemia Irritable bowel syndrome Osteoporosis Peptic ulcer on ASA 81mg BIBA s/p mechanical fall at home today. Pt states that she was walking at home with walker, knee buckled and fell hitting her head. States that she has laceration to her R eyebrow, R shoulder pain and R hip/groin pain. States that she has hx of R femur surgery and partial left hip replacement. States that she also has chronic L knee pain but pain in L knee is worse since fall. C/o pain to site of laceration of eyebrow/forehead R with mild dizziness. C/o skin tear to R elbow. Pt denies LOC, use of blood thinners, n/v, vision changes, pain inside R eye, numbness, tingling, neck/back pain, cp, sob, abdominal pain, other injuries/symptoms. Pt is RHD. States that she was not able to ambulate after the fall.   pcp: Dr. Thomas Crowder

## 2021-07-13 NOTE — ED PROVIDER NOTE - CARE PLAN
Principal Discharge DX:	Fall  Secondary Diagnosis:	Eyebrow laceration, right, initial encounter  Secondary Diagnosis:	Shoulder fracture, right, closed, initial encounter

## 2021-07-13 NOTE — ED PROVIDER NOTE - MUSCULOSKELETAL, MLM
+ttp with swelling and LROM noted to R shoulder, skin intact, fingers warm & mobile, able to make a fist, pulses and sensation intact, NVI; +ttp R groin/lateral lower hip/proximal thigh with LROM secondary to pain, skin intact, no swelling or erythema noted, R knee/tib-fib/ankle/foot NT with FROM, toes warm & mobile; +ttp L knee with swelling, +ttp L shin, toes warm & mobile, no foot drop, NVI

## 2021-07-13 NOTE — ED PROVIDER NOTE - CONSTITUTIONAL, MLM
normal... Well appearing, awake, alert, oriented to person, place, time/situation and in mild distress from pain

## 2021-07-13 NOTE — ED ADULT NURSE NOTE - NSIMPLEMENTINTERV_GEN_ALL_ED
Implemented All Fall with Harm Risk Interventions:  [place] to call system. Call bell, personal items and telephone within reach. Instruct patient to call for assistance. Room bathroom lighting operational. Non-slip footwear when patient is off stretcher. Physically safe environment: no spills, clutter or unnecessary equipment. Stretcher in lowest position, wheels locked, appropriate side rails in place. Provide visual cue, wrist band, yellow gown, etc. Monitor gait and stability. Monitor for mental status changes and reorient to person, place, and time. Review medications for side effects contributing to fall risk. Reinforce activity limits and safety measures with patient and family. Provide visual clues: red socks.
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
## 2021-07-14 DIAGNOSIS — K58.9 IRRITABLE BOWEL SYNDROME WITHOUT DIARRHEA: ICD-10-CM

## 2021-07-14 DIAGNOSIS — I48.91 UNSPECIFIED ATRIAL FIBRILLATION: ICD-10-CM

## 2021-07-14 DIAGNOSIS — I10 ESSENTIAL (PRIMARY) HYPERTENSION: ICD-10-CM

## 2021-07-14 DIAGNOSIS — R73.03 PREDIABETES: ICD-10-CM

## 2021-07-14 DIAGNOSIS — S01.111A LACERATION WITHOUT FOREIGN BODY OF RIGHT EYELID AND PERIOCULAR AREA, INITIAL ENCOUNTER: ICD-10-CM

## 2021-07-14 DIAGNOSIS — S42.91XA FRACTURE OF RIGHT SHOULDER GIRDLE, PART UNSPECIFIED, INITIAL ENCOUNTER FOR CLOSED FRACTURE: ICD-10-CM

## 2021-07-14 DIAGNOSIS — S32.591A OTHER SPECIFIED FRACTURE OF RIGHT PUBIS, INITIAL ENCOUNTER FOR CLOSED FRACTURE: ICD-10-CM

## 2021-07-14 DIAGNOSIS — K27.9 PEPTIC ULCER, SITE UNSPECIFIED, UNSPECIFIED AS ACUTE OR CHRONIC, WITHOUT HEMORRHAGE OR PERFORATION: ICD-10-CM

## 2021-07-14 DIAGNOSIS — M81.0 AGE-RELATED OSTEOPOROSIS WITHOUT CURRENT PATHOLOGICAL FRACTURE: ICD-10-CM

## 2021-07-14 DIAGNOSIS — Z29.9 ENCOUNTER FOR PROPHYLACTIC MEASURES, UNSPECIFIED: ICD-10-CM

## 2021-07-14 DIAGNOSIS — E78.5 HYPERLIPIDEMIA, UNSPECIFIED: ICD-10-CM

## 2021-07-14 LAB
A1C WITH ESTIMATED AVERAGE GLUCOSE RESULT: 8.1 % — HIGH (ref 4–5.6)
ALBUMIN SERPL ELPH-MCNC: 2.9 G/DL — LOW (ref 3.3–5)
ALP SERPL-CCNC: 74 U/L — SIGNIFICANT CHANGE UP (ref 40–120)
ALT FLD-CCNC: 20 U/L — SIGNIFICANT CHANGE UP (ref 12–78)
ANION GAP SERPL CALC-SCNC: 7 MMOL/L — SIGNIFICANT CHANGE UP (ref 5–17)
AST SERPL-CCNC: 15 U/L — SIGNIFICANT CHANGE UP (ref 15–37)
BILIRUB SERPL-MCNC: 1.1 MG/DL — SIGNIFICANT CHANGE UP (ref 0.2–1.2)
BUN SERPL-MCNC: 21 MG/DL — SIGNIFICANT CHANGE UP (ref 7–23)
CALCIUM SERPL-MCNC: 8.5 MG/DL — SIGNIFICANT CHANGE UP (ref 8.5–10.1)
CHLORIDE SERPL-SCNC: 107 MMOL/L — SIGNIFICANT CHANGE UP (ref 96–108)
CO2 SERPL-SCNC: 27 MMOL/L — SIGNIFICANT CHANGE UP (ref 22–31)
CREAT SERPL-MCNC: 0.36 MG/DL — LOW (ref 0.5–1.3)
ESTIMATED AVERAGE GLUCOSE: 186 MG/DL — HIGH (ref 68–114)
GLUCOSE SERPL-MCNC: 187 MG/DL — HIGH (ref 70–99)
HCT VFR BLD CALC: 34.5 % — SIGNIFICANT CHANGE UP (ref 34.5–45)
HGB BLD-MCNC: 11.1 G/DL — LOW (ref 11.5–15.5)
MAGNESIUM SERPL-MCNC: 2 MG/DL — SIGNIFICANT CHANGE UP (ref 1.6–2.6)
MCHC RBC-ENTMCNC: 27.3 PG — SIGNIFICANT CHANGE UP (ref 27–34)
MCHC RBC-ENTMCNC: 32.2 GM/DL — SIGNIFICANT CHANGE UP (ref 32–36)
MCV RBC AUTO: 85 FL — SIGNIFICANT CHANGE UP (ref 80–100)
NRBC # BLD: 0 /100 WBCS — SIGNIFICANT CHANGE UP (ref 0–0)
PHOSPHATE SERPL-MCNC: 3.6 MG/DL — SIGNIFICANT CHANGE UP (ref 2.5–4.5)
PLATELET # BLD AUTO: 168 K/UL — SIGNIFICANT CHANGE UP (ref 150–400)
POTASSIUM SERPL-MCNC: 4.1 MMOL/L — SIGNIFICANT CHANGE UP (ref 3.5–5.3)
POTASSIUM SERPL-SCNC: 4.1 MMOL/L — SIGNIFICANT CHANGE UP (ref 3.5–5.3)
PROT SERPL-MCNC: 5.5 G/DL — LOW (ref 6–8.3)
RBC # BLD: 4.06 M/UL — SIGNIFICANT CHANGE UP (ref 3.8–5.2)
RBC # FLD: 13.4 % — SIGNIFICANT CHANGE UP (ref 10.3–14.5)
SARS-COV-2 RNA SPEC QL NAA+PROBE: SIGNIFICANT CHANGE UP
SODIUM SERPL-SCNC: 141 MMOL/L — SIGNIFICANT CHANGE UP (ref 135–145)
WBC # BLD: 8.16 K/UL — SIGNIFICANT CHANGE UP (ref 3.8–10.5)
WBC # FLD AUTO: 8.16 K/UL — SIGNIFICANT CHANGE UP (ref 3.8–10.5)

## 2021-07-14 PROCEDURE — 76376 3D RENDER W/INTRP POSTPROCES: CPT | Mod: 26,77

## 2021-07-14 PROCEDURE — 72192 CT PELVIS W/O DYE: CPT | Mod: 26,MA

## 2021-07-14 PROCEDURE — 73200 CT UPPER EXTREMITY W/O DYE: CPT | Mod: 26,RT

## 2021-07-14 PROCEDURE — 73700 CT LOWER EXTREMITY W/O DYE: CPT | Mod: 26,LT,MA

## 2021-07-14 PROCEDURE — 76376 3D RENDER W/INTRP POSTPROCES: CPT | Mod: 26

## 2021-07-14 PROCEDURE — 99223 1ST HOSP IP/OBS HIGH 75: CPT

## 2021-07-14 PROCEDURE — 70450 CT HEAD/BRAIN W/O DYE: CPT | Mod: 26

## 2021-07-14 PROCEDURE — 99222 1ST HOSP IP/OBS MODERATE 55: CPT | Mod: GC

## 2021-07-14 PROCEDURE — 93010 ELECTROCARDIOGRAM REPORT: CPT

## 2021-07-14 RX ORDER — MORPHINE SULFATE 50 MG/1
2.5 CAPSULE, EXTENDED RELEASE ORAL EVERY 6 HOURS
Refills: 0 | Status: DISCONTINUED | OUTPATIENT
Start: 2021-07-14 | End: 2021-07-19

## 2021-07-14 RX ORDER — ACETAMINOPHEN 500 MG
1000 TABLET ORAL EVERY 8 HOURS
Refills: 0 | Status: DISCONTINUED | OUTPATIENT
Start: 2021-07-14 | End: 2021-07-19

## 2021-07-14 RX ORDER — GLUCAGON INJECTION, SOLUTION 0.5 MG/.1ML
1 INJECTION, SOLUTION SUBCUTANEOUS ONCE
Refills: 0 | Status: DISCONTINUED | OUTPATIENT
Start: 2021-07-14 | End: 2021-07-19

## 2021-07-14 RX ORDER — SENNA PLUS 8.6 MG/1
2 TABLET ORAL AT BEDTIME
Refills: 0 | Status: DISCONTINUED | OUTPATIENT
Start: 2021-07-14 | End: 2021-07-19

## 2021-07-14 RX ORDER — ASPIRIN/CALCIUM CARB/MAGNESIUM 324 MG
162 TABLET ORAL DAILY
Refills: 0 | Status: DISCONTINUED | OUTPATIENT
Start: 2021-07-14 | End: 2021-07-14

## 2021-07-14 RX ORDER — OXYCODONE HYDROCHLORIDE 5 MG/1
2.5 TABLET ORAL EVERY 4 HOURS
Refills: 0 | Status: DISCONTINUED | OUTPATIENT
Start: 2021-07-14 | End: 2021-07-14

## 2021-07-14 RX ORDER — SODIUM CHLORIDE 9 MG/ML
1000 INJECTION INTRAMUSCULAR; INTRAVENOUS; SUBCUTANEOUS
Refills: 0 | Status: DISCONTINUED | OUTPATIENT
Start: 2021-07-14 | End: 2021-07-19

## 2021-07-14 RX ORDER — ACETAMINOPHEN 500 MG
650 TABLET ORAL EVERY 4 HOURS
Refills: 0 | Status: DISCONTINUED | OUTPATIENT
Start: 2021-07-14 | End: 2021-07-19

## 2021-07-14 RX ORDER — MORPHINE SULFATE 50 MG/1
2.5 CAPSULE, EXTENDED RELEASE ORAL EVERY 4 HOURS
Refills: 0 | Status: DISCONTINUED | OUTPATIENT
Start: 2021-07-14 | End: 2021-07-19

## 2021-07-14 RX ORDER — OXYCODONE HYDROCHLORIDE 5 MG/1
5 TABLET ORAL EVERY 4 HOURS
Refills: 0 | Status: DISCONTINUED | OUTPATIENT
Start: 2021-07-14 | End: 2021-07-14

## 2021-07-14 RX ORDER — METOPROLOL TARTRATE 50 MG
25 TABLET ORAL DAILY
Refills: 0 | Status: DISCONTINUED | OUTPATIENT
Start: 2021-07-14 | End: 2021-07-19

## 2021-07-14 RX ORDER — ATORVASTATIN CALCIUM 80 MG/1
20 TABLET, FILM COATED ORAL AT BEDTIME
Refills: 0 | Status: DISCONTINUED | OUTPATIENT
Start: 2021-07-14 | End: 2021-07-19

## 2021-07-14 RX ORDER — OXYCODONE HYDROCHLORIDE 5 MG/1
10 TABLET ORAL EVERY 4 HOURS
Refills: 0 | Status: DISCONTINUED | OUTPATIENT
Start: 2021-07-14 | End: 2021-07-14

## 2021-07-14 RX ORDER — ONDANSETRON 8 MG/1
4 TABLET, FILM COATED ORAL EVERY 6 HOURS
Refills: 0 | Status: DISCONTINUED | OUTPATIENT
Start: 2021-07-14 | End: 2021-07-19

## 2021-07-14 RX ORDER — DEXTROSE 50 % IN WATER 50 %
15 SYRINGE (ML) INTRAVENOUS ONCE
Refills: 0 | Status: DISCONTINUED | OUTPATIENT
Start: 2021-07-14 | End: 2021-07-19

## 2021-07-14 RX ORDER — INSULIN LISPRO 100/ML
VIAL (ML) SUBCUTANEOUS EVERY 6 HOURS
Refills: 0 | Status: DISCONTINUED | OUTPATIENT
Start: 2021-07-14 | End: 2021-07-15

## 2021-07-14 RX ORDER — FAMOTIDINE 10 MG/ML
20 INJECTION INTRAVENOUS DAILY
Refills: 0 | Status: DISCONTINUED | OUTPATIENT
Start: 2021-07-14 | End: 2021-07-19

## 2021-07-14 RX ORDER — LOSARTAN POTASSIUM 100 MG/1
50 TABLET, FILM COATED ORAL DAILY
Refills: 0 | Status: DISCONTINUED | OUTPATIENT
Start: 2021-07-14 | End: 2021-07-19

## 2021-07-14 RX ORDER — TRAMADOL HYDROCHLORIDE 50 MG/1
50 TABLET ORAL EVERY 6 HOURS
Refills: 0 | Status: DISCONTINUED | OUTPATIENT
Start: 2021-07-14 | End: 2021-07-14

## 2021-07-14 RX ORDER — TRAMADOL HYDROCHLORIDE 50 MG/1
25 TABLET ORAL EVERY 4 HOURS
Refills: 0 | Status: DISCONTINUED | OUTPATIENT
Start: 2021-07-14 | End: 2021-07-14

## 2021-07-14 RX ADMIN — SODIUM CHLORIDE 75 MILLILITER(S): 9 INJECTION INTRAMUSCULAR; INTRAVENOUS; SUBCUTANEOUS at 11:11

## 2021-07-14 RX ADMIN — MORPHINE SULFATE 2.5 MILLIGRAM(S): 50 CAPSULE, EXTENDED RELEASE ORAL at 18:24

## 2021-07-14 RX ADMIN — Medication 1: at 06:13

## 2021-07-14 RX ADMIN — OXYCODONE HYDROCHLORIDE 5 MILLIGRAM(S): 5 TABLET ORAL at 06:12

## 2021-07-14 RX ADMIN — Medication 1: at 12:48

## 2021-07-14 RX ADMIN — Medication 1: at 18:47

## 2021-07-14 RX ADMIN — Medication 650 MILLIGRAM(S): at 11:08

## 2021-07-14 RX ADMIN — Medication 25 MILLIGRAM(S): at 06:41

## 2021-07-14 RX ADMIN — SENNA PLUS 2 TABLET(S): 8.6 TABLET ORAL at 23:27

## 2021-07-14 RX ADMIN — MORPHINE SULFATE 2 MILLIGRAM(S): 50 CAPSULE, EXTENDED RELEASE ORAL at 00:32

## 2021-07-14 RX ADMIN — ONDANSETRON 4 MILLIGRAM(S): 8 TABLET, FILM COATED ORAL at 09:34

## 2021-07-14 RX ADMIN — MORPHINE SULFATE 2.5 MILLIGRAM(S): 50 CAPSULE, EXTENDED RELEASE ORAL at 23:25

## 2021-07-14 RX ADMIN — MORPHINE SULFATE 2.5 MILLIGRAM(S): 50 CAPSULE, EXTENDED RELEASE ORAL at 17:24

## 2021-07-14 RX ADMIN — LOSARTAN POTASSIUM 50 MILLIGRAM(S): 100 TABLET, FILM COATED ORAL at 06:40

## 2021-07-14 RX ADMIN — Medication 1 DROP(S): at 19:04

## 2021-07-14 RX ADMIN — OXYCODONE HYDROCHLORIDE 5 MILLIGRAM(S): 5 TABLET ORAL at 10:00

## 2021-07-14 RX ADMIN — Medication 1000 MILLIGRAM(S): at 23:29

## 2021-07-14 RX ADMIN — OXYCODONE HYDROCHLORIDE 5 MILLIGRAM(S): 5 TABLET ORAL at 05:50

## 2021-07-14 RX ADMIN — OXYCODONE HYDROCHLORIDE 5 MILLIGRAM(S): 5 TABLET ORAL at 09:13

## 2021-07-14 RX ADMIN — ATORVASTATIN CALCIUM 20 MILLIGRAM(S): 80 TABLET, FILM COATED ORAL at 23:27

## 2021-07-14 RX ADMIN — Medication 650 MILLIGRAM(S): at 12:08

## 2021-07-14 RX ADMIN — Medication 1: at 23:25

## 2021-07-14 RX ADMIN — FAMOTIDINE 20 MILLIGRAM(S): 10 INJECTION INTRAVENOUS at 11:08

## 2021-07-14 RX ADMIN — SODIUM CHLORIDE 75 MILLILITER(S): 9 INJECTION INTRAMUSCULAR; INTRAVENOUS; SUBCUTANEOUS at 06:41

## 2021-07-14 NOTE — CONSULT NOTE ADULT - ASSESSMENT
89 y/o female with PMHx HTN, HLD, prediabetes, IBS, osteoporosis, arthritis, peptic ulcer, venous insufficiency, Afib (not on a/c) BIBEMS s/p fall at home when she was getting up out of bed to ambulate with her walker and fell on her right side, admitted with a right pubic rami fracture and right humeral head fracture     1) Shoulder fracture, right, closed,  s/p fall at home    2) Fracture of multiple pubic rami, right, closed,  3) Intractable pain  - Xray right shoulder significant for humeral head fracture on personal read, f/u final  - F/u CT shoulder  - CT right pelvis with acute fractures of the right-sided pubic rami  - CT left knee with nonspecific effusion, patient has hx of left knee pain worse s/p fall   - Ortho following  - d/c oxycodone  - avoid NSAIDs due to h/o peptic ulcer  - start morphine 2.5 mg PO q6h RTC  - PT eval    4) Osteoporosis   - CT c-spine with diffuse osteopenia without displaced fracture  - Fall risk protocol  - PT eval pending ortho OR recs.     5) Constipation prophylaxis 2/2 opioid use   - senna and miralax  - bisacodyl supp prn    Appreciate consult. Discussed with the primary team.    Carroll Palma MD   89 y/o female with PMHx HTN, HLD, prediabetes, IBS, osteoporosis, arthritis, peptic ulcer, venous insufficiency, Afib (not on a/c) BIBEMS s/p fall at home when she was getting up out of bed to ambulate with her walker and fell on her right side, admitted with a right pubic rami fracture and right humeral head fracture     1) Shoulder fracture, right, closed,  s/p fall at home    2) Fracture of multiple pubic rami, right, closed,  3) Intractable pain  - Xray right shoulder significant for humeral head fracture on personal read, f/u final  - F/u CT shoulder  - CT right pelvis with acute fractures of the right-sided pubic rami  - CT left knee with nonspecific effusion, patient has hx of left knee pain worse s/p fall   - Ortho following  - d/c oxycodone  - avoid NSAIDs due to h/o peptic ulcer  - start morphine 2.5 mg PO q6h RTC  - Tylenol RTC  - PT eval    4) Osteoporosis   - CT c-spine with diffuse osteopenia without displaced fracture  - Fall risk protocol  - PT eval pending ortho OR recs.     5) Constipation prophylaxis 2/2 opioid use   - senna and miralax  - bisacodyl supp prn    Appreciate consult. Discussed with the primary team.    Carroll Palma MD

## 2021-07-14 NOTE — PROGRESS NOTE ADULT - PROBLEM SELECTOR PLAN 3
Acute, s/p fall at home  - Admits to hitting head though pt states her injuries are from her glasses not the ground  - Right periorbital ecchymosis and laceration present s/p 8 sutures, CT showing soft tissue hematoma without fractures   - Initial CT brain negative, due to trauma will repeat serial head CTs   - CT c-spine with chronic changes  - CT left knee with nonspecific effusion, patient has hx of left knee pain worse s/p fall   - F/u pending Xray imaging and ortho recs  - Strict bedrest pending ortho recs, fall risk protocol    - SW consult, PT eval after ortho recs regarding OR  - D/w patient and daughter Eleazar, she will likely need ALTON as she uses walker to ambulate, has humeral fx

## 2021-07-14 NOTE — CONSULT NOTE ADULT - SUBJECTIVE AND OBJECTIVE BOX
History of Present Illness: The patient is a 89 year old female with a history of HTN, HL, PUD, chronic venous insufficiency, prior falls, atrial fibrillation who presents with a fall. She was walking with her walker, took a wrong turn, and fell. No dizziness, syncope, chest pain, shortness of breath, or palpitations. She currently notes significant right shoulder pain from the fall. She was found to have pelvic fracture, shoulder fracture.    Past Medical/Surgical History:  HTN, HL, PUD, chronic venous insufficiency, prior falls, atrial fibrillation    Medications:  Home Medications:  aspirin 81 mg oral tablet: 2 tab(s) orally once a day (14 Jul 2021 03:34)  atorvastatin 20 mg oral tablet: 1 tab(s) orally once a day (14 Jul 2021 03:34)  losartan 50 mg oral tablet: 1 tab(s) orally once a day (14 Jul 2021 03:34)  metoprolol succinate 25 mg oral tablet, extended release: 1 tab(s) orally once a day (14 Jul 2021 03:34)  Pepcid 20 mg oral tablet: 1 tab(s) orally once a day (at bedtime) (14 Jul 2021 03:34)  Restasis 0.05% ophthalmic emulsion: 1 drop(s) to each affected eye every 12 hours  both eyes (14 Jul 2021 03:34)      Family History: Non-contributory family history of premature cardiovascular atherosclerotic disease    Social History: No tobacco, alcohol or drug use    Review of Systems:  General: No fevers, chills, weight loss or gain  Skin: No rashes, color changes  Cardiovascular: No chest pain, orthopnea  Respiratory: No shortness of breath, cough  Gastrointestinal: No nausea, abdominal pain  Genitourinary: No incontinence, pain with urination  Musculoskeletal: No pain, swelling, decreased range of motion  Neurological: No headache, weakness  Psychiatric: No depression, anxiety  Endocrine: No weight loss or gain, increased thirst  All other systems are comprehensively negative.    Physical Exam:  Vitals:        Vital Signs Last 24 Hrs  T(C): 36.8 (14 Jul 2021 06:17), Max: 37 (14 Jul 2021 00:30)  T(F): 98.2 (14 Jul 2021 06:17), Max: 98.6 (14 Jul 2021 00:30)  HR: 92 (14 Jul 2021 06:17) (86 - 92)  BP: 164/87 (14 Jul 2021 06:17) (161/83 - 192/104)  BP(mean): --  RR: 16 (14 Jul 2021 06:17) (16 - 16)  SpO2: 97% (14 Jul 2021 06:17) (97% - 98%)  General: NAD  HEENT: Facial ecchymosis  Neck: No JVD, no carotid bruit  Lungs: CTAB  CV: RRR, nl S1/S2, no M/R/G  Abdomen: S/NT/ND, +BS  Extremities: No LE edema, no cyanosis  Neuro: AAOx3, non-focal  Skin: No rash    Labs:                        11.1   8.16  )-----------( 168      ( 14 Jul 2021 06:22 )             34.5     07-14    141  |  107  |  21  ----------------------------<  187<H>  4.1   |  27  |  0.36<L>    Ca    8.5      14 Jul 2021 06:22  Phos  3.6     07-14  Mg     2.0     07-14    TPro  5.5<L>  /  Alb  2.9<L>  /  TBili  1.1  /  DBili  x   /  AST  15  /  ALT  20  /  AlkPhos  74  07-14        PT/INR - ( 13 Jul 2021 21:01 )   PT: 12.2 sec;   INR: 1.04 ratio         PTT - ( 13 Jul 2021 21:01 )  PTT:31.2 sec    ECG: AF, borderline LVH with secondary repolarization abnormality

## 2021-07-14 NOTE — PROGRESS NOTE ADULT - PROBLEM SELECTOR PLAN 1
Acute, s/p fall at home    - Admit to medicine  - Xray right shoulder significant for humeral head fracture on personal read, f/u final    - Will keep NPO except meds awaiting ortho evaluation  - Pain control with Tylenol PRN mild pain, oxycodone 2.5mg PRN moderate pain, oxycodone 5mg PRN severe pain

## 2021-07-14 NOTE — H&P ADULT - NSHPSOCIALHISTORY_GEN_ALL_CORE
, lives alone, has daytime HHA  Ambulates with walker, requires assistance with ADLs  No EtOH or tobacco use history  Received Covid vaccine

## 2021-07-14 NOTE — CONSULT NOTE ADULT - ASSESSMENT
The patient is a 89 year old female with a history of HTN, HL, PUD, chronic venous insufficiency, prior falls, atrial fibrillation who presents with a fall, found to have shoulder and pelvic fractures.    Plan:  - ECG with known atrial fibrillation and LVH related changes  - Continue metoprolol succinate 25 mg daily for rate control  - Not on long-term anticoagulation due to prior falls - risks outweigh benefits  - Can continue aspirin, lower dose to 81 mg daily  - Continue losartan 50 mg daily  - Continue atorvastatin 20 mg daily  - Pain control  - Ortho eval  - If plan includes surgery of the right shoulder, the patient is optimized from a cardiac perspective to proceed

## 2021-07-14 NOTE — H&P ADULT - PROBLEM SELECTOR PROBLEM 2
Eyebrow laceration, right, initial encounter Fracture of multiple pubic rami, right, closed, initial encounter

## 2021-07-14 NOTE — CONSULT NOTE ADULT - SUBJECTIVE AND OBJECTIVE BOX
HPI:  89 y/o female with PMHx HTN, HLD, prediabetes, IBS, osteoporosis, arthritis, peptic ulcer, venous insufficiency, Afib (not on a/c) BIBEMS s/p fall at home. Patient reports she was resting in bed around 6pm in the evening day of presentation when she sat up, stretched her legs, lifted herself to use her walker and fell on her right side. She does not know if she lost her footing, if her knees gave out or if the walker was not fully extended. Patient admits to hitting her head though she thinks he glasses gave her the injury rather than the floor. Denies LOC, visual changes. Denies prodromal dizziness, HA, weakness, changes in vision, chest pain, chest pressure, palpitations, SOB. Patient has been feeling at her baseline state of health the past couple of days but does admit to falling a few weeks ago at home but did not result in serious injury. Patient regularly ambulates with a walker. At present, patient admits to pain around her right eye (5/10), right shoulder (8/10), right hip (5/10). Denies fevers, chills, HA, visual disturbances, chest pain, chest pressure, palpitations, SOB, cough, abdominal pain, n/v.     In the ED, VS T 98.6F, HR 87, /104 -> 161/83, RR 16 98% RA   Labs significant for BUN 25, glucose 216  Patient received IV morphine 2mg x2, Zofran 4mg x1, 8 sutures to right lateral eyebrow laceration   EKG: Afib @72bpm, Qtc 444  CT c-spine: Diffuse osteopenia without displaced fracture. Trace grade 1 C2-C3 C3-C4 retrolisthesis. Bilateral foraminal stenosis due to disc and uncovertebral joint degeneration.  CT brain: No acute intracranial bleeding. Chronic microvascular ischemic changes.  CT maxillofacial: Right periorbital soft tissue hematoma. Intact globes. No fracture.  CT left knee: No acute fracture or traumatic bony malalignment. Large left knee joint simple effusion, nonspecific.  CT pelvis: Acute fractures of the right-sided pubic rami. A third acute pelvic ring fracture is not identified by CT, further evaluation with MRI can be obtained if clinically warranted. Chronic appearing insufficiency fracture of the sacrum. Left hip arthroplasty and right femoral neck intertrochanteric fixation, without evidence of failure of the imaged portions of the hardware.  Xray right elbow, right femur, right hip with pelvis: PENDING  Xray right shoulder: personal read- right humeral head fx  Xray left knee, left tibia and fibula: PENDING   (14 Jul 2021 02:54)    PERTINENT PM/SXH:   Hyperlipidemia    Peptic ulcer    HTN (hypertension)    Osteoporosis    Irritable bowel syndrome    DM (diabetes mellitus)    Cellulitis      S/P hip replacement    History of appendectomy    History of tonsillectomy      FAMILY HISTORY:  FH: hypertension      ITEMS NOT CHECKED ARE NOT PRESENT    SOCIAL HISTORY:   Significant other/partner[ ]  Children[ ]  Restorationism/Spirituality:  Substance hx:  [ ]   Tobacco hx:  [ ]   Alcohol hx: [ ]   Home Opioid hx:  [ ] I-Stop Reference No:  Living Situation: [ ]Home  [ ]Long term care  [ ]Rehab [ ]Other    ADVANCE DIRECTIVES:    DNR  Yes    MOLST  [ ]  Living Will  [ ]   DECISION MAKER(s):  [ ] Health Care Proxy(s)  [ ] Surrogate(s)  [ ] Guardian           Name(s): Phone Number(s):    BASELINE (I)ADL(s) (prior to admission):  [place]: [ ]Total  [ ] Moderate [ ]Dependent    Allergies    sulfa drugs (Hives)    Intolerances    MEDICATIONS  (STANDING):  artificial tears (preservative free) Ophthalmic Solution 1 Drop(s) Both EYES two times a day  atorvastatin 20 milliGRAM(s) Oral at bedtime  dextrose 40% Gel 15 Gram(s) Oral once  famotidine    Tablet 20 milliGRAM(s) Oral daily  glucagon  Injectable 1 milliGRAM(s) IntraMuscular once  insulin lispro (ADMELOG) corrective regimen sliding scale   SubCutaneous every 6 hours  losartan 50 milliGRAM(s) Oral daily  metoprolol succinate ER 25 milliGRAM(s) Oral daily  sodium chloride 0.9%. 1000 milliLiter(s) (75 mL/Hr) IV Continuous <Continuous>    MEDICATIONS  (PRN):  acetaminophen   Tablet .. 650 milliGRAM(s) Oral every 4 hours PRN Mild Pain (1 - 3)  ondansetron    Tablet 4 milliGRAM(s) Oral every 6 hours PRN Nausea and/or Vomiting  traMADol 25 milliGRAM(s) Oral every 4 hours PRN Moderate Pain (4 - 6)  traMADol 50 milliGRAM(s) Oral every 6 hours PRN Severe Pain (7 - 10)    PRESENT SYMPTOMS: [ ]Unable to obtain due to poor mentation   Source if other than patient:  [ ]Family   [ ]Team     Pain: [ ]yes [ ]no  QOL impact -   Location -                    Aggravating factors -  Quality -  Radiation -  Timing-  Severity (0-10 scale):  Minimal acceptable level (0-10 scale):     CPOT:    https://www.[facility].org/getattachment/[email or web address]/Critical-Care-Pain-Observation-Tool-(CPOT)      PAIN AD Score:     http://geriatrictoolkit.[facility]/cog/painad.pdf (press ctrl +  left click to view)    Dyspnea:                           [ ]Mild [ ]Moderate [ ]Severe  Anxiety:                             [ ]Mild [ ]Moderate [ ]Severe  Fatigue:                             [ ]Mild [ ]Moderate [ ]Severe  Nausea:                             [ ]Mild [ ]Moderate [ ]Severe  Loss of appetite:              [ ]Mild [ ]Moderate [ ]Severe  Constipation:                    [ ]Mild [ ]Moderate [ ]Severe    Other Symptoms:  [ ]All other review of systems negative     Palliative Performance Status Version 2:         %    http://npcrc.org/files/news/palliative_performance_scale_ppsv2.pdf  PHYSICAL EXAM:  Vital Signs Last 24 Hrs  T(C): 37.2 (14 Jul 2021 08:15), Max: 37.2 (14 Jul 2021 07:33)  T(F): 98.9 (14 Jul 2021 08:15), Max: 98.9 (14 Jul 2021 07:33)  HR: 78 (14 Jul 2021 08:15) (78 - 92)  BP: 162/85 (14 Jul 2021 08:15) (161/83 - 192/104)  BP(mean): --  RR: 16 (14 Jul 2021 08:15) (16 - 16)  SpO2: 98% (14 Jul 2021 08:15) (97% - 98%) I&O's Summary    GENERAL:  [ ]Alert  [ ]Oriented x   [ ]Lethargic  [ ]Cachexia  [ ]Unarousable  [ ]Verbal  [ ]Non-Verbal  Behavioral:   [ ] Anxiety  [ ] Delirium [ ] Agitation [ ] Other  HEENT:  [ ]Normal   [ ]Dry mouth   [ ]ET Tube/Trach  [ ]Oral lesions  PULMONARY:   [ ]Clear [ ]Tachypnea  [ ]Audible excessive secretions   [ ]Rhonchi        [ ]Right [ ]Left [ ]Bilateral  [ ]Crackles        [ ]Right [ ]Left [ ]Bilateral  [ ]Wheezing     [ ]Right [ ]Left [ ]Bilateral  [ ]Diminished breath sounds [ ]right [ ]left [ ]bilateral  CARDIOVASCULAR:    [ ]Regular [ ]Irregular [ ]Tachy  [ ]Micha [ ]Murmur [ ]Other  GASTROINTESTINAL:  [ ]Soft  [ ]Distended   [ ]+BS  [ ]Non tender [ ]Tender  [ ]PEG [ ]OGT/ NGT  Last BM:   GENITOURINARY:  [ ]Normal [ ] Incontinent   [ ]Oliguria/Anuria   [ ]Neff  MUSCULOSKELETAL:   [ ]Normal   [ ]Weakness  [ ]Bed/Wheelchair bound [ ]Edema  NEUROLOGIC:   [ ]No focal deficits  [ ]Cognitive impairment  [ ]Dysphagia [ ]Dysarthria [ ]Paresis [ ]Other   SKIN:   [ ]Normal    [ ]Rash  [ ]Pressure ulcer(s)       Present on admission [ ]y [ ]n    CRITICAL CARE:  [ ] Shock Present  [ ]Septic [ ]Cardiogenic [ ]Neurologic [ ]Hypovolemic  [ ]  Vasopressors [ ]  Inotropes   [ ]Respiratory failure present [ ]Mechanical ventilation [ ]Non-invasive ventilatory support [ ]High flow    [ ]Acute  [ ]Chronic [ ]Hypoxic  [ ]Hypercarbic [ ]Other  [ ]Other organ failure     LABS:                        11.1   8.16  )-----------( 168      ( 14 Jul 2021 06:22 )             34.5   07-14    141  |  107  |  21  ----------------------------<  187<H>  4.1   |  27  |  0.36<L>    Ca    8.5      14 Jul 2021 06:22  Phos  3.6     07-14  Mg     2.0     07-14    TPro  5.5<L>  /  Alb  2.9<L>  /  TBili  1.1  /  DBili  x   /  AST  15  /  ALT  20  /  AlkPhos  74  07-14  PT/INR - ( 13 Jul 2021 21:01 )   PT: 12.2 sec;   INR: 1.04 ratio         PTT - ( 13 Jul 2021 21:01 )  PTT:31.2 sec      RADIOLOGY & ADDITIONAL STUDIES:    PROTEIN CALORIE MALNUTRITION PRESENT: [ ]mild [ ]moderate [ ]severe [ ]underweight [ ]morbid obesity  https://www.andeal.org/vault/2440/web/files/ONC/Table_Clinical%20Characteristics%20to%20Document%20Malnutrition-White%20JV%20et%20al%202012.pdf    Height (cm): 154.9 (07-13-21 @ 19:34)  Weight (kg): 47.6 (07-13-21 @ 19:34)  BMI (kg/m2): 19.8 (07-13-21 @ 19:34)    [ ]PPSV2 < or = to 30% [ ]significant weight loss  [ ]poor nutritional intake  [ ]anasarca     Albumin, Serum: 2.9 g/dL (07-14-21 @ 06:22)   [ ]Artificial Nutrition      REFERRALS:   [ ]Chaplaincy  [ ]Hospice  [ ]Child Life  [ ]Social Work  [ ]Case management [ ]Holistic Therapy     Goals of Care Document: ARIADNE Marques (10-21-19 @ 12:11)  Goals of Care Conversation:   Participants:  · Participants  Patient    Advance Directives:  · Does patient have Advance Directive  Yes  · Indicate Type  Health Care Proxy (HCP); Living Will  · Agent's Name  Eleazar Santiago  · Phone #  [phone]  · Are any of the items on the chart  Yes  · Specify which ones are on chart  Health Care Proxy (HCP)  Living Will  · Caregiver:  no    Conversation Discussion:  · Conversation  MOLST Discussed  · Conversation Details  Pt wants all done ,does not want to live on machines.      Electronic Signatures:  Sonja Marques (DEEP)  (Signed 21-Oct-2019 12:12)  	Authored: Goals of Care Conversation      Last Updated: 21-Oct-2019 12:12 by Sonja Marques (DEEP)     HPI:  89 y/o female with PMHx HTN, HLD, prediabetes, IBS, osteoporosis, arthritis, peptic ulcer, venous insufficiency, Afib (not on a/c) BIBEMS s/p fall at home. Patient reports she was resting in bed around 6pm in the evening day of presentation when she sat up, stretched her legs, lifted herself to use her walker and fell on her right side. She does not know if she lost her footing, if her knees gave out or if the walker was not fully extended. Patient admits to hitting her head though she thinks he glasses gave her the injury rather than the floor. Denies LOC, visual changes. Denies prodromal dizziness, HA, weakness, changes in vision, chest pain, chest pressure, palpitations, SOB. Patient has been feeling at her baseline state of health the past couple of days but does admit to falling a few weeks ago at home but did not result in serious injury. Patient regularly ambulates with a walker. At present, patient admits to pain around her right eye (5/10), right shoulder (8/10), right hip (5/10). Denies fevers, chills, HA, visual disturbances, chest pain, chest pressure, palpitations, SOB, cough, abdominal pain, n/v.     In the ED, VS T 98.6F, HR 87, /104 -> 161/83, RR 16 98% RA   Labs significant for BUN 25, glucose 216  Patient received IV morphine 2mg x2, Zofran 4mg x1, 8 sutures to right lateral eyebrow laceration   EKG: Afib @72bpm, Qtc 444  CT c-spine: Diffuse osteopenia without displaced fracture. Trace grade 1 C2-C3 C3-C4 retrolisthesis. Bilateral foraminal stenosis due to disc and uncovertebral joint degeneration.  CT brain: No acute intracranial bleeding. Chronic microvascular ischemic changes.  CT maxillofacial: Right periorbital soft tissue hematoma. Intact globes. No fracture.  CT left knee: No acute fracture or traumatic bony malalignment. Large left knee joint simple effusion, nonspecific.  CT pelvis: Acute fractures of the right-sided pubic rami. A third acute pelvic ring fracture is not identified by CT, further evaluation with MRI can be obtained if clinically warranted. Chronic appearing insufficiency fracture of the sacrum. Left hip arthroplasty and right femoral neck intertrochanteric fixation, without evidence of failure of the imaged portions of the hardware.  Xray right elbow, right femur, right hip with pelvis: PENDING  Xray right shoulder: personal read- right humeral head fx  Xray left knee, left tibia and fibula: PENDING   (14 Jul 2021 02:54)    PERTINENT PM/SXH:   Hyperlipidemia    Peptic ulcer    HTN (hypertension)    Osteoporosis    Irritable bowel syndrome    DM (diabetes mellitus)    Cellulitis      S/P hip replacement    History of appendectomy    History of tonsillectomy      FAMILY HISTORY:  FH: hypertension      ITEMS NOT CHECKED ARE NOT PRESENT    SOCIAL HISTORY:   Significant other/partner[ ]  Children[ x]  Episcopalian/Spirituality:  Substance hx:  [ ]   Tobacco hx:  [ ]   Alcohol hx: [ ]   Home Opioid hx:  [ ] I-Stop Reference No:  Living Situation: [x ]Home  [ ]Long term care  [ ]Rehab [ ]Other    ADVANCE DIRECTIVES:    DNR  Yes    MOLST  [ ]  Living Will  [ ]   DECISION MAKER(s):  [ ] Health Care Proxy(s)  [ ] Surrogate(s)  [ ] Guardian           Name(s): Phone Number(s):    BASELINE (I)ADL(s) (prior to admission):  [place]: [ ]Total  [ ] Moderate [ ]Dependent    Allergies    sulfa drugs (Hives)    Intolerances    MEDICATIONS  (STANDING):  artificial tears (preservative free) Ophthalmic Solution 1 Drop(s) Both EYES two times a day  atorvastatin 20 milliGRAM(s) Oral at bedtime  dextrose 40% Gel 15 Gram(s) Oral once  famotidine    Tablet 20 milliGRAM(s) Oral daily  glucagon  Injectable 1 milliGRAM(s) IntraMuscular once  insulin lispro (ADMELOG) corrective regimen sliding scale   SubCutaneous every 6 hours  losartan 50 milliGRAM(s) Oral daily  metoprolol succinate ER 25 milliGRAM(s) Oral daily  sodium chloride 0.9%. 1000 milliLiter(s) (75 mL/Hr) IV Continuous <Continuous>    MEDICATIONS  (PRN):  acetaminophen   Tablet .. 650 milliGRAM(s) Oral every 4 hours PRN Mild Pain (1 - 3)  ondansetron    Tablet 4 milliGRAM(s) Oral every 6 hours PRN Nausea and/or Vomiting  traMADol 25 milliGRAM(s) Oral every 4 hours PRN Moderate Pain (4 - 6)  traMADol 50 milliGRAM(s) Oral every 6 hours PRN Severe Pain (7 - 10)    PRESENT SYMPTOMS: [ ]Unable to obtain due to poor mentation   Source if other than patient:  [ ]Family   [ ]Team     Pain: [ x]yes [ ]no  QOL impact - yes  Location -   R shoulder, bilat knee R>L          Aggravating factors - movement   Quality - dull  Radiation - no  Timing- constant   Severity (0-10 scale): 9/10  Minimal acceptable level (0-10 scale): 5/10    CPOT:    https://www.[facility].org/getattachment/[email or web address]/Critical-Care-Pain-Observation-Tool-(CPOT)      PAIN AD Score:     http://geriatrictoolkit.[facility]/cog/painad.pdf (press ctrl +  left click to view)    Dyspnea:                           [ ]Mild [ ]Moderate [ ]Severe  Anxiety:                             [ ]Mild [ ]Moderate [ ]Severe  Fatigue:                             [ ]Mild [ ]Moderate [ ]Severe  Nausea:                             [ ]Mild [ ]Moderate [ ]Severe  Loss of appetite:              [ ]Mild [ ]Moderate [ ]Severe  Constipation:                    [ ]Mild [ ]Moderate [ ]Severe    Other Symptoms:  [x ]All other review of systems negative     Palliative Performance Status Version 2:        40 %    http://npcrc.org/files/news/palliative_performance_scale_ppsv2.pdf  PHYSICAL EXAM:  Vital Signs Last 24 Hrs  T(C): 37.2 (14 Jul 2021 08:15), Max: 37.2 (14 Jul 2021 07:33)  T(F): 98.9 (14 Jul 2021 08:15), Max: 98.9 (14 Jul 2021 07:33)  HR: 78 (14 Jul 2021 08:15) (78 - 92)  BP: 162/85 (14 Jul 2021 08:15) (161/83 - 192/104)  BP(mean): --  RR: 16 (14 Jul 2021 08:15) (16 - 16)  SpO2: 98% (14 Jul 2021 08:15) (97% - 98%) I&O's Summary    GENERAL: NAD, well-groomed, well-developed  HEAD:  Atraumatic, Normocephalic  EYES: EOMI, PERRLA, conjunctiva and sclera clear  CHEST/LUNG: Clear to auscultation bilaterally; No rales, rhonchi, wheezing, or rubs  HEART: Regular rate and rhythm; No murmurs, rubs, or gallops  ABDOMEN: Soft, Nontender, Nondistended; Bowel sounds present  EXTREMITIES:  2+ Peripheral Pulses, No clubbing, cyanosis, or edema    LABS:                        11.1   8.16  )-----------( 168      ( 14 Jul 2021 06:22 )             34.5   07-14    141  |  107  |  21  ----------------------------<  187<H>  4.1   |  27  |  0.36<L>    Ca    8.5      14 Jul 2021 06:22  Phos  3.6     07-14  Mg     2.0     07-14    TPro  5.5<L>  /  Alb  2.9<L>  /  TBili  1.1  /  DBili  x   /  AST  15  /  ALT  20  /  AlkPhos  74  07-14  PT/INR - ( 13 Jul 2021 21:01 )   PT: 12.2 sec;   INR: 1.04 ratio         PTT - ( 13 Jul 2021 21:01 )  PTT:31.2 sec      RADIOLOGY & ADDITIONAL STUDIES: reviewed    PROTEIN CALORIE MALNUTRITION PRESENT: [ ]mild [ ]moderate [ ]severe [ ]underweight [ ]morbid obesity  https://www.andeal.org/vault/2440/web/files/ONC/Table_Clinical%20Characteristics%20to%20Document%20Malnutrition-White%20JV%20et%20al%202012.pdf    Height (cm): 154.9 (07-13-21 @ 19:34)  Weight (kg): 47.6 (07-13-21 @ 19:34)  BMI (kg/m2): 19.8 (07-13-21 @ 19:34)    [ ]PPSV2 < or = to 30% [ ]significant weight loss  [ ]poor nutritional intake  [ ]anasarca     Albumin, Serum: 2.9 g/dL (07-14-21 @ 06:22)   [ ]Artificial Nutrition      REFERRALS:   [ ]Chaplaincy  [ ]Hospice  [ ]Child Life  [ ]Social Work  [ ]Case management [ ]Holistic Therapy     Goals of Care Document: ARIADNE Marques (10-21-19 @ 12:11)  Goals of Care Conversation:   Participants:  · Participants  Patient    Advance Directives:  · Does patient have Advance Directive  Yes  · Indicate Type  Health Care Proxy (HCP); Living Will  · Agent's Name  Eleazar Santiago  · Phone #  [phone]  · Are any of the items on the chart  Yes  · Specify which ones are on chart  Health Care Proxy (HCP)  Living Will  · Caregiver:  no    Conversation Discussion:  · Conversation  MOLST Discussed  · Conversation Details  Pt wants all done ,does not want to live on machines.      Electronic Signatures:  Sonja Marques (RN)  (Signed 21-Oct-2019 12:12)  	Authored: Goals of Care Conversation      Last Updated: 21-Oct-2019 12:12 by Sonja Marques (DEEP)     HPI:  89 y/o female with PMHx HTN, HLD, prediabetes, IBS, osteoporosis, arthritis, peptic ulcer, venous insufficiency, Afib (not on a/c) BIBEMS s/p fall at home. Patient reports she was resting in bed around 6pm in the evening day of presentation when she sat up, stretched her legs, lifted herself to use her walker and fell on her right side. She does not know if she lost her footing, if her knees gave out or if the walker was not fully extended. Patient admits to hitting her head though she thinks he glasses gave her the injury rather than the floor. Denies LOC, visual changes. Denies prodromal dizziness, HA, weakness, changes in vision, chest pain, chest pressure, palpitations, SOB. Patient has been feeling at her baseline state of health the past couple of days but does admit to falling a few weeks ago at home but did not result in serious injury. Patient regularly ambulates with a walker. At present, patient admits to pain around her right eye (5/10), right shoulder (8/10), right hip (5/10). Denies fevers, chills, HA, visual disturbances, chest pain, chest pressure, palpitations, SOB, cough, abdominal pain, n/v.     In the ED, VS T 98.6F, HR 87, /104 -> 161/83, RR 16 98% RA   Labs significant for BUN 25, glucose 216  Patient received IV morphine 2mg x2, Zofran 4mg x1, 8 sutures to right lateral eyebrow laceration   EKG: Afib @72bpm, Qtc 444  CT c-spine: Diffuse osteopenia without displaced fracture. Trace grade 1 C2-C3 C3-C4 retrolisthesis. Bilateral foraminal stenosis due to disc and uncovertebral joint degeneration.  CT brain: No acute intracranial bleeding. Chronic microvascular ischemic changes.  CT maxillofacial: Right periorbital soft tissue hematoma. Intact globes. No fracture.  CT left knee: No acute fracture or traumatic bony malalignment. Large left knee joint simple effusion, nonspecific.  CT pelvis: Acute fractures of the right-sided pubic rami. A third acute pelvic ring fracture is not identified by CT, further evaluation with MRI can be obtained if clinically warranted. Chronic appearing insufficiency fracture of the sacrum. Left hip arthroplasty and right femoral neck intertrochanteric fixation, without evidence of failure of the imaged portions of the hardware.  Xray right elbow, right femur, right hip with pelvis: PENDING  Xray right shoulder: personal read- right humeral head fx  Xray left knee, left tibia and fibula: PENDING   (14 Jul 2021 02:54)    PERTINENT PM/SXH:   Hyperlipidemia    Peptic ulcer    HTN (hypertension)    Osteoporosis    Irritable bowel syndrome    DM (diabetes mellitus)    Cellulitis      S/P hip replacement    History of appendectomy    History of tonsillectomy      FAMILY HISTORY:  FH: hypertension      ITEMS NOT CHECKED ARE NOT PRESENT    SOCIAL HISTORY:   Significant other/partner[ ]  Children[ x]  Moravian/Spirituality: Roman Catholic   Substance hx:  [ ]   Tobacco hx:  [ ]   Alcohol hx: [ ]   Home Opioid hx:  [ ] I-Stop Reference No:  Living Situation: [x ]Home  [ ]Long term care  [ ]Rehab [ ]Other    ADVANCE DIRECTIVES:    DNR  Yes    MOLST  [ ]  Living Will  [ ]   DECISION MAKER(s):  [ ] Health Care Proxy(s)  [ ] Surrogate(s)  [ ] Guardian           Name(s): Phone Number(s):    BASELINE (I)ADL(s) (prior to admission):  [place]: [ ]Total  [x ] Moderate [ ]Dependent    Allergies    sulfa drugs (Hives)    Intolerances    MEDICATIONS  (STANDING):  artificial tears (preservative free) Ophthalmic Solution 1 Drop(s) Both EYES two times a day  atorvastatin 20 milliGRAM(s) Oral at bedtime  dextrose 40% Gel 15 Gram(s) Oral once  famotidine    Tablet 20 milliGRAM(s) Oral daily  glucagon  Injectable 1 milliGRAM(s) IntraMuscular once  insulin lispro (ADMELOG) corrective regimen sliding scale   SubCutaneous every 6 hours  losartan 50 milliGRAM(s) Oral daily  metoprolol succinate ER 25 milliGRAM(s) Oral daily  sodium chloride 0.9%. 1000 milliLiter(s) (75 mL/Hr) IV Continuous <Continuous>    MEDICATIONS  (PRN):  acetaminophen   Tablet .. 650 milliGRAM(s) Oral every 4 hours PRN Mild Pain (1 - 3)  ondansetron    Tablet 4 milliGRAM(s) Oral every 6 hours PRN Nausea and/or Vomiting  traMADol 25 milliGRAM(s) Oral every 4 hours PRN Moderate Pain (4 - 6)  traMADol 50 milliGRAM(s) Oral every 6 hours PRN Severe Pain (7 - 10)    PRESENT SYMPTOMS: [ ]Unable to obtain due to poor mentation   Source if other than patient:  [ ]Family   [ ]Team     Pain: [ x]yes [ ]no  QOL impact - yes  Location -   R shoulder, bilat knee R>L          Aggravating factors - movement   Quality - dull  Radiation - no  Timing- constant   Severity (0-10 scale): 9/10  Minimal acceptable level (0-10 scale): 5/10    CPOT:    https://www.[facility].org/getattachment/[email or web address]/Critical-Care-Pain-Observation-Tool-(CPOT)      PAIN AD Score:     http://geriatrictoolkit.[facility]/cog/painad.pdf (press ctrl +  left click to view)    Dyspnea:                           [ ]Mild [ ]Moderate [ ]Severe  Anxiety:                             [ ]Mild [ ]Moderate [ ]Severe  Fatigue:                             [ ]Mild [ ]Moderate [ ]Severe  Nausea:                             [ ]Mild [ ]Moderate [ ]Severe  Loss of appetite:              [ ]Mild [ ]Moderate [ ]Severe  Constipation:                    [ ]Mild [ ]Moderate [ ]Severe    Other Symptoms:  [x ]All other review of systems negative     Palliative Performance Status Version 2:        40 %    http://npcrc.org/files/news/palliative_performance_scale_ppsv2.pdf  PHYSICAL EXAM:  Vital Signs Last 24 Hrs  T(C): 37.2 (14 Jul 2021 08:15), Max: 37.2 (14 Jul 2021 07:33)  T(F): 98.9 (14 Jul 2021 08:15), Max: 98.9 (14 Jul 2021 07:33)  HR: 78 (14 Jul 2021 08:15) (78 - 92)  BP: 162/85 (14 Jul 2021 08:15) (161/83 - 192/104)  BP(mean): --  RR: 16 (14 Jul 2021 08:15) (16 - 16)  SpO2: 98% (14 Jul 2021 08:15) (97% - 98%) I&O's Summary    GENERAL: NAD, well-groomed, well-developed  HEAD:  Atraumatic, Normocephalic  EYES: EOMI, PERRLA, conjunctiva and sclera clear  CHEST/LUNG: Clear to auscultation bilaterally; No rales, rhonchi, wheezing, or rubs  HEART: Regular rate and rhythm; No murmurs, rubs, or gallops  ABDOMEN: Soft, Nontender, Nondistended; Bowel sounds present  EXTREMITIES:  2+ Peripheral Pulses, No clubbing, cyanosis, or edema    LABS:                        11.1   8.16  )-----------( 168      ( 14 Jul 2021 06:22 )             34.5   07-14    141  |  107  |  21  ----------------------------<  187<H>  4.1   |  27  |  0.36<L>    Ca    8.5      14 Jul 2021 06:22  Phos  3.6     07-14  Mg     2.0     07-14    TPro  5.5<L>  /  Alb  2.9<L>  /  TBili  1.1  /  DBili  x   /  AST  15  /  ALT  20  /  AlkPhos  74  07-14  PT/INR - ( 13 Jul 2021 21:01 )   PT: 12.2 sec;   INR: 1.04 ratio         PTT - ( 13 Jul 2021 21:01 )  PTT:31.2 sec      RADIOLOGY & ADDITIONAL STUDIES: reviewed    PROTEIN CALORIE MALNUTRITION PRESENT: [ ]mild [ ]moderate [ ]severe [ ]underweight [ ]morbid obesity  https://www.andeal.org/vault/2440/web/files/ONC/Table_Clinical%20Characteristics%20to%20Document%20Malnutrition-White%20JV%20et%20al%202012.pdf    Height (cm): 154.9 (07-13-21 @ 19:34)  Weight (kg): 47.6 (07-13-21 @ 19:34)  BMI (kg/m2): 19.8 (07-13-21 @ 19:34)    [ ]PPSV2 < or = to 30% [ ]significant weight loss  [ ]poor nutritional intake  [ ]anasarca     Albumin, Serum: 2.9 g/dL (07-14-21 @ 06:22)   [ ]Artificial Nutrition      REFERRALS:   [ ]Chaplaincy  [ ]Hospice  [ ]Child Life  [ ]Social Work  [ ]Case management [ ]Holistic Therapy     Goals of Care Document: ARIADNE Marques (10-21-19 @ 12:11)  Goals of Care Conversation:   Participants:  · Participants  Patient    Advance Directives:  · Does patient have Advance Directive  Yes  · Indicate Type  Health Care Proxy (HCP); Living Will  · Agent's Name  Eleazar Santiago  · Phone #  [phone]  · Are any of the items on the chart  Yes  · Specify which ones are on chart  Health Care Proxy (HCP)  Living Will  · Caregiver:  no    Conversation Discussion:  · Conversation  MOLST Discussed  · Conversation Details  Pt wants all done ,does not want to live on machines.      Electronic Signatures:  Sonja Marques (RN)  (Signed 21-Oct-2019 12:12)  	Authored: Goals of Care Conversation      Last Updated: 21-Oct-2019 12:12 by Sonja Marques (DEEP)

## 2021-07-14 NOTE — H&P ADULT - PROBLEM SELECTOR PLAN 1
Acute, s/p fall at home    - Admit to medicine  - Xray right shoulder significant for humeral head fracture on personal read, f/u final  - Ortho consulted by ED, f/u recs   - Will keep NPO except meds for possible OR   - Maintenance fluids @ 75cc/hr [id number]  - Pain control with Tylenol PRN mild pain, oxycodone 5mg PRN moderate pain, oxycodone 10mg PRN severe pain Acute, s/p fall at home    - Admit to medicine  - Xray right shoulder significant for humeral head fracture on personal read, f/u final  - Ortho consulted by ED, f/u recs   - Will keep NPO except meds awaiting ortho evaluation  - Maintenance fluids @ 75cc/hr [id number]  - Pain control with Tylenol PRN mild pain, oxycodone 5mg PRN moderate pain, oxycodone 10mg PRN severe pain Acute, s/p fall at home    - Admit to medicine  - Xray right shoulder significant for humeral head fracture on personal read, f/u final  - Ortho consulted by ED, f/u recs   - Will keep NPO except meds awaiting ortho evaluation  - Maintenance fluids @ 75cc/hr [id number]  - Pain control with Tylenol PRN mild pain, oxycodone 2.5mg PRN moderate pain, oxycodone 5mg PRN severe pain

## 2021-07-14 NOTE — H&P ADULT - NSHPPHYSICALEXAM_GEN_ALL_CORE
T(C): 37 (14 Jul 2021 00:30), Max: 37 (14 Jul 2021 00:30)  T(F): 98.6 (14 Jul 2021 00:30), Max: 98.6 (14 Jul 2021 00:30)  HR: 86 (14 Jul 2021 00:30) (86 - 87)  BP: 161/83 (14 Jul 2021 00:30) (161/83 - 192/104)  RR: 16 (14 Jul 2021 00:30) (16 - 16)  SpO2: 97% (14 Jul 2021 00:30) (97% - 98%) T(C): 37 (14 Jul 2021 00:30), Max: 37 (14 Jul 2021 00:30)  T(F): 98.6 (14 Jul 2021 00:30), Max: 98.6 (14 Jul 2021 00:30)  HR: 86 (14 Jul 2021 00:30) (86 - 87)  BP: 161/83 (14 Jul 2021 00:30) (161/83 - 192/104)  RR: 16 (14 Jul 2021 00:30) (16 - 16)  SpO2: 97% (14 Jul 2021 00:30) (97% - 98%)    GENERAL: patient appears in no acute distress, appropriately interactive  EYES: EOMI b/l, PERRLA, sclera clear, no exudates  HENMT: +right periorbital ecchymosis, +right lateral eyebrow sutures clean and dry  NECK: supple, soft  LUNGS: good air entry b/l, clear to auscultation, symmetric breath sounds, no wheezing or rhonchi appreciated  HEART: Irregular rhythm, regular rate, +S1/S2, no murmurs noted, no edema to b/l LE  GASTROINTESTINAL: abdomen is soft, NTND, normoactive bowel sounds x4 quadrants, no palpable masses  INTEGUMENT: good skin turgor, appears well perfused, no visualized rashes, no jaundice noted  EXTREMITIES: right elbow dressing c/d/i, 2+ peripheral pulses  NEUROLOGIC: AA&O x3, LUE and LLE motor strength 5/5, RUE and RLE motor strength not examined 2/2 pain, no obvious sensory deficits x4 extremities   PSYCHIATRIC: mood is good, affect is congruent with mood, linear and logical thought process  HEME/LYMPH: b/l UE dorsal ecchymosis

## 2021-07-14 NOTE — H&P ADULT - NSHPREVIEWOFSYSTEMS_GEN_ALL_CORE
CONSTITUTIONAL: denies fever, chills, fatigue, weakness  HEENT: admits right periorbital pain. denies dizziness or lightheadedness, blurred vision, sore throat  SKIN: admits to new right elbow laceration  CARDIOVASCULAR: denies chest pain, chest pressure, palpitations  RESPIRATORY: denies shortness of breath, cough, sputum production, wheezing  GASTROINTESTINAL: denies nausea, vomiting, diarrhea, constipation, abdominal pain, bloody stools  GENITOURINARY: denies painful urination, increased frequency, urgency, or bloody urine  NEUROLOGICAL: denies numbness, headache, focal weakness  MUSCULOSKELETAL: admits new right shoulder pain and swelling, right hip pain, chronic left knee pain, generalized muscle aches  HEMATOLOGIC: admits b/l UE bruising, right hip bruising   LYMPHATICS: denies enlarged lymph nodes  PSYCHIATRIC: denies recent changes in anxiety, depression  ENDOCRINOLOGIC: denies sweating, cold or heat intolerance

## 2021-07-14 NOTE — H&P ADULT - HISTORY OF PRESENT ILLNESS
89 y/o female with PMHx HTN, HLD, prediabetes, IBD, osteoporosis, arthritis, peptic ulcer BIBEMS s/p fall at home.     In the ED, VS  Labs significant for  Patient received  EKG: Afib @72bpm   89 y/o female with PMHx HTN, HLD, prediabetes, IBS, osteoporosis, arthritis, peptic ulcer, venous insufficiency, Afib (not on a/c) BIBEMS s/p fall at home. Patient reports she was resting in bed around 6pm in the evening day of presentation when she sat up, stretched her legs, lifted herself to use her walker and fell on her right side. She does not know if she lost her footing, if her knees gave out or if the walker was not fully extended. Patient admits to hitting her head though she thinks he glasses gave her the injury rather than the floor. Denies LOC, visual changes. Denies prodromal dizziness, HA, weakness, changes in vision, chest pain, chest pressure, palpitations, SOB. Patient has been feeling at her baseline state of health the past couple of days but does admit to falling a few weeks ago at home but did not result in serious injury. Patient regularly ambulates with a walker. At present, patient admits to pain around her right eye (5/10), right shoulder (8/10), right hip (5/10). Denies fevers, chills, HA, visual disturbances, chest pain, chest pressure, palpitations, SOB, cough, abdominal pain, n/v.     In the ED, VS T 98.6F, HR 87, /104 -> 161/83, RR 16 98% RA   Labs significant for BUN 25, glucose 216  Patient received IV morphine 2mg x2, Zofran 4mg x1, 8 sutures to right lateral eyebrow laceration   EKG: Afib @72bpm, Qtc 444  CXR:  CT c-spine: Diffuse osteopenia without displaced fracture. Trace grade 1 C2-C3 C3-C4 retrolisthesis. Bilateral foraminal stenosis due to disc and uncovertebral joint degeneration.  CT brain: No acute intracranial bleeding. Chronic microvascular ischemic changes.  CT maxillofacial: Right periorbital soft tissue hematoma. Intact globes. No fracture.  CT left knee: No acute fracture or traumatic bony malalignment. Large left knee joint simple effusion, nonspecific.  CT pelvis: Acute fractures of the right-sided pubic rami. A third acute pelvic ring fracture is not identified by CT, further evaluation with MRI can be obtained if clinically warranted. Chronic appearing insufficiency fracture of the sacrum. Left hip arthroplasty and right femoral neck intertrochanteric fixation, without evidence of failure of the imaged portions of the hardware.  Xray right elbow, right femur, right hip with pelvis: PENDING  Xray right shoulder: personal read- right humeral head fx  Xray left knee, left tibia and fibula: PENDING   89 y/o female with PMHx HTN, HLD, prediabetes, IBS, osteoporosis, arthritis, peptic ulcer, venous insufficiency, Afib (not on a/c) BIBEMS s/p fall at home. Patient reports she was resting in bed around 6pm in the evening day of presentation when she sat up, stretched her legs, lifted herself to use her walker and fell on her right side. She does not know if she lost her footing, if her knees gave out or if the walker was not fully extended. Patient admits to hitting her head though she thinks he glasses gave her the injury rather than the floor. Denies LOC, visual changes. Denies prodromal dizziness, HA, weakness, changes in vision, chest pain, chest pressure, palpitations, SOB. Patient has been feeling at her baseline state of health the past couple of days but does admit to falling a few weeks ago at home but did not result in serious injury. Patient regularly ambulates with a walker. At present, patient admits to pain around her right eye (5/10), right shoulder (8/10), right hip (5/10). Denies fevers, chills, HA, visual disturbances, chest pain, chest pressure, palpitations, SOB, cough, abdominal pain, n/v.     In the ED, VS T 98.6F, HR 87, /104 -> 161/83, RR 16 98% RA   Labs significant for BUN 25, glucose 216  Patient received IV morphine 2mg x2, Zofran 4mg x1, 8 sutures to right lateral eyebrow laceration   EKG: Afib @72bpm, Qtc 444  CT c-spine: Diffuse osteopenia without displaced fracture. Trace grade 1 C2-C3 C3-C4 retrolisthesis. Bilateral foraminal stenosis due to disc and uncovertebral joint degeneration.  CT brain: No acute intracranial bleeding. Chronic microvascular ischemic changes.  CT maxillofacial: Right periorbital soft tissue hematoma. Intact globes. No fracture.  CT left knee: No acute fracture or traumatic bony malalignment. Large left knee joint simple effusion, nonspecific.  CT pelvis: Acute fractures of the right-sided pubic rami. A third acute pelvic ring fracture is not identified by CT, further evaluation with MRI can be obtained if clinically warranted. Chronic appearing insufficiency fracture of the sacrum. Left hip arthroplasty and right femoral neck intertrochanteric fixation, without evidence of failure of the imaged portions of the hardware.  Xray right elbow, right femur, right hip with pelvis: PENDING  Xray right shoulder: personal read- right humeral head fx  Xray left knee, left tibia and fibula: PENDING

## 2021-07-14 NOTE — H&P ADULT - PROBLEM SELECTOR PLAN 4
EKG with Afib, rate controlled  - Patient and daughter admit to hx of Afib though do not know specific details of diagnosis  - Per daughter, not on a/c due to fall risk, takes 2 ASA 81mg PO daily per cardiology  - Hold ASA pending serial CTs   - Monitor on remote telemetry   - Continue rate control with metoprolol succinate 25mg PO qd  - Follows with Dr. Aleman outpatient, will consult, f/u recs EKG with Afib, rate controlled  - Patient and daughter admit to hx of Afib though do not know specific details of diagnosis  - Per daughter, not on a/c due to fall risk, takes 2 ASA 81mg PO daily per cardiology  - CHADsVASC 5 (including diabetes)  - Hold ASA pending serial CTs   - Monitor on remote telemetry   - Continue rate control with metoprolol succinate 25mg PO qd  - Follows with Dr. Aleman outpatient, will consult, f/u recs

## 2021-07-14 NOTE — CONSULT NOTE ADULT - ASSESSMENT
89F R proximal humerus fracture    Plan:   Medical management appreciated.  DVT PPx held per primary team  Pain management PRN  NWB RUE, keep in sling   Imaging findings discussed with patient.   No acute orthopaedic surgical intervention indicated at this time. This patient is orthopaedically stable for discharge.   All of the patient's questions and concerns were answered and addressed.   Will discuss with attending, and will advise of any changes to the plan.           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  XR imaging reviewed with right GH arthritis, CT/XR imaging of pelvis with R LC1 injury  CT R shoulder obtained to r/o fracture, did not show any acute bony abnormality, only GH arthritis  DVT PPx held per primary team  Pain management PRN  WBAT RUE  WBAT BLLE/PT/OT  Imaging findings discussed with patient.   No acute orthopaedic surgical intervention indicated at this time. This patient is orthopaedically stable for discharge. Patient to follow up with attending Dr. Campos who agrees with plan.  All of the patient's questions and concerns were answered and addressed.   Will discuss with attending Dr. Campos, and will advise of any changes to the plan.

## 2021-07-14 NOTE — PROGRESS NOTE ADULT - SUBJECTIVE AND OBJECTIVE BOX
Patient is a 89y old  Female who presents with a chief complaint of right humeral head fx, right pubic rami fx (14 Jul 2021 02:54)        HPI:  89 y/o female with PMHx HTN, HLD, prediabetes, IBS, osteoporosis, arthritis, peptic ulcer, venous insufficiency, Afib (not on a/c) BIBEMS s/p fall at home. Patient reports she was resting in bed around 6pm in the evening day of presentation when she sat up, stretched her legs, lifted herself to use her walker and fell on her right side. She does not know if she lost her footing, if her knees gave out or if the walker was not fully extended. Patient admits to hitting her head though she thinks he glasses gave her the injury rather than the floor. Denies LOC, visual changes. Denies prodromal dizziness, HA, weakness, changes in vision, chest pain, chest pressure, palpitations, SOB. Patient has been feeling at her baseline state of health the past couple of days but does admit to falling a few weeks ago at home but did not result in serious injury. Patient regularly ambulates with a walker. At present, patient admits to pain around her right eye (5/10), right shoulder (8/10), right hip (5/10). Denies fevers, chills, HA, visual disturbances, chest pain, chest pressure, palpitations, SOB, cough, abdominal pain, n/v.     In the ED, VS T 98.6F, HR 87, /104 -> 161/83, RR 16 98% RA   Labs significant for BUN 25, glucose 216  Patient received IV morphine 2mg x2, Zofran 4mg x1, 8 sutures to right lateral eyebrow laceration   EKG: Afib @72bpm, Qtc 444  CT c-spine: Diffuse osteopenia without displaced fracture. Trace grade 1 C2-C3 C3-C4 retrolisthesis. Bilateral foraminal stenosis due to disc and uncovertebral joint degeneration.  CT brain: No acute intracranial bleeding. Chronic microvascular ischemic changes.  CT maxillofacial: Right periorbital soft tissue hematoma. Intact globes. No fracture.  CT left knee: No acute fracture or traumatic bony malalignment. Large left knee joint simple effusion, nonspecific.  CT pelvis: Acute fractures of the right-sided pubic rami. A third acute pelvic ring fracture is not identified by CT, further evaluation with MRI can be obtained if clinically warranted. Chronic appearing insufficiency fracture of the sacrum. Left hip arthroplasty and right femoral neck intertrochanteric fixation, without evidence of failure of the imaged portions of the hardware.  Xray right elbow, right femur, right hip with pelvis: PENDING  Xray right shoulder: personal read- right humeral head fx  Xray left knee, left tibia and fibula: PENDING   (14 Jul 2021 02:54)      SUBJECTIVE & OBJECTIVE: Pt seen and examined at bedside. decrease air entry at the bases    PHYSICAL EXAM:  T(C): 37.2 (07-14-21 @ 08:15), Max: 37.2 (07-14-21 @ 07:33)  HR: 78 (07-14-21 @ 08:15) (78 - 92)  BP: 162/85 (07-14-21 @ 08:15) (161/83 - 192/104)  RR: 16 (07-14-21 @ 08:15) (16 - 16)  SpO2: 98% (07-14-21 @ 08:15) (97% - 98%)  Wt(kg): -- Height (cm): 154.9 (07-13 @ 19:34)  Weight (kg): 47.6 (07-13 @ 19:34)  BMI (kg/m2): 19.8 (07-13 @ 19:34)  BSA (m2): 1.44 (07-13 @ 19:34)  GENERAL: NAD, well-groomed, well-developed  HEAD:  Atraumatic, Normocephalic  EYES: EOMI, PERRLA, conjunctiva and sclera clear  CHEST/LUNG: Clear to auscultation bilaterally; No rales, rhonchi, wheezing, or rubs  HEART: Regular rate and rhythm; No murmurs, rubs, or gallops  ABDOMEN: Soft, Nontender, Nondistended; Bowel sounds present  EXTREMITIES:  2+ Peripheral Pulses, No clubbing, cyanosis, or edema        MEDICATIONS  (STANDING):  artificial tears (preservative free) Ophthalmic Solution 1 Drop(s) Both EYES two times a day  atorvastatin 20 milliGRAM(s) Oral at bedtime  dextrose 40% Gel 15 Gram(s) Oral once  famotidine    Tablet 20 milliGRAM(s) Oral daily  glucagon  Injectable 1 milliGRAM(s) IntraMuscular once  insulin lispro (ADMELOG) corrective regimen sliding scale   SubCutaneous every 6 hours  losartan 50 milliGRAM(s) Oral daily  metoprolol succinate ER 25 milliGRAM(s) Oral daily  sodium chloride 0.9%. 1000 milliLiter(s) (75 mL/Hr) IV Continuous <Continuous>  traMADol 25 milliGRAM(s) Oral every 4 hours    MEDICATIONS  (PRN):  acetaminophen   Tablet .. 650 milliGRAM(s) Oral every 4 hours PRN Mild Pain (1 - 3)  ondansetron    Tablet 4 milliGRAM(s) Oral every 6 hours PRN Nausea and/or Vomiting  traMADol 50 milliGRAM(s) Oral every 6 hours PRN Severe Pain (7 - 10)      LABS:                        11.1   8.16  )-----------( 168      ( 14 Jul 2021 06:22 )             34.5     07-14    141  |  107  |  21  ----------------------------<  187<H>  4.1   |  27  |  0.36<L>    Ca    8.5      14 Jul 2021 06:22  Phos  3.6     07-14  Mg     2.0     07-14    TPro  5.5<L>  /  Alb  2.9<L>  /  TBili  1.1  /  DBili  x   /  AST  15  /  ALT  20  /  AlkPhos  74  07-14    PT/INR - ( 13 Jul 2021 21:01 )   PT: 12.2 sec;   INR: 1.04 ratio         PTT - ( 13 Jul 2021 21:01 )  PTT:31.2 sec    Magnesium, Serum: 2.0 mg/dL (07-14 @ 06:22)    CAPILLARY BLOOD GLUCOSE      POCT Blood Glucose.: 167 mg/dL (14 Jul 2021 06:10)      CAPILLARY BLOOD GLUCOSE      POCT Blood Glucose.: 167 mg/dL (14 Jul 2021 06:10)    CAPILLARY BLOOD GLUCOSE      POCT Blood Glucose.: 167 mg/dL (14 Jul 2021 06:10)            RECENT CULTURES:      RADIOLOGY & ADDITIONAL TESTS:                        DVT/GI ppx  Discussed with pt @ bedside

## 2021-07-14 NOTE — H&P ADULT - ASSESSMENT
89 y/o female with PMHx HTN, HLD, prediabetes, IBS, osteoporosis, arthritis, peptic ulcer, venous insufficiency, Afib (not on a/c) BIBEMS s/p fall at home when she was getting up out of bed to ambulate with her walker and fell on her right side, admitted with a right pubic rami fracture and right humeral head fracture  89 y/o female with PMHx HTN, HLD, prediabetes, IBS, osteoporosis, arthritis, peptic ulcer, venous insufficiency, Afib (not on a/c) BIBEMS s/p fall at home when she was getting up out of bed to ambulate with her walker and fell on her right side, admitted with a right pubic rami fracture and right humeral head fracture

## 2021-07-14 NOTE — H&P ADULT - PROBLEM SELECTOR PLAN 6
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

## 2021-07-14 NOTE — H&P ADULT - PROBLEM SELECTOR PLAN 2
Acute, s/p fall at home    - CT right pelvis with acute fractures of the right-sided pubic rami  - Ortho consulted by ED, f/u recs   - Pain control with Tylenol PRN mild pain, oxycodone 5mg PRN moderate pain, oxycodone 10mg PRN severe pain Acute, s/p fall at home    - CT right pelvis with acute fractures of the right-sided pubic rami  - Ortho consulted by ED, f/u recs   - Pain control with Tylenol PRN mild pain, oxycodone 5mg PRN moderate pain, oxycodone 10mg PRN severe pain  - Strict bedrest pending ortho recs Acute, s/p fall at home    - CT right pelvis with acute fractures of the right-sided pubic rami  - Ortho consulted by ED, f/u recs   - Pain control with Tylenol PRN mild pain, oxycodone 5mg PRN moderate pain, oxycodone 10mg PRN severe pain  - Strict bedrest pending ortho recs  - pt eval Acute, s/p fall at home    - CT right pelvis with acute fractures of the right-sided pubic rami  - Ortho consulted by ED, f/u recs   - Pain control with Tylenol PRN mild pain, oxycodone 2.5mg PRN moderate pain, oxycodone 5mg PRN severe pain  - Strict bedrest pending ortho recs  - pt eval

## 2021-07-14 NOTE — H&P ADULT - PROBLEM SELECTOR PLAN 9
Chronic  - CT c-spine with diffuse osteopenia without displaced fracture  - Fall risk protocol  - PT eval pending ortho OR recs

## 2021-07-14 NOTE — CONSULT NOTE ADULT - SUBJECTIVE AND OBJECTIVE BOX
***Incomplete note, charting in progress***    Received page about this patient at 10:05AM, and patient was seen and examined at bedside at 10:20AM. She endorses a fall last night with headstrike but cannot confirm or deny LOC. She has a history of atrial fibrillation but is only on ASA 81mg BID given her fall risk. She endorses pelvic pain at the pubis and right shoulder pain. She denies any numbness, tingling, weakness, or other orthopaedic complaints.     Exam:   Vital Signs Last 24 Hrs  T(C): 37.2 (14 Jul 2021 08:15), Max: 37.2 (14 Jul 2021 07:33)  T(F): 98.9 (14 Jul 2021 08:15), Max: 98.9 (14 Jul 2021 07:33)  HR: 78 (14 Jul 2021 08:15) (78 - 92)  BP: 162/85 (14 Jul 2021 08:15) (161/83 - 192/104)  RR: 16 (14 Jul 2021 08:15) (16 - 16)  SpO2: 98% (14 Jul 2021 08:15) (97% - 98%)    Gen: resting in bed, in pain, but NAD   RUE:  Ecchymoses scattered throughout the RUE. Swelling around the deltoid. Superficial abrasion noted on the extensor surface of the elbow.  TTP over the deltoid, posterior arm, and over the scapular spine. No stepoff appreciated.   Pain with ROM about the shoulder, elbow; NT with AROM/PROM of the wrist and hand.   SILT C5-T1.   Ax/Msc/Rad/Uln/AIN/PIN intact  Radial and ulnar pulses palpable  Cap refill <2s    Secondary Assessment:  NC/AT, NTTP of clavicles, NTTP of C-,T-,L-Spine, TTP of the right pubis, NTTP of the left pubis.   LUE: NTTP of Shoulder, Elbow, Wrist, Hand; NT with AROM/PROM of Shoulder, Elbow, Wrist, Hand; AIN/PIN/Med/Uln/Msc/Rad/Ax intact  LEs: Able to SLR, NT with Log Roll, NT with Heel Strike, NTTP of Hips, Knees, Ankles, Feet; NT with AROM/PROM of Hips, Knees, Ankles, Feet; Q/H/Gsc/TA/EHL/FHL intact     Imaging reviewed and discussed with the patient.  89F admitted for inability to ambulate through the [facility] last night. Patient reports fall last night with head strike but cannot confirm or deny LOC. She endorses pain at the right hip/groin and right shoulder. Patient reports inability to move her arm. Patient ambulates with walker at baseline but has been unable to walk since fall. She denies any numbness, tingling, weakness, or other orthopaedic complaints.     Exam:   Vital Signs Last 24 Hrs  T(C): 37.2 (14 Jul 2021 08:15), Max: 37.2 (14 Jul 2021 07:33)  T(F): 98.9 (14 Jul 2021 08:15), Max: 98.9 (14 Jul 2021 07:33)  HR: 78 (14 Jul 2021 08:15) (78 - 92)  BP: 162/85 (14 Jul 2021 08:15) (161/83 - 192/104)  RR: 16 (14 Jul 2021 08:15) (16 - 16)  SpO2: 98% (14 Jul 2021 08:15) (97% - 98%)    Gen: resting in bed, in pain, but NAD   RUE:  Ecchymoses scattered throughout the RUE. Swelling around the proximal humerus/shoulder. Superficial abrasion noted on the extensor surface of the elbow. Skin otherwise intact.  TTP over the deltoid, posterior arm, and over the scapular spine. No stepoff appreciated.   Pain with ROM about the shoulder/elbow; NT with AROM/PROM of the wrist and hand.   SILT C5-T1.   Ax/Msc/Rad/Uln/AIN/PIN intact  Radial and ulnar pulses palpable  Cap refill <2s  No calf TTP bilaterally    Secondary Assessment:  NC/AT, NTTP of clavicles, NTTP of C-,T-,L-Spine, TTP of the right pubis, NTTP of the left pubis.   LUE: NTTP of Shoulder, Elbow, Wrist, Hand; NT with AROM/PROM of Shoulder, Elbow, Wrist, Hand; AIN/PIN/Med/Uln/Msc/Rad/Ax intact  LEs: Able to SLR, NT with Log Roll, NT with Heel Strike, NTTP of Hips, Knees, Ankles, Feet; NT with AROM/PROM of Hips, Knees, Ankles, Feet; Q/H/Gsc/TA/EHL/FHL intact     XR imaging reviewed with right GH arthritis, no obvious fracture  CT/XR imaging of pelvis reviewed with R LC1 injury

## 2021-07-14 NOTE — H&P ADULT - PROBLEM SELECTOR PLAN 3
Acute, s/p fall at home  - Admits to hitting head though pt states her injuries are from her glasses not the ground  - Right periorbital ecchymosis and laceration present s/p 8 sutures, CT showing soft tissue hematoma without fractures   - Initial CT brain negative, due to trauma will repeat serial head CTs   - CT c-spine with chronic changes  - CT left knee with nonspecific effusion, patient has hx of left knee pain worse s/p fall   - F/u pending Xray imaging and ortho recs  - OOB with assistance, fall risk protocol    - SW consult, PT eval after ortho recs regarding OR  - D/w patient and daughter Eleazar, she will likely need ALTON as she uses walker to ambulate, has humeral fx Acute, s/p fall at home  - Admits to hitting head though pt states her injuries are from her glasses not the ground  - Right periorbital ecchymosis and laceration present s/p 8 sutures, CT showing soft tissue hematoma without fractures   - Initial CT brain negative, due to trauma will repeat serial head CTs   - CT c-spine with chronic changes  - CT left knee with nonspecific effusion, patient has hx of left knee pain worse s/p fall   - F/u pending Xray imaging and ortho recs  - Strict bedrest pending ortho recs, fall risk protocol    - SW consult, PT eval after ortho recs regarding OR  - D/w patient and daughter Eleazar, she will likely need ALTON as she uses walker to ambulate, has humeral fx

## 2021-07-14 NOTE — H&P ADULT - PROBLEM SELECTOR PLAN 5
Chronic, BP elevated on presentation 2/2 pain   - Continue home regimen with losartan 50mg PO qd, metoprolol succinate 25mg PO qd with hold parameters  - Monitor routine HD

## 2021-07-14 NOTE — PROGRESS NOTE ADULT - ASSESSMENT
89 y/o female with PMHx HTN, HLD, prediabetes, IBS, osteoporosis, arthritis, peptic ulcer, venous insufficiency, Afib (not on a/c) BIBEMS s/p fall at home when she was getting up out of bed to ambulate with her walker and fell on her right side, admitted with a right pubic rami fracture and right humeral head fracture

## 2021-07-14 NOTE — PROGRESS NOTE ADULT - PROBLEM SELECTOR PLAN 2
Acute, s/p fall at home    - CT right pelvis with acute fractures of the right-sided pubic rami  - Ortho consulted by ED, f/u recs   - Pain control with Tylenol PRN mild pain, oxycodone 2.5mg PRN moderate pain, oxycodone 5mg PRN severe pain  - Strict bedrest pending ortho recs  - pt eval

## 2021-07-15 LAB
ALBUMIN SERPL ELPH-MCNC: 2.6 G/DL — LOW (ref 3.3–5)
ALP SERPL-CCNC: 69 U/L — SIGNIFICANT CHANGE UP (ref 40–120)
ALT FLD-CCNC: 16 U/L — SIGNIFICANT CHANGE UP (ref 12–78)
ANION GAP SERPL CALC-SCNC: 7 MMOL/L — SIGNIFICANT CHANGE UP (ref 5–17)
AST SERPL-CCNC: 12 U/L — LOW (ref 15–37)
BILIRUB SERPL-MCNC: 1.9 MG/DL — HIGH (ref 0.2–1.2)
BUN SERPL-MCNC: 21 MG/DL — SIGNIFICANT CHANGE UP (ref 7–23)
CALCIUM SERPL-MCNC: 8.8 MG/DL — SIGNIFICANT CHANGE UP (ref 8.5–10.1)
CHLORIDE SERPL-SCNC: 104 MMOL/L — SIGNIFICANT CHANGE UP (ref 96–108)
CO2 SERPL-SCNC: 29 MMOL/L — SIGNIFICANT CHANGE UP (ref 22–31)
COVID-19 SPIKE DOMAIN AB INTERP: POSITIVE
COVID-19 SPIKE DOMAIN ANTIBODY RESULT: 62.8 U/ML — HIGH
CREAT SERPL-MCNC: 0.53 MG/DL — SIGNIFICANT CHANGE UP (ref 0.5–1.3)
GLUCOSE SERPL-MCNC: 127 MG/DL — HIGH (ref 70–99)
HCT VFR BLD CALC: 32.7 % — LOW (ref 34.5–45)
HGB BLD-MCNC: 10.6 G/DL — LOW (ref 11.5–15.5)
MCHC RBC-ENTMCNC: 28.3 PG — SIGNIFICANT CHANGE UP (ref 27–34)
MCHC RBC-ENTMCNC: 32.4 GM/DL — SIGNIFICANT CHANGE UP (ref 32–36)
MCV RBC AUTO: 87.2 FL — SIGNIFICANT CHANGE UP (ref 80–100)
NRBC # BLD: 0 /100 WBCS — SIGNIFICANT CHANGE UP (ref 0–0)
PLATELET # BLD AUTO: 155 K/UL — SIGNIFICANT CHANGE UP (ref 150–400)
POTASSIUM SERPL-MCNC: 4.2 MMOL/L — SIGNIFICANT CHANGE UP (ref 3.5–5.3)
POTASSIUM SERPL-SCNC: 4.2 MMOL/L — SIGNIFICANT CHANGE UP (ref 3.5–5.3)
PROT SERPL-MCNC: 5.5 G/DL — LOW (ref 6–8.3)
RBC # BLD: 3.75 M/UL — LOW (ref 3.8–5.2)
RBC # FLD: 13.7 % — SIGNIFICANT CHANGE UP (ref 10.3–14.5)
SARS-COV-2 IGG+IGM SERPL QL IA: 62.8 U/ML — HIGH
SARS-COV-2 IGG+IGM SERPL QL IA: POSITIVE
SODIUM SERPL-SCNC: 140 MMOL/L — SIGNIFICANT CHANGE UP (ref 135–145)
WBC # BLD: 9.17 K/UL — SIGNIFICANT CHANGE UP (ref 3.8–10.5)
WBC # FLD AUTO: 9.17 K/UL — SIGNIFICANT CHANGE UP (ref 3.8–10.5)

## 2021-07-15 PROCEDURE — 99233 SBSQ HOSP IP/OBS HIGH 50: CPT | Mod: GC

## 2021-07-15 PROCEDURE — 99232 SBSQ HOSP IP/OBS MODERATE 35: CPT | Mod: GC

## 2021-07-15 PROCEDURE — 99233 SBSQ HOSP IP/OBS HIGH 50: CPT

## 2021-07-15 RX ORDER — INSULIN LISPRO 100/ML
VIAL (ML) SUBCUTANEOUS
Refills: 0 | Status: DISCONTINUED | OUTPATIENT
Start: 2021-07-15 | End: 2021-07-19

## 2021-07-15 RX ORDER — ASPIRIN/CALCIUM CARB/MAGNESIUM 324 MG
81 TABLET ORAL DAILY
Refills: 0 | Status: DISCONTINUED | OUTPATIENT
Start: 2021-07-15 | End: 2021-07-19

## 2021-07-15 RX ORDER — INSULIN LISPRO 100/ML
VIAL (ML) SUBCUTANEOUS AT BEDTIME
Refills: 0 | Status: DISCONTINUED | OUTPATIENT
Start: 2021-07-15 | End: 2021-07-19

## 2021-07-15 RX ORDER — POLYETHYLENE GLYCOL 3350 17 G/17G
17 POWDER, FOR SOLUTION ORAL DAILY
Refills: 0 | Status: DISCONTINUED | OUTPATIENT
Start: 2021-07-15 | End: 2021-07-19

## 2021-07-15 RX ADMIN — FAMOTIDINE 20 MILLIGRAM(S): 10 INJECTION INTRAVENOUS at 11:11

## 2021-07-15 RX ADMIN — Medication 81 MILLIGRAM(S): at 11:11

## 2021-07-15 RX ADMIN — MORPHINE SULFATE 2.5 MILLIGRAM(S): 50 CAPSULE, EXTENDED RELEASE ORAL at 12:21

## 2021-07-15 RX ADMIN — Medication 1000 MILLIGRAM(S): at 00:29

## 2021-07-15 RX ADMIN — Medication 1000 MILLIGRAM(S): at 05:08

## 2021-07-15 RX ADMIN — Medication 1000 MILLIGRAM(S): at 06:02

## 2021-07-15 RX ADMIN — Medication 1: at 12:20

## 2021-07-15 RX ADMIN — Medication 1 DROP(S): at 17:04

## 2021-07-15 RX ADMIN — MORPHINE SULFATE 2.5 MILLIGRAM(S): 50 CAPSULE, EXTENDED RELEASE ORAL at 05:09

## 2021-07-15 RX ADMIN — MORPHINE SULFATE 2.5 MILLIGRAM(S): 50 CAPSULE, EXTENDED RELEASE ORAL at 06:02

## 2021-07-15 RX ADMIN — LOSARTAN POTASSIUM 50 MILLIGRAM(S): 100 TABLET, FILM COATED ORAL at 05:08

## 2021-07-15 RX ADMIN — MORPHINE SULFATE 2.5 MILLIGRAM(S): 50 CAPSULE, EXTENDED RELEASE ORAL at 11:11

## 2021-07-15 RX ADMIN — MORPHINE SULFATE 2.5 MILLIGRAM(S): 50 CAPSULE, EXTENDED RELEASE ORAL at 17:34

## 2021-07-15 RX ADMIN — Medication 1 DROP(S): at 05:08

## 2021-07-15 RX ADMIN — MORPHINE SULFATE 2.5 MILLIGRAM(S): 50 CAPSULE, EXTENDED RELEASE ORAL at 00:25

## 2021-07-15 RX ADMIN — SENNA PLUS 2 TABLET(S): 8.6 TABLET ORAL at 23:06

## 2021-07-15 RX ADMIN — Medication 25 MILLIGRAM(S): at 05:08

## 2021-07-15 RX ADMIN — ATORVASTATIN CALCIUM 20 MILLIGRAM(S): 80 TABLET, FILM COATED ORAL at 23:06

## 2021-07-15 RX ADMIN — MORPHINE SULFATE 2.5 MILLIGRAM(S): 50 CAPSULE, EXTENDED RELEASE ORAL at 17:04

## 2021-07-15 NOTE — PROGRESS NOTE ADULT - PROBLEM SELECTOR PLAN 2
Acute, s/p fall at home    - CT right pelvis with acute fractures of the right-sided pubic rami  - Ortho consulted by ED - no surgical intervention  - Pain control with Tylenol PRN mild pain, oxycodone 2.5mg PRN moderate pain, oxycodone 5mg PRN severe pain  - pt eval - pt to be dc'd for ALTON Acute, s/p fall at home    - CT right pelvis with acute fractures of the right-sided pubic rami  - Ortho consulted by ED - no surgical intervention  - Pain control with Tylenol PRN mild pain, oxycodone 2.5mg PRN moderate pain, oxycodone 5mg PRN severe pain  - pt eval - recommend ALTON - fracture ruled out, Acute, s/p fall at home , severe oa  - Xray right shoulder - no evidence of acute fracture  - Pain control with Tylenol PRN mild pain, oxycodone 2.5mg PRN moderate pain, oxycodone 5mg PRN severe pain  -apprec OT recs  - apprec ortho recs

## 2021-07-15 NOTE — PROGRESS NOTE ADULT - PROBLEM SELECTOR PROBLEM 2
Fracture of multiple pubic rami, right, closed, initial encounter Shoulder fracture, right, closed, initial encounter

## 2021-07-15 NOTE — PROGRESS NOTE ADULT - PROBLEM SELECTOR PLAN 6
Per patient, she has hx of prediabetes   - Daughter reports she thinks patient has been diagnosed with DMT2 recently  - BG elevated 214 on admission  - Will place patient on low dose admelog sliding scale q6h while NPO with hypoglycemic protocol, accucheks  - HbA1c 8.1 Per patient, she has hx of prediabetes   - Daughter reports she thinks patient has been diagnosed with DMT2 recently  - BG elevated 214 on admission  - Patient on low dose admelog sliding scale before meals and bedtime with hypoglycemic protocol, accucheks  - HbA1c 8.1 -pt has dm2, mild cognitive impairment, cont ldiss, will need diet control with outpt follow up  - Daughter reports she thinks patient has been diagnosed with DMT2 recently  - BG elevated 214 on admission  - Patient on low dose admelog sliding scale before meals and bedtime with hypoglycemic protocol, accucheks  - HbA1c 8.1

## 2021-07-15 NOTE — PROGRESS NOTE ADULT - PROBLEM SELECTOR PLAN 3
Acute, s/p fall at home  - Admits to hitting head though pt states her injuries are from her glasses not the ground  - Right periorbital ecchymosis and laceration present s/p 8 sutures, CT showing soft tissue hematoma without fractures   - Initial CT brain negative, patient denies loc, headache, ams. mentating well - if clinical status changes, will consider repeat head CTs   - CT c-spine with chronic changes  - CT left knee with nonspecific effusion, patient has hx of left knee pain worse s/p fall   - Xray imaging, ortho recs as above  - Activity - ambulate with assistance  - Plan as above Acute, s/p fall at home  - Admits to hitting head though pt states her injuries are from her glasses not the ground  - Right periorbital ecchymosis and laceration present s/p 8 sutures, CT showing soft tissue hematoma without fractures   - Initial CT brain negative, patient denies loc, headache, ams. mentating well - if clinical status changes, will consider repeat head CT  - CT c-spine with chronic changes  - CT left knee with nonspecific effusion, patient has hx of left knee pain worse s/p fall   - Xray imaging, ortho recs as above  - Activity - ambulate with assistance  - Plan as above

## 2021-07-15 NOTE — PHYSICAL THERAPY INITIAL EVALUATION ADULT - RANGE OF MOTION EXAMINATION, REHAB EVAL
RUE grossly decreased 50-75:/Left UE ROM was WFL (within functional limits)/bilateral lower extremity ROM was WFL (within functional limits)

## 2021-07-15 NOTE — PROGRESS NOTE ADULT - ASSESSMENT
The patient is a 89 year old female with a history of HTN, HL, PUD, chronic venous insufficiency, prior falls, atrial fibrillation who presents with a fall, found to have shoulder and pelvic fractures.    Plan:  - ECG with known atrial fibrillation and LVH related changes  - Continue metoprolol succinate 25 mg daily for rate control  - Not on long-term anticoagulation due to prior falls - risks outweigh benefits  - Continue aspirin 81 mg daily  - Continue losartan 50 mg daily  - Continue atorvastatin 20 mg daily  - Pain control  - Ortho eval noted - no surgical intervention  - PT

## 2021-07-15 NOTE — PROGRESS NOTE ADULT - ASSESSMENT
89 y/o female with PMHx HTN, HLD, prediabetes, IBS, osteoporosis, arthritis, peptic ulcer, venous insufficiency, Afib (not on a/c) BIBEMS s/p fall at home when she was getting up out of bed to ambulate with her walker and fell on her right side, admitted with a right pubic rami fracture and right humeral head fracture     1) Shoulder fracture, right, closed,  s/p fall at home    2) Fracture of multiple pubic rami, right, closed,  3) Intractable pain  - Xray right shoulder significant for humeral head fracture on personal read, f/u final  - CT shoulder no fracture, severe osteoarthritis.  - CT right pelvis with acute fractures of the right-sided pubic rami  - CT left knee with nonspecific effusion, patient has hx of left knee pain worse s/p fall   - Ortho following  - d/c oxycodone  - avoid NSAIDs due to h/o peptic ulcer  - continue morphine 2.5 mg PO q6h RTC > taper down as contusion improves.   - Tylenol RTC  - PT eval    4) Osteoporosis   - CT c-spine with diffuse osteopenia without displaced fracture  - Fall risk protocol  - PT eval      5) Constipation prophylaxis 2/2 opioid use   - senna and miralax  - bisacodyl supp prn    Appreciate consult. Discussed with the primary team.    Carroll Palma MD

## 2021-07-15 NOTE — PROGRESS NOTE ADULT - PROBLEM SELECTOR PROBLEM 1
Shoulder fracture, right, closed, initial encounter Fracture of multiple pubic rami, right, closed, initial encounter

## 2021-07-15 NOTE — PROGRESS NOTE ADULT - SUBJECTIVE AND OBJECTIVE BOX
Patient is a 89y old  Female who presents with a chief complaint of right humeral head fx, right pubic rami fx (14 Jul 2021 11:51)      INTERVAL HPI/OVERNIGHT EVENTS: Patient seen and examined at bedside. No overnight events occurred. Patient states her pain control today is much improved, though still present. She has poor range of motion of her right arm and right leg. She denies headache, dizziness or lightheadedness.  Denies fevers, chills, chest pain, dyspnea, abdominal pain, n/v/d/c.    MEDICATIONS  (STANDING):  acetaminophen   Tablet .. 1000 milliGRAM(s) Oral every 8 hours  artificial tears (preservative free) Ophthalmic Solution 1 Drop(s) Both EYES two times a day  aspirin enteric coated 81 milliGRAM(s) Oral daily  atorvastatin 20 milliGRAM(s) Oral at bedtime  dextrose 40% Gel 15 Gram(s) Oral once  famotidine    Tablet 20 milliGRAM(s) Oral daily  glucagon  Injectable 1 milliGRAM(s) IntraMuscular once  insulin lispro (ADMELOG) corrective regimen sliding scale   SubCutaneous every 6 hours  losartan 50 milliGRAM(s) Oral daily  metoprolol succinate ER 25 milliGRAM(s) Oral daily  morphine   Solution 2.5 milliGRAM(s) Oral every 6 hours  polyethylene glycol 3350 17 Gram(s) Oral daily  senna 2 Tablet(s) Oral at bedtime  sodium chloride 0.9%. 1000 milliLiter(s) (75 mL/Hr) IV Continuous <Continuous>    MEDICATIONS  (PRN):  acetaminophen   Tablet .. 650 milliGRAM(s) Oral every 4 hours PRN Mild Pain (1 - 3)  morphine   Solution 2.5 milliGRAM(s) Oral every 4 hours PRN Severe Pain (7 - 10)  ondansetron    Tablet 4 milliGRAM(s) Oral every 6 hours PRN Nausea and/or Vomiting      Allergies    sulfa drugs (Hives)    Intolerances        REVIEW OF SYSTEMS:  CONSTITUTIONAL: No fever or chills  HEENT:  No headache, no sore throat  RESPIRATORY: No cough, wheezing, or shortness of breath  CARDIOVASCULAR: No chest pain, palpitations  GASTROINTESTINAL: No abd pain, nausea, vomiting, or diarrhea  GENITOURINARY: No dysuria, frequency, or hematuria  NEUROLOGICAL: no focal weakness or dizziness  MUSCULOSKELETAL: + pain, swelling, decreased range of motion    Vital Signs Last 24 Hrs  T(C): 36.9 (15 Jul 2021 10:19), Max: 37.3 (14 Jul 2021 20:20)  T(F): 98.4 (15 Jul 2021 10:19), Max: 99.1 (14 Jul 2021 20:20)  HR: 82 (15 Jul 2021 10:19) (82 - 92)  BP: 102/69 (15 Jul 2021 10:19) (92/56 - 111/74)  BP(mean): --  RR: 20 (15 Jul 2021 10:19) (18 - 20)  SpO2: 90% (15 Jul 2021 10:19) (90% - 94%)    PHYSICAL EXAM:  GENERAL: NAD  HEENT:  anicteric, moist mucous membranes. R jazmin orbital ecchymosis   CHEST/LUNG:  CTA b/l, no rales, wheezes, or rhonchi  HEART:  RRR, S1, S2, no m/r/g  ABDOMEN:  BS+, soft, nontender, nondistended  EXTREMITIES: no edema, cyanosis  NERVOUS SYSTEM: answers questions and follows commands appropriately    LABS:                        10.6   9.17  )-----------( 155      ( 15 Jul 2021 06:43 )             32.7     CBC Full  -  ( 15 Jul 2021 06:43 )  WBC Count : 9.17 K/uL  Hemoglobin : 10.6 g/dL  Hematocrit : 32.7 %  Platelet Count - Automated : 155 K/uL  Mean Cell Volume : 87.2 fl  Mean Cell Hemoglobin : 28.3 pg  Mean Cell Hemoglobin Concentration : 32.4 gm/dL  Auto Neutrophil # : x  Auto Lymphocyte # : x  Auto Monocyte # : x  Auto Eosinophil # : x  Auto Basophil # : x  Auto Neutrophil % : x  Auto Lymphocyte % : x  Auto Monocyte % : x  Auto Eosinophil % : x  Auto Basophil % : x    15 Jul 2021 06:43    140    |  104    |  21     ----------------------------<  127    4.2     |  29     |  0.53     Ca    8.8        15 Jul 2021 06:43    TPro  5.5    /  Alb  2.6    /  TBili  1.9    /  DBili  x      /  AST  12     /  ALT  16     /  AlkPhos  69     15 Jul 2021 06:43    PT/INR - ( 13 Jul 2021 21:01 )   PT: 12.2 sec;   INR: 1.04 ratio         PTT - ( 13 Jul 2021 21:01 )  PTT:31.2 sec    CAPILLARY BLOOD GLUCOSE      POCT Blood Glucose.: 190 mg/dL (15 Jul 2021 12:19)  POCT Blood Glucose.: 117 mg/dL (15 Jul 2021 08:23)  POCT Blood Glucose.: 127 mg/dL (15 Jul 2021 06:11)  POCT Blood Glucose.: 168 mg/dL (14 Jul 2021 23:13)  POCT Blood Glucose.: 174 mg/dL (14 Jul 2021 18:39)          RADIOLOGY & ADDITIONAL TESTS:    Personally reviewed.     Consultant(s) Notes Reviewed:  [x] YES  [ ] NO

## 2021-07-15 NOTE — PROGRESS NOTE ADULT - SUBJECTIVE AND OBJECTIVE BOX
SUBJECTIVE AND OBJECTIVE:  INTERVAL HPI/OVERNIGHT EVENTS:    DNR on chart: Yes      Allergies    sulfa drugs (Hives)    Intolerances    MEDICATIONS  (STANDING):  acetaminophen   Tablet .. 1000 milliGRAM(s) Oral every 8 hours  artificial tears (preservative free) Ophthalmic Solution 1 Drop(s) Both EYES two times a day  aspirin enteric coated 81 milliGRAM(s) Oral daily  atorvastatin 20 milliGRAM(s) Oral at bedtime  dextrose 40% Gel 15 Gram(s) Oral once  famotidine    Tablet 20 milliGRAM(s) Oral daily  glucagon  Injectable 1 milliGRAM(s) IntraMuscular once  insulin lispro (ADMELOG) corrective regimen sliding scale   SubCutaneous three times a day before meals  insulin lispro (ADMELOG) corrective regimen sliding scale   SubCutaneous at bedtime  losartan 50 milliGRAM(s) Oral daily  metoprolol succinate ER 25 milliGRAM(s) Oral daily  morphine   Solution 2.5 milliGRAM(s) Oral every 6 hours  polyethylene glycol 3350 17 Gram(s) Oral daily  senna 2 Tablet(s) Oral at bedtime  sodium chloride 0.9%. 1000 milliLiter(s) (75 mL/Hr) IV Continuous <Continuous>    MEDICATIONS  (PRN):  acetaminophen   Tablet .. 650 milliGRAM(s) Oral every 4 hours PRN Mild Pain (1 - 3)  morphine   Solution 2.5 milliGRAM(s) Oral every 4 hours PRN Severe Pain (7 - 10)  ondansetron    Tablet 4 milliGRAM(s) Oral every 6 hours PRN Nausea and/or Vomiting      ITEMS UNCHECKED ARE NOT PRESENT    PRESENT SYMPTOMS: [ ]Unable to obtain due to poor mentation   Source if other than patient:  [ ]Family   [ ]Team     Pain:  [ ]yes [ ]no  QOL impact -   Location -                    Aggravating factors -  Quality -  Radiation -  Timing-  Severity (0-10 scale):  Minimal acceptable level (0-10 scale):     Dyspnea:                           [ ]Mild [ ]Moderate [ ]Severe  Anxiety:                             [ ]Mild [ ]Moderate [ ]Severe  Fatigue:                             [ ]Mild [ ]Moderate [ ]Severe  Nausea:                             [ ]Mild [ ]Moderate [ ]Severe  Loss of appetite:              [ ]Mild [ ]Moderate [ ]Severe  Constipation:                    [ ]Mild [ ]Moderate [ ]Severe    CPOT:    https://www.[facility].org/getattachment/[email or web address]/Critical-Care-Pain-Observation-Tool-(CPOT)    PAIN AD Score:	  http://geriatrictoolkit.[facility]/cog/painad.pdf (Ctrl + left click to view)    Other Symptoms:  [ ]All other review of systems negative     Palliative Performance Status Version 2:         %      [email or web address]_[email or web address].pdf  PHYSICAL EXAM:  Vital Signs Last 24 Hrs  T(C): 36.9 (15 Jul 2021 10:19), Max: 37.3 (14 Jul 2021 20:20)  T(F): 98.4 (15 Jul 2021 10:19), Max: 99.1 (14 Jul 2021 20:20)  HR: 82 (15 Jul 2021 10:19) (82 - 92)  BP: 102/69 (15 Jul 2021 10:19) (92/56 - 111/74)  BP(mean): --  RR: 20 (15 Jul 2021 10:19) (18 - 20)  SpO2: 90% (15 Jul 2021 10:19) (90% - 94%) I&O's Summary    14 Jul 2021 07:01  -  15 Jul 2021 07:00  --------------------------------------------------------  IN: 0 mL / OUT: 500 mL / NET: -500 mL    15 Jul 2021 07:01  -  15 Jul 2021 18:42  --------------------------------------------------------  IN: 340 mL / OUT: 150 mL / NET: 190 mL       GENERAL:  [ ]Alert  [ ]Oriented x   [ ]Lethargic  [ ]Cachexia  [ ]Unarousable  [ ]Verbal  [ ]Non-Verbal  Behavioral:   [ ]Anxiety  [ ]Delirium [ ]Agitation [ ]Other  HEENT:  [ ]Normal   [ ]Dry mouth   [ ]ET Tube/Trach  [ ]Oral lesions  PULMONARY:   [ ]Clear [ ]Tachypnea  [ ]Audible excessive secretions   [ ]Rhonchi        [ ]Right [ ]Left [ ]Bilateral  [ ]Crackles        [ ]Right [ ]Left [ ]Bilateral  [ ]Wheezing     [ ]Right [ ]Left [ ]Bilateral  [ ]Diminished BS [ ] Right [ ]Left [ ]Bilateral  CARDIOVASCULAR:    [ ]Regular [ ]Irregular [ ]Tachy  [ ]Micha [ ]Murmur [ ]Other  GASTROINTESTINAL:  [ ]Soft  [ ]Distended   [ ]+BS  [ ]Non tender [ ]Tender  [ ]PEG [ ]OGT/ NGT   Last BM:      GENITOURINARY:  [ ]Normal [ ]Incontinent   [ ]Oliguria/Anuria   [ ]Neff  MUSCULOSKELETAL:   [ ]Normal   [ ]Weakness  [ ]Bed/Wheelchair bound [ ]Edema  NEUROLOGIC:   [ ]No focal deficits  [ ] Cognitive impairment  [ ] Dysphagia [ ]Dysarthria [ ] Paresis [ ]Other   SKIN:   [ ]Normal  [ ]Rash   [ ]Pressure ulcer(s) [ ]y [ ]n present on admission    CRITICAL CARE:  [ ]Shock Present  [ ]Septic [ ]Cardiogenic [ ]Neurologic [ ]Hypovolemic  [ ]Vasopressors [ ]Inotropes  [ ]Respiratory failure present [ ]Mechanical Ventilation [ ]Non-invasive ventilatory support [ ]High-Flow   [ ]Acute  [ ]Chronic [ ]Hypoxic  [ ]Hypercarbic [ ]Other  [ ]Other organ failure     LABS:                        10.6   9.17  )-----------( 155      ( 15 Jul 2021 06:43 )             32.7   07-15    140  |  104  |  21  ----------------------------<  127<H>  4.2   |  29  |  0.53    Ca    8.8      15 Jul 2021 06:43  Phos  3.6     07-14  Mg     2.0     07-14    TPro  5.5<L>  /  Alb  2.6<L>  /  TBili  1.9<H>  /  DBili  x   /  AST  12<L>  /  ALT  16  /  AlkPhos  69  07-15  PT/INR - ( 13 Jul 2021 21:01 )   PT: 12.2 sec;   INR: 1.04 ratio         PTT - ( 13 Jul 2021 21:01 )  PTT:31.2 sec      RADIOLOGY & ADDITIONAL STUDIES:    Protein Calorie Malnutrition Present: [ ]mild [ ]moderate [ ]severe [ ]underweight [ ]morbid obesity  https://www.andeal.org/vault/2440/web/files/ONC/Table_Clinical%20Characteristics%20to%20Document%20Malnutrition-White%20JV%20et%20al%202012.pdf    Height (cm): 154.9 (07-13-21 @ 19:34)  Weight (kg): 47.6 (07-13-21 @ 19:34)  BMI (kg/m2): 19.8 (07-13-21 @ 19:34)    [ ]PPSV2 < or = 30%  [ ]significant weight loss [ ]poor nutritional intake [ ]anasarca   Albumin, Serum: 2.6 g/dL (07-15-21 @ 06:43)   [ ]Artificial Nutrition    REFERRALS:   [ ]Chaplaincy  [ ]Hospice  [ ]Child Life  [ ]Social Work  [ ]Case management [ ]Holistic Therapy     Goals of Care Document:  ARIADNE Marques (10-21-19 @ 12:11)  Goals of Care Conversation:   Participants:  · Participants  Patient    Advance Directives:  · Does patient have Advance Directive  Yes  · Indicate Type  Health Care Proxy (HCP); Living Will  · Agent's Name  Eleazar Santiago  · Phone #  [phone]  · Are any of the items on the chart  Yes  · Specify which ones are on chart  Health Care Proxy (HCP)  Living Will  · Caregiver:  no    Conversation Discussion:  · Conversation  MOLST Discussed  · Conversation Details  Pt wants all done ,does not want to live on machines.      Electronic Signatures:  Sonja Marques (DEEP)  (Signed 21-Oct-2019 12:12)  	Authored: Goals of Care Conversation      Last Updated: 21-Oct-2019 12:12 by Sonja Marques (DEEP)       SUBJECTIVE AND OBJECTIVE/INTERVAL HPI/OVERNIGHT EVENTS:  - no acute events overnight  - pain improved    DNR on chart: Yes      Allergies    sulfa drugs (Hives)    Intolerances    MEDICATIONS  (STANDING):  acetaminophen   Tablet .. 1000 milliGRAM(s) Oral every 8 hours  artificial tears (preservative free) Ophthalmic Solution 1 Drop(s) Both EYES two times a day  aspirin enteric coated 81 milliGRAM(s) Oral daily  atorvastatin 20 milliGRAM(s) Oral at bedtime  dextrose 40% Gel 15 Gram(s) Oral once  famotidine    Tablet 20 milliGRAM(s) Oral daily  glucagon  Injectable 1 milliGRAM(s) IntraMuscular once  insulin lispro (ADMELOG) corrective regimen sliding scale   SubCutaneous three times a day before meals  insulin lispro (ADMELOG) corrective regimen sliding scale   SubCutaneous at bedtime  losartan 50 milliGRAM(s) Oral daily  metoprolol succinate ER 25 milliGRAM(s) Oral daily  morphine   Solution 2.5 milliGRAM(s) Oral every 6 hours  polyethylene glycol 3350 17 Gram(s) Oral daily  senna 2 Tablet(s) Oral at bedtime  sodium chloride 0.9%. 1000 milliLiter(s) (75 mL/Hr) IV Continuous <Continuous>    MEDICATIONS  (PRN):  acetaminophen   Tablet .. 650 milliGRAM(s) Oral every 4 hours PRN Mild Pain (1 - 3)  morphine   Solution 2.5 milliGRAM(s) Oral every 4 hours PRN Severe Pain (7 - 10)  ondansetron    Tablet 4 milliGRAM(s) Oral every 6 hours PRN Nausea and/or Vomiting      ITEMS UNCHECKED ARE NOT PRESENT    PRESENT SYMPTOMS: [ ]Unable to obtain due to poor mentation   Source if other than patient:  [ ]Family   [ ]Team     Pain: [ x]yes [ ]no  QOL impact - yes  Location -   R shoulder, bilat knee R>L          Aggravating factors - movement   Quality - dull  Radiation - no  Timing- constant   Severity (0-10 scale): 2/10  Minimal acceptable level (0-10 scale): 5/10    Dyspnea:                           [ ]Mild [ ]Moderate [ ]Severe  Anxiety:                             [ ]Mild [ ]Moderate [ ]Severe  Fatigue:                             [ ]Mild [ ]Moderate [ ]Severe  Nausea:                             [ ]Mild [ ]Moderate [ ]Severe  Loss of appetite:              [ ]Mild [ ]Moderate [ ]Severe  Constipation:                    [ ]Mild [ ]Moderate [ ]Severe    CPOT:    https://www.[facility].org/getattachment/[email or web address]-5r0f-5m5e-6884i8206w5p/Critical-Care-Pain-Observation-Tool-(CPOT)    PAIN AD Score:	  http://geriatrictoolkit.[facility]/cog/painad.pdf (Ctrl + left click to view)    Other Symptoms:  [x ]All other review of systems negative     Palliative Performance Status Version 2:         %      http://[email or web address].org/files/news/palliative_performance_scale_[email or web address].pdf  PHYSICAL EXAM:  Vital Signs Last 24 Hrs  T(C): 36.9 (15 Jul 2021 10:19), Max: 37.3 (14 Jul 2021 20:20)  T(F): 98.4 (15 Jul 2021 10:19), Max: 99.1 (14 Jul 2021 20:20)  HR: 82 (15 Jul 2021 10:19) (82 - 92)  BP: 102/69 (15 Jul 2021 10:19) (92/56 - 111/74)  BP(mean): --  RR: 20 (15 Jul 2021 10:19) (18 - 20)  SpO2: 90% (15 Jul 2021 10:19) (90% - 94%) I&O's Summary    14 Jul 2021 07:01  -  15 Jul 2021 07:00  --------------------------------------------------------  IN: 0 mL / OUT: 500 mL / NET: -500 mL    15 Jul 2021 07:01  -  15 Jul 2021 18:42  --------------------------------------------------------  IN: 340 mL / OUT: 150 mL / NET: 190 mL    GENERAL: NAD, well-groomed, well-developed, sitting in the chair  HEAD:  Atraumatic, Normocephalic  EYES: EOMI, PERRLA, conjunctiva and sclera clear  CHEST/LUNG: Clear to auscultation bilaterally; No rales, rhonchi, wheezing, or rubs  HEART: Regular rate and rhythm; No murmurs, rubs, or gallops  ABDOMEN: Soft, Nontender, Nondistended; Bowel sounds present  EXTREMITIES:  2+ Peripheral Pulses, No clubbing, cyanosis, or edema    LABS:                        10.6   9.17  )-----------( 155      ( 15 Jul 2021 06:43 )             32.7   07-15    140  |  104  |  21  ----------------------------<  127<H>  4.2   |  29  |  0.53    Ca    8.8      15 Jul 2021 06:43  Phos  3.6     07-14  Mg     2.0     07-14    TPro  5.5<L>  /  Alb  2.6<L>  /  TBili  1.9<H>  /  DBili  x   /  AST  12<L>  /  ALT  16  /  AlkPhos  69  07-15  PT/INR - ( 13 Jul 2021 21:01 )   PT: 12.2 sec;   INR: 1.04 ratio         PTT - ( 13 Jul 2021 21:01 )  PTT:31.2 sec      RADIOLOGY & ADDITIONAL STUDIES: reviewed    Protein Calorie Malnutrition Present: [ ]mild [ ]moderate [ ]severe [ ]underweight [ ]morbid obesity  https://www.andeal.org/vault/2440/web/files/ONC/Table_Clinical%20Characteristics%20to%20Document%20Malnutrition-White%20JV%20et%20al%202012.pdf    Height (cm): 154.9 (07-13-21 @ 19:34)  Weight (kg): 47.6 (07-13-21 @ 19:34)  BMI (kg/m2): 19.8 (07-13-21 @ 19:34)    [ ]PPSV2 < or = 30%  [ ]significant weight loss [ ]poor nutritional intake [ ]anasarca   Albumin, Serum: 2.6 g/dL (07-15-21 @ 06:43)   [ ]Artificial Nutrition    REFERRALS:   [ ]Chaplaincy  [ ]Hospice  [ ]Child Life  [ ]Social Work  [ ]Case management [ ]Holistic Therapy     Goals of Care Document:  S. Jerald (10-21-19 @ 12:11)  Goals of Care Conversation:   Participants:  · Participants  Patient    Advance Directives:  · Does patient have Advance Directive  Yes  · Indicate Type  Health Care Proxy (HCP); Living Will  · Agent's Name  Eleazar Santiago  · Phone #  [phone]  · Are any of the items on the chart  Yes  · Specify which ones are on chart  Health Care Proxy (HCP)  Living Will  · Caregiver:  no    Conversation Discussion:  · Conversation  MOLST Discussed  · Conversation Details  Pt wants all done ,does not want to live on machines.      Electronic Signatures:  Sonja Marques (DEEP)  (Signed 21-Oct-2019 12:12)  	Authored: Goals of Care Conversation      Last Updated: 21-Oct-2019 12:12 by Sonja Marques (DEEP)

## 2021-07-15 NOTE — PROGRESS NOTE ADULT - SUBJECTIVE AND OBJECTIVE BOX
Chief Complaint: Fall    Interval Events: No events overnight.    Review of Systems:  General: No fevers, chills, weight loss or gain  Skin: No rashes, color changes  Cardiovascular: No chest pain, orthopnea  Respiratory: No shortness of breath, cough  Gastrointestinal: No nausea, abdominal pain  Genitourinary: No incontinence, pain with urination  Musculoskeletal: (+) pain, swelling, decreased range of motion  Neurological: No headache, weakness  Psychiatric: No depression, anxiety  Endocrine: No weight loss or gain, increased thirst  All other systems are comprehensively negative.    Physical Exam:  Vitals:        Vital Signs Last 24 Hrs  T(C): 37 (15 Jul 2021 05:04), Max: 37.3 (14 Jul 2021 20:20)  T(F): 98.6 (15 Jul 2021 05:04), Max: 99.1 (14 Jul 2021 20:20)  HR: 92 (15 Jul 2021 05:04) (78 - 92)  BP: 111/74 (15 Jul 2021 05:04) (92/56 - 162/85)  BP(mean): --  RR: 18 (15 Jul 2021 05:04) (16 - 18)  SpO2: 94% (15 Jul 2021 05:04) (92% - 98%)  General: NAD  HEENT: MMM  Neck: No JVD, no carotid bruit  Lungs: CTAB  CV: RRR, nl S1/S2, no M/R/G  Abdomen: S/NT/ND, +BS  Extremities: No LE edema, no cyanosis  Neuro: AAOx3, non-focal  Skin: No rash    Labs:                        10.6   9.17  )-----------( 155      ( 15 Jul 2021 06:43 )             32.7     07-15    140  |  104  |  21  ----------------------------<  127<H>  4.2   |  29  |  0.53    Ca    8.8      15 Jul 2021 06:43  Phos  3.6     07-14  Mg     2.0     07-14    TPro  5.5<L>  /  Alb  2.6<L>  /  TBili  1.9<H>  /  DBili  x   /  AST  12<L>  /  ALT  16  /  AlkPhos  69  07-15        PT/INR - ( 13 Jul 2021 21:01 )   PT: 12.2 sec;   INR: 1.04 ratio         PTT - ( 13 Jul 2021 21:01 )  PTT:31.2 sec    Telemetry: AF

## 2021-07-15 NOTE — PROGRESS NOTE ADULT - ASSESSMENT
89 y/o female with PMHx HTN, HLD, prediabetes, IBS, osteoporosis, arthritis, peptic ulcer, venous insufficiency, Afib (not on a/c) BIBEMS s/p fall at home when she was getting up out of bed to ambulate with her walker and fell on her right side, admitted with a right pubic rami fracture and right humeral head fracture    89 y/o female with PMHx HTN, HLD, prediabetes, IBS, osteoporosis, arthritis, peptic ulcer, venous insufficiency, Afib (not on a/c) BIBEMS s/p fall at home when she was getting up out of bed to ambulate with her walker and fell on her right side, admitted with a right pubic rami fracture and R shoulder pain s/p fall

## 2021-07-15 NOTE — PROGRESS NOTE ADULT - PROBLEM SELECTOR PLAN 1
Acute, s/p fall at home    - Admit to medicine  - Xray right shoulder - no evidence of acute fracture  - Pain control with Tylenol PRN mild pain, oxycodone 2.5mg PRN moderate pain, oxycodone 5mg PRN severe pain  - Miralax daily Acute, s/p fall at home    - Xray right shoulder - no evidence of acute fracture  - Pain control with Tylenol PRN mild pain, oxycodone 2.5mg PRN moderate pain, oxycodone 5mg PRN severe pain Acute, s/p fall at home    - CT right pelvis with acute fractures of the right-sided pubic rami  - Ortho consulted by ED - no surgical intervention, wbat, apprec ortho recs; to f/u as outpt with Dr Campos  - Pain control with Tylenol PRN mild pain, oxycodone 2.5mg PRN moderate pain, oxycodone 5mg PRN severe pain  - pt eval - recommend ALTON

## 2021-07-15 NOTE — PROGRESS NOTE ADULT - PROBLEM SELECTOR PLAN 5
Chronic, BP elevated on presentation 2/2 pain   - Continue home regimen with losartan 50mg PO qd, metoprolol succinate 25mg PO qd with hold parameters  - Monitor routine HD Chronic, BP elevated on presentation 2/2 pain - resolved  - Continue home regimen with losartan 50mg PO qd, metoprolol succinate 25mg PO qd with hold parameters  - Monitor routine HD

## 2021-07-15 NOTE — PHYSICAL THERAPY INITIAL EVALUATION ADULT - ADDITIONAL COMMENTS
pt lives in a house w/ 1 step to enter.  Pt report stays on main level.  Pt has a home health aide 8 hours/day x 5 days/wk.

## 2021-07-15 NOTE — PROGRESS NOTE ADULT - PROBLEM SELECTOR PLAN 9
Chronic  - CT c-spine with diffuse osteopenia without displaced fracture  - Fall risk protocol  - PT eval pending ortho OR recs Chronic  - CT c-spine with diffuse osteopenia without displaced fracture  - Fall risk protocol  - PT eval as above

## 2021-07-16 LAB
ALBUMIN SERPL ELPH-MCNC: 2.4 G/DL — LOW (ref 3.3–5)
ALP SERPL-CCNC: 66 U/L — SIGNIFICANT CHANGE UP (ref 40–120)
ALT FLD-CCNC: 17 U/L — SIGNIFICANT CHANGE UP (ref 12–78)
ANION GAP SERPL CALC-SCNC: 4 MMOL/L — LOW (ref 5–17)
APPEARANCE UR: CLEAR — SIGNIFICANT CHANGE UP
AST SERPL-CCNC: 16 U/L — SIGNIFICANT CHANGE UP (ref 15–37)
BILIRUB SERPL-MCNC: 1.2 MG/DL — SIGNIFICANT CHANGE UP (ref 0.2–1.2)
BILIRUB UR-MCNC: NEGATIVE — SIGNIFICANT CHANGE UP
BUN SERPL-MCNC: 22 MG/DL — SIGNIFICANT CHANGE UP (ref 7–23)
CALCIUM SERPL-MCNC: 8.5 MG/DL — SIGNIFICANT CHANGE UP (ref 8.5–10.1)
CHLORIDE SERPL-SCNC: 104 MMOL/L — SIGNIFICANT CHANGE UP (ref 96–108)
CO2 SERPL-SCNC: 31 MMOL/L — SIGNIFICANT CHANGE UP (ref 22–31)
COLOR SPEC: YELLOW — SIGNIFICANT CHANGE UP
CREAT SERPL-MCNC: 0.52 MG/DL — SIGNIFICANT CHANGE UP (ref 0.5–1.3)
DIFF PNL FLD: NEGATIVE — SIGNIFICANT CHANGE UP
GLUCOSE SERPL-MCNC: 158 MG/DL — HIGH (ref 70–99)
GLUCOSE UR QL: 250
HCT VFR BLD CALC: 34 % — LOW (ref 34.5–45)
HGB BLD-MCNC: 11 G/DL — LOW (ref 11.5–15.5)
KETONES UR-MCNC: NEGATIVE — SIGNIFICANT CHANGE UP
LEUKOCYTE ESTERASE UR-ACNC: ABNORMAL
MCHC RBC-ENTMCNC: 28.4 PG — SIGNIFICANT CHANGE UP (ref 27–34)
MCHC RBC-ENTMCNC: 32.4 GM/DL — SIGNIFICANT CHANGE UP (ref 32–36)
MCV RBC AUTO: 87.6 FL — SIGNIFICANT CHANGE UP (ref 80–100)
NITRITE UR-MCNC: NEGATIVE — SIGNIFICANT CHANGE UP
NRBC # BLD: 0 /100 WBCS — SIGNIFICANT CHANGE UP (ref 0–0)
PH UR: 5 — SIGNIFICANT CHANGE UP (ref 5–8)
PLATELET # BLD AUTO: 148 K/UL — LOW (ref 150–400)
POTASSIUM SERPL-MCNC: 4.3 MMOL/L — SIGNIFICANT CHANGE UP (ref 3.5–5.3)
POTASSIUM SERPL-SCNC: 4.3 MMOL/L — SIGNIFICANT CHANGE UP (ref 3.5–5.3)
PROT SERPL-MCNC: 5.7 G/DL — LOW (ref 6–8.3)
PROT UR-MCNC: 30 MG/DL
RBC # BLD: 3.88 M/UL — SIGNIFICANT CHANGE UP (ref 3.8–5.2)
RBC # FLD: 13.8 % — SIGNIFICANT CHANGE UP (ref 10.3–14.5)
SODIUM SERPL-SCNC: 139 MMOL/L — SIGNIFICANT CHANGE UP (ref 135–145)
SP GR SPEC: 1.01 — SIGNIFICANT CHANGE UP (ref 1.01–1.02)
UROBILINOGEN FLD QL: 1
WBC # BLD: 8.03 K/UL — SIGNIFICANT CHANGE UP (ref 3.8–10.5)
WBC # FLD AUTO: 8.03 K/UL — SIGNIFICANT CHANGE UP (ref 3.8–10.5)

## 2021-07-16 PROCEDURE — 99222 1ST HOSP IP/OBS MODERATE 55: CPT

## 2021-07-16 PROCEDURE — 99233 SBSQ HOSP IP/OBS HIGH 50: CPT | Mod: GC

## 2021-07-16 RX ADMIN — MORPHINE SULFATE 2.5 MILLIGRAM(S): 50 CAPSULE, EXTENDED RELEASE ORAL at 20:02

## 2021-07-16 RX ADMIN — Medication 1 DROP(S): at 05:39

## 2021-07-16 RX ADMIN — Medication 1000 MILLIGRAM(S): at 06:33

## 2021-07-16 RX ADMIN — ATORVASTATIN CALCIUM 20 MILLIGRAM(S): 80 TABLET, FILM COATED ORAL at 21:24

## 2021-07-16 RX ADMIN — POLYETHYLENE GLYCOL 3350 17 GRAM(S): 17 POWDER, FOR SOLUTION ORAL at 12:34

## 2021-07-16 RX ADMIN — Medication 81 MILLIGRAM(S): at 12:33

## 2021-07-16 RX ADMIN — Medication 1000 MILLIGRAM(S): at 21:24

## 2021-07-16 RX ADMIN — Medication 2: at 18:50

## 2021-07-16 RX ADMIN — Medication 1000 MILLIGRAM(S): at 05:39

## 2021-07-16 RX ADMIN — MORPHINE SULFATE 2.5 MILLIGRAM(S): 50 CAPSULE, EXTENDED RELEASE ORAL at 13:30

## 2021-07-16 RX ADMIN — Medication 3: at 12:34

## 2021-07-16 RX ADMIN — Medication 1 DROP(S): at 19:03

## 2021-07-16 RX ADMIN — MORPHINE SULFATE 2.5 MILLIGRAM(S): 50 CAPSULE, EXTENDED RELEASE ORAL at 05:39

## 2021-07-16 RX ADMIN — LOSARTAN POTASSIUM 50 MILLIGRAM(S): 100 TABLET, FILM COATED ORAL at 05:39

## 2021-07-16 RX ADMIN — MORPHINE SULFATE 2.5 MILLIGRAM(S): 50 CAPSULE, EXTENDED RELEASE ORAL at 19:02

## 2021-07-16 RX ADMIN — Medication 1000 MILLIGRAM(S): at 14:22

## 2021-07-16 RX ADMIN — Medication 1000 MILLIGRAM(S): at 15:20

## 2021-07-16 RX ADMIN — SENNA PLUS 2 TABLET(S): 8.6 TABLET ORAL at 21:24

## 2021-07-16 RX ADMIN — FAMOTIDINE 20 MILLIGRAM(S): 10 INJECTION INTRAVENOUS at 12:33

## 2021-07-16 RX ADMIN — MORPHINE SULFATE 2.5 MILLIGRAM(S): 50 CAPSULE, EXTENDED RELEASE ORAL at 12:33

## 2021-07-16 RX ADMIN — Medication 25 MILLIGRAM(S): at 05:39

## 2021-07-16 RX ADMIN — MORPHINE SULFATE 2.5 MILLIGRAM(S): 50 CAPSULE, EXTENDED RELEASE ORAL at 06:33

## 2021-07-16 RX ADMIN — Medication 1000 MILLIGRAM(S): at 22:24

## 2021-07-16 NOTE — OCCUPATIONAL THERAPY INITIAL EVALUATION ADULT - GENERAL OBSERVATIONS, REHAB EVAL
Pt found seated in bedside chair, +IVL, +external female catheter, +ecchymosis to R periorbital area.

## 2021-07-16 NOTE — PROGRESS NOTE ADULT - PROBLEM SELECTOR PLAN 1
Acute, s/p fall at home    - CT right pelvis with acute fractures of the right-sided pubic rami  - Ortho consulted by ED - no surgical intervention, wbat, apprec ortho recs; to f/u as outpt with Dr Campos  - Pain control with Tylenol PRN mild pain, oxycodone 2.5mg PRN moderate pain, oxycodone 5mg PRN severe pain  - pt eval - recommend ALTON

## 2021-07-16 NOTE — OCCUPATIONAL THERAPY INITIAL EVALUATION ADULT - MANUAL MUSCLE TESTING RESULTS, REHAB EVAL
independent L shoulder 2+/5, L elbow/wrist/digits 3+/5, R shoulder 2+/5, R elbow/wrist/digits at least 3/5, b/ l Le grossly 3+/5 except R hip/grossly assessed due to

## 2021-07-16 NOTE — PROGRESS NOTE ADULT - PROBLEM SELECTOR PLAN 5
Chronic, BP elevated on presentation 2/2 pain - resolved  - Continue home regimen with losartan 50mg PO qd, metoprolol succinate 25mg PO qd with hold parameters  - Monitor routine HD

## 2021-07-16 NOTE — PROGRESS NOTE ADULT - PROBLEM SELECTOR PLAN 9
Chronic  - CT c-spine with diffuse osteopenia without displaced fracture  - Fall risk protocol  - PT eval as above

## 2021-07-16 NOTE — PROGRESS NOTE ADULT - SUBJECTIVE AND OBJECTIVE BOX
Chief Complaint: Fall    Interval Events: No events overnight.    Review of Systems:  General: No fevers, chills, weight loss or gain  Skin: No rashes, color changes  Cardiovascular: No chest pain, orthopnea  Respiratory: No shortness of breath, cough  Gastrointestinal: No nausea, abdominal pain  Genitourinary: No incontinence, pain with urination  Musculoskeletal: (+) pain, swelling, decreased range of motion  Neurological: No headache, weakness  Psychiatric: No depression, anxiety  Endocrine: No weight loss or gain, increased thirst  All other systems are comprehensively negative.    Physical Exam:  Vital Signs Last 24 Hrs  T(C): 36.3 (16 Jul 2021 04:59), Max: 37.1 (15 Jul 2021 19:28)  T(F): 97.3 (16 Jul 2021 04:59), Max: 98.8 (15 Jul 2021 19:28)  HR: 90 (16 Jul 2021 04:59) (71 - 90)  BP: 147/86 (16 Jul 2021 04:59) (102/69 - 147/86)  BP(mean): --  RR: 18 (16 Jul 2021 04:59) (18 - 20)  SpO2: 93% (16 Jul 2021 04:59) (90% - 96%)  General: NAD  HEENT: MMM  Neck: No JVD, no carotid bruit  Lungs: CTAB  CV: RRR, nl S1/S2, no M/R/G  Abdomen: S/NT/ND, +BS  Extremities: No LE edema, no cyanosis  Neuro: AAOx3, non-focal  Skin: No rash    Labs:    07-16    139  |  104  |  22  ----------------------------<  158<H>  4.3   |  31  |  0.52    Ca    8.5      16 Jul 2021 06:27    TPro  5.7<L>  /  Alb  2.4<L>  /  TBili  1.2  /  DBili  x   /  AST  16  /  ALT  17  /  AlkPhos  66  07-16                        11.0   8.03  )-----------( 148      ( 16 Jul 2021 06:27 )             34.0       Telemetry: AF

## 2021-07-16 NOTE — OCCUPATIONAL THERAPY INITIAL EVALUATION ADULT - PERTINENT HX OF CURRENT PROBLEM, REHAB EVAL
89 y/o F with hx of Cellulitis HTN (hypertension) Hyperlipidemia Irritable bowel syndrome Osteoporosis Peptic ulcer on ASA 81mg BIBA s/p mechanical fall. Pt states that she was walking at home with walker, knee buckled and fell hitting her head. Pt with c/o laceration to her R eyebrow, R shoulder pain and R hip/groin pain. Pt was not able to ambulate after the fall. Pt found to have R pubic rami fx, no evidence of acute fx in R shoulder. Pt is WBAT b/l LE and RUE.

## 2021-07-16 NOTE — OCCUPATIONAL THERAPY INITIAL EVALUATION ADULT - LIVES WITH, PROFILE
Pt lives alone, however has a HHA for 8 hours, 5 days/week. Pt was independent with UB dressing, grooming, toileting prior to admission, reports HHA assisted with bathing and donning compression socks. Pt has 1 step to enter her home with all needs on the 1st floor, +tub with shower chair. Pt utilizes a tripod rollator for functional mobility/transfers. Pt is R hand dominant./alone

## 2021-07-16 NOTE — CONSULT NOTE ADULT - REASON FOR ADMISSION
right humeral head fx, right pubic rami fx

## 2021-07-16 NOTE — PROGRESS NOTE ADULT - ASSESSMENT
89 y/o female with PMHx HTN, HLD, prediabetes, IBS, osteoporosis, arthritis, peptic ulcer, venous insufficiency, Afib (not on a/c) BIBEMS s/p fall at home when she was getting up out of bed to ambulate with her walker and fell on her right side, admitted with a right pubic rami fracture and R shoulder pain s/p fall

## 2021-07-16 NOTE — OCCUPATIONAL THERAPY INITIAL EVALUATION ADULT - VISUAL ACUITY
+glasses, pt with difficulty wearing glasses at current 2* ecchymosis of R periorbital areas/not tested

## 2021-07-16 NOTE — PROGRESS NOTE ADULT - PROBLEM SELECTOR PLAN 2
- fracture ruled out, Acute, s/p fall at home , severe oa  - Xray right shoulder - no evidence of acute fracture  - Pain control with Tylenol PRN mild pain, oxycodone 2.5mg PRN moderate pain, oxycodone 5mg PRN severe pain  -apprec OT recs  - apprec ortho recs

## 2021-07-16 NOTE — OCCUPATIONAL THERAPY INITIAL EVALUATION ADULT - DIAGNOSIS, OT EVAL
Pt with impaired ROM, strength, balance, pain impacting pt's ability to complete ADLs, functional mobility/transfers.

## 2021-07-16 NOTE — OCCUPATIONAL THERAPY INITIAL EVALUATION ADULT - ADDITIONAL COMMENTS
CT Head (-)  X-Ray R Shoulder (-)  CT Pelvis 7/14/21: Acute fractures of the right-sided pubic rami. A third acute pelvic ring fracture is not identified by CT, further evaluation with MRI can be obtained if clinically warranted.

## 2021-07-16 NOTE — PROGRESS NOTE ADULT - SUBJECTIVE AND OBJECTIVE BOX
Patient is a 89y old  Female who presents with a chief complaint of right humeral head fx, right pubic rami fx (16 Jul 2021 12:06)      INTERVAL HPI/OVERNIGHT EVENTS: Patient seen and examined at bedside. No overnight events occurred. Patient states her pain is controlled when she does not move but she finds it difficult to mobilize at all due to the pain. Patient also reports an increase in urinary frequency and states her urine is "hot." She has poor range of motion of her right arm and right leg. She denies headache, dizziness or lightheadedness.  Denies fevers, chills, chest pain, dyspnea, abdominal pain, n/v/d/c.    MEDICATIONS  (STANDING):  acetaminophen   Tablet .. 1000 milliGRAM(s) Oral every 8 hours  artificial tears (preservative free) Ophthalmic Solution 1 Drop(s) Both EYES two times a day  aspirin enteric coated 81 milliGRAM(s) Oral daily  atorvastatin 20 milliGRAM(s) Oral at bedtime  dextrose 40% Gel 15 Gram(s) Oral once  famotidine    Tablet 20 milliGRAM(s) Oral daily  glucagon  Injectable 1 milliGRAM(s) IntraMuscular once  insulin lispro (ADMELOG) corrective regimen sliding scale   SubCutaneous three times a day before meals  insulin lispro (ADMELOG) corrective regimen sliding scale   SubCutaneous at bedtime  losartan 50 milliGRAM(s) Oral daily  metoprolol succinate ER 25 milliGRAM(s) Oral daily  morphine   Solution 2.5 milliGRAM(s) Oral every 6 hours  polyethylene glycol 3350 17 Gram(s) Oral daily  senna 2 Tablet(s) Oral at bedtime  sodium chloride 0.9%. 1000 milliLiter(s) (75 mL/Hr) IV Continuous <Continuous>    MEDICATIONS  (PRN):  acetaminophen   Tablet .. 650 milliGRAM(s) Oral every 4 hours PRN Mild Pain (1 - 3)  morphine   Solution 2.5 milliGRAM(s) Oral every 4 hours PRN Severe Pain (7 - 10)  ondansetron    Tablet 4 milliGRAM(s) Oral every 6 hours PRN Nausea and/or Vomiting      Allergies    sulfa drugs (Hives)    Intolerances        REVIEW OF SYSTEMS:  Comprehensive ROS per HPI    Vital Signs Last 24 Hrs  T(C): 36.9 (16 Jul 2021 11:05), Max: 37.1 (15 Jul 2021 19:28)  T(F): 98.4 (16 Jul 2021 11:05), Max: 98.8 (15 Jul 2021 19:28)  HR: 73 (16 Jul 2021 11:05) (71 - 90)  BP: 97/60 (16 Jul 2021 11:05) (97/60 - 147/86)  BP(mean): --  RR: 18 (16 Jul 2021 11:05) (18 - 18)  SpO2: 92% (16 Jul 2021 11:05) (92% - 96%)    PHYSICAL EXAM:  GENERAL: NAD  HEENT:  anicteric, moist mucous membranes. R jazmin orbital ecchymosis   CHEST/LUNG:  CTA b/l, no rales, wheezes, or rhonchi  HEART:  RRR, S1, S2, no m/r/g  ABDOMEN:  BS+, soft, nontender, nondistended  EXTREMITIES: no edema, cyanosis  NERVOUS SYSTEM: answers questions and follows commands appropriately    LABS:                        11.0   8.03  )-----------( 148      ( 16 Jul 2021 06:27 )             34.0     CBC Full  -  ( 16 Jul 2021 06:27 )  WBC Count : 8.03 K/uL  Hemoglobin : 11.0 g/dL  Hematocrit : 34.0 %  Platelet Count - Automated : 148 K/uL  Mean Cell Volume : 87.6 fl  Mean Cell Hemoglobin : 28.4 pg  Mean Cell Hemoglobin Concentration : 32.4 gm/dL  Auto Neutrophil # : x  Auto Lymphocyte # : x  Auto Monocyte # : x  Auto Eosinophil # : x  Auto Basophil # : x  Auto Neutrophil % : x  Auto Lymphocyte % : x  Auto Monocyte % : x  Auto Eosinophil % : x  Auto Basophil % : x    16 Jul 2021 06:27    139    |  104    |  22     ----------------------------<  158    4.3     |  31     |  0.52     Ca    8.5        16 Jul 2021 06:27    TPro  5.7    /  Alb  2.4    /  TBili  1.2    /  DBili  x      /  AST  16     /  ALT  17     /  AlkPhos  66     16 Jul 2021 06:27        CAPILLARY BLOOD GLUCOSE      POCT Blood Glucose.: 255 mg/dL (16 Jul 2021 11:48)  POCT Blood Glucose.: 140 mg/dL (16 Jul 2021 08:03)  POCT Blood Glucose.: 166 mg/dL (15 Jul 2021 21:52)  POCT Blood Glucose.: 149 mg/dL (15 Jul 2021 16:49)          RADIOLOGY & ADDITIONAL TESTS:    Personally reviewed.     Consultant(s) Notes Reviewed:  [x] YES  [ ] NO

## 2021-07-16 NOTE — OCCUPATIONAL THERAPY INITIAL EVALUATION ADULT - RANGE OF MOTION EXAMINATION, UPPER EXTREMITY
except b/l shoulder, L shoulder flex grossly 0-90*, R shouldre flexion grossly 0-40*, limited 2* pain/bilateral UE Active ROM was WFL  (within functional limits)

## 2021-07-16 NOTE — CONSULT NOTE ADULT - SUBJECTIVE AND OBJECTIVE BOX
[facility]  INFECTIOUS DISEASES   [address] [place] [address]  Tel: [phone]     Fax: [phone]  =======================================================    [id number]  LEVON SRIVASTAVA     CC: Patient is a 89y old  Female who presents with a chief complaint of right humeral head fx, right pubic rami fx (15 Jul 2021 18:42)    HPI:  89 y/o woman with PMH of HTN, prediabetes, IBS, osteoporosis, arthritis, peptic ulcer, venous insufficiency and Afib was admitted after a fall at home and diagnosed with right humeral head fx, right pubic rami fx.   Today ID has been called since she felt that her urine is "hot". No fever or chills, no dysuria, no hematouria, no abdominal or flank pain. No history or recurrent UTIs.   No fever or leukocytosis.     PAST MEDICAL & SURGICAL HISTORY:  Hyperlipidemia  Peptic ulcer  HTN (hypertension)  Osteoporosis  Irritable bowel syndrome  Cellulitis  S/P hip replacement  partial, left  History of appendectomy  History of tonsillectomy    Social Hx: no smoking, ETOH or drugs     FAMILY HISTORY:  FH: hypertension    Allergies  sulfa drugs (Hives)    Antibiotics:  None     REVIEW OF SYSTEMS:  CONSTITUTIONAL:  No Fever or chills  HEENT:  No diplopia or blurred vision.  No sore throat or runny nose.  CARDIOVASCULAR:  No chest pain or SOB.  RESPIRATORY:  No cough, shortness of breath, PND or orthopnea.  GASTROINTESTINAL:  No nausea, vomiting or diarrhea.  GENITOURINARY:  No dysuria, frequency or urgency. No Blood in urine  MUSCULOSKELETAL:  no joint aches, no muscle pain  SKIN:  No change in skin, hair or nails.  NEUROLOGIC:  No paresthesias, fasciculations, seizures or weakness.  PSYCHIATRIC:  No disorder of thought or mood.  ENDOCRINE:  No heat or cold intolerance, polyuria or polydipsia.  HEMATOLOGICAL:  No easy bruising or bleeding.     Physical Exam:  Vital Signs Last 24 Hrs  T(C): 36.9 (16 Jul 2021 11:05), Max: 37.1 (15 Jul 2021 19:28)  T(F): 98.4 (16 Jul 2021 11:05), Max: 98.8 (15 Jul 2021 19:28)  HR: 73 (16 Jul 2021 11:05) (71 - 90)  BP: 97/60 (16 Jul 2021 11:05) (97/60 - 147/86)  BP(mean): --  RR: 18 (16 Jul 2021 11:05) (18 - 18)  SpO2: 92% (16 Jul 2021 11:05) (92% - 96%)  GEN: NAD  HEENT: normocephalic and atraumatic. R eye with ecchymosis   NECK: Supple.  No lymphadenopathy   LUNGS: Clear to auscultation.  HEART: Regular rate and rhythm   ABDOMEN: Soft, nontender, and nondistended.  Positive bowel sounds.    : No CVA tenderness  EXTREMITIES: Without edema.  NEUROLOGIC: grossly intact.  PSYCHIATRIC: Appropriate affect .  SKIN: No rash  Labs:  07-16    139  |  104  |  22  ----------------------------<  158<H>  4.3   |  31  |  0.52    Ca    8.5      16 Jul 2021 06:27    TPro  5.7<L>  /  Alb  2.4<L>  /  TBili  1.2  /  DBili  x   /  AST  16  /  ALT  17  /  AlkPhos  66  07-16                        11.0   8.03  )-----------( 148      ( 16 Jul 2021 06:27 )             34.0     LIVER FUNCTIONS - ( 16 Jul 2021 06:27 )  Alb: 2.4 g/dL / Pro: 5.7 g/dL / ALK PHOS: 66 U/L / ALT: 17 U/L / AST: 16 U/L / GGT: x           COVID-19 PCR: NotDetec (07-13-21 @ 21:01)    All imaging and other data have been reviewed.    Assessment and Plan:   89 y/o woman with PMH of HTN, prediabetes, IBS, osteoporosis, arthritis, peptic ulcer, venous insufficiency and Afib was admitted after a fall at home and diagnosed with right humeral head fx, right pubic rami fx.   Today ID has been called since she felt that her urine is "hot".   No fever  No leukocytosis   No urinary symptoms except feeling "hot urine"    R/O UTI    - Will do UA, if negative no need to proceed with UC  - No need to start ABx at this time, if UA positive will start ABx and wait for UC.     Thank you for courtesy of this consult.     Will follow.    Kari Peace MD  Division of Infectious Diseases   Cell [phone] between 8am and 6pm   After 6pm and weekends please call ID service at [phone].

## 2021-07-16 NOTE — PROGRESS NOTE ADULT - PROBLEM SELECTOR PLAN 6
-pt has dm2, mild cognitive impairment, cont ldiss, will need diet control with outpt follow up  - Daughter reports she thinks patient has been diagnosed with DMT2 recently  - BG elevated 214 on admission  - Patient on low dose admelog sliding scale before meals and bedtime with hypoglycemic protocol, accucheks  - HbA1c 8.1

## 2021-07-16 NOTE — PROGRESS NOTE ADULT - PROBLEM SELECTOR PLAN 3
Acute, s/p fall at home  - Admits to hitting head though pt states her injuries are from her glasses not the ground  - Right periorbital ecchymosis and laceration present s/p 8 sutures, CT showing soft tissue hematoma without fractures   - Initial CT brain negative, patient denies loc, headache, ams. mentating well - if clinical status changes, will consider repeat head CT  - CT c-spine with chronic changes  - CT left knee with nonspecific effusion, patient has hx of left knee pain worse s/p fall   - Xray imaging, ortho recs as above  - Activity - ambulate with assistance  - Plan as above

## 2021-07-17 ENCOUNTER — TRANSCRIPTION ENCOUNTER (OUTPATIENT)
Age: 86
End: 2021-07-17

## 2021-07-17 LAB
ALBUMIN SERPL ELPH-MCNC: 2.1 G/DL — LOW (ref 3.3–5)
ALP SERPL-CCNC: 61 U/L — SIGNIFICANT CHANGE UP (ref 40–120)
ALT FLD-CCNC: 16 U/L — SIGNIFICANT CHANGE UP (ref 12–78)
ANION GAP SERPL CALC-SCNC: 6 MMOL/L — SIGNIFICANT CHANGE UP (ref 5–17)
AST SERPL-CCNC: 14 U/L — LOW (ref 15–37)
BILIRUB SERPL-MCNC: 0.9 MG/DL — SIGNIFICANT CHANGE UP (ref 0.2–1.2)
BUN SERPL-MCNC: 22 MG/DL — SIGNIFICANT CHANGE UP (ref 7–23)
CALCIUM SERPL-MCNC: 8.3 MG/DL — LOW (ref 8.5–10.1)
CHLORIDE SERPL-SCNC: 108 MMOL/L — SIGNIFICANT CHANGE UP (ref 96–108)
CO2 SERPL-SCNC: 28 MMOL/L — SIGNIFICANT CHANGE UP (ref 22–31)
CREAT SERPL-MCNC: 0.33 MG/DL — LOW (ref 0.5–1.3)
GLUCOSE SERPL-MCNC: 164 MG/DL — HIGH (ref 70–99)
HCT VFR BLD CALC: 32.2 % — LOW (ref 34.5–45)
HGB BLD-MCNC: 10.2 G/DL — LOW (ref 11.5–15.5)
MCHC RBC-ENTMCNC: 27.8 PG — SIGNIFICANT CHANGE UP (ref 27–34)
MCHC RBC-ENTMCNC: 31.7 GM/DL — LOW (ref 32–36)
MCV RBC AUTO: 87.7 FL — SIGNIFICANT CHANGE UP (ref 80–100)
NRBC # BLD: 0 /100 WBCS — SIGNIFICANT CHANGE UP (ref 0–0)
PLATELET # BLD AUTO: 162 K/UL — SIGNIFICANT CHANGE UP (ref 150–400)
POTASSIUM SERPL-MCNC: 4 MMOL/L — SIGNIFICANT CHANGE UP (ref 3.5–5.3)
POTASSIUM SERPL-SCNC: 4 MMOL/L — SIGNIFICANT CHANGE UP (ref 3.5–5.3)
PROCALCITONIN SERPL-MCNC: 0.28 NG/ML — HIGH (ref 0–0.04)
PROT SERPL-MCNC: 5.2 G/DL — LOW (ref 6–8.3)
RBC # BLD: 3.67 M/UL — LOW (ref 3.8–5.2)
RBC # FLD: 13.6 % — SIGNIFICANT CHANGE UP (ref 10.3–14.5)
SODIUM SERPL-SCNC: 142 MMOL/L — SIGNIFICANT CHANGE UP (ref 135–145)
WBC # BLD: 6.13 K/UL — SIGNIFICANT CHANGE UP (ref 3.8–10.5)
WBC # FLD AUTO: 6.13 K/UL — SIGNIFICANT CHANGE UP (ref 3.8–10.5)

## 2021-07-17 PROCEDURE — 99232 SBSQ HOSP IP/OBS MODERATE 35: CPT

## 2021-07-17 PROCEDURE — 99233 SBSQ HOSP IP/OBS HIGH 50: CPT

## 2021-07-17 RX ORDER — CYCLOSPORINE 0.5 MG/ML
1 EMULSION OPHTHALMIC
Refills: 0 | Status: DISCONTINUED | OUTPATIENT
Start: 2021-07-17 | End: 2021-07-19

## 2021-07-17 RX ORDER — CEFTRIAXONE 500 MG/1
INJECTION, POWDER, FOR SOLUTION INTRAMUSCULAR; INTRAVENOUS
Refills: 0 | Status: DISCONTINUED | OUTPATIENT
Start: 2021-07-17 | End: 2021-07-19

## 2021-07-17 RX ORDER — CEFTRIAXONE 500 MG/1
1000 INJECTION, POWDER, FOR SOLUTION INTRAMUSCULAR; INTRAVENOUS EVERY 24 HOURS
Refills: 0 | Status: DISCONTINUED | OUTPATIENT
Start: 2021-07-18 | End: 2021-07-19

## 2021-07-17 RX ORDER — CEFTRIAXONE 500 MG/1
1000 INJECTION, POWDER, FOR SOLUTION INTRAMUSCULAR; INTRAVENOUS ONCE
Refills: 0 | Status: COMPLETED | OUTPATIENT
Start: 2021-07-17 | End: 2021-07-17

## 2021-07-17 RX ADMIN — Medication 81 MILLIGRAM(S): at 11:05

## 2021-07-17 RX ADMIN — Medication 1000 MILLIGRAM(S): at 23:33

## 2021-07-17 RX ADMIN — Medication 25 MILLIGRAM(S): at 06:04

## 2021-07-17 RX ADMIN — POLYETHYLENE GLYCOL 3350 17 GRAM(S): 17 POWDER, FOR SOLUTION ORAL at 11:04

## 2021-07-17 RX ADMIN — Medication 1000 MILLIGRAM(S): at 22:21

## 2021-07-17 RX ADMIN — ATORVASTATIN CALCIUM 20 MILLIGRAM(S): 80 TABLET, FILM COATED ORAL at 22:21

## 2021-07-17 RX ADMIN — Medication 1000 MILLIGRAM(S): at 15:34

## 2021-07-17 RX ADMIN — Medication 1000 MILLIGRAM(S): at 06:03

## 2021-07-17 RX ADMIN — Medication 1 DROP(S): at 06:04

## 2021-07-17 RX ADMIN — Medication 1: at 12:04

## 2021-07-17 RX ADMIN — CEFTRIAXONE 100 MILLIGRAM(S): 500 INJECTION, POWDER, FOR SOLUTION INTRAMUSCULAR; INTRAVENOUS at 04:40

## 2021-07-17 RX ADMIN — Medication 1: at 07:39

## 2021-07-17 RX ADMIN — FAMOTIDINE 20 MILLIGRAM(S): 10 INJECTION INTRAVENOUS at 11:04

## 2021-07-17 RX ADMIN — CYCLOSPORINE 1 DROP(S): 0.5 EMULSION OPHTHALMIC at 18:19

## 2021-07-17 RX ADMIN — Medication 1000 MILLIGRAM(S): at 13:37

## 2021-07-17 RX ADMIN — MORPHINE SULFATE 2.5 MILLIGRAM(S): 50 CAPSULE, EXTENDED RELEASE ORAL at 20:07

## 2021-07-17 RX ADMIN — SENNA PLUS 2 TABLET(S): 8.6 TABLET ORAL at 22:21

## 2021-07-17 RX ADMIN — MORPHINE SULFATE 2.5 MILLIGRAM(S): 50 CAPSULE, EXTENDED RELEASE ORAL at 20:42

## 2021-07-17 RX ADMIN — Medication 1: at 17:01

## 2021-07-17 RX ADMIN — LOSARTAN POTASSIUM 50 MILLIGRAM(S): 100 TABLET, FILM COATED ORAL at 06:04

## 2021-07-17 NOTE — PROGRESS NOTE ADULT - SUBJECTIVE AND OBJECTIVE BOX
[facility]  INFECTIOUS DISEASES   [address] [place] [address]  Tel: [phone]     Fax: [phone]  =======================================================    N-[id number]  LEVON SRIVASTAVA     Follow up: UTI    No new complaint, no dysuria or fever.     PAST MEDICAL & SURGICAL HISTORY:  Hyperlipidemia  Peptic ulcer  HTN (hypertension)  Osteoporosis  Irritable bowel syndrome  Cellulitis  S/P hip replacement  partial, left  History of appendectomy  History of tonsillectomy    Social Hx: no smoking, ETOH or drugs     FAMILY HISTORY:  FH: hypertension    Allergies  sulfa drugs (Hives)    Antibiotics:  None     REVIEW OF SYSTEMS:  CONSTITUTIONAL:  No Fever or chills  HEENT:  No diplopia or blurred vision.  No sore throat or runny nose.  CARDIOVASCULAR:  No chest pain or SOB.  RESPIRATORY:  No cough, shortness of breath, PND or orthopnea.  GASTROINTESTINAL:  No nausea, vomiting or diarrhea.  GENITOURINARY:  No dysuria, frequency or urgency. No Blood in urine  MUSCULOSKELETAL:  no joint aches, no muscle pain  SKIN:  No change in skin, hair or nails.  NEUROLOGIC:  No paresthesias, fasciculations, seizures or weakness.  PSYCHIATRIC:  No disorder of thought or mood.  ENDOCRINE:  No heat or cold intolerance, polyuria or polydipsia.  HEMATOLOGICAL:  No easy bruising or bleeding.     Physical Exam:  Vital Signs Last 24 Hrs  T(C): 36.4 (17 Jul 2021 05:04), Max: 36.9 (16 Jul 2021 11:05)  T(F): 97.5 (17 Jul 2021 05:04), Max: 98.4 (16 Jul 2021 11:05)  HR: 65 (17 Jul 2021 05:04) (64 - 73)  BP: 153/80 (17 Jul 2021 05:04) (97/60 - 153/80)  BP(mean): --  RR: 17 (17 Jul 2021 05:04) (17 - 18)  SpO2: 95% (17 Jul 2021 05:04) (92% - 100%)  GEN: NAD  HEENT: normocephalic and atraumatic. R eye with ecchymosis   NECK: Supple.  No lymphadenopathy   LUNGS: Clear to auscultation.  HEART: Regular rate and rhythm   ABDOMEN: Soft, nontender, and nondistended.  Positive bowel sounds.    : No CVA tenderness  EXTREMITIES: Without edema.  NEUROLOGIC: grossly intact.  PSYCHIATRIC: Appropriate affect .  SKIN: No rash    Labs:                        10.2   6.13  )-----------( 162      ( 17 Jul 2021 08:00 )             32.2     07-17    142  |  108  |  22  ----------------------------<  164<H>  4.0   |  28  |  0.33<L>    Ca    8.3<L>      17 Jul 2021 08:53    TPro  5.2<L>  /  Alb  2.1<L>  /  TBili  0.9  /  DBili  x   /  AST  14<L>  /  ALT  16  /  AlkPhos  61  07-17    WBC Count: 6.13 K/uL (07-17-21 @ 08:00)  WBC Count: 8.03 K/uL (07-16-21 @ 06:27)  WBC Count: 9.17 K/uL (07-15-21 @ 06:43)  WBC Count: 8.16 K/uL (07-14-21 @ 06:22)  WBC Count: 7.57 K/uL (07-13-21 @ 21:01)    Creatinine, Serum: 0.33 mg/dL (07-17-21 @ 08:53)  Creatinine, Serum: 0.52 mg/dL (07-16-21 @ 06:27)  Creatinine, Serum: 0.53 mg/dL (07-15-21 @ 06:43)  Creatinine, Serum: 0.36 mg/dL (07-14-21 @ 06:22)  Creatinine, Serum: 0.61 mg/dL (07-13-21 @ 21:01)    Procalcitonin, Serum: 0.28 ng/mL (07-17-21 @ 08:53)    COVID-19 PCR: NotDetec (07-13-21 @ 21:01)    All imaging and other data have been reviewed.    Assessment and Plan:   89 y/o woman with PMH of HTN, prediabetes, IBS, osteoporosis, arthritis, peptic ulcer, venous insufficiency and Afib was admitted after a fall at home and diagnosed with right humeral head fx, right pubic rami fx.   Today ID has been called since she felt that her urine is "hot".   No fever  No leukocytosis   No urinary symptoms except feeling "hot urine"    1- UTI    - UA with mildly elevated WBC, no nitrate  - UC pending  - Has been started on ceftriaxone, if sensitive can treat for total 3 says.     Will follow.    Kari Peace MD  Division of Infectious Diseases   Cell [phone] between 8am and 6pm   After 6pm and weekends please call ID service at [phone].

## 2021-07-17 NOTE — DISCHARGE NOTE PROVIDER - NSDCFUADDINST_GEN_ALL_CORE_FT
Follow up with your primary care doctor within 1 week of discharge.    Follow up with Orthopedics, Dr. Campos, within 1 week of discharge.

## 2021-07-17 NOTE — DISCHARGE NOTE PROVIDER - NSDCCPCAREPLAN_GEN_ALL_CORE_FT
PRINCIPAL DISCHARGE DIAGNOSIS  Diagnosis: Fall  Assessment and Plan of Treatment: - You fell at home and requires 8 sutures for your laceration on your face.  - The CT scan of your head was negative for an acute neuro event.  - You hurt your right shoulder, but no fracture as seen on imaging. Continue with pain control as prescribed.  - You were evaluated by physical therapy and subacute rehabilitation was recommended.  - Follow up with your primary care doctor within 1 week of discharge.      SECONDARY DISCHARGE DIAGNOSES  Diagnosis: Fracture of multiple pubic rami, right, closed, initial encounter  Assessment and Plan of Treatment: - CT imaging of your pelvis showed acute fractures of the right-sided pubic rami  - You were evaluated by the orthopedics team and no surgical intervention was recommended.  - Continue with pain control as prescribed.   - You were evaluated by physical therapy and subacute rehabilitation was recommended.  - Follow up with your orthopedics within 1 week of discharge.    Diagnosis: Hypertension  Assessment and Plan of Treatment: - Continue with your home anti-hypertensives    Diagnosis: Atrial fibrillation  Assessment and Plan of Treatment: - Continue with your home metoprolol and aspirin  - Follow up with your cardiologist outpatient    Diagnosis: Diabetes mellitus  Assessment and Plan of Treatment: - Your hemoglobin A1c(number that meaures your diabtes) was elevated at 8.1; this puts you in th ediabetic range  - continue diet and liefestyle changes  - Follow up with your primary care doctor with 1 week of discharge for repeat blood work and diabetic management.    Diagnosis: UTI (urinary tract infection)  Assessment and Plan of Treatment: You were treated for a UTI with IV antibiotics while in the hospital.    Diagnosis: Hyperlipidemia  Assessment and Plan of Treatment: - continue with your home atorvastatin.     PRINCIPAL DISCHARGE DIAGNOSIS  Diagnosis: Fall  Assessment and Plan of Treatment: - You fell at home and required 8 sutures for your laceration on your face. The sutures need to be removed on 7/21.  - The CT scan of your head was negative for an acute neuro event.  - You hurt your right shoulder, but no fracture as seen on imaging. Continue with pain control as prescribed.  - You were evaluated by physical therapy and subacute rehabilitation was recommended.  - Follow up with your primary care doctor within 1 week of discharge.      SECONDARY DISCHARGE DIAGNOSES  Diagnosis: Hypertension  Assessment and Plan of Treatment: - Continue with your home anti-hypertensives    Diagnosis: Hyperlipidemia  Assessment and Plan of Treatment: - continue with your home atorvastatin.    Diagnosis: Atrial fibrillation  Assessment and Plan of Treatment: - Continue with your home metoprolol and aspirin  - Follow up with your cardiologist outpatient    Diagnosis: Fracture of multiple pubic rami, right, closed, initial encounter  Assessment and Plan of Treatment: - CT imaging of your pelvis showed acute fractures of the right-sided pubic rami  - You were evaluated by the orthopedics team and no surgical intervention was recommended.  - Continue with pain control as prescribed.   - You were evaluated by physical therapy and subacute rehabilitation was recommended.  - Follow up with your orthopedics within 1 week of discharge.    Diagnosis: Diabetes mellitus  Assessment and Plan of Treatment: - Your hemoglobin A1c(number that meaures your diabtes) was elevated at 8.1; this puts you in th ediabetic range  - continue diet and liefestyle changes  - Follow up with your primary care doctor with 1 week of discharge for repeat blood work and diabetic management.    Diagnosis: UTI (urinary tract infection)  Assessment and Plan of Treatment: You were treated for a UTI with IV antibiotics while in the hospital.     PRINCIPAL DISCHARGE DIAGNOSIS  Diagnosis: Fall  Assessment and Plan of Treatment: - You fell at home and required 8 sutures for your laceration on your face. The sutures need to be removed on 7/21.  - The CT scan of your head was negative for an acute neuro event.  - You hurt your right shoulder, but no fracture as seen on imaging. Continue with pain control as prescribed.  - You were evaluated by physical therapy and subacute rehabilitation was recommended.  - Follow up with your primary care doctor within 1 week of discharge.      SECONDARY DISCHARGE DIAGNOSES  Diagnosis: Fracture of multiple pubic rami, right, closed, initial encounter  Assessment and Plan of Treatment: - CT imaging of your pelvis showed acute fractures of the right-sided pubic rami  - You were evaluated by the orthopedics team and no surgical intervention was recommended.  - Continue with pain control as prescribed.   - You were evaluated by physical therapy and subacute rehabilitation was recommended.  - Follow up with your orthopedics within 1 week of discharge.    Diagnosis: Hyperlipidemia  Assessment and Plan of Treatment: - continue with your home atorvastatin.    Diagnosis: Hypertension  Assessment and Plan of Treatment: - Continue with your home anti-hypertensives    Diagnosis: Atrial fibrillation  Assessment and Plan of Treatment: - Continue with your home metoprolol and aspirin  - Follow up with your cardiologist outpatient    Diagnosis: Diabetes mellitus  Assessment and Plan of Treatment: - Your hemoglobin A1c(number that meaures your diabtes) was elevated at 8.1; this puts you in the diabetic range. c/w insulin coverage on sliding scale.  - continue diet and liefestyle changes  - Follow up with your primary care doctor with 1 week of discharge for repeat blood work and diabetic management.    Diagnosis: UTI (urinary tract infection)  Assessment and Plan of Treatment: You were treated for a UTI with IV antibiotics while in the hospital.

## 2021-07-17 NOTE — PROGRESS NOTE ADULT - NSPROGADDITIONALINFOA_GEN_ALL_CORE
at this time, patient is clinically improving. will likely need ALTON  on empiric abx.  abx will not impact dc.  will continue to work on dc planning

## 2021-07-17 NOTE — DISCHARGE NOTE PROVIDER - NSDCMRMEDTOKEN_GEN_ALL_CORE_FT
aspirin 81 mg oral tablet: 2 tab(s) orally once a day  atorvastatin 20 mg oral tablet: 1 tab(s) orally once a day  losartan 50 mg oral tablet: 1 tab(s) orally once a day  metoprolol succinate 25 mg oral tablet, extended release: 1 tab(s) orally once a day  Pepcid 20 mg oral tablet: 1 tab(s) orally once a day (at bedtime)  Restasis 0.05% ophthalmic emulsion: 1 drop(s) to each affected eye every 12 hours  both eyes   acetaminophen 325 mg oral tablet: 2 tab(s) orally every 4 hours, As needed, Mild Pain (1 - 3)  acetaminophen 500 mg oral tablet: 2 tab(s) orally every 8 hours  aspirin 81 mg oral delayed release tablet: 1 tab(s) orally once a day  atorvastatin 20 mg oral tablet: 1 tab(s) orally once a day (at bedtime)  cycloSPORINE 0.05% ophthalmic emulsion: 1 drop(s) to each affected eye 2 times a day  famotidine 20 mg oral tablet: 1 tab(s) orally once a day  insulin lispro 100 units/mL injectable solution: TID after meals   1 Unit(s) if Glucose 151 - 200  2 Unit(s) if Glucose 201 - 250  3 Unit(s) if Glucose 251 - 300  4 Unit(s) if Glucose 301 - 350  5 Unit(s) if Glucose 351 - 400  6 Unit(s) if Glucose Greater Than 400    At bedtime  0 Unit(s) if Glucose 0 - 250  1 Unit(s) if Glucose 251 - 300  2 Unit(s) if Glucose 301 - 350  3 Unit(s) if Glucose 351 - 400  4 Unit(s) if Glucose Greater Than 400  lidocaine 5% topical film: Apply topically to affected area  losartan 50 mg oral tablet: 1 tab(s) orally once a day  magnesium hydroxide 8% oral suspension: 30 milliliter(s) orally once a day  metoprolol succinate 25 mg oral tablet, extended release: 1 tab(s) orally once a day  morphine 10 mg/5 mL oral solution: 1.25 milliliter(s) orally every 6 hours  morphine 10 mg/5 mL oral solution: 1.25 milliliter(s) orally every 4 hours, As needed, Severe Pain (7 - 10)  ondansetron 4 mg oral tablet: 1 tab(s) orally every 6 hours, As needed, Nausea and/or Vomiting  polyethylene glycol 3350 oral powder for reconstitution: 17 gram(s) orally once a day  senna oral tablet: 2 tab(s) orally once a day (at bedtime)

## 2021-07-17 NOTE — DISCHARGE NOTE PROVIDER - CARE PROVIDER_API CALL
TIFF ALVARADO  Internal Medicine  [address] [facility], [address] [place] [address]  Phone: [phone]  Fax: [phone]  Follow Up Time: 1 week    Miky Campos  ORTHOPAEDIC SURGERY  [address] [place] [address]  Phone: [phone]  Fax: [phone]  Follow Up Time: 1 week    Cale Aleman)  Cardiovascular Disease  [address] [facility], [address] [place] [address]  Phone: [phone]  Fax: [phone]  Follow Up Time: 1 week

## 2021-07-17 NOTE — PROGRESS NOTE ADULT - SUBJECTIVE AND OBJECTIVE BOX
Patient seen and examined at bedside  no acute overnight events  patient denies  any active complaints  VSS afebrile  On empiric iV antibiotics for UTI  denies pain. wants to get out of bed and walk with PT    Review of Systems:  General:denies fever chills, headache, weakness  HEENT: denies blurry vision,diffculty swallowing, difficulty hearing, tinnitus  Cardiovascular: denies chest pain  ,palpitations  Pulmonary:denies shortness of breath, cough, wheezing, hemoptysis  Gastrointestinal: denies abdominal pain, constipation, diarrhea,nausea , vomiting, hematochezia  : denies hematuria, dysuria, or incontinence  Neurological: denies weakness, numbness , tingling, dizziness, tremors  MSK: denies muscle pain, difficulty ambulating, swelling, back pain  skin: denies skin rash, itching, burning, or  skin lesions  Psychiatrical: denies mood disturbances, anxierty, feeling depressed, depression , or difficulty sleeping    Objective:  Vitals  T(C): 36.6 (07-17-21 @ 12:45), Max: 36.7 (07-16-21 @ 16:50)  HR: 73 (07-17-21 @ 12:45) (64 - 73)  BP: 118/72 (07-17-21 @ 12:45) (110/71 - 153/80)  RR: 17 (07-17-21 @ 12:45) (17 - 18)  SpO2: 94% (07-17-21 @ 12:45) (94% - 100%)    Physical Exam:  General: comfortable, no acute distress, elderly,   HEENT: Atraumatic, no LAD, trachea midline, PERRLA  Cardiovascular: normal s1s2, no murmurs, gallops or fricition rubs  Pulmonary: clear to ausculation Bilaterally, no wheezing , rhonchi  Gastrointestinal: soft non tender non distended, no masses felt, no organomegally  Muscloskeletal: no lower extremity edema, intact bilateral lower extremity pulses  Neurological: CN II-12 intact. No focal weakness  Psychiatrical: normal mood, cooperative  SKIN: ecchmyosis around eyes.    Labs:                          10.2   6.13  )-----------( 162      ( 17 Jul 2021 08:00 )             32.2     07-17    142  |  108  |  22  ----------------------------<  164<H>  4.0   |  28  |  0.33<L>    Ca    8.3<L>      17 Jul 2021 08:53    TPro  5.2<L>  /  Alb  2.1<L>  /  TBili  0.9  /  DBili  x   /  AST  14<L>  /  ALT  16  /  AlkPhos  61  07-17    LIVER FUNCTIONS - ( 17 Jul 2021 08:53 )  Alb: 2.1 g/dL / Pro: 5.2 g/dL / ALK PHOS: 61 U/L / ALT: 16 U/L / AST: 14 U/L / GGT: x                 Active Medications  MEDICATIONS  (STANDING):  acetaminophen   Tablet .. 1000 milliGRAM(s) Oral every 8 hours  artificial tears (preservative free) Ophthalmic Solution 1 Drop(s) Both EYES two times a day  aspirin enteric coated 81 milliGRAM(s) Oral daily  atorvastatin 20 milliGRAM(s) Oral at bedtime  cefTRIAXone   IVPB      dextrose 40% Gel 15 Gram(s) Oral once  famotidine    Tablet 20 milliGRAM(s) Oral daily  glucagon  Injectable 1 milliGRAM(s) IntraMuscular once  insulin lispro (ADMELOG) corrective regimen sliding scale   SubCutaneous three times a day before meals  insulin lispro (ADMELOG) corrective regimen sliding scale   SubCutaneous at bedtime  losartan 50 milliGRAM(s) Oral daily  metoprolol succinate ER 25 milliGRAM(s) Oral daily  morphine   Solution 2.5 milliGRAM(s) Oral every 6 hours  polyethylene glycol 3350 17 Gram(s) Oral daily  senna 2 Tablet(s) Oral at bedtime  sodium chloride 0.9%. 1000 milliLiter(s) (75 mL/Hr) IV Continuous <Continuous>    MEDICATIONS  (PRN):  acetaminophen   Tablet .. 650 milliGRAM(s) Oral every 4 hours PRN Mild Pain (1 - 3)  morphine   Solution 2.5 milliGRAM(s) Oral every 4 hours PRN Severe Pain (7 - 10)  ondansetron    Tablet 4 milliGRAM(s) Oral every 6 hours PRN Nausea and/or Vomiting

## 2021-07-17 NOTE — PROGRESS NOTE ADULT - ASSESSMENT
The patient is a 89 year old female with a history of HTN, HL, PUD, chronic venous insufficiency, prior falls, atrial fibrillation who presents with a fall, found to have shoulder and pelvic fractures.    7/17/21  Patient sitting up, awake and alert  No complaints offered    Plan:  - ECG with known atrial fibrillation and LVH related changes  - Continue metoprolol succinate 25 mg daily for rate control  - Not on long-term anticoagulation due to prior falls - risks outweigh benefits  - Continue aspirin 81 mg daily  - Continue losartan 50 mg daily  - Continue atorvastatin 20 mg daily  - On Rochepin  - Pain control  - Ortho evaluation noted - no surgical intervention  - Physical therapy  - Discharge planning The patient is a 89 year old female with a history of HTN, HL, PUD, chronic venous insufficiency, prior falls, atrial fibrillation who presents with a fall, found to have shoulder and pelvic fractures.    7/17/21  Patient sitting up, awake and alert  No complaints offered  Monitor-A-Fib    Plan:  - ECG with known atrial fibrillation and LVH related changes  - Continue metoprolol succinate 25 mg daily for rate control  - Not on long-term anticoagulation due to prior falls - risks outweigh benefits  - Continue aspirin 81 mg daily  - Continue losartan 50 mg daily  - Continue atorvastatin 20 mg daily  - On Rochepin  - Pain control  - Ortho evaluation noted - no surgical intervention  - Physical therapy  - Discharge planning

## 2021-07-17 NOTE — DISCHARGE NOTE PROVIDER - HOSPITAL COURSE
HPI:  89 y/o female with PMHx HTN, HLD, prediabetes, IBS, osteoporosis, arthritis, peptic ulcer, venous insufficiency, Afib (not on a/c) BIBEMS s/p fall at home. Patient reports she was resting in bed around 6pm in the evening day of presentation when she sat up, stretched her legs, lifted herself to use her walker and fell on her right side. She does not know if she lost her footing, if her knees gave out or if the walker was not fully extended. Patient admits to hitting her head though she thinks he glasses gave her the injury rather than the floor. Denies LOC, visual changes. Denies prodromal dizziness, HA, weakness, changes in vision, chest pain, chest pressure, palpitations, SOB. Patient has been feeling at her baseline state of health the past couple of days but does admit to falling a few weeks ago at home but did not result in serious injury. Patient regularly ambulates with a walker. At present, patient admits to pain around her right eye (5/10), right shoulder (8/10), right hip (5/10). Denies fevers, chills, HA, visual disturbances, chest pain, chest pressure, palpitations, SOB, cough, abdominal pain, n/v.     In the ED, VS T 98.6F, HR 87, /104 -> 161/83, RR 16 98% RA   Labs significant for BUN 25, glucose 216  Patient received IV morphine 2mg x2, Zofran 4mg x1, 8 sutures to right lateral eyebrow laceration   EKG: Afib @72bpm, Qtc 444  CT c-spine: Diffuse osteopenia without displaced fracture. Trace grade 1 C2-C3 C3-C4 retrolisthesis. Bilateral foraminal stenosis due to disc and uncovertebral joint degeneration.  CT brain: No acute intracranial bleeding. Chronic microvascular ischemic changes.  CT maxillofacial: Right periorbital soft tissue hematoma. Intact globes. No fracture.  CT left knee: No acute fracture or traumatic bony malalignment. Large left knee joint simple effusion, nonspecific.  CT pelvis: Acute fractures of the right-sided pubic rami. A third acute pelvic ring fracture is not identified by CT, further evaluation with MRI can be obtained if clinically warranted. Chronic appearing insufficiency fracture of the sacrum. Left hip arthroplasty and right femoral neck intertrochanteric fixation, without evidence of failure of the imaged portions of the hardware.  Xray right elbow, right femur, right hip with pelvis: PENDING  Xray right shoulder: No definite acute fracture  Xray left knee, left tibia and fibula: PENDING  CT Upper extremity: Severe right glenohumeral osteoarthrosis. No fracture or dislocation is identified.    ---  HOSPITAL COURSE:     Patient admitted for fracture of R pubic rami and right shoulder pain status post fall. Ortho consulted, and determined no surgical intervention needed; WBAT, and with plans to follow up outpatient with Dr. Campos. Patient's pain was controlled with Tylenol (mild), oxycodone (mod and severe). Physical therapy evaluated patient and determined she be discharged to subacute rehab. Cardiology was consulted for management of patient's known A fib and hypertension. Home BB continued for rate control and losartan continued with hold parameters. Patient's HbA1C 8.1 on admission: started on low dose sliding scale.    On day 2 of admission, patient complained of dysuria and increased urinary frequency. Infectious disease consulted. UA showed moderate leukocyte esterase, mildly elevated WBC's, no nitrate. Patient started on Ceftriaxone IV. UCx showed ____. Patient treated for __ days with IV ceftriaxone.                  ---  CONSULTANTS:     ---  TIME SPENT:  I, the attending physician, was physically present for the key portions of the evaluation and management (E/M) service provided. The total amount of time spent reviewing the hospital notes, laboratory values, imaging findings, assessing/counseling the patient, discussing with consultant physicians, social work, nursing staff was -- minutes    ---  Primary care provider was made aware of plan for discharge:      [  ] NO     [  ] YES   HPI:  89 y/o female with PMHx HTN, HLD, prediabetes, IBS, osteoporosis, arthritis, peptic ulcer, venous insufficiency, Afib (not on a/c) BIBEMS s/p fall at home. Patient reports she was resting in bed around 6pm in the evening day of presentation when she sat up, stretched her legs, lifted herself to use her walker and fell on her right side. She does not know if she lost her footing, if her knees gave out or if the walker was not fully extended. Patient admits to hitting her head though she thinks he glasses gave her the injury rather than the floor. Denies LOC, visual changes. Denies prodromal dizziness, HA, weakness, changes in vision, chest pain, chest pressure, palpitations, SOB. Patient has been feeling at her baseline state of health the past couple of days but does admit to falling a few weeks ago at home but did not result in serious injury. Patient regularly ambulates with a walker. At present, patient admits to pain around her right eye (5/10), right shoulder (8/10), right hip (5/10). Denies fevers, chills, HA, visual disturbances, chest pain, chest pressure, palpitations, SOB, cough, abdominal pain, n/v.     In the ED, VS T 98.6F, HR 87, /104 -> 161/83, RR 16 98% RA   Labs significant for BUN 25, glucose 216  Patient received IV morphine 2mg x2, Zofran 4mg x1, 8 sutures to right lateral eyebrow laceration   EKG: Afib @72bpm, Qtc 444  CT c-spine: Diffuse osteopenia without displaced fracture. Trace grade 1 C2-C3 C3-C4 retrolisthesis. Bilateral foraminal stenosis due to disc and uncovertebral joint degeneration.  CT brain: No acute intracranial bleeding. Chronic microvascular ischemic changes.  CT maxillofacial: Right periorbital soft tissue hematoma. Intact globes. No fracture.  CT left knee: No acute fracture or traumatic bony malalignment. Large left knee joint simple effusion, nonspecific.  CT pelvis: Acute fractures of the right-sided pubic rami. A third acute pelvic ring fracture is not identified by CT, further evaluation with MRI can be obtained if clinically warranted. Chronic appearing insufficiency fracture of the sacrum. Left hip arthroplasty and right femoral neck intertrochanteric fixation, without evidence of failure of the imaged portions of the hardware.  Xray right elbow, right femur, right hip with pelvis: PENDING  Xray right shoulder: No definite acute fracture  Xray left knee, left tibia and fibula: PENDING  CT Upper extremity: Severe right glenohumeral osteoarthrosis. No fracture or dislocation is identified.    ---  HOSPITAL COURSE:     Patient admitted for fracture of R pubic rami and right shoulder pain status post fall. Patient admitted to telemetry for close monitoring. Ortho consulted, and determined no surgical intervention was needed; WBAT, and with plans to follow up outpatient with Dr. Campos. Patient's pain was controlled with Tylenol (mild), oxycodone (mod and severe). Physical therapy evaluated patient and determined she be discharged to subacute rehab. Cardiology was consulted for management of patient's known A fib and hypertension. Home BB continued for rate control and losartan continued with hold parameters. Patient's HbA1C 8.1 on admission: started on low dose sliding scale.    On day 2 of admission, patient complained of dysuria and increased urinary frequency. Infectious disease consulted. UA showed moderate leukocyte esterase, mildly elevated WBC's, no nitrate. Patient started on Ceftriaxone IV. UCx grew ____. Patient treated for __ days with IV ceftriaxone.    Patient showed improvement throughout hospitalization. Patient was seen and examined on day of discharge: Patient was medically optimized for discharge to [facility] on _____.              ---  CONSULTANTS:     ---  TIME SPENT:  I, the attending physician, was physically present for the key portions of the evaluation and management (E/M) service provided. The total amount of time spent reviewing the hospital notes, laboratory values, imaging findings, assessing/counseling the patient, discussing with consultant physicians, social work, nursing staff was -- minutes    ---  Primary care provider was made aware of plan for discharge:      [  ] NO     [  ] YES   HPI:  89 y/o female with PMHx HTN, HLD, prediabetes, IBS, osteoporosis, arthritis, peptic ulcer, venous insufficiency, Afib (not on a/c) BIBEMS s/p fall at home. Patient reports she was resting in bed around 6pm in the evening day of presentation when she sat up, stretched her legs, lifted herself to use her walker and fell on her right side. She does not know if she lost her footing, if her knees gave out or if the walker was not fully extended. Patient admits to hitting her head though she thinks he glasses gave her the injury rather than the floor. Denies LOC, visual changes. Denies prodromal dizziness, HA, weakness, changes in vision, chest pain, chest pressure, palpitations, SOB. Patient has been feeling at her baseline state of health the past couple of days but does admit to falling a few weeks ago at home but did not result in serious injury. Patient regularly ambulates with a walker. At present, patient admits to pain around her right eye (5/10), right shoulder (8/10), right hip (5/10). Denies fevers, chills, HA, visual disturbances, chest pain, chest pressure, palpitations, SOB, cough, abdominal pain, n/v.     In the ED, VS T 98.6F, HR 87, /104 -> 161/83, RR 16 98% RA   Labs significant for BUN 25, glucose 216  Patient received IV morphine 2mg x2, Zofran 4mg x1, 8 sutures to right lateral eyebrow laceration   EKG: Afib @72bpm, Qtc 444  CT c-spine: Diffuse osteopenia without displaced fracture. Trace grade 1 C2-C3 C3-C4 retrolisthesis. Bilateral foraminal stenosis due to disc and uncovertebral joint degeneration.  CT brain: No acute intracranial bleeding. Chronic microvascular ischemic changes.  CT maxillofacial: Right periorbital soft tissue hematoma. Intact globes. No fracture.  CT left knee: No acute fracture or traumatic bony malalignment. Large left knee joint simple effusion, nonspecific.  CT pelvis: Acute fractures of the right-sided pubic rami. A third acute pelvic ring fracture is not identified by CT, further evaluation with MRI can be obtained if clinically warranted. Chronic appearing insufficiency fracture of the sacrum. Left hip arthroplasty and right femoral neck intertrochanteric fixation, without evidence of failure of the imaged portions of the hardware.  Xray right elbow, right femur, right hip with pelvis: PENDING  Xray right shoulder: No definite acute fracture  Xray left knee, left tibia and fibula: PENDING  CT Upper extremity: Severe right glenohumeral osteoarthrosis. No fracture or dislocation is identified.    ---  HOSPITAL COURSE:     Patient admitted for fracture of R pubic rami and right shoulder pain status post fall. Patient admitted to telemetry for close monitoring. Ortho consulted, and determined no surgical intervention was needed; WBAT, and with plans to follow up outpatient with Dr. Campos. Patient's pain was controlled per regimen set by pain management. Physical therapy evaluated patient and determined she be discharged to subacute rehab. Cardiology was consulted for management of patient's known A fib and hypertension. Home BB continued for rate control and losartan continued with hold parameters. Patient's HbA1C 8.1 on admission: started on low dose sliding scale.    On day 2 of admission, patient complained of dysuria and increased urinary frequency. Infectious disease consulted. UA showed moderate leukocyte esterase, mildly elevated WBC's, no nitrate. Patient started on Ceftriaxone IV. UCx grew ____. Patient treated for 3 days with IV ceftriaxone.    Patient showed improvement throughout hospitalization. Patient was seen and examined on day of discharge: Patient was medically optimized for discharge to [facility] on _____.              ---  CONSULTANTS:   Orthopedics  Pain Management: Dr. Hayden Yoo  Cardiology: Dr. Louise  ID: Dr. Peace    ---  TIME SPENT:  I, the attending physician, was physically present for the key portions of the evaluation and management (E/M) service provided. The total amount of time spent reviewing the hospital notes, laboratory values, imaging findings, assessing/counseling the patient, discussing with consultant physicians, social work, nursing staff was -- minutes    ---  Primary care provider was made aware of plan for discharge:      [  ] NO     [  ] YES   HPI:  89 y/o female with PMHx HTN, HLD, prediabetes, IBS, osteoporosis, arthritis, peptic ulcer, venous insufficiency, Afib (not on a/c) BIBEMS s/p fall at home. Patient reports she was resting in bed around 6pm in the evening day of presentation when she sat up, stretched her legs, lifted herself to use her walker and fell on her right side. She does not know if she lost her footing, if her knees gave out or if the walker was not fully extended. Patient admits to hitting her head though she thinks he glasses gave her the injury rather than the floor. Denies LOC, visual changes. Denies prodromal dizziness, HA, weakness, changes in vision, chest pain, chest pressure, palpitations, SOB. Patient has been feeling at her baseline state of health the past couple of days but does admit to falling a few weeks ago at home but did not result in serious injury. Patient regularly ambulates with a walker. At present, patient admits to pain around her right eye (5/10), right shoulder (8/10), right hip (5/10). Denies fevers, chills, HA, visual disturbances, chest pain, chest pressure, palpitations, SOB, cough, abdominal pain, n/v.     In the ED, VS T 98.6F, HR 87, /104 -> 161/83, RR 16 98% RA   Labs significant for BUN 25, glucose 216  Patient received IV morphine 2mg x2, Zofran 4mg x1, 8 sutures to right lateral eyebrow laceration   EKG: Afib @72bpm, Qtc 444  CT c-spine: Diffuse osteopenia without displaced fracture. Trace grade 1 C2-C3 C3-C4 retrolisthesis. Bilateral foraminal stenosis due to disc and uncovertebral joint degeneration.  CT brain: No acute intracranial bleeding. Chronic microvascular ischemic changes.  CT maxillofacial: Right periorbital soft tissue hematoma. Intact globes. No fracture.  CT left knee: No acute fracture or traumatic bony malalignment. Large left knee joint simple effusion, nonspecific.  CT pelvis: Acute fractures of the right-sided pubic rami. A third acute pelvic ring fracture is not identified by CT, further evaluation with MRI can be obtained if clinically warranted. Chronic appearing insufficiency fracture of the sacrum. Left hip arthroplasty and right femoral neck intertrochanteric fixation, without evidence of failure of the imaged portions of the hardware.  Xray right elbow, right femur, right hip with pelvis: PENDING  Xray right shoulder: No definite acute fracture  Xray left knee, left tibia and fibula: PENDING  CT Upper extremity: Severe right glenohumeral osteoarthrosis. No fracture or dislocation is identified.    ---  HOSPITAL COURSE:     Patient admitted for fracture of R pubic rami and right shoulder pain status post fall. Patient admitted to telemetry for close monitoring. Ortho consulted, and determined no surgical intervention was needed; WBAT, and with plans to follow up outpatient with Dr. Campos. Patient's pain was controlled per regimen set by pain management. Physical therapy evaluated patient and determined she be discharged to subacute rehab. Cardiology was consulted for management of patient's known A fib and hypertension. Home BB continued for rate control and losartan continued with hold parameters. Patient's HbA1C 8.1 on admission: started on low dose sliding scale.    On day 2 of admission, patient complained of dysuria and increased urinary frequency. Infectious disease consulted. UA showed moderate leukocyte esterase, mildly elevated WBC's, no nitrate. Patient started on Ceftriaxone IV. UCx pending, Patient treated for 3 days with IV ceftriaxone.    Patient showed improvement throughout hospitalization. Patient was seen and examined on day of discharge: Patient was medically optimized for discharge to [facility]      T(C): 36.6 (07-19-21 @ 11:38), Max: 37.1 (07-18-21 @ 19:53)  HR: 77 (07-19-21 @ 11:38) (77 - 100)  BP: 132/84 (07-19-21 @ 11:38) (120/69 - 132/84)  RR: 17 (07-19-21 @ 11:38) (17 - 18)  SpO2: 95% (07-19-21 @ 11:38) (93% - 95%)    PHYSICAL EXAM:  GENERAL: NAD, elderly  HEENT:  anicteric, moist mucous membranes; bruising over right side face  CHEST/LUNG:  CTA b/l, no rales, wheezes, or rhonchi  HEART:  RRR, S1, S2  ABDOMEN:  BS+, soft, nontender, nondistended  EXTREMITIES: no cyanosis, or calf tenderness; +TTP right humeral head with edema  NERVOUS SYSTEM: answers questions and follows commands appropriately              ---  CONSULTANTS:   Orthopedics  Pain Management: Dr. Hayden Yoo  Cardiology: Dr. Louise  ID: Dr. Peace    ---  TIME SPENT:  I, the attending physician, was physically present for the key portions of the evaluation and management (E/M) service provided. The total amount of time spent reviewing the hospital notes, laboratory values, imaging findings, assessing/counseling the patient, discussing with consultant physicians, social work, nursing staff was -- minutes    ---     HPI:  89 y/o female with PMHx HTN, HLD, prediabetes, IBS, osteoporosis, arthritis, peptic ulcer, venous insufficiency, Afib (not on a/c) BIBEMS s/p fall at home. Patient reports she was resting in bed around 6pm in the evening day of presentation when she sat up, stretched her legs, lifted herself to use her walker and fell on her right side. She does not know if she lost her footing, if her knees gave out or if the walker was not fully extended. Patient admits to hitting her head though she thinks he glasses gave her the injury rather than the floor. Denies LOC, visual changes. Denies prodromal dizziness, HA, weakness, changes in vision, chest pain, chest pressure, palpitations, SOB. Patient has been feeling at her baseline state of health the past couple of days but does admit to falling a few weeks ago at home but did not result in serious injury. Patient regularly ambulates with a walker. At present, patient admits to pain around her right eye (5/10), right shoulder (8/10), right hip (5/10). Denies fevers, chills, HA, visual disturbances, chest pain, chest pressure, palpitations, SOB, cough, abdominal pain, n/v.     In the ED, VS T 98.6F, HR 87, /104 -> 161/83, RR 16 98% RA   Labs significant for BUN 25, glucose 216  Patient received IV morphine 2mg x2, Zofran 4mg x1, 8 sutures to right lateral eyebrow laceration   EKG: Afib @72bpm, Qtc 444  CT c-spine: Diffuse osteopenia without displaced fracture. Trace grade 1 C2-C3 C3-C4 retrolisthesis. Bilateral foraminal stenosis due to disc and uncovertebral joint degeneration.  CT brain: No acute intracranial bleeding. Chronic microvascular ischemic changes.  CT maxillofacial: Right periorbital soft tissue hematoma. Intact globes. No fracture.  CT left knee: No acute fracture or traumatic bony malalignment. Large left knee joint simple effusion, nonspecific.  CT pelvis: Acute fractures of the right-sided pubic rami. A third acute pelvic ring fracture is not identified by CT, further evaluation with MRI can be obtained if clinically warranted. Chronic appearing insufficiency fracture of the sacrum. Left hip arthroplasty and right femoral neck intertrochanteric fixation, without evidence of failure of the imaged portions of the hardware.  Xray right elbow, right femur, right hip with pelvis: PENDING  Xray right shoulder: No definite acute fracture  Xray left knee, left tibia and fibula: PENDING  CT Upper extremity: Severe right glenohumeral osteoarthrosis. No fracture or dislocation is identified.    ---  HOSPITAL COURSE:     Patient admitted for fracture of R pubic rami and right shoulder pain status post fall. Patient admitted to telemetry for close monitoring. Ortho consulted, and determined no surgical intervention was needed; WBAT, and with plans to follow up outpatient with Dr. Campos. Rt shoulder pain CT did not show any fracture. Patient's pain was controlled per regimen set by pain management. Physical therapy evaluated patient and determined she be discharged to subacute rehab. Cardiology was consulted for management of patient's known A fib and hypertension. Home BB continued for rate control and losartan continued with hold parameters. Patient's HbA1C 8.1 on admission: started on low dose sliding scale.    On day 2 of admission, patient complained of dysuria and increased urinary frequency. Infectious disease consulted. UA showed moderate leukocyte esterase, mildly elevated WBC's, no nitrate. Patient started on Ceftriaxone IV. UCx pending, Patient treated for 3 days with IV ceftriaxone.    Patient showed improvement throughout hospitalization. Patient was seen and examined on day of discharge: Patient was medically optimized for discharge to [facility]      T(C): 36.6 (07-19-21 @ 11:38), Max: 37.1 (07-18-21 @ 19:53)  HR: 77 (07-19-21 @ 11:38) (77 - 100)  BP: 132/84 (07-19-21 @ 11:38) (120/69 - 132/84)  RR: 17 (07-19-21 @ 11:38) (17 - 18)  SpO2: 95% (07-19-21 @ 11:38) (93% - 95%)    PHYSICAL EXAM:  GENERAL: NAD, elderly  HEENT:  anicteric, moist mucous membranes; bruising over right side face  CHEST/LUNG:  CTA b/l, no rales, wheezes, or rhonchi  HEART:  RRR, S1, S2  ABDOMEN:  BS+, soft, nontender, nondistended  EXTREMITIES: no cyanosis, or calf tenderness; +TTP right humeral head with edema  NERVOUS SYSTEM: answers questions and follows commands appropriately              ---  CONSULTANTS:   Orthopedics  Pain Management: Dr. Hayden Yoo  Cardiology: Dr. Louise  ID: Dr. Peace    ---  TIME SPENT:  I, the attending physician, was physically present for the key portions of the evaluation and management (E/M) service provided. The total amount of time spent reviewing the hospital notes, laboratory values, imaging findings, assessing/counseling the patient, discussing with consultant physicians, social work, nursing staff was 40 minutes    ---

## 2021-07-17 NOTE — PROGRESS NOTE ADULT - SUBJECTIVE AND OBJECTIVE BOX
Patient is a 89y Female with a known history of :  Shoulder fracture, right, closed, initial encounter [S42.91XA]    Eyebrow laceration, right, initial encounter [S01.111A]    Fall [W19.XXXA]    Hypertension [I10]    Hyperlipidemia [E78.5]    Osteoporosis [M81.0]    Irritable bowel syndrome (IBS) [K58.9]    Need for prophylactic measure [Z29.9]    Peptic ulcer [K27.9]    Atrial fibrillation [I48.91]    Fracture of multiple pubic rami, right, closed, initial encounter [S32.591A]    Prediabetes [R73.03]      HPI:  89 y/o female with PMHx HTN, HLD, prediabetes, IBS, osteoporosis, arthritis, peptic ulcer, venous insufficiency, Afib (not on a/c) BIBEMS s/p fall at home. Patient reports she was resting in bed around 6pm in the evening day of presentation when she sat up, stretched her legs, lifted herself to use her walker and fell on her right side. She does not know if she lost her footing, if her knees gave out or if the walker was not fully extended. Patient admits to hitting her head though she thinks he glasses gave her the injury rather than the floor. Denies LOC, visual changes. Denies prodromal dizziness, HA, weakness, changes in vision, chest pain, chest pressure, palpitations, SOB. Patient has been feeling at her baseline state of health the past couple of days but does admit to falling a few weeks ago at home but did not result in serious injury. Patient regularly ambulates with a walker. At present, patient admits to pain around her right eye (5/10), right shoulder (8/10), right hip (5/10). Denies fevers, chills, HA, visual disturbances, chest pain, chest pressure, palpitations, SOB, cough, abdominal pain, n/v.     In the ED, VS T 98.6F, HR 87, /104 -> 161/83, RR 16 98% RA   Labs significant for BUN 25, glucose 216  Patient received IV morphine 2mg x2, Zofran 4mg x1, 8 sutures to right lateral eyebrow laceration   EKG: Afib @72bpm, Qtc 444  CT c-spine: Diffuse osteopenia without displaced fracture. Trace grade 1 C2-C3 C3-C4 retrolisthesis. Bilateral foraminal stenosis due to disc and uncovertebral joint degeneration.  CT brain: No acute intracranial bleeding. Chronic microvascular ischemic changes.  CT maxillofacial: Right periorbital soft tissue hematoma. Intact globes. No fracture.  CT left knee: No acute fracture or traumatic bony malalignment. Large left knee joint simple effusion, nonspecific.  CT pelvis: Acute fractures of the right-sided pubic rami. A third acute pelvic ring fracture is not identified by CT, further evaluation with MRI can be obtained if clinically warranted. Chronic appearing insufficiency fracture of the sacrum. Left hip arthroplasty and right femoral neck intertrochanteric fixation, without evidence of failure of the imaged portions of the hardware.  Xray right elbow, right femur, right hip with pelvis: PENDING  Xray right shoulder: personal read- right humeral head fx  Xray left knee, left tibia and fibula: PENDING   (14 Jul 2021 02:54)      REVIEW OF SYSTEMS:    CONSTITUTIONAL: No fever, weight loss, or fatigue  EYES: No eye pain, visual disturbances, or discharge  ENMT:  No difficulty hearing, tinnitus, vertigo; No sinus or throat pain  NECK: No pain or stiffness  BREASTS: No pain, masses, or nipple discharge  RESPIRATORY: No cough, wheezing, chills or hemoptysis; No shortness of breath  CARDIOVASCULAR: No chest pain, palpitations, dizziness, or leg swelling  GASTROINTESTINAL: No abdominal or epigastric pain. No nausea, vomiting, or hematemesis; No diarrhea or constipation. No melena or hematochezia.  GENITOURINARY: No dysuria, frequency, hematuria, or incontinence  NEUROLOGICAL: No headaches, memory loss, loss of strength, numbness, or tremors  SKIN: No itching, burning, rashes, or lesions   LYMPH NODES: No enlarged glands  ENDOCRINE: No heat or cold intolerance; No hair loss  MUSCULOSKELETAL: No joint pain or swelling; No muscle, back, or extremity pain  PSYCHIATRIC: No depression, anxiety, mood swings, or difficulty sleeping  HEME/LYMPH: No easy bruising, or bleeding gums  ALLERGY AND IMMUNOLOGIC: No hives or eczema    MEDICATIONS  (STANDING):  acetaminophen   Tablet .. 1000 milliGRAM(s) Oral every 8 hours  artificial tears (preservative free) Ophthalmic Solution 1 Drop(s) Both EYES two times a day  aspirin enteric coated 81 milliGRAM(s) Oral daily  atorvastatin 20 milliGRAM(s) Oral at bedtime  cefTRIAXone   IVPB      dextrose 40% Gel 15 Gram(s) Oral once  famotidine    Tablet 20 milliGRAM(s) Oral daily  glucagon  Injectable 1 milliGRAM(s) IntraMuscular once  insulin lispro (ADMELOG) corrective regimen sliding scale   SubCutaneous three times a day before meals  insulin lispro (ADMELOG) corrective regimen sliding scale   SubCutaneous at bedtime  losartan 50 milliGRAM(s) Oral daily  metoprolol succinate ER 25 milliGRAM(s) Oral daily  morphine   Solution 2.5 milliGRAM(s) Oral every 6 hours  polyethylene glycol 3350 17 Gram(s) Oral daily  senna 2 Tablet(s) Oral at bedtime  sodium chloride 0.9%. 1000 milliLiter(s) (75 mL/Hr) IV Continuous <Continuous>    MEDICATIONS  (PRN):  acetaminophen   Tablet .. 650 milliGRAM(s) Oral every 4 hours PRN Mild Pain (1 - 3)  morphine   Solution 2.5 milliGRAM(s) Oral every 4 hours PRN Severe Pain (7 - 10)  ondansetron    Tablet 4 milliGRAM(s) Oral every 6 hours PRN Nausea and/or Vomiting      ALLERGIES: sulfa drugs (Hives)      FAMILY HISTORY:  FH: hypertension        Social history:  Alochol:   Smoking:   Drug Use:   Marital Status:     PHYSICAL EXAMINATION:  -----------------------------  T(C): 36.4 (07-17-21 @ 05:04), Max: 36.9 (07-16-21 @ 11:05)  HR: 65 (07-17-21 @ 05:04) (64 - 73)  BP: 153/80 (07-17-21 @ 05:04) (97/60 - 153/80)  RR: 17 (07-17-21 @ 05:04) (17 - 18)  SpO2: 95% (07-17-21 @ 05:04) (92% - 100%)  Wt(kg): --        Constitutional: well developed, normal appearance, well groomed, well nourished, no deformities and no acute distress.   Eyes: the conjunctiva exhibited no abnormalities and the eyelids demonstrated no xanthelasmas.   HEENT: normal oral mucosa, no oral pallor and no oral cyanosis.   Neck: normal jugular venous A waves present, normal jugular venous V waves present and no jugular venous borrero A waves.   Pulmonary: no respiratory distress, normal respiratory rhythm and effort, no accessory muscle use and lungs were clear to auscultation bilaterally. Anteriorly  Cardiovascular: heart rate and rhythm were irreg/irreg, normal S1 and S2 and no murmur, gallop, rub, heave or thrill are present.    Musculoskeletal: the gait could not be assessed.  Extremities: no clubbing of the fingernails, no localized cyanosis, no petechial hemorrhages and no ischemic changes.   Skin: normal skin color and pigmentation, no rash, no venous stasis, no skin lesions, no skin ulcer and no xanthoma was observed.   Psychiatric: oriented to person, place, and time, the affect was normal, the mood was normal and not feeling anxious.     LABS:   --------  07-17    142  |  108  |  22  ----------------------------<  164<H>  4.0   |  28  |  0.33<L>    Ca    8.3<L>      17 Jul 2021 08:53    TPro  5.2<L>  /  Alb  2.1<L>  /  TBili  0.9  /  DBili  x   /  AST  14<L>  /  ALT  16  /  AlkPhos  61  07-17                         10.2   6.13  )-----------( 162      ( 17 Jul 2021 08:00 )             32.2                   Radiology:

## 2021-07-17 NOTE — DISCHARGE NOTE PROVIDER - PROVIDER TOKENS
PROVIDER:[TOKEN:[[id number]],FOLLOWUP:[1 week]],PROVIDER:[TOKEN:[[id number]],FOLLOWUP:[1 week]],PROVIDER:[TOKEN:[[id number]],FOLLOWUP:[1 week]]

## 2021-07-18 LAB
ALBUMIN SERPL ELPH-MCNC: 2.2 G/DL — LOW (ref 3.3–5)
ALP SERPL-CCNC: 64 U/L — SIGNIFICANT CHANGE UP (ref 40–120)
ALT FLD-CCNC: 19 U/L — SIGNIFICANT CHANGE UP (ref 12–78)
ANION GAP SERPL CALC-SCNC: 7 MMOL/L — SIGNIFICANT CHANGE UP (ref 5–17)
AST SERPL-CCNC: 13 U/L — LOW (ref 15–37)
BILIRUB SERPL-MCNC: 1 MG/DL — SIGNIFICANT CHANGE UP (ref 0.2–1.2)
BUN SERPL-MCNC: 28 MG/DL — HIGH (ref 7–23)
CALCIUM SERPL-MCNC: 8.7 MG/DL — SIGNIFICANT CHANGE UP (ref 8.5–10.1)
CHLORIDE SERPL-SCNC: 108 MMOL/L — SIGNIFICANT CHANGE UP (ref 96–108)
CO2 SERPL-SCNC: 27 MMOL/L — SIGNIFICANT CHANGE UP (ref 22–31)
CREAT SERPL-MCNC: 0.35 MG/DL — LOW (ref 0.5–1.3)
GLUCOSE SERPL-MCNC: 134 MG/DL — HIGH (ref 70–99)
HCT VFR BLD CALC: 32.7 % — LOW (ref 34.5–45)
HGB BLD-MCNC: 10.5 G/DL — LOW (ref 11.5–15.5)
MCHC RBC-ENTMCNC: 28 PG — SIGNIFICANT CHANGE UP (ref 27–34)
MCHC RBC-ENTMCNC: 32.1 GM/DL — SIGNIFICANT CHANGE UP (ref 32–36)
MCV RBC AUTO: 87.2 FL — SIGNIFICANT CHANGE UP (ref 80–100)
NRBC # BLD: 0 /100 WBCS — SIGNIFICANT CHANGE UP (ref 0–0)
PLATELET # BLD AUTO: 212 K/UL — SIGNIFICANT CHANGE UP (ref 150–400)
POTASSIUM SERPL-MCNC: 4.1 MMOL/L — SIGNIFICANT CHANGE UP (ref 3.5–5.3)
POTASSIUM SERPL-SCNC: 4.1 MMOL/L — SIGNIFICANT CHANGE UP (ref 3.5–5.3)
PROT SERPL-MCNC: 5.7 G/DL — LOW (ref 6–8.3)
RBC # BLD: 3.75 M/UL — LOW (ref 3.8–5.2)
RBC # FLD: 13.6 % — SIGNIFICANT CHANGE UP (ref 10.3–14.5)
SODIUM SERPL-SCNC: 142 MMOL/L — SIGNIFICANT CHANGE UP (ref 135–145)
WBC # BLD: 5.88 K/UL — SIGNIFICANT CHANGE UP (ref 3.8–10.5)
WBC # FLD AUTO: 5.88 K/UL — SIGNIFICANT CHANGE UP (ref 3.8–10.5)

## 2021-07-18 PROCEDURE — 99233 SBSQ HOSP IP/OBS HIGH 50: CPT

## 2021-07-18 PROCEDURE — 74018 RADEX ABDOMEN 1 VIEW: CPT | Mod: 26

## 2021-07-18 PROCEDURE — 99232 SBSQ HOSP IP/OBS MODERATE 35: CPT

## 2021-07-18 RX ORDER — LIDOCAINE 4 G/100G
1 CREAM TOPICAL DAILY
Refills: 0 | Status: DISCONTINUED | OUTPATIENT
Start: 2021-07-18 | End: 2021-07-19

## 2021-07-18 RX ADMIN — MORPHINE SULFATE 2.5 MILLIGRAM(S): 50 CAPSULE, EXTENDED RELEASE ORAL at 23:32

## 2021-07-18 RX ADMIN — LIDOCAINE 1 PATCH: 4 CREAM TOPICAL at 22:57

## 2021-07-18 RX ADMIN — Medication 1: at 16:59

## 2021-07-18 RX ADMIN — LIDOCAINE 1 PATCH: 4 CREAM TOPICAL at 20:07

## 2021-07-18 RX ADMIN — Medication 1: at 08:05

## 2021-07-18 RX ADMIN — Medication 1000 MILLIGRAM(S): at 13:09

## 2021-07-18 RX ADMIN — Medication 1000 MILLIGRAM(S): at 06:42

## 2021-07-18 RX ADMIN — MORPHINE SULFATE 2.5 MILLIGRAM(S): 50 CAPSULE, EXTENDED RELEASE ORAL at 09:08

## 2021-07-18 RX ADMIN — LOSARTAN POTASSIUM 50 MILLIGRAM(S): 100 TABLET, FILM COATED ORAL at 05:56

## 2021-07-18 RX ADMIN — CYCLOSPORINE 1 DROP(S): 0.5 EMULSION OPHTHALMIC at 17:00

## 2021-07-18 RX ADMIN — CEFTRIAXONE 100 MILLIGRAM(S): 500 INJECTION, POWDER, FOR SOLUTION INTRAMUSCULAR; INTRAVENOUS at 05:56

## 2021-07-18 RX ADMIN — Medication 1000 MILLIGRAM(S): at 21:11

## 2021-07-18 RX ADMIN — Medication 81 MILLIGRAM(S): at 11:23

## 2021-07-18 RX ADMIN — MORPHINE SULFATE 2.5 MILLIGRAM(S): 50 CAPSULE, EXTENDED RELEASE ORAL at 08:35

## 2021-07-18 RX ADMIN — ATORVASTATIN CALCIUM 20 MILLIGRAM(S): 80 TABLET, FILM COATED ORAL at 21:11

## 2021-07-18 RX ADMIN — Medication 25 MILLIGRAM(S): at 05:56

## 2021-07-18 RX ADMIN — CYCLOSPORINE 1 DROP(S): 0.5 EMULSION OPHTHALMIC at 05:59

## 2021-07-18 RX ADMIN — Medication 1000 MILLIGRAM(S): at 13:56

## 2021-07-18 RX ADMIN — Medication 10 MILLIGRAM(S): at 18:54

## 2021-07-18 RX ADMIN — FAMOTIDINE 20 MILLIGRAM(S): 10 INJECTION INTRAVENOUS at 11:23

## 2021-07-18 RX ADMIN — POLYETHYLENE GLYCOL 3350 17 GRAM(S): 17 POWDER, FOR SOLUTION ORAL at 11:23

## 2021-07-18 RX ADMIN — Medication 2: at 11:37

## 2021-07-18 RX ADMIN — Medication 1000 MILLIGRAM(S): at 05:57

## 2021-07-18 RX ADMIN — SENNA PLUS 2 TABLET(S): 8.6 TABLET ORAL at 21:11

## 2021-07-18 RX ADMIN — LIDOCAINE 1 PATCH: 4 CREAM TOPICAL at 11:23

## 2021-07-18 RX ADMIN — Medication 1000 MILLIGRAM(S): at 22:15

## 2021-07-18 NOTE — PROGRESS NOTE ADULT - PROBLEM SELECTOR PLAN 7
Chronic, stable  - Continue home atorvastatin 20mg PO qhs

## 2021-07-18 NOTE — PROGRESS NOTE ADULT - SUBJECTIVE AND OBJECTIVE BOX
[facility]  INFECTIOUS DISEASES   [address] [place] [address]  Tel: [phone]     Fax: [phone]  =======================================================    N-[id number]  LEVON SRIVASTAVA     Follow up: UTI    No new complaint, no dysuria or fever.   UA positive and UC pending.     PAST MEDICAL & SURGICAL HISTORY:  Hyperlipidemia  Peptic ulcer  HTN (hypertension)  Osteoporosis  Irritable bowel syndrome  Cellulitis  S/P hip replacement  partial, left  History of appendectomy  History of tonsillectomy    Social Hx: no smoking, ETOH or drugs     FAMILY HISTORY:  FH: hypertension    Allergies  sulfa drugs (Hives)    Antibiotics:  None     REVIEW OF SYSTEMS:  CONSTITUTIONAL:  No Fever or chills  HEENT:  No diplopia or blurred vision.  No sore throat or runny nose.  CARDIOVASCULAR:  No chest pain or SOB.  RESPIRATORY:  No cough, shortness of breath, PND or orthopnea.  GASTROINTESTINAL:  No nausea, vomiting or diarrhea.  GENITOURINARY:  No dysuria, frequency or urgency. No Blood in urine  MUSCULOSKELETAL:  no joint aches, no muscle pain  SKIN:  No change in skin, hair or nails.  NEUROLOGIC:  No paresthesias, fasciculations, seizures or weakness.  PSYCHIATRIC:  No disorder of thought or mood.  ENDOCRINE:  No heat or cold intolerance, polyuria or polydipsia.  HEMATOLOGICAL:  No easy bruising or bleeding.     Physical Exam:  Vital Signs Last 24 Hrs  T(C): 36.4 (18 Jul 2021 11:33), Max: 37 (17 Jul 2021 19:48)  T(F): 97.6 (18 Jul 2021 11:33), Max: 98.6 (17 Jul 2021 19:48)  HR: 83 (18 Jul 2021 11:33) (73 - 88)  BP: 138/87 (18 Jul 2021 11:33) (102/66 - 147/100)  BP(mean): --  RR: 14 (18 Jul 2021 11:33) (14 - 18)  SpO2: 95% (18 Jul 2021 11:33) (92% - 95%)  GEN: NAD  HEENT: normocephalic and atraumatic. R eye with ecchymosis   NECK: Supple.  No lymphadenopathy   LUNGS: Clear to auscultation.  HEART: Regular rate and rhythm   ABDOMEN: Soft, nontender, and nondistended.  Positive bowel sounds.    : No CVA tenderness  EXTREMITIES: Without edema.  NEUROLOGIC: grossly intact.  PSYCHIATRIC: Appropriate affect .  SKIN: No rash      Labs:                        10.5   5.88  )-----------( 212      ( 18 Jul 2021 07:11 )             32.7     07-18    142  |  108  |  28<H>  ----------------------------<  134<H>  4.1   |  27  |  0.35<L>    Ca    8.7      18 Jul 2021 07:11    TPro  5.7<L>  /  Alb  2.2<L>  /  TBili  1.0  /  DBili  x   /  AST  13<L>  /  ALT  19  /  AlkPhos  64  07-18    WBC Count: 5.88 K/uL (07-18-21 @ 07:11)  WBC Count: 6.13 K/uL (07-17-21 @ 08:00)  WBC Count: 8.03 K/uL (07-16-21 @ 06:27)  WBC Count: 9.17 K/uL (07-15-21 @ 06:43)  WBC Count: 8.16 K/uL (07-14-21 @ 06:22)  WBC Count: 7.57 K/uL (07-13-21 @ 21:01)    Creatinine, Serum: 0.35 mg/dL (07-18-21 @ 07:11)  Creatinine, Serum: 0.33 mg/dL (07-17-21 @ 08:53)  Creatinine, Serum: 0.52 mg/dL (07-16-21 @ 06:27)  Creatinine, Serum: 0.53 mg/dL (07-15-21 @ 06:43)  Creatinine, Serum: 0.36 mg/dL (07-14-21 @ 06:22)  Creatinine, Serum: 0.61 mg/dL (07-13-21 @ 21:01)    Procalcitonin, Serum: 0.28 ng/mL (07-17-21 @ 08:53)     COVID-19 PCR: NotDetec (07-13-21 @ 21:01)    All imaging and other data have been reviewed.    Assessment and Plan:   89 y/o woman with PMH of HTN, prediabetes, IBS, osteoporosis, arthritis, peptic ulcer, venous insufficiency and Afib was admitted after a fall at home and diagnosed with right humeral head fx, right pubic rami fx.   Today ID has been called since she felt that her urine is "hot".   No fever  No leukocytosis   No urinary symptoms except feeling "hot urine"    1- UTI    - UA with mildly elevated WBC, no nitrate  - UC pending  - Has been started on ceftriaxone, if sensitive can treat for total 3 says. Tomorrow last day.     Will follow.    Kari Peace MD  Division of Infectious Diseases   Cell [phone] between 8am and 6pm   After 6pm and weekends please call ID service at [phone].

## 2021-07-18 NOTE — PROGRESS NOTE ADULT - SUBJECTIVE AND OBJECTIVE BOX
Patient is a 89y Female with a known history of :  Shoulder fracture, right, closed, initial encounter [S42.91XA]    Eyebrow laceration, right, initial encounter [S01.111A]    Fall [W19.XXXA]    Hypertension [I10]    Hyperlipidemia [E78.5]    Osteoporosis [M81.0]    Irritable bowel syndrome (IBS) [K58.9]    Need for prophylactic measure [Z29.9]    Peptic ulcer [K27.9]    Atrial fibrillation [I48.91]    Fracture of multiple pubic rami, right, closed, initial encounter [S32.591A]    Prediabetes [R73.03]      HPI:  89 y/o female with PMHx HTN, HLD, prediabetes, IBS, osteoporosis, arthritis, peptic ulcer, venous insufficiency, Afib (not on a/c) BIBEMS s/p fall at home. Patient reports she was resting in bed around 6pm in the evening day of presentation when she sat up, stretched her legs, lifted herself to use her walker and fell on her right side. She does not know if she lost her footing, if her knees gave out or if the walker was not fully extended. Patient admits to hitting her head though she thinks he glasses gave her the injury rather than the floor. Denies LOC, visual changes. Denies prodromal dizziness, HA, weakness, changes in vision, chest pain, chest pressure, palpitations, SOB. Patient has been feeling at her baseline state of health the past couple of days but does admit to falling a few weeks ago at home but did not result in serious injury. Patient regularly ambulates with a walker. At present, patient admits to pain around her right eye (5/10), right shoulder (8/10), right hip (5/10). Denies fevers, chills, HA, visual disturbances, chest pain, chest pressure, palpitations, SOB, cough, abdominal pain, n/v.     In the ED, VS T 98.6F, HR 87, /104 -> 161/83, RR 16 98% RA   Labs significant for BUN 25, glucose 216  Patient received IV morphine 2mg x2, Zofran 4mg x1, 8 sutures to right lateral eyebrow laceration   EKG: Afib @72bpm, Qtc 444  CT c-spine: Diffuse osteopenia without displaced fracture. Trace grade 1 C2-C3 C3-C4 retrolisthesis. Bilateral foraminal stenosis due to disc and uncovertebral joint degeneration.  CT brain: No acute intracranial bleeding. Chronic microvascular ischemic changes.  CT maxillofacial: Right periorbital soft tissue hematoma. Intact globes. No fracture.  CT left knee: No acute fracture or traumatic bony malalignment. Large left knee joint simple effusion, nonspecific.  CT pelvis: Acute fractures of the right-sided pubic rami. A third acute pelvic ring fracture is not identified by CT, further evaluation with MRI can be obtained if clinically warranted. Chronic appearing insufficiency fracture of the sacrum. Left hip arthroplasty and right femoral neck intertrochanteric fixation, without evidence of failure of the imaged portions of the hardware.  Xray right elbow, right femur, right hip with pelvis: PENDING  Xray right shoulder: personal read- right humeral head fx  Xray left knee, left tibia and fibula: PENDING   (14 Jul 2021 02:54)      REVIEW OF SYSTEMS:    CONSTITUTIONAL: No fever, weight loss, or fatigue  EYES: No eye pain, visual disturbances, or discharge  ENMT:  No difficulty hearing, tinnitus, vertigo; No sinus or throat pain  NECK: No pain or stiffness  BREASTS: No pain, masses, or nipple discharge  RESPIRATORY: No cough, wheezing, chills or hemoptysis; No shortness of breath  CARDIOVASCULAR: No chest pain, palpitations, dizziness, or leg swelling  GASTROINTESTINAL: No abdominal or epigastric pain. No nausea, vomiting, or hematemesis; No diarrhea or constipation. No melena or hematochezia.  GENITOURINARY: No dysuria, frequency, hematuria, or incontinence  NEUROLOGICAL: No headaches, memory loss, loss of strength, numbness, or tremors  SKIN: No itching, burning, rashes, or lesions   LYMPH NODES: No enlarged glands  ENDOCRINE: No heat or cold intolerance; No hair loss  MUSCULOSKELETAL: No joint pain or swelling; No muscle, back, or extremity pain  PSYCHIATRIC: No depression, anxiety, mood swings, or difficulty sleeping  HEME/LYMPH: No easy bruising, or bleeding gums  ALLERGY AND IMMUNOLOGIC: No hives or eczema    MEDICATIONS  (STANDING):  acetaminophen   Tablet .. 1000 milliGRAM(s) Oral every 8 hours  aspirin enteric coated 81 milliGRAM(s) Oral daily  atorvastatin 20 milliGRAM(s) Oral at bedtime  cefTRIAXone   IVPB 1000 milliGRAM(s) IV Intermittent every 24 hours  cefTRIAXone   IVPB      cycloSPORINE (RESTASIS) 0.05% Emulsion 1 Drop(s) Both EYES two times a day  dextrose 40% Gel 15 Gram(s) Oral once  famotidine    Tablet 20 milliGRAM(s) Oral daily  glucagon  Injectable 1 milliGRAM(s) IntraMuscular once  insulin lispro (ADMELOG) corrective regimen sliding scale   SubCutaneous three times a day before meals  insulin lispro (ADMELOG) corrective regimen sliding scale   SubCutaneous at bedtime  losartan 50 milliGRAM(s) Oral daily  metoprolol succinate ER 25 milliGRAM(s) Oral daily  morphine   Solution 2.5 milliGRAM(s) Oral every 6 hours  polyethylene glycol 3350 17 Gram(s) Oral daily  senna 2 Tablet(s) Oral at bedtime  sodium chloride 0.9%. 1000 milliLiter(s) (75 mL/Hr) IV Continuous <Continuous>    MEDICATIONS  (PRN):  acetaminophen   Tablet .. 650 milliGRAM(s) Oral every 4 hours PRN Mild Pain (1 - 3)  lidocaine   Patch 1 Patch Transdermal daily PRN right shoulder pain  morphine   Solution 2.5 milliGRAM(s) Oral every 4 hours PRN Severe Pain (7 - 10)  ondansetron    Tablet 4 milliGRAM(s) Oral every 6 hours PRN Nausea and/or Vomiting      ALLERGIES: sulfa drugs (Hives)      FAMILY HISTORY:  FH: hypertension        Social history:  Alochol:   Smoking:   Drug Use:   Marital Status:     PHYSICAL EXAMINATION:  -----------------------------  T(C): 36.4 (07-18-21 @ 04:25), Max: 37 (07-17-21 @ 19:48)  HR: 88 (07-18-21 @ 04:25) (73 - 88)  BP: 147/100 (07-18-21 @ 04:25) (102/66 - 147/100)  RR: 18 (07-18-21 @ 04:25) (17 - 18)  SpO2: 93% (07-18-21 @ 04:25) (92% - 94%)  Wt(kg): --    07-17 @ 07:01  -  07-18 @ 07:00  --------------------------------------------------------  IN:  Total IN: 0 mL    OUT:    Voided (mL): 800 mL  Total OUT: 800 mL    Total NET: -800 mL      07-18 @ 07:01  -  07-18 @ 11:04  --------------------------------------------------------  IN:    Oral Fluid: 360 mL  Total IN: 360 mL    OUT:  Total OUT: 0 mL    Total NET: 360 mL            Constitutional: well developed, normal appearance, well groomed, well nourished, no deformities and no acute distress.   Eyes: the conjunctiva exhibited no abnormalities and the eyelids demonstrated no xanthelasmas.   HEENT: normal oral mucosa, no oral pallor and no oral cyanosis.   Neck: normal jugular venous A waves present, normal jugular venous V waves present and no jugular venous borrero A waves.   Pulmonary: no respiratory distress, normal respiratory rhythm and effort, no accessory muscle use and lungs were clear to auscultation bilaterally.   Cardiovascular: heart rate and rhythm were irreg/irreg, normal S1 and S2 and no murmur, gallop, rub, heave or thrill are present.    Musculoskeletal: the gait could not be assessed.   Extremities: no clubbing of the fingernails, no localized cyanosis, no petechial hemorrhages and no ischemic changes.   Skin: normal skin color and pigmentation, no rash, no venous stasis, no skin lesions, no skin ulcer and no xanthoma was observed.   Psychiatric: oriented to person, place, and time, the affect was normal, the mood was normal and not feeling anxious.     LABS:   --------  07-18    142  |  108  |  28<H>  ----------------------------<  134<H>  4.1   |  27  |  0.35<L>    Ca    8.7      18 Jul 2021 07:11    TPro  5.7<L>  /  Alb  2.2<L>  /  TBili  1.0  /  DBili  x   /  AST  13<L>  /  ALT  19  /  AlkPhos  64  07-18                         10.5   5.88  )-----------( 212      ( 18 Jul 2021 07:11 )             32.7                   Radiology:

## 2021-07-18 NOTE — PROGRESS NOTE ADULT - PROBLEM SELECTOR PLAN 2
- fracture ruled out, Acute, s/p fall at home , severe oa  - Xray right shoulder - no evidence of acute fracture  - Pain control as above  - Lidocaine patch added today for right humeral head pain and tenderness  - pt eval - recommend [facility]  - Ortho following

## 2021-07-18 NOTE — PROGRESS NOTE ADULT - ATTENDING COMMENTS
35
patient seen and examined at bedside. case discussed during rounds  patient slated for maki  patient reporting constipation since admission. was given  a laxative without effect  ++abdominal cramping  checked kub:: stool impaction  plan:  c/w bowel regime ++ add suppository
89 y/o female with PMHx HTN, HLD, prediabetes, IBS, osteoporosis, arthritis, peptic ulcer, venous insufficiency, Afib (not on a/c) BIBEMS s/p fall at home when she was getting up out of bed to ambulate with her walker and fell on her right side, admitted with a right pubic rami fracture and R shoulder pain s/p fall Plan: apprec ID recs, f/u UA, monitor clinical course, dc planning, pain control, dc likely monday once auth completed apprec sw/cm collaboration
89 y/o female with PMHx HTN, HLD, prediabetes, IBS, osteoporosis, mild cognitive impairment, dm2, arthritis, peptic ulcer, venous insufficiency, Afib (not on a/c, fall risk) BIBEMS s/p fall at home when she was getting up out of bed to ambulate with her walker and fell on her right side, admitted with a right pubic rami fracture and R shoulder pain s/p fall Plan: apprec PT eval for AL[facility], spoke with daughter at length on multiple occasions today to update on dx, px and plan of care, apprec ortho recs, cont pain control prn and reassess, apprec palliative recs, dc planning pt is medically stable for discharge at this time with close follow up

## 2021-07-18 NOTE — PROGRESS NOTE ADULT - ASSESSMENT
The patient is a 89 year old female with a history of HTN, HL, PUD, chronic venous insufficiency, prior falls, atrial fibrillation who presents with a fall, found to have shoulder and pelvic fractures.    7/18/21  Patient sleeping comfortably in chair  Easily arousable  No cardiac complaints offered  Complaining of some mild abdominal cramping  Monitor-A-Fib    Plan:  - ECG with known atrial fibrillation and LVH related changes  - Continue metoprolol succinate 25 mg daily for rate control  - Not on long-term anticoagulation due to prior falls - risks outweigh benefits  - Continue aspirin 81 mg daily  - Continue losartan 50 mg daily  - Continue atorvastatin 20 mg daily  - On Rochepin  - Pain control  - Ortho evaluation noted - no surgical intervention  - Physical therapy  - Discharge planning

## 2021-07-18 NOTE — PROGRESS NOTE ADULT - SUBJECTIVE AND OBJECTIVE BOX
Patient is a 89y old  Female who presents with a chief complaint of right humeral head fx, right pubic rami fx (2021 11:59)      INTERVAL HPI/OVERNIGHT EVENTS: Patient seen and examined at bedside. No acute events overnight. Today pt admitted to right humeral pain and constipation. Denies cp, sob, fevers, chills, n/v.    MEDICATIONS  (STANDING):  acetaminophen   Tablet .. 1000 milliGRAM(s) Oral every 8 hours  aspirin enteric coated 81 milliGRAM(s) Oral daily  atorvastatin 20 milliGRAM(s) Oral at bedtime  bisacodyl Suppository 10 milliGRAM(s) Rectal once  cefTRIAXone   IVPB      cefTRIAXone   IVPB 1000 milliGRAM(s) IV Intermittent every 24 hours  cycloSPORINE (RESTASIS) 0.05% Emulsion 1 Drop(s) Both EYES two times a day  dextrose 40% Gel 15 Gram(s) Oral once  famotidine    Tablet 20 milliGRAM(s) Oral daily  glucagon  Injectable 1 milliGRAM(s) IntraMuscular once  insulin lispro (ADMELOG) corrective regimen sliding scale   SubCutaneous three times a day before meals  insulin lispro (ADMELOG) corrective regimen sliding scale   SubCutaneous at bedtime  losartan 50 milliGRAM(s) Oral daily  metoprolol succinate ER 25 milliGRAM(s) Oral daily  morphine   Solution 2.5 milliGRAM(s) Oral every 6 hours  polyethylene glycol 3350 17 Gram(s) Oral daily  senna 2 Tablet(s) Oral at bedtime  sodium chloride 0.9%. 1000 milliLiter(s) (75 mL/Hr) IV Continuous <Continuous>    MEDICATIONS  (PRN):  acetaminophen   Tablet .. 650 milliGRAM(s) Oral every 4 hours PRN Mild Pain (1 - 3)  lidocaine   Patch 1 Patch Transdermal daily PRN right shoulder pain  morphine   Solution 2.5 milliGRAM(s) Oral every 4 hours PRN Severe Pain (7 - 10)  ondansetron    Tablet 4 milliGRAM(s) Oral every 6 hours PRN Nausea and/or Vomiting      Allergies    sulfa drugs (Hives)    Intolerances        REVIEW OF SYSTEMS:  CONSTITUTIONAL: No fever or chills  HEENT:  No headache, no sore throat  RESPIRATORY: No cough, wheezing, or shortness of breath  CARDIOVASCULAR: No chest pain, palpitations  GASTROINTESTINAL: No abd pain, nausea, vomiting, or diarrhea; admits constipation  GENITOURINARY: No dysuria, frequency, or hematuria  NEUROLOGICAL: no focal weakness or dizziness  MUSCULOSKELETAL: admits RUE pain    Vital Signs Last 24 Hrs  T(C): 36.4 (2021 11:33), Max: 37 (2021 19:48)  T(F): 97.6 (2021 11:33), Max: 98.6 (2021 19:48)  HR: 83 (2021 11:33) (78 - 88)  BP: 138/87 (2021 11:33) (102/66 - 147/100)  BP(mean): --  RR: 14 (2021 11:33) (14 - 18)  SpO2: 95% (2021 11:33) (92% - 95%)    PHYSICAL EXAM:  GENERAL: NAD, elderly  HEENT:  anicteric, moist mucous membranes; bruising over right side face  CHEST/LUNG:  CTA b/l, no rales, wheezes, or rhonchi  HEART:  RRR, S1, S2  ABDOMEN:  BS+, soft, nontender, nondistended  EXTREMITIES: no cyanosis, or calf tenderness; +TTP right humeral head with edema  NERVOUS SYSTEM: answers questions and follows commands appropriately    LABS:                        10.5   5.88  )-----------( 212      ( 2021 07:11 )             32.7     CBC Full  -  ( 2021 07:11 )  WBC Count : 5.88 K/uL  Hemoglobin : 10.5 g/dL  Hematocrit : 32.7 %  Platelet Count - Automated : 212 K/uL  Mean Cell Volume : 87.2 fl  Mean Cell Hemoglobin : 28.0 pg  Mean Cell Hemoglobin Concentration : 32.1 gm/dL  Auto Neutrophil # : x  Auto Lymphocyte # : x  Auto Monocyte # : x  Auto Eosinophil # : x  Auto Basophil # : x  Auto Neutrophil % : x  Auto Lymphocyte % : x  Auto Monocyte % : x  Auto Eosinophil % : x  Auto Basophil % : x    2021 07:11    142    |  108    |  28     ----------------------------<  134    4.1     |  27     |  0.35     Ca    8.7        2021 07:11    TPro  5.7    /  Alb  2.2    /  TBili  1.0    /  DBili  x      /  AST  13     /  ALT  19     /  AlkPhos  64     2021 07:11      Urinalysis Basic - ( 2021 21:48 )    Color: Yellow / Appearance: Clear / S.015 / pH: x  Gluc: x / Ketone: Negative  / Bili: Negative / Urobili: 1   Blood: x / Protein: 30 mg/dL / Nitrite: Negative   Leuk Esterase: Moderate / RBC: 0-2 /HPF / WBC 11-25   Sq Epi: x / Non Sq Epi: Occasional / Bacteria: Few      CAPILLARY BLOOD GLUCOSE      POCT Blood Glucose.: 209 mg/dL (2021 11:37)  POCT Blood Glucose.: 158 mg/dL (2021 07:58)  POCT Blood Glucose.: 168 mg/dL (2021 22:20)  POCT Blood Glucose.: 186 mg/dL (2021 17:01)

## 2021-07-18 NOTE — PROGRESS NOTE ADULT - PROBLEM SELECTOR PLAN 4
EKG with Afib, rate controlled  - Patient and daughter admit to hx of Afib though do not know specific details of diagnosis  - Per daughter, not on a/c due to fall risk, takes 2 ASA 81mg PO daily per cardiology  - CHADsVASC 5 (including diabetes)  - ASA 81 daily  - Monitor on remote telemetry   - Continue rate control with metoprolol succinate 25mg PO qd  - Follows with Dr. Aleman outpatient, will consult, f/u recs
EKG with Afib, rate controlled  - Patient and daughter admit to hx of Afib though do not know specific details of diagnosis  - Per daughter, not on a/c due to fall risk, takes 2 ASA 81mg PO daily per cardiology  - CHADsVASC 5 (including diabetes)  - ASA 81 daily  - Monitor on remote telemetry   - Continue rate control with metoprolol succinate 25mg PO qd  - Follows with Dr. Aleman outpatient -- Cardiology, Dr. Louise, following
EKG with Afib, rate controlled  - Patient and daughter admit to hx of Afib though do not know specific details of diagnosis  - Per daughter, not on a/c due to fall risk, takes 2 ASA 81mg PO daily per cardiology  - CHADsVASC 5 (including diabetes)  - ASA 81 daily  - Monitor on remote telemetry   - Continue rate control with metoprolol succinate 25mg PO qd  - Follows with Dr. Aleman outpatient, will consult, f/u recs
EKG with Afib, rate controlled  - Patient and daughter admit to hx of Afib though do not know specific details of diagnosis  - Per daughter, not on a/c due to fall risk, takes 2 ASA 81mg PO daily per cardiology  - CHADsVASC 5 (including diabetes)  - ASA 81 daily  - Monitor on remote telemetry   - Continue rate control with metoprolol succinate 25mg PO qd  - Follows with Dr. Aleman outpatient, will consult, f/u recs
EKG with Afib, rate controlled  - Patient and daughter admit to hx of Afib though do not know specific details of diagnosis  - Per daughter, not on a/c due to fall risk, takes 2 ASA 81mg PO daily per cardiology  - CHADsVASC 5 (including diabetes)  - Hold ASA pending serial CTs   - Monitor on remote telemetry   - Continue rate control with metoprolol succinate 25mg PO qd  - Follows with Dr. Aleman outpatient, will consult, f/u recs

## 2021-07-18 NOTE — PROGRESS NOTE ADULT - PROBLEM SELECTOR PLAN 1
Acute, s/p fall at home    - CT right pelvis with acute fractures of the right-sided pubic rami  - Ortho consulted by ED - no surgical intervention, wbat, apprec ortho recs; to f/u as outpt with Dr Campos  - Pain control with Tylenol and morphine  - Pain Management, Dr. Hayden Yoo, following  - pt eval - recommend [facility] Acute, s/p fall at home    - CT right pelvis with acute fractures of the right-sided pubic rami  - Ortho consulted by ED - no surgical intervention, wbat, apprec ortho recs; to f/u as outpt with Dr Campos  - Pain control with Tylenol and morphine  - Bowel Regimen for constipation 2/2 opioid use: Miralax, Senna; Dulcolax ordered today; f/u [facility]   - Pain Management, Dr. Hayden Yoo, following  - pt eval - recommend [facility]

## 2021-07-18 NOTE — PROGRESS NOTE ADULT - PROBLEM SELECTOR PLAN 8
Chronic, stable  - H/h stable  - Continue Pepcid daily

## 2021-07-18 NOTE — PROGRESS NOTE ADULT - PROBLEM SELECTOR PLAN 10
Pt has hx of Afib, daughter reports she is not on chemical a/c 2/2 fall risk as above  VTE ppx with SCDs    11. GOC. Patient is DNR/DNI. MOLST completed, form in chart.    12. Urinary Sx's - dysuria/increase in frequency. UA with mildly elevated WBC; F/u UCx  - c/w 3 day course of ceftriaxone  - ID, Dr. Peace, following
Pt has hx of Afib, daughter reports she is not on chemical a/c 2/2 fall risk as above  VTE ppx with SCDs    11. GOC. Patient is DNR/DNI. MOLST completed, form in chart.    12. Urinary Sx's - dysuria/increase in frequency. F/u UA. ID following - if UA positive, initiate antibiotics and f/u UCx as outpatient. No treatment necessary if UA negative. Should not impact discharge.
Pt has hx of Afib, daughter reports she is not on chemical a/c 2/2 fall risk as above  VTE ppx with SCDs    11. GOC. Patient is DNR/DNI. MOLST completed, form in chart.
Pt has hx of Afib, daughter reports she is not on chemical a/c 2/2 fall risk as above  VTE ppx with SCDs    11. GOC. Patient is DNR/DNI. MOLST completed, form in chart.
Pt has hx of Afib, daughter reports she is not on chemical a/c 2/2 fall risk as above  VTE ppx with SCDs    11. GOC. Patient is DNR/DNI. MOLST completed, form in chart.    12. Urinary Sx's - dysuria/increase in frequency. F/u UA. ID following - if UA positive, initiate antibiotics and f/u UCx as outpatient. No treatment necessary if UA negative. Should not impact discharge.

## 2021-07-18 NOTE — PROGRESS NOTE ADULT - PROBLEM SELECTOR PLAN 3
- Acute, s/p fall at home  - Admits to hitting head though pt states her injuries are from her glasses not the ground  - Right periorbital ecchymosis and laceration present s/p 8 sutures, CT showing soft tissue hematoma without fractures   - Initial CT brain negative, patient denies loc, headache, ams. mentating well   - Repeat CT head negative   - CT c-spine with chronic changes  - CT left knee with nonspecific effusion, patient has hx of left knee pain worse s/p fall   - Xray imaging, ortho recs as above  - Activity - ambulate with assistance  - Plan as above

## 2021-07-19 ENCOUNTER — TRANSCRIPTION ENCOUNTER (OUTPATIENT)
Age: 86
End: 2021-07-19

## 2021-07-19 VITALS
SYSTOLIC BLOOD PRESSURE: 115 MMHG | TEMPERATURE: 98 F | RESPIRATION RATE: 17 BRPM | DIASTOLIC BLOOD PRESSURE: 71 MMHG | OXYGEN SATURATION: 91 % | HEART RATE: 86 BPM

## 2021-07-19 LAB
ALBUMIN SERPL ELPH-MCNC: 2.5 G/DL — LOW (ref 3.3–5)
ALP SERPL-CCNC: 66 U/L — SIGNIFICANT CHANGE UP (ref 40–120)
ALT FLD-CCNC: 21 U/L — SIGNIFICANT CHANGE UP (ref 12–78)
ANION GAP SERPL CALC-SCNC: 7 MMOL/L — SIGNIFICANT CHANGE UP (ref 5–17)
AST SERPL-CCNC: 13 U/L — LOW (ref 15–37)
BILIRUB SERPL-MCNC: 1.2 MG/DL — SIGNIFICANT CHANGE UP (ref 0.2–1.2)
BUN SERPL-MCNC: 24 MG/DL — HIGH (ref 7–23)
CALCIUM SERPL-MCNC: 8.9 MG/DL — SIGNIFICANT CHANGE UP (ref 8.5–10.1)
CHLORIDE SERPL-SCNC: 106 MMOL/L — SIGNIFICANT CHANGE UP (ref 96–108)
CO2 SERPL-SCNC: 27 MMOL/L — SIGNIFICANT CHANGE UP (ref 22–31)
CORTIS AM PEAK SERPL-MCNC: 14.5 UG/DL — SIGNIFICANT CHANGE UP (ref 6–18.4)
CREAT SERPL-MCNC: 0.41 MG/DL — LOW (ref 0.5–1.3)
GLUCOSE SERPL-MCNC: 145 MG/DL — HIGH (ref 70–99)
HCT VFR BLD CALC: 34.8 % — SIGNIFICANT CHANGE UP (ref 34.5–45)
HGB BLD-MCNC: 10.9 G/DL — LOW (ref 11.5–15.5)
MCHC RBC-ENTMCNC: 27.4 PG — SIGNIFICANT CHANGE UP (ref 27–34)
MCHC RBC-ENTMCNC: 31.3 GM/DL — LOW (ref 32–36)
MCV RBC AUTO: 87.4 FL — SIGNIFICANT CHANGE UP (ref 80–100)
NRBC # BLD: 0 /100 WBCS — SIGNIFICANT CHANGE UP (ref 0–0)
PLATELET # BLD AUTO: 223 K/UL — SIGNIFICANT CHANGE UP (ref 150–400)
POTASSIUM SERPL-MCNC: 4.8 MMOL/L — SIGNIFICANT CHANGE UP (ref 3.5–5.3)
POTASSIUM SERPL-SCNC: 4.8 MMOL/L — SIGNIFICANT CHANGE UP (ref 3.5–5.3)
PROT SERPL-MCNC: 5.8 G/DL — LOW (ref 6–8.3)
RBC # BLD: 3.98 M/UL — SIGNIFICANT CHANGE UP (ref 3.8–5.2)
RBC # FLD: 13.6 % — SIGNIFICANT CHANGE UP (ref 10.3–14.5)
SARS-COV-2 RNA SPEC QL NAA+PROBE: SIGNIFICANT CHANGE UP
SODIUM SERPL-SCNC: 140 MMOL/L — SIGNIFICANT CHANGE UP (ref 135–145)
WBC # BLD: 6.35 K/UL — SIGNIFICANT CHANGE UP (ref 3.8–10.5)
WBC # FLD AUTO: 6.35 K/UL — SIGNIFICANT CHANGE UP (ref 3.8–10.5)

## 2021-07-19 PROCEDURE — 84100 ASSAY OF PHOSPHORUS: CPT

## 2021-07-19 PROCEDURE — 73200 CT UPPER EXTREMITY W/O DYE: CPT

## 2021-07-19 PROCEDURE — 72192 CT PELVIS W/O DYE: CPT | Mod: MA

## 2021-07-19 PROCEDURE — 97116 GAIT TRAINING THERAPY: CPT

## 2021-07-19 PROCEDURE — 71045 X-RAY EXAM CHEST 1 VIEW: CPT

## 2021-07-19 PROCEDURE — 82533 TOTAL CORTISOL: CPT

## 2021-07-19 PROCEDURE — 85025 COMPLETE CBC W/AUTO DIFF WBC: CPT

## 2021-07-19 PROCEDURE — 83036 HEMOGLOBIN GLYCOSYLATED A1C: CPT

## 2021-07-19 PROCEDURE — 80053 COMPREHEN METABOLIC PANEL: CPT

## 2021-07-19 PROCEDURE — 97110 THERAPEUTIC EXERCISES: CPT

## 2021-07-19 PROCEDURE — 36415 COLL VENOUS BLD VENIPUNCTURE: CPT

## 2021-07-19 PROCEDURE — 84145 PROCALCITONIN (PCT): CPT

## 2021-07-19 PROCEDURE — U0003: CPT

## 2021-07-19 PROCEDURE — 81001 URINALYSIS AUTO W/SCOPE: CPT

## 2021-07-19 PROCEDURE — 73080 X-RAY EXAM OF ELBOW: CPT

## 2021-07-19 PROCEDURE — 99285 EMERGENCY DEPT VISIT HI MDM: CPT

## 2021-07-19 PROCEDURE — 73590 X-RAY EXAM OF LOWER LEG: CPT

## 2021-07-19 PROCEDURE — 73700 CT LOWER EXTREMITY W/O DYE: CPT | Mod: MA

## 2021-07-19 PROCEDURE — 12011 RPR F/E/E/N/L/M 2.5 CM/<: CPT

## 2021-07-19 PROCEDURE — 76376 3D RENDER W/INTRP POSTPROCES: CPT

## 2021-07-19 PROCEDURE — 86850 RBC ANTIBODY SCREEN: CPT

## 2021-07-19 PROCEDURE — 96375 TX/PRO/DX INJ NEW DRUG ADDON: CPT | Mod: XU

## 2021-07-19 PROCEDURE — 73502 X-RAY EXAM HIP UNI 2-3 VIEWS: CPT

## 2021-07-19 PROCEDURE — 86900 BLOOD TYPING SEROLOGIC ABO: CPT

## 2021-07-19 PROCEDURE — 99239 HOSP IP/OBS DSCHRG MGMT >30: CPT | Mod: 25

## 2021-07-19 PROCEDURE — 73562 X-RAY EXAM OF KNEE 3: CPT

## 2021-07-19 PROCEDURE — 85730 THROMBOPLASTIN TIME PARTIAL: CPT

## 2021-07-19 PROCEDURE — 73552 X-RAY EXAM OF FEMUR 2/>: CPT

## 2021-07-19 PROCEDURE — 85610 PROTHROMBIN TIME: CPT

## 2021-07-19 PROCEDURE — 87186 SC STD MICRODIL/AGAR DIL: CPT

## 2021-07-19 PROCEDURE — 72125 CT NECK SPINE W/O DYE: CPT | Mod: MA

## 2021-07-19 PROCEDURE — 83735 ASSAY OF MAGNESIUM: CPT

## 2021-07-19 PROCEDURE — 85027 COMPLETE CBC AUTOMATED: CPT

## 2021-07-19 PROCEDURE — 86769 SARS-COV-2 COVID-19 ANTIBODY: CPT

## 2021-07-19 PROCEDURE — U0005: CPT

## 2021-07-19 PROCEDURE — 82962 GLUCOSE BLOOD TEST: CPT

## 2021-07-19 PROCEDURE — 87086 URINE CULTURE/COLONY COUNT: CPT

## 2021-07-19 PROCEDURE — 70450 CT HEAD/BRAIN W/O DYE: CPT

## 2021-07-19 PROCEDURE — 73030 X-RAY EXAM OF SHOULDER: CPT

## 2021-07-19 PROCEDURE — 99232 SBSQ HOSP IP/OBS MODERATE 35: CPT

## 2021-07-19 PROCEDURE — 97165 OT EVAL LOW COMPLEX 30 MIN: CPT

## 2021-07-19 PROCEDURE — 70486 CT MAXILLOFACIAL W/O DYE: CPT | Mod: MA

## 2021-07-19 PROCEDURE — 96376 TX/PRO/DX INJ SAME DRUG ADON: CPT

## 2021-07-19 PROCEDURE — 74018 RADEX ABDOMEN 1 VIEW: CPT

## 2021-07-19 PROCEDURE — 97530 THERAPEUTIC ACTIVITIES: CPT

## 2021-07-19 PROCEDURE — 93005 ELECTROCARDIOGRAM TRACING: CPT

## 2021-07-19 PROCEDURE — 99233 SBSQ HOSP IP/OBS HIGH 50: CPT

## 2021-07-19 PROCEDURE — 96374 THER/PROPH/DIAG INJ IV PUSH: CPT

## 2021-07-19 PROCEDURE — 86901 BLOOD TYPING SEROLOGIC RH(D): CPT

## 2021-07-19 PROCEDURE — 97162 PT EVAL MOD COMPLEX 30 MIN: CPT

## 2021-07-19 RX ORDER — ASPIRIN/CALCIUM CARB/MAGNESIUM 324 MG
1 TABLET ORAL
Qty: 0 | Refills: 0 | DISCHARGE
Start: 2021-07-19

## 2021-07-19 RX ORDER — FAMOTIDINE 10 MG/ML
1 INJECTION INTRAVENOUS
Qty: 0 | Refills: 0 | DISCHARGE

## 2021-07-19 RX ORDER — SENNA PLUS 8.6 MG/1
2 TABLET ORAL
Qty: 0 | Refills: 0 | DISCHARGE
Start: 2021-07-19

## 2021-07-19 RX ORDER — MAGNESIUM HYDROXIDE 400 MG/1
30 TABLET, CHEWABLE ORAL
Qty: 0 | Refills: 0 | DISCHARGE
Start: 2021-07-19

## 2021-07-19 RX ORDER — LOSARTAN POTASSIUM 100 MG/1
1 TABLET, FILM COATED ORAL
Qty: 0 | Refills: 0 | DISCHARGE
Start: 2021-07-19

## 2021-07-19 RX ORDER — ACETAMINOPHEN 500 MG
2 TABLET ORAL
Qty: 0 | Refills: 0 | DISCHARGE
Start: 2021-07-19

## 2021-07-19 RX ORDER — LIDOCAINE 4 G/100G
1 CREAM TOPICAL
Qty: 0 | Refills: 0 | DISCHARGE
Start: 2021-07-19

## 2021-07-19 RX ORDER — FAMOTIDINE 10 MG/ML
1 INJECTION INTRAVENOUS
Qty: 0 | Refills: 0 | DISCHARGE
Start: 2021-07-19

## 2021-07-19 RX ORDER — CYCLOSPORINE 0.5 MG/ML
1 EMULSION OPHTHALMIC
Qty: 0 | Refills: 0 | DISCHARGE
Start: 2021-07-19

## 2021-07-19 RX ORDER — ASPIRIN/CALCIUM CARB/MAGNESIUM 324 MG
2 TABLET ORAL
Qty: 0 | Refills: 0 | DISCHARGE

## 2021-07-19 RX ORDER — ATORVASTATIN CALCIUM 80 MG/1
1 TABLET, FILM COATED ORAL
Qty: 0 | Refills: 0 | DISCHARGE

## 2021-07-19 RX ORDER — METOPROLOL TARTRATE 50 MG
1 TABLET ORAL
Qty: 0 | Refills: 0 | DISCHARGE

## 2021-07-19 RX ORDER — CYCLOSPORINE 0.5 MG/ML
1 EMULSION OPHTHALMIC
Qty: 0 | Refills: 0 | DISCHARGE

## 2021-07-19 RX ORDER — ATORVASTATIN CALCIUM 80 MG/1
1 TABLET, FILM COATED ORAL
Qty: 0 | Refills: 0 | DISCHARGE
Start: 2021-07-19

## 2021-07-19 RX ORDER — METOPROLOL TARTRATE 50 MG
1 TABLET ORAL
Qty: 0 | Refills: 0 | DISCHARGE
Start: 2021-07-19

## 2021-07-19 RX ORDER — LOSARTAN POTASSIUM 100 MG/1
1 TABLET, FILM COATED ORAL
Qty: 0 | Refills: 0 | DISCHARGE

## 2021-07-19 RX ORDER — MORPHINE SULFATE 50 MG/1
1.25 CAPSULE, EXTENDED RELEASE ORAL
Qty: 0 | Refills: 0 | DISCHARGE
Start: 2021-07-19

## 2021-07-19 RX ORDER — MAGNESIUM HYDROXIDE 400 MG/1
30 TABLET, CHEWABLE ORAL DAILY
Refills: 0 | Status: DISCONTINUED | OUTPATIENT
Start: 2021-07-19 | End: 2021-07-19

## 2021-07-19 RX ORDER — POLYETHYLENE GLYCOL 3350 17 G/17G
17 POWDER, FOR SOLUTION ORAL
Qty: 0 | Refills: 0 | DISCHARGE
Start: 2021-07-19

## 2021-07-19 RX ORDER — ONDANSETRON 8 MG/1
1 TABLET, FILM COATED ORAL
Qty: 0 | Refills: 0 | DISCHARGE
Start: 2021-07-19

## 2021-07-19 RX ORDER — INSULIN LISPRO 100/ML
1 VIAL (ML) SUBCUTANEOUS
Qty: 0 | Refills: 0 | DISCHARGE
Start: 2021-07-19

## 2021-07-19 RX ADMIN — POLYETHYLENE GLYCOL 3350 17 GRAM(S): 17 POWDER, FOR SOLUTION ORAL at 11:39

## 2021-07-19 RX ADMIN — LOSARTAN POTASSIUM 50 MILLIGRAM(S): 100 TABLET, FILM COATED ORAL at 11:38

## 2021-07-19 RX ADMIN — Medication 1: at 07:59

## 2021-07-19 RX ADMIN — CEFTRIAXONE 100 MILLIGRAM(S): 500 INJECTION, POWDER, FOR SOLUTION INTRAMUSCULAR; INTRAVENOUS at 05:39

## 2021-07-19 RX ADMIN — Medication 2: at 12:14

## 2021-07-19 RX ADMIN — Medication 81 MILLIGRAM(S): at 11:38

## 2021-07-19 RX ADMIN — MAGNESIUM HYDROXIDE 30 MILLILITER(S): 400 TABLET, CHEWABLE ORAL at 11:39

## 2021-07-19 RX ADMIN — Medication 1000 MILLIGRAM(S): at 13:32

## 2021-07-19 RX ADMIN — Medication 25 MILLIGRAM(S): at 05:45

## 2021-07-19 RX ADMIN — Medication 1000 MILLIGRAM(S): at 06:28

## 2021-07-19 RX ADMIN — Medication 1000 MILLIGRAM(S): at 05:45

## 2021-07-19 RX ADMIN — FAMOTIDINE 20 MILLIGRAM(S): 10 INJECTION INTRAVENOUS at 11:39

## 2021-07-19 RX ADMIN — MORPHINE SULFATE 2.5 MILLIGRAM(S): 50 CAPSULE, EXTENDED RELEASE ORAL at 00:25

## 2021-07-19 RX ADMIN — MORPHINE SULFATE 2.5 MILLIGRAM(S): 50 CAPSULE, EXTENDED RELEASE ORAL at 11:33

## 2021-07-19 RX ADMIN — CYCLOSPORINE 1 DROP(S): 0.5 EMULSION OPHTHALMIC at 05:45

## 2021-07-19 NOTE — DISCHARGE NOTE NURSING/CASE MANAGEMENT/SOCIAL WORK - PATIENT PORTAL LINK FT
You can access the FollowMyHealth Patient Portal offered by [facility] by registering at the following website: [email or web address]. By joining [facility]’s FollowMyHealth portal, you will also be able to view your health information using other applications (apps) compatible with our system.

## 2021-07-19 NOTE — PROGRESS NOTE ADULT - REASON FOR ADMISSION
right humeral head fx, right pubic rami fx

## 2021-07-19 NOTE — PROGRESS NOTE ADULT - SUBJECTIVE AND OBJECTIVE BOX
Chief Complaint: Fall    Interval Events: No events overnight.    Review of Systems:  General: No fevers, chills, weight loss or gain  Skin: No rashes, color changes  Cardiovascular: No chest pain, orthopnea  Respiratory: No shortness of breath, cough  Gastrointestinal: No nausea, abdominal pain  Genitourinary: No incontinence, pain with urination  Musculoskeletal: No pain, swelling, decreased range of motion  Neurological: No headache, weakness  Psychiatric: No depression, anxiety  Endocrine: No weight loss or gain, increased thirst  All other systems are comprehensively negative.    Physical Exam:  Vital Signs Last 24 Hrs  T(C): 36.6 (19 Jul 2021 05:16), Max: 37.1 (18 Jul 2021 19:53)  T(F): 97.8 (19 Jul 2021 05:16), Max: 98.7 (18 Jul 2021 19:53)  HR: 100 (19 Jul 2021 05:16) (83 - 100)  BP: 131/88 (19 Jul 2021 05:16) (120/69 - 138/87)  BP(mean): --  RR: 18 (19 Jul 2021 05:16) (14 - 18)  SpO2: 93% (19 Jul 2021 05:16) (93% - 95%)  General: NAD  HEENT: MMM  Neck: No JVD, no carotid bruit  Lungs: CTAB  CV: RRR, nl S1/S2, no M/R/G  Abdomen: S/NT/ND, +BS  Extremities: No LE edema, no cyanosis  Neuro: AAOx3, non-focal  Skin: No rash    Labs:    07-19    140  |  106  |  24<H>  ----------------------------<  145<H>  4.8   |  27  |  0.41<L>    Ca    8.9      19 Jul 2021 06:43    TPro  5.8<L>  /  Alb  2.5<L>  /  TBili  1.2  /  DBili  x   /  AST  13<L>  /  ALT  21  /  AlkPhos  66  07-19                        10.9   6.35  )-----------( 223      ( 19 Jul 2021 06:43 )             34.8       Telemetry: AF

## 2021-07-19 NOTE — PROGRESS NOTE ADULT - PROVIDER SPECIALTY LIST ADULT
Cardiology
Cardiology
Infectious Disease
Cardiology
Cardiology
Infectious Disease
Infectious Disease
Palliative Care
Palliative Care
Cardiology
Hospitalist
Internal Medicine

## 2021-07-19 NOTE — PROGRESS NOTE ADULT - SUBJECTIVE AND OBJECTIVE BOX
[facility]  INFECTIOUS DISEASES   [address] [place] [address]  Tel: [phone]     Fax: [phone]  =======================================================    N-[id number]  LEVON SRIVASTAVA     Follow up: UTI    No new complaint, no dysuria or fever.   UA positive and UC pending.     PAST MEDICAL & SURGICAL HISTORY:  Hyperlipidemia  Peptic ulcer  HTN (hypertension)  Osteoporosis  Irritable bowel syndrome  Cellulitis  S/P hip replacement  partial, left  History of appendectomy  History of tonsillectomy    Social Hx: no smoking, ETOH or drugs     FAMILY HISTORY:  FH: hypertension    Allergies  sulfa drugs (Hives)    Antibiotics:  None     REVIEW OF SYSTEMS:  CONSTITUTIONAL:  No Fever or chills  HEENT:  No diplopia or blurred vision.  No sore throat or runny nose.  CARDIOVASCULAR:  No chest pain or SOB.  RESPIRATORY:  No cough, shortness of breath, PND or orthopnea.  GASTROINTESTINAL:  No nausea, vomiting or diarrhea.  GENITOURINARY:  No dysuria, frequency or urgency. No Blood in urine  MUSCULOSKELETAL:  no joint aches, no muscle pain  SKIN:  No change in skin, hair or nails.  NEUROLOGIC:  No paresthesias, fasciculations, seizures or weakness.  PSYCHIATRIC:  No disorder of thought or mood.  ENDOCRINE:  No heat or cold intolerance, polyuria or polydipsia.  HEMATOLOGICAL:  No easy bruising or bleeding.     Physical Exam:  Vital Signs Last 24 Hrs  T(C): 36.6 (19 Jul 2021 11:38), Max: 37.1 (18 Jul 2021 19:53)  T(F): 97.8 (19 Jul 2021 11:38), Max: 98.7 (18 Jul 2021 19:53)  HR: 77 (19 Jul 2021 11:38) (77 - 100)  BP: 132/84 (19 Jul 2021 11:38) (120/69 - 132/84)  BP(mean): --  RR: 17 (19 Jul 2021 11:38) (17 - 18)  SpO2: 95% (19 Jul 2021 11:38) (93% - 95%)  GEN: NAD  HEENT: normocephalic and atraumatic. R eye with ecchymosis   NECK: Supple.  No lymphadenopathy   LUNGS: Clear to auscultation.  HEART: Regular rate and rhythm   ABDOMEN: Soft, nontender, and nondistended.  Positive bowel sounds.    : No CVA tenderness  EXTREMITIES: Without edema.  NEUROLOGIC: grossly intact.  PSYCHIATRIC: Appropriate affect .  SKIN: No rash      Labs:                        10.9   6.35  )-----------( 223      ( 19 Jul 2021 06:43 )             34.8     07-19    140  |  106  |  24<H>  ----------------------------<  145<H>  4.8   |  27  |  0.41<L>    Ca    8.9      19 Jul 2021 06:43    TPro  5.8<L>  /  Alb  2.5<L>  /  TBili  1.2  /  DBili  x   /  AST  13<L>  /  ALT  21  /  AlkPhos  66  07-19    Culture - Urine (collected 07-17-21 @ 00:30)  Source: .Urine Clean Catch (Midstream)    WBC Count: 6.35 K/uL (07-19-21 @ 06:43)  WBC Count: 5.88 K/uL (07-18-21 @ 07:11)  WBC Count: 6.13 K/uL (07-17-21 @ 08:00)  WBC Count: 8.03 K/uL (07-16-21 @ 06:27)  WBC Count: 9.17 K/uL (07-15-21 @ 06:43)    Creatinine, Serum: 0.41 mg/dL (07-19-21 @ 06:43)  Creatinine, Serum: 0.35 mg/dL (07-18-21 @ 07:11)  Creatinine, Serum: 0.33 mg/dL (07-17-21 @ 08:53)  Creatinine, Serum: 0.52 mg/dL (07-16-21 @ 06:27)  Creatinine, Serum: 0.53 mg/dL (07-15-21 @ 06:43)    Procalcitonin, Serum: 0.28 ng/mL (07-17-21 @ 08:53)     COVID-19 PCR: NotDetec (07-13-21 @ 21:01)    All imaging and other data have been reviewed.    Assessment and Plan:   89 y/o woman with PMH of HTN, prediabetes, IBS, osteoporosis, arthritis, peptic ulcer, venous insufficiency and Afib was admitted after a fall at home and diagnosed with right humeral head fx, right pubic rami fx.   Today ID has been called since she felt that her urine is "hot".   No fever  No leukocytosis   No urinary symptoms except feeling "hot urine"    1- UTI    - UA with mildly elevated WBC, no nitrate  - UC low CFU GNR, will follow result   - Has been started on ceftriaxone, today is the last day, can stop and watch.     Will follow.    Kari Peace MD  Division of Infectious Diseases   Cell [phone] between 8am and 6pm   After 6pm and weekends please call ID service at [phone].

## 2021-07-19 NOTE — PROGRESS NOTE ADULT - ASSESSMENT
89 y/o female with PMHx HTN, HLD, prediabetes, IBS, osteoporosis, arthritis, peptic ulcer, venous insufficiency, Afib (not on a/c) BIBEMS s/p fall at home when she was getting up out of bed to ambulate with her walker and fell on her right side, admitted with a right pubic rami fracture and right humeral head fracture     1) Shoulder fracture, right, closed,  s/p fall at home    2) Fracture of multiple pubic rami, right, closed,  3) Intractable pain  - Xray right shoulder significant for humeral head fracture on personal read, f/u final  - CT shoulder no fracture, severe osteoarthritis.  - CT right pelvis with acute fractures of the right-sided pubic rami  - CT left knee with nonspecific effusion, patient has hx of left knee pain worse s/p fall   - Ortho following  - d/c oxycodone  - avoid NSAIDs due to h/o peptic ulcer  - d/c morphine RTC but continue 2.5 mg PO q4h prn severe pain  - Tylenol RTC  - PT eval > d/c to [facility]    4) Osteoporosis   - CT c-spine with diffuse osteopenia without displaced fracture  - Fall risk protocol  - PT eval  > d/c to [facility]    5) Constipation 2/2 opioid use   - senna and miralax  - bisacodyl supp prn    Appreciate consult. Discussed with the primary team.    Carroll Palma MD

## 2021-07-19 NOTE — PROGRESS NOTE ADULT - ASSESSMENT
The patient is a 89 year old female with a history of HTN, HL, PUD, chronic venous insufficiency, prior falls, atrial fibrillation who presents with a fall, found to have shoulder and pelvic fractures.    Plan:  - ECG with known atrial fibrillation and LVH related changes  - Continue metoprolol succinate 25 mg daily for rate control  - Not on long-term anticoagulation due to prior falls - risks outweigh benefits  - Continue aspirin 81 mg daily  - Continue losartan 50 mg daily  - Continue atorvastatin 20 mg daily  - Pain control  - Ortho eval noted - no surgical intervention  - PT  - Discharge planning

## 2021-07-19 NOTE — PROGRESS NOTE ADULT - SUBJECTIVE AND OBJECTIVE BOX
SUBJECTIVE AND OBJECTIVE:  INTERVAL HPI/OVERNIGHT EVENTS:    DNR on chart: Yes      Allergies    sulfa drugs (Hives)    Intolerances    MEDICATIONS  (STANDING):  acetaminophen   Tablet .. 1000 milliGRAM(s) Oral every 8 hours  aspirin enteric coated 81 milliGRAM(s) Oral daily  atorvastatin 20 milliGRAM(s) Oral at bedtime  cefTRIAXone   IVPB 1000 milliGRAM(s) IV Intermittent every 24 hours  cefTRIAXone   IVPB      cycloSPORINE (RESTASIS) 0.05% Emulsion 1 Drop(s) Both EYES two times a day  dextrose 40% Gel 15 Gram(s) Oral once  famotidine    Tablet 20 milliGRAM(s) Oral daily  glucagon  Injectable 1 milliGRAM(s) IntraMuscular once  insulin lispro (ADMELOG) corrective regimen sliding scale   SubCutaneous three times a day before meals  insulin lispro (ADMELOG) corrective regimen sliding scale   SubCutaneous at bedtime  losartan 50 milliGRAM(s) Oral daily  magnesium hydroxide Suspension 30 milliLiter(s) Oral daily  metoprolol succinate ER 25 milliGRAM(s) Oral daily  polyethylene glycol 3350 17 Gram(s) Oral daily  senna 2 Tablet(s) Oral at bedtime  sodium chloride 0.9%. 1000 milliLiter(s) (75 mL/Hr) IV Continuous <Continuous>    MEDICATIONS  (PRN):  acetaminophen   Tablet .. 650 milliGRAM(s) Oral every 4 hours PRN Mild Pain (1 - 3)  lidocaine   Patch 1 Patch Transdermal daily PRN right shoulder pain  morphine   Solution 2.5 milliGRAM(s) Oral every 4 hours PRN Severe Pain (7 - 10)  ondansetron    Tablet 4 milliGRAM(s) Oral every 6 hours PRN Nausea and/or Vomiting      ITEMS UNCHECKED ARE NOT PRESENT    PRESENT SYMPTOMS: [ ]Unable to obtain due to poor mentation   Source if other than patient:  [ ]Family   [ ]Team     Pain:  [ ]yes [ ]no  QOL impact -   Location -                    Aggravating factors -  Quality -  Radiation -  Timing-  Severity (0-10 scale):  Minimal acceptable level (0-10 scale):     Dyspnea:                           [ ]Mild [ ]Moderate [ ]Severe  Anxiety:                             [ ]Mild [ ]Moderate [ ]Severe  Fatigue:                             [ ]Mild [ ]Moderate [ ]Severe  Nausea:                             [ ]Mild [ ]Moderate [ ]Severe  Loss of appetite:              [ ]Mild [ ]Moderate [ ]Severe  Constipation:                    [ ]Mild [ ]Moderate [ ]Severe    CPOT:    https://www.[facility].org/getattachment/[email or web address]/Critical-Care-Pain-Observation-Tool-(CPOT)    PAIN AD Score:	  [email or web address] (Ctrl + left click to view)    Other Symptoms:  [ ]All other review of systems negative     Palliative Performance Status Version 2:         %      [email or web address]/[email or web address]_performance_[email or web address]_[email or web address]  PHYSICAL EXAM:  Vital Signs Last 24 Hrs  T(C): 36.6 (19 Jul 2021 11:38), Max: 37.1 (18 Jul 2021 19:53)  T(F): 97.8 (19 Jul 2021 11:38), Max: 98.7 (18 Jul 2021 19:53)  HR: 77 (19 Jul 2021 11:38) (77 - 100)  BP: 132/84 (19 Jul 2021 11:38) (120/69 - 132/84)  BP(mean): --  RR: 17 (19 Jul 2021 11:38) (17 - 18)  SpO2: 95% (19 Jul 2021 11:38) (93% - 95%) I&O's Summary    18 Jul 2021 07:01  -  19 Jul 2021 07:00  --------------------------------------------------------  IN: 360 mL / OUT: 350 mL / NET: 10 mL    19 Jul 2021 07:01  -  19 Jul 2021 12:17  --------------------------------------------------------  IN: 240 mL / OUT: 0 mL / NET: 240 mL       GENERAL:  [ ]Alert  [ ]Oriented x   [ ]Lethargic  [ ]Cachexia  [ ]Unarousable  [ ]Verbal  [ ]Non-Verbal  Behavioral:   [ ]Anxiety  [ ]Delirium [ ]Agitation [ ]Other  HEENT:  [ ]Normal   [ ]Dry mouth   [ ]ET Tube/Trach  [ ]Oral lesions  PULMONARY:   [ ]Clear [ ]Tachypnea  [ ]Audible excessive secretions   [ ]Rhonchi        [ ]Right [ ]Left [ ]Bilateral  [ ]Crackles        [ ]Right [ ]Left [ ]Bilateral  [ ]Wheezing     [ ]Right [ ]Left [ ]Bilateral  [ ]Diminished BS [ ] Right [ ]Left [ ]Bilateral  CARDIOVASCULAR:    [ ]Regular [ ]Irregular [ ]Tachy  [ ]Micha [ ]Murmur [ ]Other  GASTROINTESTINAL:  [ ]Soft  [ ]Distended   [ ]+BS  [ ]Non tender [ ]Tender  [ ]PEG [ ]OGT/ NGT   Last BM:      GENITOURINARY:  [ ]Normal [ ]Incontinent   [ ]Oliguria/Anuria   [ ]Neff  MUSCULOSKELETAL:   [ ]Normal   [ ]Weakness  [ ]Bed/Wheelchair bound [ ]Edema  NEUROLOGIC:   [ ]No focal deficits  [ ] Cognitive impairment  [ ] Dysphagia [ ]Dysarthria [ ] Paresis [ ]Other   SKIN:   [ ]Normal  [ ]Rash   [ ]Pressure ulcer(s) [ ]y [ ]n present on admission    CRITICAL CARE:  [ ]Shock Present  [ ]Septic [ ]Cardiogenic [ ]Neurologic [ ]Hypovolemic  [ ]Vasopressors [ ]Inotropes  [ ]Respiratory failure present [ ]Mechanical Ventilation [ ]Non-invasive ventilatory support [ ]High-Flow   [ ]Acute  [ ]Chronic [ ]Hypoxic  [ ]Hypercarbic [ ]Other  [ ]Other organ failure     LABS:                        10.9   6.35  )-----------( 223      ( 19 Jul 2021 06:43 )             34.8   07-19    140  |  106  |  24<H>  ----------------------------<  145<H>  4.8   |  27  |  0.41<L>    Ca    8.9      19 Jul 2021 06:43    TPro  5.8<L>  /  Alb  2.5<L>  /  TBili  1.2  /  DBili  x   /  AST  13<L>  /  ALT  21  /  AlkPhos  66  07-19        RADIOLOGY & ADDITIONAL STUDIES:    Protein Calorie Malnutrition Present: [ ]mild [ ]moderate [ ]severe [ ]underweight [ ]morbid obesity  https://www.andeal.org/vault/1009/web/files/ONC/Table_Clinical%20Characteristics%20to%20Document%20Malnutrition-White%20JV%20et%20al%202012.pdf    Height (cm): 154.9 (07-13-21 @ 19:34)  Weight (kg): 47.6 (07-13-21 @ 19:34)  BMI (kg/m2): 19.8 (07-13-21 @ 19:34)    [ ]PPSV2 < or = 30%  [ ]significant weight loss [ ]poor nutritional intake [ ]anasarca   Albumin, Serum: 2.5 g/dL (07-19-21 @ 06:43)   [ ]Artificial Nutrition    REFERRALS:   [ ]Chaplaincy  [ ]Hospice  [ ]Child Life  [ ]Social Work  [ ]Case management [ ]Holistic Therapy     Goals of Care Document:  ARIADNE Marques (10-21-19 @ 12:11)  Goals of Care Conversation:   Participants:  · Participants  Patient    Advance Directives:  · Does patient have Advance Directive  Yes  · Indicate Type  Health Care Proxy (HCP); Living Will  · Agent's Name  Eleazar Santiago  · Phone #  [phone]  · Are any of the items on the chart  Yes  · Specify which ones are on chart  Health Care Proxy (HCP)  Living Will  · Caregiver:  no    Conversation Discussion:  · Conversation  MOLST Discussed  · Conversation Details  Pt wants all done ,does not want to live on machines.      Electronic Signatures:  Sonja Marques (RN)  (Signed 21-Oct-2019 12:12)  	Authored: Goals of Care Conversation      Last Updated: 21-Oct-2019 12:12 by Sonja Marques (DEEP)       SUBJECTIVE AND OBJECTIVE/INTERVAL HPI/OVERNIGHT EVENTS:  - no acute events overnight  - pain improved    DNR on chart: Yes      Allergies    sulfa drugs (Hives)    Intolerances    MEDICATIONS  (STANDING):  acetaminophen   Tablet .. 1000 milliGRAM(s) Oral every 8 hours  aspirin enteric coated 81 milliGRAM(s) Oral daily  atorvastatin 20 milliGRAM(s) Oral at bedtime  cefTRIAXone   IVPB 1000 milliGRAM(s) IV Intermittent every 24 hours  cefTRIAXone   IVPB      cycloSPORINE (RESTASIS) 0.05% Emulsion 1 Drop(s) Both EYES two times a day  dextrose 40% Gel 15 Gram(s) Oral once  famotidine    Tablet 20 milliGRAM(s) Oral daily  glucagon  Injectable 1 milliGRAM(s) IntraMuscular once  insulin lispro (ADMELOG) corrective regimen sliding scale   SubCutaneous three times a day before meals  insulin lispro (ADMELOG) corrective regimen sliding scale   SubCutaneous at bedtime  losartan 50 milliGRAM(s) Oral daily  magnesium hydroxide Suspension 30 milliLiter(s) Oral daily  metoprolol succinate ER 25 milliGRAM(s) Oral daily  polyethylene glycol 3350 17 Gram(s) Oral daily  senna 2 Tablet(s) Oral at bedtime  sodium chloride 0.9%. 1000 milliLiter(s) (75 mL/Hr) IV Continuous <Continuous>    MEDICATIONS  (PRN):  acetaminophen   Tablet .. 650 milliGRAM(s) Oral every 4 hours PRN Mild Pain (1 - 3)  lidocaine   Patch 1 Patch Transdermal daily PRN right shoulder pain  morphine   Solution 2.5 milliGRAM(s) Oral every 4 hours PRN Severe Pain (7 - 10)  ondansetron    Tablet 4 milliGRAM(s) Oral every 6 hours PRN Nausea and/or Vomiting      ITEMS UNCHECKED ARE NOT PRESENT    PRESENT SYMPTOMS: [ ]Unable to obtain due to poor mentation   Source if other than patient:  [ ]Family   [ ]Team     Pain: [ x]yes [ ]no  QOL impact - yes  Location -   R shoulder, bilat knee R>L          Aggravating factors - movement   Quality - dull  Radiation - no  Timing- constant   Severity (0-10 scale): 2/10  Minimal acceptable level (0-10 scale): 5/10    Dyspnea:                           [ ]Mild [ ]Moderate [ ]Severe  Anxiety:                             [ ]Mild [ ]Moderate [ ]Severe  Fatigue:                             [ ]Mild [ ]Moderate [ ]Severe  Nausea:                             [ ]Mild [ ]Moderate [ ]Severe  Loss of appetite:              [ ]Mild [ ]Moderate [ ]Severe  Constipation:                    [ ]Mild [ ]Moderate [ ]Severe    CPOT:    https://www.[facility].org/getattachment/[email or web address]/Critical-Care-Pain-Observation-Tool-(CPOT)    PAIN AD Score:	  [email or web address]/[email or web address] (Ctrl + left click to view)    Other Symptoms:  [x ]All other review of systems negative     Palliative Performance Status Version 2:         %      [email or web address]/files/[email or web address]_performance_scale_[email or web address].pdf  PHYSICAL EXAM:  Vital Signs Last 24 Hrs  T(C): 36.6 (19 Jul 2021 11:38), Max: 37.1 (18 Jul 2021 19:53)  T(F): 97.8 (19 Jul 2021 11:38), Max: 98.7 (18 Jul 2021 19:53)  HR: 77 (19 Jul 2021 11:38) (77 - 100)  BP: 132/84 (19 Jul 2021 11:38) (120/69 - 132/84)  BP(mean): --  RR: 17 (19 Jul 2021 11:38) (17 - 18)  SpO2: 95% (19 Jul 2021 11:38) (93% - 95%) I&O's Summary    18 Jul 2021 07:01  -  19 Jul 2021 07:00  --------------------------------------------------------  IN: 360 mL / OUT: 350 mL / NET: 10 mL    19 Jul 2021 07:01  -  19 Jul 2021 12:17  --------------------------------------------------------  IN: 240 mL / OUT: 0 mL / NET: 240 mL    GENERAL: NAD, well-groomed, well-developed, sitting in the chair  HEAD:  Atraumatic, Normocephalic  EYES: EOMI, PERRLA, conjunctiva and sclera clear  CHEST/LUNG: Clear to auscultation bilaterally; No rales, rhonchi, wheezing, or rubs  HEART: Regular rate and rhythm; No murmurs, rubs, or gallops  ABDOMEN: Soft, Nontender, Nondistended; Bowel sounds present  EXTREMITIES:  2+ Peripheral Pulses, No clubbing, cyanosis, or edema    LABS:                        10.9   6.35  )-----------( 223      ( 19 Jul 2021 06:43 )             34.8   07-19    140  |  106  |  24<H>  ----------------------------<  145<H>  4.8   |  27  |  0.41<L>    Ca    8.9      19 Jul 2021 06:43    TPro  5.8<L>  /  Alb  2.5<L>  /  TBili  1.2  /  DBili  x   /  AST  13<L>  /  ALT  21  /  AlkPhos  66  07-19        RADIOLOGY & ADDITIONAL STUDIES: reviewed    Protein Calorie Malnutrition Present: [ ]mild [ ]moderate [ ]severe [ ]underweight [ ]morbid obesity  https://www.andeal.org/vault/2440/web/files/ONC/Table_Clinical%20Characteristics%20to%20Document%20Malnutrition-White%20JV%20et%20al%202012.pdf    Height (cm): 154.9 (07-13-21 @ 19:34)  Weight (kg): 47.6 (07-13-21 @ 19:34)  BMI (kg/m2): 19.8 (07-13-21 @ 19:34)    [ ]PPSV2 < or = 30%  [ ]significant weight loss [ ]poor nutritional intake [ ]anasarca   Albumin, Serum: 2.5 g/dL (07-19-21 @ 06:43)   [ ]Artificial Nutrition    REFERRALS:   [ ]Chaplaincy  [ ]Hospice  [ ]Child Life  [ ]Social Work  [ ]Case management [ ]Holistic Therapy     Goals of Care Document:  ARIADNE Marques (10-21-19 @ 12:11)  Goals of Care Conversation:   Participants:  · Participants  Patient    Advance Directives:  · Does patient have Advance Directive  Yes  · Indicate Type  Health Care Proxy (HCP); Living Will  · Agent's Name  Eleazar Santiago  · Phone #  [phone]  · Are any of the items on the chart  Yes  · Specify which ones are on chart  Health Care Proxy (HCP)  Living Will  · Caregiver:  no    Conversation Discussion:  · Conversation  MOLST Discussed  · Conversation Details  Pt wants all done ,does not want to live on machines.      Electronic Signatures:  Sonja Marques (DEEP)  (Signed 21-Oct-2019 12:12)  	Authored: Goals of Care Conversation      Last Updated: 21-Oct-2019 12:12 by Sonja Marques (DEEP)

## 2021-10-04 ENCOUNTER — OUTPATIENT (OUTPATIENT)
Dept: OUTPATIENT SERVICES | Facility: HOSPITAL | Age: 86
LOS: 1 days | End: 2021-10-04
Payer: MEDICARE

## 2021-10-04 DIAGNOSIS — Z96.60 PRESENCE OF UNSPECIFIED ORTHOPEDIC JOINT IMPLANT: Chronic | ICD-10-CM

## 2021-10-04 DIAGNOSIS — Z20.828 CONTACT WITH AND (SUSPECTED) EXPOSURE TO OTHER VIRAL COMMUNICABLE DISEASES: ICD-10-CM

## 2021-10-04 DIAGNOSIS — Z98.89 OTHER SPECIFIED POSTPROCEDURAL STATES: Chronic | ICD-10-CM

## 2021-10-04 LAB — SARS-COV-2 RNA SPEC QL NAA+PROBE: SIGNIFICANT CHANGE UP

## 2021-10-04 PROCEDURE — U0003: CPT

## 2021-10-04 PROCEDURE — U0005: CPT

## 2021-10-05 ENCOUNTER — TRANSCRIPTION ENCOUNTER (OUTPATIENT)
Age: 86
End: 2021-10-05

## 2021-10-06 ENCOUNTER — OUTPATIENT (OUTPATIENT)
Dept: OUTPATIENT SERVICES | Facility: HOSPITAL | Age: 86
LOS: 1 days | End: 2021-10-06
Payer: MEDICARE

## 2021-10-06 ENCOUNTER — RESULT REVIEW (OUTPATIENT)
Age: 86
End: 2021-10-06

## 2021-10-06 VITALS
OXYGEN SATURATION: 98 % | TEMPERATURE: 98 F | HEIGHT: 58 IN | DIASTOLIC BLOOD PRESSURE: 64 MMHG | WEIGHT: 95.24 LBS | SYSTOLIC BLOOD PRESSURE: 171 MMHG | HEART RATE: 71 BPM | RESPIRATION RATE: 16 BRPM

## 2021-10-06 VITALS
OXYGEN SATURATION: 100 % | RESPIRATION RATE: 13 BRPM | SYSTOLIC BLOOD PRESSURE: 153 MMHG | HEART RATE: 87 BPM | DIASTOLIC BLOOD PRESSURE: 71 MMHG

## 2021-10-06 DIAGNOSIS — Z96.60 PRESENCE OF UNSPECIFIED ORTHOPEDIC JOINT IMPLANT: Chronic | ICD-10-CM

## 2021-10-06 DIAGNOSIS — Z98.89 OTHER SPECIFIED POSTPROCEDURAL STATES: Chronic | ICD-10-CM

## 2021-10-06 DIAGNOSIS — H18.20 UNSPECIFIED CORNEAL EDEMA: ICD-10-CM

## 2021-10-06 PROCEDURE — 65757 PREP CORNEAL ENDO ALLOGRAFT: CPT | Mod: RT

## 2021-10-06 PROCEDURE — 88312 SPECIAL STAINS GROUP 1: CPT | Mod: 26

## 2021-10-06 PROCEDURE — 87070 CULTURE OTHR SPECIMN AEROBIC: CPT

## 2021-10-06 PROCEDURE — 88312 SPECIAL STAINS GROUP 1: CPT

## 2021-10-06 PROCEDURE — 88304 TISSUE EXAM BY PATHOLOGIST: CPT | Mod: 26

## 2021-10-06 PROCEDURE — 87075 CULTR BACTERIA EXCEPT BLOOD: CPT

## 2021-10-06 PROCEDURE — 88304 TISSUE EXAM BY PATHOLOGIST: CPT

## 2021-10-06 PROCEDURE — 65756 CORNEAL TRNSPL ENDOTHELIAL: CPT | Mod: AS,RT

## 2021-10-06 PROCEDURE — 65756 CORNEAL TRNSPL ENDOTHELIAL: CPT | Mod: RT

## 2021-10-06 PROCEDURE — V2785: CPT

## 2021-10-06 NOTE — ASU DISCHARGE PLAN (ADULT/PEDIATRIC) - CARE PROVIDER_API CALL
Vivian Duran; ANGELA)  Ophthalmology  [address] [place] [address]  Phone: [phone]  Fax: [phone]  Scheduled Appointment: 10/07/2021 11:45 AM

## 2021-10-06 NOTE — ASU PATIENT PROFILE, ADULT - NSICDXPASTMEDICALHX_GEN_ALL_CORE_FT
PAST MEDICAL HISTORY:  Cellulitis     Fractured pelvis     HTN (hypertension)     Hyperlipidemia     Irritable bowel syndrome     Osteoporosis     Peptic ulcer

## 2021-10-06 NOTE — ASU PATIENT PROFILE, ADULT - VISION (WITH CORRECTIVE LENSES IF THE PATIENT USUALLY WEARS THEM):
Blurry vision right eye/Partially impaired: cannot see medication labels or newsprint, but can see obstacles in path, and the surrounding layout; can count fingers at arm's length

## 2021-10-06 NOTE — ASU DISCHARGE PLAN (ADULT/PEDIATRIC) - PATIENT EDUCATION MATERIALS PROVIED
Eye shield with instructions , sunglasses and eye kit given to patient. Pink pillow for head positioning/Other (specify)

## 2021-10-08 LAB — GRAM STN FLD: SIGNIFICANT CHANGE UP

## 2021-10-27 LAB
CULTURE RESULTS: SIGNIFICANT CHANGE UP
SPECIMEN SOURCE: SIGNIFICANT CHANGE UP

## 2022-04-11 NOTE — ED ADULT NURSE NOTE - NS_SISCREENINGSR_GEN_ALL_ED
Negative Mirvaso Pregnancy And Lactation Text: This medication has not been assigned a Pregnancy Risk Category. It is unknown if the medication is excreted in breast milk.

## 2022-09-06 NOTE — ED ADULT TRIAGE NOTE - BMI (KG/M2)
Visually Significant (secondary to glare), discussed the risks, benefits, alternatives, and limitations of cataract surgery. The patient stated a full understanding and a desire to proceed with the procedure. The patient will need to return for pre-op appointment with cataract measurements and to have any additional questions answered, and start pre-operative eye drops as directed. Okay to schedule [facility] H&P OD. 18.9

## 2022-09-23 ENCOUNTER — INPATIENT (INPATIENT)
Facility: HOSPITAL | Age: 87
LOS: 0 days | Discharge: HOME | DRG: 392 | End: 2022-09-24
Attending: HOSPITALIST | Admitting: HOSPITALIST
Payer: MEDICARE

## 2022-09-23 VITALS
HEART RATE: 99 BPM | WEIGHT: 95.02 LBS | RESPIRATION RATE: 18 BRPM | HEIGHT: 58 IN | OXYGEN SATURATION: 94 % | DIASTOLIC BLOOD PRESSURE: 79 MMHG | SYSTOLIC BLOOD PRESSURE: 146 MMHG | TEMPERATURE: 99 F

## 2022-09-23 DIAGNOSIS — K52.9 NONINFECTIVE GASTROENTERITIS AND COLITIS, UNSPECIFIED: ICD-10-CM

## 2022-09-23 DIAGNOSIS — Z98.89 OTHER SPECIFIED POSTPROCEDURAL STATES: Chronic | ICD-10-CM

## 2022-09-23 DIAGNOSIS — E78.5 HYPERLIPIDEMIA, UNSPECIFIED: ICD-10-CM

## 2022-09-23 DIAGNOSIS — R93.89 ABNORMAL FINDINGS ON DIAGNOSTIC IMAGING OF OTHER SPECIFIED BODY STRUCTURES: ICD-10-CM

## 2022-09-23 DIAGNOSIS — D72.825 BANDEMIA: ICD-10-CM

## 2022-09-23 DIAGNOSIS — R22.2 LOCALIZED SWELLING, MASS AND LUMP, TRUNK: ICD-10-CM

## 2022-09-23 DIAGNOSIS — Z29.9 ENCOUNTER FOR PROPHYLACTIC MEASURES, UNSPECIFIED: ICD-10-CM

## 2022-09-23 DIAGNOSIS — I48.20 CHRONIC ATRIAL FIBRILLATION, UNSPECIFIED: ICD-10-CM

## 2022-09-23 DIAGNOSIS — Z96.60 PRESENCE OF UNSPECIFIED ORTHOPEDIC JOINT IMPLANT: Chronic | ICD-10-CM

## 2022-09-23 DIAGNOSIS — I10 ESSENTIAL (PRIMARY) HYPERTENSION: ICD-10-CM

## 2022-09-23 PROBLEM — S32.9XXA FRACTURE OF UNSPECIFIED PARTS OF LUMBOSACRAL SPINE AND PELVIS, INITIAL ENCOUNTER FOR CLOSED FRACTURE: Chronic | Status: ACTIVE | Noted: 2021-10-06

## 2022-09-23 LAB
ALBUMIN SERPL ELPH-MCNC: 3 G/DL — LOW (ref 3.3–5)
ALP SERPL-CCNC: 72 U/L — SIGNIFICANT CHANGE UP (ref 40–120)
ALT FLD-CCNC: 22 U/L — SIGNIFICANT CHANGE UP (ref 12–78)
ANION GAP SERPL CALC-SCNC: 4 MMOL/L — LOW (ref 5–17)
APPEARANCE UR: CLEAR — SIGNIFICANT CHANGE UP
AST SERPL-CCNC: 13 U/L — LOW (ref 15–37)
BASOPHILS # BLD AUTO: 0 K/UL — SIGNIFICANT CHANGE UP (ref 0–0.2)
BASOPHILS NFR BLD AUTO: 0 % — SIGNIFICANT CHANGE UP (ref 0–2)
BILIRUB SERPL-MCNC: 1.2 MG/DL — SIGNIFICANT CHANGE UP (ref 0.2–1.2)
BILIRUB UR-MCNC: NEGATIVE — SIGNIFICANT CHANGE UP
BUN SERPL-MCNC: 23 MG/DL — SIGNIFICANT CHANGE UP (ref 7–23)
CALCIUM SERPL-MCNC: 8.3 MG/DL — LOW (ref 8.5–10.1)
CHLORIDE SERPL-SCNC: 110 MMOL/L — HIGH (ref 96–108)
CO2 SERPL-SCNC: 29 MMOL/L — SIGNIFICANT CHANGE UP (ref 22–31)
COLOR SPEC: YELLOW — SIGNIFICANT CHANGE UP
CREAT SERPL-MCNC: 0.54 MG/DL — SIGNIFICANT CHANGE UP (ref 0.5–1.3)
DIFF PNL FLD: NEGATIVE — SIGNIFICANT CHANGE UP
EGFR: 85 ML/MIN/1.73M2 — SIGNIFICANT CHANGE UP
EOSINOPHIL # BLD AUTO: 0.07 K/UL — SIGNIFICANT CHANGE UP (ref 0–0.5)
EOSINOPHIL NFR BLD AUTO: 1 % — SIGNIFICANT CHANGE UP (ref 0–6)
FLUAV AG NPH QL: SIGNIFICANT CHANGE UP
FLUBV AG NPH QL: SIGNIFICANT CHANGE UP
GLUCOSE SERPL-MCNC: 168 MG/DL — HIGH (ref 70–99)
GLUCOSE UR QL: NEGATIVE — SIGNIFICANT CHANGE UP
HCT VFR BLD CALC: 42.2 % — SIGNIFICANT CHANGE UP (ref 34.5–45)
HGB BLD-MCNC: 13.8 G/DL — SIGNIFICANT CHANGE UP (ref 11.5–15.5)
KETONES UR-MCNC: NEGATIVE — SIGNIFICANT CHANGE UP
LACTATE SERPL-SCNC: 1.8 MMOL/L — SIGNIFICANT CHANGE UP (ref 0.7–2)
LEUKOCYTE ESTERASE UR-ACNC: ABNORMAL
LIDOCAIN IGE QN: 47 U/L — LOW (ref 73–393)
LYMPHOCYTES # BLD AUTO: 0.35 K/UL — LOW (ref 1–3.3)
LYMPHOCYTES # BLD AUTO: 5 % — LOW (ref 13–44)
MCHC RBC-ENTMCNC: 29.9 PG — SIGNIFICANT CHANGE UP (ref 27–34)
MCHC RBC-ENTMCNC: 32.7 GM/DL — SIGNIFICANT CHANGE UP (ref 32–36)
MCV RBC AUTO: 91.3 FL — SIGNIFICANT CHANGE UP (ref 80–100)
MONOCYTES # BLD AUTO: 0.28 K/UL — SIGNIFICANT CHANGE UP (ref 0–0.9)
MONOCYTES NFR BLD AUTO: 4 % — SIGNIFICANT CHANGE UP (ref 2–14)
NEUTROPHILS # BLD AUTO: 6.32 K/UL — SIGNIFICANT CHANGE UP (ref 1.8–7.4)
NEUTROPHILS NFR BLD AUTO: 72 % — SIGNIFICANT CHANGE UP (ref 43–77)
NITRITE UR-MCNC: NEGATIVE — SIGNIFICANT CHANGE UP
NRBC # BLD: SIGNIFICANT CHANGE UP /100 WBCS (ref 0–0)
PH UR: 5 — SIGNIFICANT CHANGE UP (ref 5–8)
PLATELET # BLD AUTO: 154 K/UL — SIGNIFICANT CHANGE UP (ref 150–400)
POTASSIUM SERPL-MCNC: 4.7 MMOL/L — SIGNIFICANT CHANGE UP (ref 3.5–5.3)
POTASSIUM SERPL-SCNC: 4.7 MMOL/L — SIGNIFICANT CHANGE UP (ref 3.5–5.3)
PROT SERPL-MCNC: 5.5 G/DL — LOW (ref 6–8.3)
PROT UR-MCNC: 15
RBC # BLD: 4.62 M/UL — SIGNIFICANT CHANGE UP (ref 3.8–5.2)
RBC # FLD: 13 % — SIGNIFICANT CHANGE UP (ref 10.3–14.5)
RSV RNA NPH QL NAA+NON-PROBE: SIGNIFICANT CHANGE UP
SARS-COV-2 RNA SPEC QL NAA+PROBE: SIGNIFICANT CHANGE UP
SODIUM SERPL-SCNC: 143 MMOL/L — SIGNIFICANT CHANGE UP (ref 135–145)
SP GR SPEC: 1.01 — SIGNIFICANT CHANGE UP (ref 1.01–1.02)
UROBILINOGEN FLD QL: NEGATIVE — SIGNIFICANT CHANGE UP
WBC # BLD: 7.02 K/UL — SIGNIFICANT CHANGE UP (ref 3.8–10.5)
WBC # FLD AUTO: 7.02 K/UL — SIGNIFICANT CHANGE UP (ref 3.8–10.5)

## 2022-09-23 PROCEDURE — 99223 1ST HOSP IP/OBS HIGH 75: CPT | Mod: GC,AI

## 2022-09-23 PROCEDURE — 99285 EMERGENCY DEPT VISIT HI MDM: CPT | Mod: FS

## 2022-09-23 PROCEDURE — 74177 CT ABD & PELVIS W/CONTRAST: CPT | Mod: 26,MA

## 2022-09-23 PROCEDURE — 93010 ELECTROCARDIOGRAM REPORT: CPT

## 2022-09-23 RX ORDER — SODIUM CHLORIDE 9 MG/ML
1000 INJECTION INTRAMUSCULAR; INTRAVENOUS; SUBCUTANEOUS
Refills: 0 | Status: DISCONTINUED | OUTPATIENT
Start: 2022-09-23 | End: 2022-09-24

## 2022-09-23 RX ORDER — PANTOPRAZOLE SODIUM 20 MG/1
40 TABLET, DELAYED RELEASE ORAL DAILY
Refills: 0 | Status: DISCONTINUED | OUTPATIENT
Start: 2022-09-23 | End: 2022-09-24

## 2022-09-23 RX ORDER — MULTIVIT-MIN/FERROUS GLUCONATE 9 MG/15 ML
1 LIQUID (ML) ORAL DAILY
Refills: 0 | Status: DISCONTINUED | OUTPATIENT
Start: 2022-09-23 | End: 2022-09-24

## 2022-09-23 RX ORDER — FAMOTIDINE 10 MG/ML
20 INJECTION INTRAVENOUS ONCE
Refills: 0 | Status: COMPLETED | OUTPATIENT
Start: 2022-09-23 | End: 2022-09-23

## 2022-09-23 RX ORDER — PREDNISOLONE SODIUM PHOSPHATE 1 %
1 DROPS OPHTHALMIC (EYE)
Refills: 0 | Status: DISCONTINUED | OUTPATIENT
Start: 2022-09-23 | End: 2022-09-24

## 2022-09-23 RX ORDER — SODIUM CHLORIDE 9 MG/ML
1000 INJECTION, SOLUTION INTRAVENOUS
Refills: 0 | Status: DISCONTINUED | OUTPATIENT
Start: 2022-09-23 | End: 2022-09-23

## 2022-09-23 RX ORDER — SODIUM CHLORIDE 9 MG/ML
1000 INJECTION INTRAMUSCULAR; INTRAVENOUS; SUBCUTANEOUS ONCE
Refills: 0 | Status: COMPLETED | OUTPATIENT
Start: 2022-09-23 | End: 2022-09-23

## 2022-09-23 RX ORDER — METRONIDAZOLE 500 MG
500 TABLET ORAL ONCE
Refills: 0 | Status: COMPLETED | OUTPATIENT
Start: 2022-09-23 | End: 2022-09-23

## 2022-09-23 RX ORDER — CIPROFLOXACIN LACTATE 400MG/40ML
400 VIAL (ML) INTRAVENOUS ONCE
Refills: 0 | Status: COMPLETED | OUTPATIENT
Start: 2022-09-23 | End: 2022-09-23

## 2022-09-23 RX ORDER — METOPROLOL TARTRATE 50 MG
25 TABLET ORAL DAILY
Refills: 0 | Status: DISCONTINUED | OUTPATIENT
Start: 2022-09-23 | End: 2022-09-24

## 2022-09-23 RX ORDER — METRONIDAZOLE 500 MG
500 TABLET ORAL EVERY 8 HOURS
Refills: 0 | Status: DISCONTINUED | OUTPATIENT
Start: 2022-09-24 | End: 2022-09-24

## 2022-09-23 RX ORDER — ACETAMINOPHEN 500 MG
650 TABLET ORAL ONCE
Refills: 0 | Status: COMPLETED | OUTPATIENT
Start: 2022-09-23 | End: 2022-09-23

## 2022-09-23 RX ORDER — CIPROFLOXACIN LACTATE 400MG/40ML
200 VIAL (ML) INTRAVENOUS EVERY 12 HOURS
Refills: 0 | Status: DISCONTINUED | OUTPATIENT
Start: 2022-09-24 | End: 2022-09-24

## 2022-09-23 RX ORDER — ATORVASTATIN CALCIUM 80 MG/1
20 TABLET, FILM COATED ORAL AT BEDTIME
Refills: 0 | Status: DISCONTINUED | OUTPATIENT
Start: 2022-09-23 | End: 2022-09-24

## 2022-09-23 RX ORDER — ONDANSETRON 8 MG/1
4 TABLET, FILM COATED ORAL EVERY 8 HOURS
Refills: 0 | Status: DISCONTINUED | OUTPATIENT
Start: 2022-09-23 | End: 2022-09-24

## 2022-09-23 RX ORDER — LOSARTAN POTASSIUM 100 MG/1
50 TABLET, FILM COATED ORAL DAILY
Refills: 0 | Status: DISCONTINUED | OUTPATIENT
Start: 2022-09-23 | End: 2022-09-24

## 2022-09-23 RX ORDER — FAMOTIDINE 10 MG/ML
20 INJECTION INTRAVENOUS DAILY
Refills: 0 | Status: DISCONTINUED | OUTPATIENT
Start: 2022-09-23 | End: 2022-09-24

## 2022-09-23 RX ORDER — ENOXAPARIN SODIUM 100 MG/ML
40 INJECTION SUBCUTANEOUS EVERY 24 HOURS
Refills: 0 | Status: DISCONTINUED | OUTPATIENT
Start: 2022-09-23 | End: 2022-09-24

## 2022-09-23 RX ORDER — ACETAMINOPHEN 500 MG
650 TABLET ORAL EVERY 6 HOURS
Refills: 0 | Status: DISCONTINUED | OUTPATIENT
Start: 2022-09-23 | End: 2022-09-24

## 2022-09-23 RX ORDER — LACTOBACILLUS ACIDOPHILUS 100MM CELL
1 CAPSULE ORAL DAILY
Refills: 0 | Status: DISCONTINUED | OUTPATIENT
Start: 2022-09-23 | End: 2022-09-24

## 2022-09-23 RX ORDER — ONDANSETRON 8 MG/1
4 TABLET, FILM COATED ORAL ONCE
Refills: 0 | Status: COMPLETED | OUTPATIENT
Start: 2022-09-23 | End: 2022-09-23

## 2022-09-23 RX ORDER — ASPIRIN/CALCIUM CARB/MAGNESIUM 324 MG
81 TABLET ORAL DAILY
Refills: 0 | Status: DISCONTINUED | OUTPATIENT
Start: 2022-09-23 | End: 2022-09-24

## 2022-09-23 RX ADMIN — ATORVASTATIN CALCIUM 20 MILLIGRAM(S): 80 TABLET, FILM COATED ORAL at 23:59

## 2022-09-23 RX ADMIN — Medication 650 MILLIGRAM(S): at 20:07

## 2022-09-23 RX ADMIN — ONDANSETRON 4 MILLIGRAM(S): 8 TABLET, FILM COATED ORAL at 13:35

## 2022-09-23 RX ADMIN — Medication 260 MILLIGRAM(S): at 13:36

## 2022-09-23 RX ADMIN — SODIUM CHLORIDE 1000 MILLILITER(S): 9 INJECTION INTRAMUSCULAR; INTRAVENOUS; SUBCUTANEOUS at 13:35

## 2022-09-23 RX ADMIN — Medication 100 MILLIGRAM(S): at 23:54

## 2022-09-23 RX ADMIN — Medication 200 MILLIGRAM(S): at 19:54

## 2022-09-23 RX ADMIN — Medication 1 DROP(S): at 19:54

## 2022-09-23 RX ADMIN — SODIUM CHLORIDE 60 MILLILITER(S): 9 INJECTION, SOLUTION INTRAVENOUS at 19:54

## 2022-09-23 RX ADMIN — FAMOTIDINE 20 MILLIGRAM(S): 10 INJECTION INTRAVENOUS at 13:36

## 2022-09-23 RX ADMIN — Medication 650 MILLIGRAM(S): at 21:00

## 2022-09-23 RX ADMIN — SODIUM CHLORIDE 1000 MILLILITER(S): 9 INJECTION INTRAMUSCULAR; INTRAVENOUS; SUBCUTANEOUS at 15:52

## 2022-09-23 RX ADMIN — SODIUM CHLORIDE 75 MILLILITER(S): 9 INJECTION INTRAMUSCULAR; INTRAVENOUS; SUBCUTANEOUS at 23:57

## 2022-09-23 NOTE — H&P ADULT - NSHPSOCIALHISTORY_GEN_ALL_CORE
Tobacco: denies   EtOH:  denies   Recreational drug use: denies   Lives with: alone but has 20 hours per day/ 7 day health aid assistance   Ambulates: with cane   ADLs:20 hours per day/ 7 day health aid assistance   Occupation: retired   GOC: Full code

## 2022-09-23 NOTE — H&P ADULT - NSICDXPASTMEDICALHX_GEN_ALL_CORE_FT
PAST MEDICAL HISTORY:  Cellulitis     Fractured pelvis     HTN (hypertension)     Hyperlipidemia     Irritable bowel syndrome     Osteoporosis     Pancreatic mass     Peptic ulcer

## 2022-09-23 NOTE — ED PROVIDER NOTE - OBJECTIVE STATEMENT
89-year-old female with history of hypertension, hyperlipidemia, GERD, diabetes, appendectomy, cholecystectomy, kidney stones, IBS, peptic ulcer presents with complaint of abdominal pain, vomiting and diarrhea.  Patient states that she has had abdominal pain and nonbloody diarrhea for few days, worse since yesterday.  States that she started vomiting last night.  Family states that patient is very lethargic today and dehydrated.  Patient admits to dysuria. States that pt has chronic occasional diarrhea with her IBS but not associated with vomiting/pain. Denies fever, recent travel, sick contacts, bloody stools, hematuria, cough or other symptoms.  pcp: Dr. Thomas Crowder

## 2022-09-23 NOTE — H&P ADULT - PROBLEM SELECTOR PLAN 4
BP well controlled on admission  - Takes at home losartan 50 mg qd and metoprolol succinate 50 mg qd  c/w home meds w/ holding parameters  - monitor BP & titrate BP meds PRN

## 2022-09-23 NOTE — ED PROVIDER NOTE - CONSTITUTIONAL, MLM
normal... appears lethargic, awake, alert, oriented to person, place, time/situation and in no apparent distress.

## 2022-09-23 NOTE — H&P ADULT - PROBLEM SELECTOR PLAN 3
- CT a/p  Stable pancreatic neck cyst. Stable left paraspinous mass. Bilateral  parapelvic cysts reidentified limiting evaluation for mild hydronephrosis. Subcentimeter cortical hypodensity too small to characterize. Centimeter calcified uterine myomata.  - On last admission patient was informed about CT findings. Needs to follow up as an outpatient

## 2022-09-23 NOTE — ED ADULT NURSE NOTE - OBJECTIVE STATEMENT
pt BIBA with complaints of abdominal pain since last night. pain worse in lower abdomen. +nausea/vomiting. abdomen soft/tender to palpation in lower quadrants. skin warm and dry. speaking in full sentences.

## 2022-09-23 NOTE — H&P ADULT - CONVERSATION DETAILS
Met with patient and daughter Eleazar Reviewed patient's medical and social history as well as events leading to patient's hospitalization. Writer discussed patient's current diagnosis ( pancreatic and paraspinal mass HTN, Afib) medical condition and management, prognosis, and life expectancy. Inquired about patient's wishes regarding extent of medical care to be provided including escalation of medical care into the ICU and use of vasopressor support. In addition, the writer inquired about thoughts regarding cardiopulmonary resuscitation, artificial nutrition and hydration including use of feeding tubes and IVF, antibiotics, and further investigative studies such as blood draws and radiology. Pt and Eleazar want to think about it, but for now patient is Full code.

## 2022-09-23 NOTE — H&P ADULT - ATTENDING COMMENTS
enteritis, diarrhea  cipro/flagyl  IVF  F/up GI recs  bandemia ?reactive  f/up ID recs and cultures  non-toxic appearance  abd soft  ADAT

## 2022-09-23 NOTE — H&P ADULT - HISTORY OF PRESENT ILLNESS
89 years old female patient with history of HTN, Afib, PUD, renal stones, IBS who presents today for nausea, vomiting and diarrhea since last night. Patient reports multiple large bowel movent with diarrhea and vomit 3x, associated with diffuse crampy abdominal pain and chills without fever. Not been able to tolerate PO since last night, patient reports generalized weakness. Pt had 1x of diarrhea on Monday. She denies hematochezia, melena, hematuria, unintentional weight loss. She reports chronic constant headaches since had eye surgery last october, described as a dull headaches, constant pain, nothing help or worse the pain, has been taking Tylenol.   Pt lives at home with health aid, uses cane, denies any recent falls.     ED course:   VS: Afebrile, HR: 99, BP: 149/79 SpO 97% RA RR 18  Labs significant for normal CBC, renal and hepatic function, UA unremarkable   CT a/p showed fid-filled left-sided small bowel loops suggestive of enteritis. Stable pancreatic neck cyst. Stable left paraspinous mass as above. Bilateral  parapelvic cysts reidentified limiting evaluation for mild hydronephrosis. Subcentimeter cortical hypodensity too small to characterize. Centimeter calcified uterine myomata.  Given Cipro IV x1, Flagyl IV, Zofran 4 mg IVP, famotidine 20 mg, IV NS bolus 2 lt in the ED

## 2022-09-23 NOTE — ED ADULT NURSE NOTE - HIV OFFER
How Severe Is Your Eczema?: mild Is This A New Presentation, Or A Follow-Up?: Follow Up Eczema Opt out

## 2022-09-23 NOTE — H&P ADULT - ASSESSMENT
89 years old female patient with history of HTN, Afib, PUD, renal stones, IBS who presents today for nausea, vomiting and diarrhea since last night,  no able to tolerate PO associated with generalized weakness. Patient admitted for enteritis and dehydration.

## 2022-09-23 NOTE — H&P ADULT - NSHPPHYSICALEXAM_GEN_ALL_CORE
T(C): 36.6 (09-23-22 @ 20:15), Max: 37.7 (09-23-22 @ 13:00)  HR: 84 (09-23-22 @ 20:15) (84 - 99)  BP: 149/78 (09-23-22 @ 20:15) (146/79 - 149/78)  RR: 16 (09-23-22 @ 20:15) (16 - 18)  SpO2: 97% (09-23-22 @ 20:15) (94% - 97%)    GENERAL: patient appears well  EYES: sclera clear, no exudates  ENMT: oropharynx clear without erythema, no exudates, dry mucous membranes  NECK: supple, soft, no thyromegaly noted  LUNGS: good air entry bilaterally, clear to auscultation, symmetric breath sounds, no wheezing or rhonchi appreciated  HEART: soft S1/S2, regular rate and rhythm, no murmurs noted, no lower extremity edema  GASTROINTESTINAL: abdomen is soft, nontender, nondistended, normoactive bowel sounds  INTEGUMENT: good skin turgor,   NEUROLOGIC: awake, alert, oriented x3  PSYCHIATRIC: mood is good, affect is congruent, linear and logical thought process  HEME/LYMPH: no obvious ecchymosis or petechiae

## 2022-09-23 NOTE — ED PROVIDER NOTE - CLINICAL SUMMARY MEDICAL DECISION MAKING FREE TEXT BOX
89-year-old female with past medical history of hypertension, hyperlipidemia, prediabetic, IBS, peptic ulcer disease, venous insufficiency with past surgical history of appendectomy Maylin cystectomy presents to the emergency department with daughter complaining of abdominal pain for the past week, and started today with nausea and vomiting and loose stool, no fever abdomen soft nontender follow-up CBC, CMP, lipase level, CT abdomen and pelvis, IV fluids, antiemetics, pain control and reevaluation.

## 2022-09-23 NOTE — H&P ADULT - PROBLEM SELECTOR PLAN 7
DVT ppx: Lovenox 40 mg qd     IMPROVE VTE Individual Risk Assessment  RISK                                                                Points  [  ] Previous VTE                                                  3  [  ] Thrombophilia                                               2  [  ] Lower limb paralysis                                      2        (unable to hold up >15 seconds)    [  ] Current Cancer                                              2         (within 6 months)  [  ] Immobilization > 24 hrs                                1  [  ] ICU/CCU stay > 24 hours                              1  [ x ] Age > 60                                                      1  IMPROVE VTE Score 1________

## 2022-09-23 NOTE — H&P ADULT - PROBLEM SELECTOR PLAN 2
Band 18% on admission possible 2/2 to acute infectious process, additionally pt has a pancreatic and paraspinal mass.   - Afebrile, normal leukocytosis, UA unremarkable   - F/up urine and blood cultures   - Started empirically treatment for bacterial enteritis with Cipro and Flagyl  - ID, Dr. Peace consulted.

## 2022-09-23 NOTE — ED PROVIDER NOTE - PROGRESS NOTE DETAILS
Patient currently feeling better after IV fluids, IV Tylenol, Zofran and Pepcid abdomen is soft awaiting CT abdomen and pelvis. Spoke to pt and family who feels pt is still lethargic and wants to be admitted for IV fluids and abx.   Spoke to hospitalist, Dr. Echols who accepted admission.

## 2022-09-23 NOTE — H&P ADULT - PROBLEM SELECTOR PLAN 1
Patient presents with n/v, diarrhea, diffused abdominal pain, no able to tolerate PO associated with generalized weakness possible viral vs bacterial enteritis  - Admit to [facility]  - Given Cipro IV x1, Flagyl IV, Zofran 4 mg IVP, famotidine 20 mg, IV NS bolus 2 lt in the ED   - CT a/p showed fid-filled left-sided small bowel loops suggestive of enteritis. Stable pancreatic neck cyst. Stable left paraspinous mass as above. Bilateral  parapelvic cysts reidentified limiting evaluation for mild hydronephrosis. Subcentimeter cortical hypodensity too small to characterize. Centimeter calcified uterine myomata.  - Started empirically treatment for bacterial enteritis with Cipro and Flagyl  - Maintenance  IV with NS 75 cc/h   - DASH diet   - Pantoprazole 40 mg IV qd, Lactobacillus qd   - Stool count   - GI consulted  - Trend CBC with diff and fever

## 2022-09-23 NOTE — ED ADULT NURSE REASSESSMENT NOTE - NS ED NURSE REASSESS COMMENT FT1
waiting for Lab to draw patient's blood  after multiple RN attempts. IVF infusing. medicated for pain

## 2022-09-23 NOTE — ED ADULT TRIAGE NOTE - CHIEF COMPLAINT QUOTE
Psychiatrist
Patient BIBA A/Ox4 with clear speech. C/o lower ABD pain, nausea and vomiting. Denies urinary complaint. L FA 20G noted from EMS.

## 2022-09-23 NOTE — ED PROVIDER NOTE - NS ED ATTENDING STATEMENT MOD
This was a shared visit with the KRISTEN. I reviewed and verified the documentation and independently performed the documented:

## 2022-09-23 NOTE — H&P ADULT - NSHPREVIEWOFSYSTEMS_GEN_ALL_CORE
CONSTITUTIONAL: denies fever, chills, fatigue, weakness  HEENT: admits chronic vision , sore throat  SKIN: denies new lesions, rash  CARDIOVASCULAR: denies chest pain, chest pressure, palpitations  RESPIRATORY: denies shortness of breath, sputum production  GASTROINTESTINAL: admits nausea, vomiting, diarrhea, abdominal pain  GENITOURINARY: denies dysuria, discharge  NEUROLOGICAL: denies numbness, headache, focal weakness  MUSCULOSKELETAL: denies new joint pain, muscle aches  HEMATOLOGIC: denies gross bleeding, bruising  LYMPHATICS: denies enlarged lymph nodes, extremity swelling  PSYCHIATRIC: denies recent changes in anxiety, depression  ENDOCRINOLOGIC: denies sweating, cold or heat intolerance CONSTITUTIONAL: denies fever, chills, fatigue, weakness  HEENT: admits chronic decreased vision acuity , sore throat  SKIN: denies new lesions, rash  CARDIOVASCULAR: denies chest pain, chest pressure, palpitations  RESPIRATORY: denies shortness of breath, sputum production  GASTROINTESTINAL: admits nausea, vomiting, diarrhea, abdominal pain  GENITOURINARY: denies dysuria, discharge  NEUROLOGICAL: denies numbness, headache, focal weakness  MUSCULOSKELETAL: denies new joint pain, muscle aches  HEMATOLOGIC: denies gross bleeding, bruising  LYMPHATICS: denies enlarged lymph nodes, extremity swelling  PSYCHIATRIC: denies recent changes in anxiety, depression  ENDOCRINOLOGIC: denies sweating, cold or heat intolerance

## 2022-09-23 NOTE — H&P ADULT - PROBLEM SELECTOR PROBLEM 5
"James Helm is a 17 y.o. male patient.    Temp: 97.5 °F (36.4 °C) (06/15/22 0928)  Pulse: (!) 136 (06/15/22 0947)  Resp: (!) 26 (06/15/22 0947)  BP: (!) 105/58 (06/15/22 0928)  SpO2: 96 % (06/15/22 0947)  Weight: 57.6 kg (126 lb 15.8 oz) (06/14/22 1447)  Height: 5' 7.52" (171.5 cm) (06/14/22 1447)    PICC  Date/Time: 6/15/2022 10:18 AM  Location procedure was performed: [facility] PICC LINE PLACEMENT  Performed by: Aimee Solis RN  Assisting provider: Ashley Bennett LPN  Consent Done: Yes  Time out: Immediately prior to procedure a time out was called to verify the correct patient, procedure, equipment, support staff and site/side marked as required  Indications: med administration and vascular access  Anesthesia: local infiltration  Local anesthetic: lidocaine 1% without epinephrine  Anesthetic Total (mL): 2  Preparation: skin prepped with ChloraPrep  Skin prep agent dried: skin prep agent completely dried prior to procedure  Sterile barriers: all five maximum sterile barriers used - cap, mask, sterile gown, sterile gloves, and large sterile sheet  Hand hygiene: hand hygiene performed prior to central venous catheter insertion  Location details: right brachial  Catheter type: double lumen  Catheter size: 5 Fr  Catheter Length: 33cm    Ultrasound guidance: yes  Vessel Caliber: medium and patent, compressibility normal  Vascular Doppler: not done  Needle advanced into vessel with real time Ultrasound guidance.  Guidewire confirmed in vessel.  Image recorded and saved.  Sterile sheath used.  Number of attempts: 1  Post-procedure: blood return through all ports, chlorhexidine patch and sterile dressing applied  Technical procedures used: 3CG  Specimens: No  Implants: No  Assessment: placement verified by x-ray  Complications: none          Name ASHLEY BENNETT LPN   6/15/2022  " Chronic atrial fibrillation

## 2022-09-23 NOTE — ED ADULT NURSE NOTE - CHIEF COMPLAINT QUOTE
Patient BIBA A/Ox4 with clear speech. C/o lower ABD pain, nausea and vomiting. Denies urinary complaint. L FA 20G noted from EMS.

## 2022-09-23 NOTE — H&P ADULT - PROBLEM SELECTOR PLAN 5
- Not on AC given history of multiple falls in the past   - On metoprolol succinate 50 mg qd for rate control   - Will continue ASA 81 mg and atorvastatin 20 mg

## 2022-09-24 ENCOUNTER — TRANSCRIPTION ENCOUNTER (OUTPATIENT)
Age: 87
End: 2022-09-24

## 2022-09-24 VITALS
DIASTOLIC BLOOD PRESSURE: 65 MMHG | TEMPERATURE: 98 F | SYSTOLIC BLOOD PRESSURE: 124 MMHG | HEART RATE: 66 BPM | OXYGEN SATURATION: 95 % | RESPIRATION RATE: 17 BRPM

## 2022-09-24 LAB
ALBUMIN SERPL ELPH-MCNC: 2.7 G/DL — LOW (ref 3.3–5)
ALP SERPL-CCNC: 63 U/L — SIGNIFICANT CHANGE UP (ref 40–120)
ALT FLD-CCNC: 19 U/L — SIGNIFICANT CHANGE UP (ref 12–78)
ANION GAP SERPL CALC-SCNC: 5 MMOL/L — SIGNIFICANT CHANGE UP (ref 5–17)
AST SERPL-CCNC: 20 U/L — SIGNIFICANT CHANGE UP (ref 15–37)
BASOPHILS # BLD AUTO: 0.03 K/UL — SIGNIFICANT CHANGE UP (ref 0–0.2)
BASOPHILS NFR BLD AUTO: 0.7 % — SIGNIFICANT CHANGE UP (ref 0–2)
BILIRUB SERPL-MCNC: 1 MG/DL — SIGNIFICANT CHANGE UP (ref 0.2–1.2)
BUN SERPL-MCNC: 12 MG/DL — SIGNIFICANT CHANGE UP (ref 7–23)
CALCIUM SERPL-MCNC: 8.2 MG/DL — LOW (ref 8.5–10.1)
CHLORIDE SERPL-SCNC: 114 MMOL/L — HIGH (ref 96–108)
CO2 SERPL-SCNC: 26 MMOL/L — SIGNIFICANT CHANGE UP (ref 22–31)
CREAT SERPL-MCNC: 0.46 MG/DL — LOW (ref 0.5–1.3)
EGFR: 89 ML/MIN/1.73M2 — SIGNIFICANT CHANGE UP
EOSINOPHIL # BLD AUTO: 0.03 K/UL — SIGNIFICANT CHANGE UP (ref 0–0.5)
EOSINOPHIL NFR BLD AUTO: 0.7 % — SIGNIFICANT CHANGE UP (ref 0–6)
GLUCOSE SERPL-MCNC: 97 MG/DL — SIGNIFICANT CHANGE UP (ref 70–99)
HCT VFR BLD CALC: 41.4 % — SIGNIFICANT CHANGE UP (ref 34.5–45)
HGB BLD-MCNC: 12.9 G/DL — SIGNIFICANT CHANGE UP (ref 11.5–15.5)
IMM GRANULOCYTES NFR BLD AUTO: 0.4 % — SIGNIFICANT CHANGE UP (ref 0–0.9)
LYMPHOCYTES # BLD AUTO: 0.94 K/UL — LOW (ref 1–3.3)
LYMPHOCYTES # BLD AUTO: 20.9 % — SIGNIFICANT CHANGE UP (ref 13–44)
MCHC RBC-ENTMCNC: 29.1 PG — SIGNIFICANT CHANGE UP (ref 27–34)
MCHC RBC-ENTMCNC: 31.2 GM/DL — LOW (ref 32–36)
MCV RBC AUTO: 93.2 FL — SIGNIFICANT CHANGE UP (ref 80–100)
MONOCYTES # BLD AUTO: 0.42 K/UL — SIGNIFICANT CHANGE UP (ref 0–0.9)
MONOCYTES NFR BLD AUTO: 9.3 % — SIGNIFICANT CHANGE UP (ref 2–14)
NEUTROPHILS # BLD AUTO: 3.06 K/UL — SIGNIFICANT CHANGE UP (ref 1.8–7.4)
NEUTROPHILS NFR BLD AUTO: 68 % — SIGNIFICANT CHANGE UP (ref 43–77)
NRBC # BLD: 0 /100 WBCS — SIGNIFICANT CHANGE UP (ref 0–0)
PLATELET # BLD AUTO: 128 K/UL — LOW (ref 150–400)
POTASSIUM SERPL-MCNC: 3.4 MMOL/L — LOW (ref 3.5–5.3)
POTASSIUM SERPL-SCNC: 3.4 MMOL/L — LOW (ref 3.5–5.3)
PROT SERPL-MCNC: 5.4 G/DL — LOW (ref 6–8.3)
RBC # BLD: 4.44 M/UL — SIGNIFICANT CHANGE UP (ref 3.8–5.2)
RBC # FLD: 13.6 % — SIGNIFICANT CHANGE UP (ref 10.3–14.5)
SODIUM SERPL-SCNC: 145 MMOL/L — SIGNIFICANT CHANGE UP (ref 135–145)
WBC # BLD: 4.5 K/UL — SIGNIFICANT CHANGE UP (ref 3.8–10.5)
WBC # FLD AUTO: 4.5 K/UL — SIGNIFICANT CHANGE UP (ref 3.8–10.5)

## 2022-09-24 PROCEDURE — 99285 EMERGENCY DEPT VISIT HI MDM: CPT | Mod: 25

## 2022-09-24 PROCEDURE — 80053 COMPREHEN METABOLIC PANEL: CPT

## 2022-09-24 PROCEDURE — 87637 SARSCOV2&INF A&B&RSV AMP PRB: CPT

## 2022-09-24 PROCEDURE — 96375 TX/PRO/DX INJ NEW DRUG ADDON: CPT

## 2022-09-24 PROCEDURE — 87040 BLOOD CULTURE FOR BACTERIA: CPT

## 2022-09-24 PROCEDURE — 93005 ELECTROCARDIOGRAM TRACING: CPT

## 2022-09-24 PROCEDURE — 93010 ELECTROCARDIOGRAM REPORT: CPT

## 2022-09-24 PROCEDURE — 96374 THER/PROPH/DIAG INJ IV PUSH: CPT

## 2022-09-24 PROCEDURE — 74177 CT ABD & PELVIS W/CONTRAST: CPT | Mod: MA

## 2022-09-24 PROCEDURE — 97162 PT EVAL MOD COMPLEX 30 MIN: CPT

## 2022-09-24 PROCEDURE — 99222 1ST HOSP IP/OBS MODERATE 55: CPT

## 2022-09-24 PROCEDURE — 87086 URINE CULTURE/COLONY COUNT: CPT

## 2022-09-24 PROCEDURE — 81001 URINALYSIS AUTO W/SCOPE: CPT

## 2022-09-24 PROCEDURE — 36415 COLL VENOUS BLD VENIPUNCTURE: CPT

## 2022-09-24 PROCEDURE — 83690 ASSAY OF LIPASE: CPT

## 2022-09-24 PROCEDURE — 85025 COMPLETE CBC W/AUTO DIFF WBC: CPT

## 2022-09-24 PROCEDURE — 99239 HOSP IP/OBS DSCHRG MGMT >30: CPT

## 2022-09-24 PROCEDURE — 83605 ASSAY OF LACTIC ACID: CPT

## 2022-09-24 RX ORDER — FAMOTIDINE 10 MG/ML
1 INJECTION INTRAVENOUS
Qty: 0 | Refills: 0 | DISCHARGE

## 2022-09-24 RX ORDER — LACTOBACILLUS ACIDOPHILUS 100MM CELL
1 CAPSULE ORAL
Qty: 14 | Refills: 0
Start: 2022-09-24 | End: 2022-09-30

## 2022-09-24 RX ORDER — INFLUENZA VIRUS VACCINE 15; 15; 15; 15 UG/.5ML; UG/.5ML; UG/.5ML; UG/.5ML
0.7 SUSPENSION INTRAMUSCULAR ONCE
Refills: 0 | Status: DISCONTINUED | OUTPATIENT
Start: 2022-09-24 | End: 2022-09-24

## 2022-09-24 RX ORDER — LACTOBACILLUS ACIDOPHILUS 100MM CELL
1 CAPSULE ORAL
Qty: 0 | Refills: 0 | DISCHARGE

## 2022-09-24 RX ORDER — ACETAMINOPHEN 500 MG
2 TABLET ORAL
Qty: 0 | Refills: 0 | DISCHARGE
Start: 2022-09-24

## 2022-09-24 RX ORDER — POTASSIUM CHLORIDE 20 MEQ
10 PACKET (EA) ORAL ONCE
Refills: 0 | Status: COMPLETED | OUTPATIENT
Start: 2022-09-24 | End: 2022-09-24

## 2022-09-24 RX ORDER — METRONIDAZOLE 500 MG
1 TABLET ORAL
Qty: 12 | Refills: 0
Start: 2022-09-24 | End: 2022-09-27

## 2022-09-24 RX ADMIN — Medication 1 TABLET(S): at 11:30

## 2022-09-24 RX ADMIN — Medication 10 MILLIEQUIVALENT(S): at 11:32

## 2022-09-24 RX ADMIN — ENOXAPARIN SODIUM 40 MILLIGRAM(S): 100 INJECTION SUBCUTANEOUS at 05:11

## 2022-09-24 RX ADMIN — LOSARTAN POTASSIUM 50 MILLIGRAM(S): 100 TABLET, FILM COATED ORAL at 05:10

## 2022-09-24 RX ADMIN — Medication 1 DROP(S): at 05:11

## 2022-09-24 RX ADMIN — Medication 1 TABLET(S): at 13:24

## 2022-09-24 RX ADMIN — Medication 100 MILLIGRAM(S): at 09:11

## 2022-09-24 RX ADMIN — FAMOTIDINE 20 MILLIGRAM(S): 10 INJECTION INTRAVENOUS at 11:31

## 2022-09-24 RX ADMIN — Medication 81 MILLIGRAM(S): at 11:31

## 2022-09-24 RX ADMIN — Medication 100 MILLIGRAM(S): at 05:11

## 2022-09-24 RX ADMIN — Medication 25 MILLIGRAM(S): at 05:10

## 2022-09-24 RX ADMIN — PANTOPRAZOLE SODIUM 40 MILLIGRAM(S): 20 TABLET, DELAYED RELEASE ORAL at 11:32

## 2022-09-24 NOTE — CONSULT NOTE ADULT - CONSULT REASON
Subjective:      Patient ID: Melani Manning is a 67 y.o. female. HPI  The patient comes unexpectedly today with complaints of triggering of the fourth digit right hand. She is requesting an injection    Review of Systems    Objective:   Physical Exam  /68   Pulse 84   Ht 5' 5\" (1.651 m)   Wt 181 lb (82.1 kg)   BMI 30.12 kg/m²   Alert female in no acute distress thickening of fourth flexor tendon right hand  Assessment:          Flexor tenosynovitis. The patient was injected along the flexor tendon sheath with 20 mg of Kenalog and a half cc lidocaine. The patient tolerated the procedure without difficulty. I'll see her back as previously scheduled.
enteritis
Enteritis

## 2022-09-24 NOTE — DISCHARGE NOTE PROVIDER - ATTENDING DISCHARGE PHYSICAL EXAMINATION:
GENERAL: patient appears well  EYES: sclera clear, no exudates  ENMT: oropharynx clear without erythema, no exudates, dry mucous membranes  NECK: supple, soft, no thyromegaly noted  LUNGS: good air entry bilaterally, clear to auscultation, symmetric breath sounds, no wheezing or rhonchi appreciated  HEART: soft S1/S2, regular rate and rhythm, no murmurs noted, no lower extremity edema  GASTROINTESTINAL: abdomen is soft, nontender, nondistended, normoactive bowel sounds  NEUROLOGIC: awake, alert, oriented x3  PSYCHIATRIC: mood is good, affect is congruent, linear and logical thought process

## 2022-09-24 NOTE — PHYSICAL THERAPY INITIAL EVALUATION ADULT - GAIT TRAINING, PT EVAL
(3 - 5 sessions) Pt will ambulate 100 feet using rolling walker with supervision to improve mobility upon d/c.

## 2022-09-24 NOTE — PHYSICAL THERAPY INITIAL EVALUATION ADULT - TRANSFER TRAINING, PT EVAL
(3 - 5 sessions) Pt will be able to transfer sit to stand supervision with rolling walker to improve mobility.

## 2022-09-24 NOTE — DISCHARGE NOTE NURSING/CASE MANAGEMENT/SOCIAL WORK - PATIENT PORTAL LINK FT
You can access the FollowMyHealth Patient Portal offered by [facility] by registering at the following website: [email or web address]. By joining [facility]’s FollowMyHealth portal, you will also be able to view your health information using other applications (apps) compatible with our system.

## 2022-09-24 NOTE — PHYSICAL THERAPY INITIAL EVALUATION ADULT - LEVEL OF INDEPENDENCE: GAIT, REHAB EVAL
Removed the 'apron', tummy tuck, on 5/17/17. Over 42 lbs of skin/access fat during this surgery.     Some drainage tubes were removed yesterday due to discomfort.       Has not had a bowel movement since right before surgery.  Has been using oxycontin since the surgery.  Has not taken it in two days; is taking Advil migraine to address her pain.        Used miralax (has ibs) the last two days.  Drinking water and a lot of juice.  Is concerned about straining that could cause some of her stitches to come apart.   Is able to feel stool but it is too hard to pass; she states she feels the stool 'move back' up into her colon.       Will have stitches in for at least another two weeks.  Got 280 stiches.      No intestinal cramping experienced at all, its almost like everything is shut down.     Requested a stool softener from plastic surgeon, they stated they couldn't prescribe that and she had to consult her PCP.     Is asking for stool softener at this time.        contact guard

## 2022-09-24 NOTE — DISCHARGE NOTE PROVIDER - CARE PROVIDER_API CALL
TIFF ALVARADO  Internal Medicine  [address], [address] [place] [address]  Phone: [phone]  Fax: [phone]  Established Patient  Follow Up Time: 1 week

## 2022-09-24 NOTE — DISCHARGE NOTE PROVIDER - HOSPITAL COURSE
89 y/o F PMH HTN, afib, PUD, IBS who presented to the ED for evaluation of abdominal pain with nausea/vomiting/diarrhea since last night. CT showed left sided enteritis. Patient was started on empiric IV cipro and flagyl. She improved clinically. Her diarrhea resolved. She was tolerating diet. Abdominal pain improved.     Case discussed with GI and ID. Will discharge home with outpatient follow up with her PMD in 1-2 weeks. D/w daughter, patient to return to the ED if she develops any worsening diarrhea, poor po intake, fever/chills.

## 2022-09-24 NOTE — PHYSICAL THERAPY INITIAL EVALUATION ADULT - BED MOBILITY TRAINING, PT EVAL
(3 - 5 sessions) Pt will be able to transfer supine to sit with supervision to improve function and mobility upon d/c.

## 2022-09-24 NOTE — CONSULT NOTE ADULT - ASSESSMENT
enteritis  nausea/vomiting, resolved  diarrhea resolved    clinical improved  now tolerating diet  no further diarrhea  suspected resolving gastroenteritis  dc home with 5 day course of current antibiotics  d/w patient  d/w primary

## 2022-09-24 NOTE — PHYSICAL THERAPY INITIAL EVALUATION ADULT - ACTIVE RANGE OF MOTION EXAMINATION, REHAB EVAL
B shoulder flexion AROM limited; L UE to 90 deg, R UE to 60 deg flexion/Left LE Active ROM was WFL (within functional limits)/Right LE Active ROM was WFL (within functional limits)/deficits as listed below

## 2022-09-24 NOTE — PHYSICAL THERAPY INITIAL EVALUATION ADULT - BED MOBILITY LIMITATIONS, REHAB EVAL
decreased ability to use arms for pushing/pulling/decreased ability to use legs for bridging/pushing/impaired ability to control trunk for mobility [Negative] : Heme/Lymph

## 2022-09-24 NOTE — DISCHARGE NOTE PROVIDER - NSDCMRMEDTOKEN_GEN_ALL_CORE_FT
Acidophilus oral capsule: 1 cap(s) orally 2 times a day   aspirin 81 mg oral delayed release tablet: 1 tab(s) orally once a day  atorvastatin 20 mg oral tablet: 1 tab(s) orally once a day (at bedtime)  cycloSPORINE 0.05% ophthalmic emulsion: 1 drop(s) to each affected eye 2 times a day  Eye Multivitamin oral tablet: 1 tab(s) orally once a day  famotidine 20 mg oral tablet: 1 tab(s) orally once a day  losartan 50 mg oral tablet: 1 tab(s) orally once a day  metoprolol succinate 25 mg oral tablet, extended release: 1 tab(s) orally once a day  prednisoLONE acetate 1% ophthalmic suspension: 1 drop(s) in the right eye 2 times a day  Vitamin D3: 1 tab(s) orally once a day  Zithromax 500 mg oral tablet: 1 tab(s) orally once a day

## 2022-09-24 NOTE — PHYSICAL THERAPY INITIAL EVALUATION ADULT - PERTINENT HX OF CURRENT PROBLEM, REHAB EVAL
89 years old female patient with history of HTN, Afib, PUD, renal stones, IBS who presents today for nausea, vomiting and diarrhea since last night. Patient reports multiple large bowel movent with diarrhea and vomit 3x, associated with diffuse crampy abdominal pain and chills without fever. Not been able to tolerate PO since last night, patient reports generalized weakness. Pt admitted for enteritis and dehydration.

## 2022-09-24 NOTE — DISCHARGE NOTE PROVIDER - NSDCCPCAREPLAN_GEN_ALL_CORE_FT
PRINCIPAL DISCHARGE DIAGNOSIS  Diagnosis: Enteritis  Assessment and Plan of Treatment: You were seen by ID and GI. CT was consistent with left sided enteritis.  You were started on IV antibiotics and improved clinically.   Please complete an additional 3 day course of azithromycin 500mg daily starting 9/25. Return to the ED if you develop any fever/chills/poor appetite/abdominal cramping/persistent diarrhea.   Follow up with your primary medical doctor Dr Crowder in 1-2 weeks for post hospitalization follow up.      SECONDARY DISCHARGE DIAGNOSES  Diagnosis: Chronic atrial fibrillation  Assessment and Plan of Treatment: You were continued on your home medications during hospitalization.   please follow up with your PMD as needed.    Diagnosis: HTN (hypertension)  Assessment and Plan of Treatment: Please continue your home medications at home.

## 2022-09-24 NOTE — PATIENT PROFILE ADULT - FALL HARM RISK - HARM RISK INTERVENTIONS

## 2022-09-24 NOTE — CONSULT NOTE ADULT - SUBJECTIVE AND OBJECTIVE BOX
[facility]  Jose Gay PA-C  [address] [place] [id number] [phone]      Chief Complaint:  Patient is a 89y old  Female who presents with a chief complaint of enteritis (23 Sep 2022 19:37)      HPI:89 years old female patient with history of HTN, Afib, PUD, renal stones, IBS who presents today for nausea, vomiting and diarrhea since last night. Patient reports multiple large bowel movent with diarrhea and vomit 3x, associated with diffuse crampy abdominal pain and chills without fever. Not been able to tolerate PO since last night, patient reports generalized weakness. Pt had 1x of diarrhea on Monday. She denies hematochezia, melena, hematuria, unintentional weight loss. She reports chronic constant headaches since had eye surgery last october, described as a dull headaches, constant pain, nothing help or worse the pain, has been taking Tylenol.   Pt lives at home with health aid, uses cane, denies any recent falls.     Allergies:  sulfa drugs (Hives)      Medications:  acetaminophen     Tablet .. 650 milliGRAM(s) Oral every 6 hours PRN  aspirin enteric coated 81 milliGRAM(s) Oral daily  atorvastatin 20 milliGRAM(s) Oral at bedtime  ciprofloxacin   IVPB 200 milliGRAM(s) IV Intermittent every 12 hours  enoxaparin Injectable 40 milliGRAM(s) SubCutaneous every 24 hours  famotidine    Tablet 20 milliGRAM(s) Oral daily  influenza  Vaccine (HIGH DOSE) 0.7 milliLiter(s) IntraMuscular once  lactobacillus acidophilus 1 Tablet(s) Oral daily  losartan 50 milliGRAM(s) Oral daily  metoprolol succinate ER 25 milliGRAM(s) Oral daily  metroNIDAZOLE  IVPB 500 milliGRAM(s) IV Intermittent every 8 hours  multivitamin/minerals 1 Tablet(s) Oral daily  ondansetron Injectable 4 milliGRAM(s) IV Push every 8 hours PRN  pantoprazole  Injectable 40 milliGRAM(s) IV Push daily  prednisoLONE acetate 1% Suspension 1 Drop(s) Right EYE two times a day  sodium chloride 0.9%. 1000 milliLiter(s) IV Continuous <Continuous>      PMHX/PSHX:  Hyperlipidemia    Peptic ulcer    HTN (hypertension)    Osteoporosis    Irritable bowel syndrome    DM (diabetes mellitus)    Cellulitis    Fractured pelvis    Pancreatic mass    S/P hip replacement    History of appendectomy    History of tonsillectomy        Family history:  FH: hypertension        Social History:     ROS:     General:  No wt loss, fevers, chills, night sweats, fatigue,   Eyes:  Good vision, no reported pain  ENT:  No sore throat, pain, runny nose, dysphagia  CV:  No pain, palpitations, hypo/hypertension  Resp:  No dyspnea, cough, tachypnea, wheezing  GI:  No pain, No nausea, No vomiting, No diarrhea, No constipation, No weight loss, No fever, No pruritis, No rectal bleeding, No tarry stools, No dysphagia,  :  No pain, bleeding, incontinence, nocturia  Muscle:  No pain, weakness  Neuro:  No weakness, tingling, memory problems  Psych:  No fatigue, insomnia, mood problems, depression  Endocrine:  No polyuria, polydipsia, cold/heat intolerance  Heme:  No petechiae, ecchymosis, easy bruisability  Skin:  No rash, tattoos, scars, edema      PHYSICAL EXAM:   Vital Signs:  Vital Signs Last 24 Hrs  T(C): 36.5 (24 Sep 2022 02:52), Max: 37.7 (23 Sep 2022 13:00)  T(F): 97.7 (24 Sep 2022 02:52), Max: 99.8 (23 Sep 2022 13:00)  HR: 63 (24 Sep 2022 02:52) (63 - 99)  BP: 134/62 (24 Sep 2022 02:52) (120/73 - 149/78)  BP(mean): --  RR: 16 (24 Sep 2022 02:52) (15 - 18)  SpO2: 93% (24 Sep 2022 02:52) (93% - 97%)    Parameters below as of 24 Sep 2022 02:52  Patient On (Oxygen Delivery Method): room air      Daily Height in cm: 147.32 (23 Sep 2022 11:06)    Daily     GENERAL:  Appears stated age,   HEENT:  NC/AT,    CHEST:  Full & symmetric excursion,   HEART:  Regular rhythm  ABDOMEN:  Soft, non-tender, non-distended,   EXTEREMITIES:  no cyanosis,clubbing or edema  SKIN:  No rash  NEURO:  Alert,    LABS:                        13.8   7.02  )-----------( 154      ( 23 Sep 2022 14:10 )             42.2         143  |  110<H>  |  23  ----------------------------<  168<H>  4.7   |  29  |  0.54    Ca    8.3<L>      23 Sep 2022 14:10    TPro  5.5<L>  /  Alb  3.0<L>  /  TBili  1.2  /  DBili  x   /  AST  13<L>  /  ALT  22  /  AlkPhos  72      LIVER FUNCTIONS - ( 23 Sep 2022 14:10 )  Alb: 3.0 g/dL / Pro: 5.5 g/dL / ALK PHOS: 72 U/L / ALT: 22 U/L / AST: 13 U/L / GGT: x             Urinalysis Basic - ( 23 Sep 2022 18:07 )    Color: Yellow / Appearance: Clear / S.015 / pH: x  Gluc: x / Ketone: Negative  / Bili: Negative / Urobili: Negative   Blood: x / Protein: 15 / Nitrite: Negative   Leuk Esterase: Small / RBC: 0-2 /HPF / WBC 3-5   Sq Epi: x / Non Sq Epi: Few / Bacteria: Few      Amylase Serum--      Lipase serum47       Ammonia--      Imaging:          
[facility]  INFECTIOUS DISEASES   [address] [place] [address]  Tel: [phone]     Fax: [phone]  ======================================================  MD Laz Ojeda Kaushal, MD Cho, Michelle, MD   ======================================================    MRN-[id number]  LEVON SRIVASTAVA     CC: Diarrhea     HPI:  89 yo woman with PMH of HTN, Afib, PUD, renal stones and IBS was admitted with nausea, vomiting and diarrhea for one day. She had multiple loose bowel movents and vomited 3 times.   She also had abdominal pain but no fever or chills. Currently all symptoms have resolved and she had regular diet this morning with good tolerance.   No history of C diff in the past, but has IBS.   CT a/p showed fid-filled left-sided small bowel loops suggestive of enteritis.  She has been started on ciprofloxacin and metronidazole and ID was called for further recommendations.      PAST MEDICAL & SURGICAL HISTORY:  Hyperlipidemia  Peptic ulcer  HTN (hypertension)  Osteoporosis  Irritable bowel syndrome  Cellulitis  Fractured pelvis  Pancreatic mass  S/P hip replacement  partial, left  History of appendectomy  History of tonsillectomy    Social Hx: no smoking, ETOH or drugs     FAMILY HISTORY:  FH: hypertension    Allergies  sulfa drugs (Hives)    Antibiotics:  MEDICATIONS  (STANDING):  aspirin enteric coated 81 milliGRAM(s) Oral daily  atorvastatin 20 milliGRAM(s) Oral at bedtime  ciprofloxacin   IVPB 200 milliGRAM(s) IV Intermittent every 12 hours  enoxaparin Injectable 40 milliGRAM(s) SubCutaneous every 24 hours  famotidine    Tablet 20 milliGRAM(s) Oral daily  influenza  Vaccine (HIGH DOSE) 0.7 milliLiter(s) IntraMuscular once  lactobacillus acidophilus 1 Tablet(s) Oral daily  losartan 50 milliGRAM(s) Oral daily  metoprolol succinate ER 25 milliGRAM(s) Oral daily  metroNIDAZOLE  IVPB 500 milliGRAM(s) IV Intermittent every 8 hours  multivitamin/minerals 1 Tablet(s) Oral daily  pantoprazole  Injectable 40 milliGRAM(s) IV Push daily  potassium chloride    Tablet ER 10 milliEquivalent(s) Oral once  prednisoLONE acetate 1% Suspension 1 Drop(s) Right EYE two times a day  sodium chloride 0.9%. 1000 milliLiter(s) (75 mL/Hr) IV Continuous <Continuous>     REVIEW OF SYSTEMS:  CONSTITUTIONAL:  No Fever or chills  HEENT:  No diplopia or blurred vision.  No sore throat or runny nose.  CARDIOVASCULAR:  No chest pain or SOB.  RESPIRATORY:  No cough, shortness of breath, PND or orthopnea.  GASTROINTESTINAL:  No nausea, vomiting or diarrhea.  GENITOURINARY:  No dysuria, frequency or urgency. No Blood in urine  MUSCULOSKELETAL:  no joint aches, no muscle pain  SKIN:  No change in skin, hair or nails.  NEUROLOGIC:  No paresthesias, fasciculations, seizures or weakness.  PSYCHIATRIC:  No disorder of thought or mood.  ENDOCRINE:  No heat or cold intolerance, polyuria or polydipsia.  HEMATOLOGICAL:  No easy bruising or bleeding.     Physical Exam:  Vital Signs Last 24 Hrs  T(C): 36.5 (24 Sep 2022 02:52), Max: 37.7 (23 Sep 2022 13:00)  T(F): 97.7 (24 Sep 2022 02:52), Max: 99.8 (23 Sep 2022 13:00)  HR: 63 (24 Sep 2022 02:52) (63 - 99)  BP: 134/62 (24 Sep 2022 02:52) (120/73 - 149/78)  BP(mean): --  RR: 16 (24 Sep 2022 02:52) (15 - 18)  SpO2: 93% (24 Sep 2022 02:52) (93% - 97%)  Parameters below as of 24 Sep 2022 02:52  Patient On (Oxygen Delivery Method): room air  Height (cm): 147.3 ( @ 11:06)  Weight (kg): 43.1 ( @ 11:06)  BMI (kg/m2): 19.9 ( @ 11:06)  BSA (m2): 1.33 ( @ 11:06)  GEN: NAD  HEENT: normocephalic and atraumatic. EOMI. PERRL.    NECK: Supple.  No lymphadenopathy   LUNGS: Clear to auscultation.  HEART: Irregular rate and rhythm  ABDOMEN: Soft, nontender, and nondistended.  Positive bowel sounds.    : No CVA tenderness  EXTREMITIES: Without edema.  NEUROLOGIC: grossly intact.  PSYCHIATRIC: Appropriate affect .  SKIN: ecchymoses in legs     Labs:      145  |  114<H>  |  12  ----------------------------<  97  3.4<L>   |  26  |  0.46<L>    Ca    8.2<L>      24 Sep 2022 09:18    TPro  5.4<L>  /  Alb  2.7<L>  /  TBili  1.0  /  DBili  x   /  AST  20  /  ALT  19  /  AlkPhos  63                          12.9   4.50  )-----------( 128      ( 24 Sep 2022 09:18 )             41.4     Urinalysis Basic - ( 23 Sep 2022 18:07 )    Color: Yellow / Appearance: Clear / S.015 / pH: x  Gluc: x / Ketone: Negative  / Bili: Negative / Urobili: Negative   Blood: x / Protein: 15 / Nitrite: Negative   Leuk Esterase: Small / RBC: 0-2 /HPF / WBC 3-5   Sq Epi: x / Non Sq Epi: Few / Bacteria: Few    LIVER FUNCTIONS - ( 24 Sep 2022 09:18 )  Alb: 2.7 g/dL / Pro: 5.4 g/dL / ALK PHOS: 63 U/L / ALT: 19 U/L / AST: 20 U/L / GGT: x           SARS-CoV-2 Result: NotDetec (22 @ 19:45)    All imaging and other data have been reviewed.  < from: CT Abdomen and Pelvis w/ IV Cont (22 @ 15:43) >  ACC: [id number] EXAM:  CT ABDOMEN AND PELVIS IC                        PROCEDURE DATE:  2022    INTERPRETATION:  CLINICAL INFORMATION: 89 years  Female with abdominal   pain, vomiting, diarrhea.  COMPARISON: 10/1/2019  CONTRAST/COMPLICATIONS:  IV Contrast: Omnipaque 350  90 cc administered   10 cc discarded  Oral Contrast: NONE  Complications: None reported at time of study completion  PROCEDURE:  CT of the Abdomen and Pelvis was performed.  Sagittal and coronal reformats wereperformed.  FINDINGS:  LOWER CHEST: Mild cardiomegaly. Coronary atherosclerosis. Mild bilateral   lower lobe dependent atelectasis.  LIVER: Within normal limits.  BILE DUCTS: Stable 1 cm common duct postcholecystectomy.  GALLBLADDER: Cholecystectomy.  SPLEEN: Within normal limits.  PANCREAS: Atrophic. Stable 1.8 x 1.3 cm pancreatic neck cyst.  ADRENALS: Within normal limits.  KIDNEYS/URETERS: Bilateral parapelvic cysts reidentified limiting   evaluation for mild hydronephrosis. Symmetrical nephrograms. No   urolithiasis. Subcentimeter cortical hypodensities too small to   characterize.  BLADDER: Incompletely distended. Partially obscured by streak artifact.  REPRODUCTIVE ORGANS: Centimeter calcified uterine myomata.  BOWEL: No bowel obstruction. Fluid-filled nondistended left-sided small   bowel loops suggestive of enteritis. Appendix is not visualized. No   evidence of inflammation in the pericecal region.  PERITONEUM: No ascites.  VESSELS: Atherosclerotic changes.  RETROPERITONEUM/LYMPH NODES: No lymphadenopathy.  ABDOMINAL WALL: Stable left paraspinous mass measuring 5.0 x 3.8 x 9.4 cm   (2:62) scalloping the left L5 lamina and extending into the sacral   foramen. The mass again extends from the level of L4 to the third sacral   segment.  BONES: Hip arthroplasty. ORIF right hip. Thoracolumbar degenerative   changes. Scoliosis.  IMPRESSION:  Fluid-filled left-sided small bowel loops suggestive of enteritis.  Stable pancreatic neck cyst.  Stable left paraspinous mass as above.  Other chronic findings as above.    Assessment and Plan:   89 yo woman with PMH of HTN, Afib, PUD, renal stones and IBS was admitted with nausea, vomiting and diarrhea for one day. She had multiple loose bowel movents and vomited 3 times.   She also had abdominal pain but no fever or chills. Currently all symptoms have resolved and she had regular diet this morning with good tolerance.   No history of C diff, since stopped without any intervention, no leukocytosis, makes it unlikely.   CT a/p showed fid-filled left-sided small bowel loops suggestive of enteritis.  Could be a simple infectious enteritis/gastroenteritis most of the time is self limited. Since has been started on FQ, can treat for 5 days or change to 3 days of azithromycin which is preferred.    If diarrhea requires will send GI PCR.     Thank you for courtesy of this consult.     Discussed with the primary team.     Kari Peace MD  Division of Infectious Diseases   Please call ID service at [phone] with any question.      55 minutes spent on total encounter assessing patient, examination, chart review, counseling and coordinating care by the attending physician/nurse/care manager.

## 2022-09-24 NOTE — PHYSICAL THERAPY INITIAL EVALUATION ADULT - ADDITIONAL COMMENTS
Prior to admission pt reports using rolling walker for ADL's and an aide from 8am-8pm and 11pm-6am, who helps her for dressing and showering. Pt lives in home where she is able to stay on the main level but has 1 step (a porch step, her walker can fit on step) to enter.

## 2022-09-25 LAB
CULTURE RESULTS: SIGNIFICANT CHANGE UP
SPECIMEN SOURCE: SIGNIFICANT CHANGE UP

## 2022-09-25 RX ORDER — CIPROFLOXACIN LACTATE 400MG/40ML
1 VIAL (ML) INTRAVENOUS
Qty: 7 | Refills: 0
Start: 2022-09-25 | End: 2022-09-27

## 2022-09-25 RX ORDER — AZITHROMYCIN 500 MG/1
1 TABLET, FILM COATED ORAL
Qty: 3 | Refills: 0
Start: 2022-09-25 | End: 2022-09-27

## 2022-11-28 NOTE — OCCUPATIONAL THERAPY INITIAL EVALUATION ADULT - PATIENT/FAMILY/SIGNIFICANT OTHER GOALS STATEMENT, OT EVAL
Physical Therapy Discharge    Visit Type: Discharge Summary -  Daily Treatment Note  Visit: 6  Referring Provider: Jennifer White CNP  Medical Diagnosis (from order): Diagnosis Information    Diagnosis  715.91 (ICD-9-CM) - M19.011 (ICD-10-CM) - Osteoarthritis of right shoulder         SUBJECTIVE                                                                                                               Pt reports he is able to reach top cupboards unless there are stuff on top of cabinet. Continues to use long handled sponge to wash back. Pt reports he is able to teddy and doff jacket/coat much easier. Pt states he is doing his HEP daily.     Pain / Symptoms  - Pain rating (out of 10): Current: 3       OBJECTIVE                                                                                                                     Range of Motion (ROM)   (degrees unless noted; active unless noted; norms in ( ); negative=lacking to 0, positive=beyond 0)  Shoulder:    - Flexion (180):        â¢ Left:130         â¢ Right: 140    - Abduction (180):        â¢ Left: 125        â¢ Right: 130    - Internal Rotation:        - at 90Â° (70-90):            â¢ Left: 60           â¢ Right: 70    - External Rotation:       - at 90Â° (90):            â¢ Left: 20            â¢ Right: 10          Palpation  Left  - Levator Scapulae: hypertonic  - Upper Trapezius: hypertonic  Right  - Levator Scapulae: hypertonic  - Upper Trapezius: hypertonic              Outcome/Assessments  Outcome Measures:   Quick Disabilities of the Arm, Shoulder and Hand: QuickDash Total Score (Score will not calculate if more then 2 questions are left blank): 34.09  (scored 0-100; a higher score indicates greater disability) see flowsheet for additional documentation        Treatment     Therapeutic Exercise  UBE 4 min F 4 min B  Cervical rotation with chin tucks 10 x 2  Cervical SB with chin tucks 10 x 2  Cervical extension passive stretch ( supine without pillow)- gravity assisted 1 min x 2   RTBAND: diagonals, ER, Wedron and arrow 10 x 2 both - added to HEP  Corner pec stretch x 5- added to HEP  Wall push up + 10 x 2- added to HEP    Manual Therapy   MWM IR with belt in standing 10 x 2 B    Skilled input: verbal instruction/cues, tactile instruction/cues and posture correction  for clothing adjustments for techniques, therapist position for techniques and hand placement and palpation for techniques as described above and how they are pertinent to the patient's plan of care. Home Exercise Program  Access Code: [email or web address]  URL: [email or web address]  Date: 11/28/2022  Prepared by: Omer Bradleyd    Exercises  Â· Supine Chin Tuck - 2 x daily - 7 x weekly - 2 sets - 10 reps - 5 hold  Â· Seated Gentle Upper Trapezius Stretch - 2 x daily - 7 x weekly - 3 reps - 15-20 hold  Â· Gentle Levator Scapulae Stretch - 2 x daily - 7 x weekly - 3 reps - 15-20 hold  Â· Supine Shoulder Flexion AAROM with Hands Clasped - 2 x daily - 7 x weekly - 2 sets - 10 reps  Â· Shoulder Flexion Wall Slide with Towel - 1 x daily - 7 x weekly - 2 sets - 10 reps  Â· Shoulder Circles on Wall with Towel - 1 x daily - 7 x weekly - 2 sets - 10 reps  Â· AAH: Mulligan Assisted Cervical Extension (Backward Bend) SNAG with Towel _Mobilization - 1 x daily - 7 x weekly - 1-2 sets - 10 reps  Â· AAH: Mulligan Assisted Cervical Rotation SNAG with Towel _mobilization - 1 x daily - 7 x weekly - 1-2 sets - 10 reps  Â· Seated Thoracic Self-Mobilization - 1 x daily - 7 x weekly - 2 sets - 10 reps  Â· Standing Shoulder Internal Rotation Stretch with Towel - 1 x daily - 7 x weekly - 1-2 sets - 10 reps  Â· Seated Cervical Retraction and Rotation - 1 x daily - 7 x weekly - 2 sets - 10 reps  Â· Standing Cervical Retraction with Sidebending - 1 x daily - 7 x weekly - 3 sets - 10 reps  Â· Shoulder External Rotation and Scapular Retraction with Resistance - 1 x daily - 7 x weekly - 2-3 sets - 10 reps  Â· Standing Shoulder Diagonal Horizontal Abduction 60/120 Degrees with Resistance - 1 x daily - 7 x weekly - 2 sets - 10 reps  Â· Drawing [place] - 1 x daily - 7 x weekly - 2-3 sets - 10 reps  Â· Corner Pec Major Stretch - 1 x daily - 7 x weekly - 5 reps - 15 hold  Â· Wall Push Up with Plus - 1 x daily - 7 x weekly - 2 sets - 10 reps               ASSESSMENT                                                                                                          To date the patient has made gains as expected. Pt continues to have severe tightness to cervical/shoulder muscles due to years  of poor posture and positioning. Improved IR after MWM to T4. Pt is able to self correct posture when aware. Instructed on passive  Stretching to cervical extension in supine, gravity assisted stretch without pillow. Upgraded HEP. Encouraged to  Change position and assume neutral posture frequently during the day to break flexion pattern. Pt verbalized understanding. Pain/symptoms after session (out of 10): 1  Education:   - Results of above outlined education: Verbalizes understanding and Demonstrates understanding    PLAN                                                                                                                           Discharge from skilled therapy with instructions/recommendations to: continue home exercise program, follow up with referring provider    Suggestions for next session as indicated: DC with instructions      Goals  Long Term Goals: to be met by end of plan of care  1. GOALS TO BE MET AT END OF PLAN OF CARE      1. Pt is independent with HEP and proper positioning   Status: met   2. Pt will be able to raise both arms up to at least second shelf of cupboard to reach while in kitchen Status: met   3. Pt will demonstrate  ability to reach behind at least at T4 level to be able to teddy and doff jacket without difficulty Status: met   4.  Reduce soft tissue restrictions and improve cervical ROM to be able to turn head side to side without trunk rotation  Status: met   5.  Improve outcome measures to at  least 10 points to reducue disability with daily functional activities Status: partially met       Therapy procedure time and total treatment time can be found documented on the Time Entry flowsheet To get better so that she can return home

## 2023-01-25 NOTE — ED PROVIDER NOTE - TOBACCO USE
normal... Well appearing, awake, alert, oriented to person, place, time/situation and in no apparent distress. Unknown if ever smoked

## 2023-04-21 NOTE — PATIENT PROFILE ADULT - BILL PAYMENT
Detail Level: Detailed Depth Of Biopsy: dermis Was A Bandage Applied: Yes Size Of Lesion In Cm: 0.8 X Size Of Lesion In Cm: 0 Biopsy Type: H and E Biopsy Method: Personna blade Anesthesia Type: 1% lidocaine with epinephrine Anesthesia Volume In Cc (Will Not Render If 0): 0.5 Hemostasis: Drysol Wound Care: Petrolatum Dressing: bandage Destruction After The Procedure: No Type Of Destruction Used: Curettage Curettage Text: The wound bed was treated with curettage after the biopsy was performed. Cryotherapy Text: The wound bed was treated with cryotherapy after the biopsy was performed. Electrodesiccation Text: The wound bed was treated with electrodesiccation after the biopsy was performed. Electrodesiccation And Curettage Text: The wound bed was treated with electrodesiccation and curettage after the biopsy was performed. Silver Nitrate Text: The wound bed was treated with silver nitrate after the biopsy was performed. Lab: -[id number] Consent: Written consent was obtained and risks were reviewed including but not limited to scarring, infection, bleeding, scabbing, incomplete removal, nerve damage and allergy to anesthesia. Post-Care Instructions: I reviewed with the patient in detail post-care instructions. Patient is to keep the biopsy site dry overnight, and then apply bacitracin twice daily until healed. Patient may apply hydrogen peroxide soaks to remove any crusting. Notification Instructions: Patient will be notified of biopsy results. However, patient instructed to call the office if not contacted within 2 weeks. Billing Type: [employer] Information: Selecting Yes will display possible errors in your note based on the variables you have selected. This validation is only offered as a suggestion for you. PLEASE NOTE THAT THE VALIDATION TEXT WILL BE REMOVED WHEN YOU FINALIZE YOUR NOTE. IF YOU WANT TO FAX A PRELIMINARY NOTE YOU WILL NEED TO TOGGLE THIS TO 'NO' IF YOU DO NOT WANT IT IN YOUR FAXED NOTE. no

## 2023-08-02 NOTE — H&P ADULT - PROBLEM SELECTOR PLAN 10
Refill request for ativan and tramadol   VTE ppx with SCDs s/p fall, possible OR with ortho. Holding ASA pending serial CTs Pt has hx of Afib, daughter reports she is not on chemical a/c 2/2 fall risk as above  VTE ppx with SCDs s/p fall, possible OR with ortho. Holding ASA pending serial CTs Pt has hx of Afib, daughter reports she is not on chemical a/c 2/2 fall risk as above  VTE ppx with SCDs Pt has hx of Afib, daughter reports she is not on chemical a/c 2/2 fall risk as above  VTE ppx with SCDs    11. GOC. Patient is DNR/DNI. MOLST completed, form in chart.

## 2023-10-04 NOTE — PHYSICAL THERAPY INITIAL EVALUATION ADULT - PERTINENT HX OF CURRENT PROBLEM, REHAB EVAL
Operative Report    Date of Procedure: 10/4/2023    Preoperative Diagnosis: left ring finger trigger    Postoperative Diagnosis: left ring finger trigger    Procedure: left ring finger trigger release    Surgeon: Douglas Cornejo MD.    Assistant: Jailene Bashir PA-C    Estimated Blood Loss: None    Specimen: None    Anesthesia: Local 1% lidocaine without epinephrine    Description of Procedure:  The patient was brought into the operating room and placed in the supine position on the operating table.  Timeout was taken to verify patient, site and procedure.  The patient was given a dose of preoperative antibiotics and the left upper extremity was then prepped and draped in the standard surgical fashion.   I injected the finger with 1% lidocaine at the level of the A1 pulley.  The arm was exsanguinated and the tourniquet was insufflated.    I made an oblique incision over the A1 pulley of the left ring finger.  The subcutaneous tissues were divided in line with the incision.  I identified the flexor tendon sheath.  The neurovascular bundles were identified and protected.  I made a longitudinal incision within the flexor tendon sheath and had the patient flex and extend the finger on the table.  There was no further triggering.    The wound was irrigated with normal saline.  I closed with 4-0 nylon suture.  A soft dressing was applied.  The tourniquet was let down.  Fingers pinked up immediately and the patient was taken to recovery room without complication.       89 y/o f presents s/p fall. Pt admits to hitting her head, no LOC. CT c-spine: Diffuse osteopenia, no fracture. CT brain: No acute intracranial bleeding. Chronic ischemic changes. CT maxillofacial: Right periorbital hematoma. Intact globes. No fracture.CT left knee: No acute fracture or traumatic bony malalignment. Large left knee effusion. CT pelvis: Acute fractures of the right-sided pubic rami.CT R shoulder: no acute bony abnormality, GH arthritis

## 2023-10-17 NOTE — ED PROVIDER NOTE - CPE EDP MUSC NORM
General Sunscreen Counseling: I recommended over-the-counter SPF 30 or higher sunscreen applied prior to going outdoors, even on cloudy days, and the use of broad-brimmed hats and sun-protective clothing. I advised that sunscreen should be reapplied every 2 hours, and immediately after swimming. I recommended the use of a daily facial moisturizer containing SPF 30 or higher sunscreen. Detail Level: Zone Products Recommended: Daily facial moisturizer - Cetaphil oil control moisturizer with SPF 30 normal...

## 2023-10-23 ENCOUNTER — EMERGENCY (EMERGENCY)
Facility: HOSPITAL | Age: 88
LOS: 1 days | Discharge: HOME | End: 2023-10-23
Attending: EMERGENCY MEDICINE | Admitting: EMERGENCY MEDICINE
Payer: MEDICARE

## 2023-10-23 VITALS
RESPIRATION RATE: 16 BRPM | OXYGEN SATURATION: 97 % | HEART RATE: 85 BPM | TEMPERATURE: 98 F | DIASTOLIC BLOOD PRESSURE: 92 MMHG | SYSTOLIC BLOOD PRESSURE: 157 MMHG

## 2023-10-23 VITALS
HEART RATE: 93 BPM | RESPIRATION RATE: 18 BRPM | WEIGHT: 108.03 LBS | OXYGEN SATURATION: 93 % | SYSTOLIC BLOOD PRESSURE: 188 MMHG | HEIGHT: 60 IN | DIASTOLIC BLOOD PRESSURE: 115 MMHG | TEMPERATURE: 100 F

## 2023-10-23 DIAGNOSIS — Z96.60 PRESENCE OF UNSPECIFIED ORTHOPEDIC JOINT IMPLANT: Chronic | ICD-10-CM

## 2023-10-23 DIAGNOSIS — Z98.89 OTHER SPECIFIED POSTPROCEDURAL STATES: Chronic | ICD-10-CM

## 2023-10-23 PROCEDURE — 90471 IMMUNIZATION ADMIN: CPT | Mod: XU

## 2023-10-23 PROCEDURE — 12004 RPR S/N/AX/GEN/TRK7.6-12.5CM: CPT

## 2023-10-23 PROCEDURE — 99285 EMERGENCY DEPT VISIT HI MDM: CPT | Mod: FS,25

## 2023-10-23 PROCEDURE — 70450 CT HEAD/BRAIN W/O DYE: CPT | Mod: MA

## 2023-10-23 PROCEDURE — 73090 X-RAY EXAM OF FOREARM: CPT

## 2023-10-23 PROCEDURE — 70450 CT HEAD/BRAIN W/O DYE: CPT | Mod: 26,MA

## 2023-10-23 PROCEDURE — 73090 X-RAY EXAM OF FOREARM: CPT | Mod: 26,LT

## 2023-10-23 PROCEDURE — 72125 CT NECK SPINE W/O DYE: CPT | Mod: 26,MA

## 2023-10-23 PROCEDURE — 72125 CT NECK SPINE W/O DYE: CPT | Mod: MA

## 2023-10-23 PROCEDURE — 90715 TDAP VACCINE 7 YRS/> IM: CPT

## 2023-10-23 PROCEDURE — 99284 EMERGENCY DEPT VISIT MOD MDM: CPT | Mod: 25

## 2023-10-23 RX ORDER — TETANUS TOXOID, REDUCED DIPHTHERIA TOXOID AND ACELLULAR PERTUSSIS VACCINE, ADSORBED 5; 2.5; 8; 8; 2.5 [IU]/.5ML; [IU]/.5ML; UG/.5ML; UG/.5ML; UG/.5ML
0.5 SUSPENSION INTRAMUSCULAR ONCE
Refills: 0 | Status: COMPLETED | OUTPATIENT
Start: 2023-10-23 | End: 2023-10-23

## 2023-10-23 RX ORDER — CEPHALEXIN 500 MG
500 CAPSULE ORAL ONCE
Refills: 0 | Status: COMPLETED | OUTPATIENT
Start: 2023-10-23 | End: 2023-10-23

## 2023-10-23 RX ORDER — ONDANSETRON 8 MG/1
4 TABLET, FILM COATED ORAL ONCE
Refills: 0 | Status: COMPLETED | OUTPATIENT
Start: 2023-10-23 | End: 2023-10-23

## 2023-10-23 RX ORDER — METOPROLOL TARTRATE 50 MG
25 TABLET ORAL ONCE
Refills: 0 | Status: COMPLETED | OUTPATIENT
Start: 2023-10-23 | End: 2023-10-23

## 2023-10-23 RX ORDER — CEPHALEXIN 500 MG
1 CAPSULE ORAL
Qty: 28 | Refills: 0
Start: 2023-10-23 | End: 2023-10-29

## 2023-10-23 RX ORDER — LOSARTAN POTASSIUM 100 MG/1
50 TABLET, FILM COATED ORAL ONCE
Refills: 0 | Status: COMPLETED | OUTPATIENT
Start: 2023-10-23 | End: 2023-10-23

## 2023-10-23 RX ORDER — ACETAMINOPHEN 500 MG
650 TABLET ORAL ONCE
Refills: 0 | Status: COMPLETED | OUTPATIENT
Start: 2023-10-23 | End: 2023-10-23

## 2023-10-23 RX ADMIN — Medication 650 MILLIGRAM(S): at 22:05

## 2023-10-23 RX ADMIN — ONDANSETRON 4 MILLIGRAM(S): 8 TABLET, FILM COATED ORAL at 22:30

## 2023-10-23 RX ADMIN — Medication 650 MILLIGRAM(S): at 22:45

## 2023-10-23 RX ADMIN — Medication 500 MILLIGRAM(S): at 22:04

## 2023-10-23 RX ADMIN — TETANUS TOXOID, REDUCED DIPHTHERIA TOXOID AND ACELLULAR PERTUSSIS VACCINE, ADSORBED 0.5 MILLILITER(S): 5; 2.5; 8; 8; 2.5 SUSPENSION INTRAMUSCULAR at 22:07

## 2023-10-23 RX ADMIN — Medication 25 MILLIGRAM(S): at 22:04

## 2023-10-23 RX ADMIN — LOSARTAN POTASSIUM 50 MILLIGRAM(S): 100 TABLET, FILM COATED ORAL at 22:05

## 2023-10-23 NOTE — ED PROVIDER NOTE - NSFOLLOWUPINSTRUCTIONS_ED_ALL_ED_FT
Sutures should be removed in 10-14 days. Return for any signs of infection (redness/swelling/discharge/fever). Antibiotics were sent to your pharmacy.        Head Injury, Adult    There are many types of head injuries. Head injuries can be as minor as a small bump, or they can be a serious medical issue. More severe head injuries include:  A jarring injury to the brain (concussion).  A bruise (contusion) of the brain. This means there is bleeding in the brain that can cause swelling.  A cracked skull (skull fracture).  Bleeding in the brain that collects, clots, and forms a bump (hematoma).  After a head injury, most problems occur within the first 24 hours, but side effects may occur up to 7–10 days after the injury. It is important to watch your condition for any changes. You may need to be observed in the emergency department or urgent care, or you may be admitted to the hospital.    What are the causes?  There are many possible causes of a head injury. Serious head injuries may be caused by car accidents, bicycle or motorcycle accidents, sports injuries, falls, or being struck by an object.    What are the symptoms?  Symptoms of a head injury include a contusion, bump, or bleeding at the site of the injury. Other physical symptoms may include:  Headache.  Nausea or vomiting.  Dizziness.  Blurred or double vision.  Being uncomfortable around bright lights or loud noises.  Seizures.  Feeling tired.  Trouble being awakened.  Loss of consciousness.  Mental or emotional symptoms may include:  Irritability.  Confusion and memory problems.  Poor attention and concentration.  Changes in eating or sleeping habits.  Anxiety or depression.  How is this diagnosed?  This condition can usually be diagnosed based on your symptoms, a description of the injury, and a physical exam. You may also have imaging tests done, such as a CT scan or an MRI.    How is this treated?  Treatment for this condition depends on the severity and type of injury you have. The main goal of treatment is to prevent complications and allow the brain time to heal.    Mild head injury    If you have a mild head injury, you may be sent home, and treatment may include:  Observation. A responsible adult should stay with you for 24 hours after your injury and check on you often.  Physical rest.  Brain rest.  Pain medicines.  Severe head injury    If you have a severe head injury, treatment may include:  Close observation. This includes hospitalization with the following care:  Frequent physical exams.  Frequent checks of how your brain and nervous system are working (neurological status).  Checking your blood pressure and oxygen levels.  Medicines to relieve pain, prevent seizures, and decrease brain swelling.  Airway protection and breathing support. This may include using a ventilator.  Treatments that monitor and manage swelling inside the brain.  Brain surgery. This may be needed to:  Remove a collection of blood or blood clots.  Stop the bleeding.  Remove a part of the skull to allow room for the brain to swell.  Follow these instructions at home:  Activity    Rest and avoid activities that are physically hard or tiring.  Make sure you get enough sleep.  Let your brain rest by limiting activities that require a lot of thought or attention, such as:  Watching TV.  Playing memory games and puzzles.  Job-related work or homework.  Working on the computer, using social media, and texting.  Avoid activities that could cause another head injury, such as playing sports, until your health care provider approves. Having another head injury, especially before the first one has healed, can be dangerous.  Ask your health care provider when it is safe for you to return to your regular activities, including work or school. Ask your health care provider for a step-by-step plan for gradually returning to activities.  Ask your health care provider when you can drive, ride a bicycle, or use heavy machinery. Your ability to react may be slower after a brain injury. Do not do these activities if you are dizzy.  Lifestyle      Do not drink alcohol until your health care provider approves. Do not use drugs. Alcohol and certain drugs may slow your recovery and can put you at risk of further injury.  If it is harder than usual to remember things, write them down.  If you are easily distracted, try to do one thing at a time.  Talk with family members or close friends when making important decisions.  Tell your friends, family, a trusted colleague, and  about your injury, symptoms, and restrictions. Have them watch for any new or worsening problems.  General instructions    Take over-the-counter and prescription medicines only as told by your health care provider.  Have someone stay with you for 24 hours after your head injury. This person should watch you for any changes in your symptoms and be ready to seek medical help.  Keep all follow-up visits as told by your health care provider. This is important.  How is this prevented?  Work on improving your balance and strength to avoid falls.  Wear a seat belt when you are in a moving vehicle.  Wear a helmet when riding a bicycle, skiing, or doing any other sport or activity that has a risk of injury.  If you drink alcohol:  Limit how much you use to:  0–1 drink a day for nonpregnant women.  0–2 drinks a day for men.  Be aware of how much alcohol is in your drink. In the [place] one drink equals one 12 oz bottle of beer (355 mL), one 5 oz glass of wine (148 mL), or one 1½ oz glass of hard liquor (44 mL).  Take safety measures in your home, such as:  Removing clutter and tripping hazards from floors and stairways.  Using grab bars in bathrooms and handrails by stairs.  Placing non-slip mats on floors and in bathtubs.  Improving lighting in dim areas.  Where to find more information  Centers for Disease Control and Prevention: www.cdc.gov  Get help right away if:  You have:  A severe headache that is not helped by medicine.  Trouble walking or weakness in your arms and legs.  Clear or bloody fluid coming from your nose or ears.  Changes in your vision.  A seizure.  Increased confusion or irritability.  Your symptoms get worse.  You are sleepier than normal and have trouble staying awake.  You lose your balance.  Your pupils change size.  Your speech is slurred.  Your dizziness gets worse.  You vomit.  These symptoms may represent a serious problem that is an emergency. Do not wait to see if the symptoms will go away. Get medical help right away. Call your local emergency services (911 in the [place] Do not drive yourself to the hospital.    Summary  Head injuries can be minor, or they can be a serious medical issue requiring immediate attention.  Treatment for this condition depends on the severity and type of injury you have.  Have someone stay with you for 24 hours after your injury and check on you often.  Ask your health care provider when it is safe for you to return to your regular activities, including work or school.  Head injury prevention includes wearing a seat belt in a motor vehicle, using a helmet on a bicycle, limiting alcohol use, and taking safety measures in your home.  This information is not intended to replace advice given to you by your health care provider. Make sure you discuss any questions you have with your health care provider.

## 2023-10-23 NOTE — ED ADULT TRIAGE NOTE - CHIEF COMPLAINT QUOTE
per ems from home with slip and fall while getting out of bed. pt has bump to back of head, no LOC, no blood thinners. pt has laceration to right forearm, wrapped by ems. did not take blood pressure medicine yet today

## 2023-10-23 NOTE — ED PROVIDER NOTE - PHYSICAL EXAMINATION
Constitutional: Awake, Alert, non-toxic. NAD. Well appearing, well nourished.   HEAD: Normocephalic, (+) parietal mild hematoma/TTP  EYES: PERRL, EOM intact, conjunctiva and sclera are clear bilaterally. No raccoon eyes.   ENT: No maher sign. No rhinorrhea, normal pharynx, patent, no tonsillar exudate or enlargement, mucous membranes pink/moist, no erythema, no drooling or stridor.   NECK: Supple, non-tender  BACK: No midline or paraspinal TTP of cervical/thoracic/lumbar spine, FROM. No ecchymosis or hematomas. (+) palpable scoliosis.   CARDIOVASCULAR: Normal S1, S2; regular rate and rhythm.  RESPIRATORY: Normal respiratory effort; breath sounds CTAB, no wheezes, rhonchi, or rales. Speaking in full sentences. No accessory muscle use.   ABDOMEN: Soft; non-tender, non-distended.  EXTREMITIES: Full passive and active ROM in all extremities; (+) right arm 10 cm forearm laceration/skin tear, hands and extremities non-tender to palpation; distal pulses palpable and symmetric, no edema, no crepitus or step off  SKIN: Warm, dry; good skin turgor, no apparent lesions or rashes, no ecchymosis, brisk capillary refill.  NEURO: A&O x3. Sensory and motor functions are grossly intact. Speech is normal. Appearance and judgement seem appropriate for gender and age. No neurological deficits. Neurovascular sensation intact motor function 5/5 of upper and lower extremities, CN II-XII grossly intact, no ataxia, absent pronator drift, intact cerebellar function. Speech clear, without articulation or word-finding difficulties. Eyes- PERRL bilaterally. EOMs in tact. No nystagmus. No facial droop.

## 2023-10-23 NOTE — ED ADULT NURSE NOTE - SKIN TEMPERATURE MOISTURE
MARCO ANTONIO Quevedo AND ANITRA warm

## 2023-10-23 NOTE — ED ADULT NURSE NOTE - NSFALLHARMRISKINTERV_ED_ALL_ED

## 2023-10-23 NOTE — ED PROVIDER NOTE - CLINICAL SUMMARY MEDICAL DECISION MAKING FREE TEXT BOX
89-year-old female presents to the emergency department by ambulance from home, aide and daughter at the bedside, states patient yesterday get out of bed to go to the bathroom and felt, patient states that she feels burning to her right forearm which there is a centimeter laceration, able to open and close her hand, will follow-up x-ray rule out fracture, update tetanus, start antibiotic, give Tylenol for pain control, patient states she hit the back of her head, did not pass out, patient is currently on baby aspirin, will follow-up CT head and CT cervical spine, noted patient's blood pressure to be elevated, patient states that she did not take her blood pressure medication, currently on losartan and metoprolol, will medicate now.

## 2023-10-23 NOTE — ED PROVIDER NOTE - PATIENT PORTAL LINK FT
You can access the FollowMyHealth Patient Portal offered by [facility] by registering at the following website: [email or web address]. By joining [facility]’s FollowMyHealth portal, you will also be able to view your health information using other applications (apps) compatible with our system.

## 2023-10-23 NOTE — ED PROVIDER NOTE - PROGRESS NOTE DETAILS
laceration repaired. All questions were answered. Discussed the importance of prompt, close medical follow-up. Patient will return with any changes, concerns or persistent/worsening symptoms.  Patient verbalized understanding.

## 2023-10-23 NOTE — ED PROVIDER NOTE - DATE/TIME 1
(congestive heart failure) ([address])        5. Well controlled type 2 diabetes mellitus ([address])   DIABETES FOOT EXAM    CBC with Auto Differential    Comprehensive Metabolic Panel    Lipid, Fasting    MICROALBUMIN / CREATININE URINE RATIO    POCT Urinalysis no Micro      6. Essential hypertension  Uric Acid    TSH without Reflex      7. Chronic respiratory failure with hypoxia (HCC)        8. Morbidly obese ([address])        9. COPD, mild ([address])        10. Vitamin D deficiency  Vitamin D 25 Hydroxy             Plan:       COPD with chronic hypoxic respiratory failure   f/w DrMohit Lombardo  . Off advair and now on brezetri and  Albuterol prn  Remains off smoking for 3 yrs , on o2 at night 2 L   Chronic dyspnea remains with exertion       DM- 2, A1c  At 6.3- on metformin 500mg daily,  Already on ACEI  need  eye exam this year  Already on ACEI and will consider statins    Anxiety and severe  depression - has used prozac and klonopin in the past   Weaned off klonopin 2015  - now on cymbalta 30 mg daily -better controlled  - no suicidal thoughts  - doing better since last 2 yrs  since her    returned from [facility]    HTN - remains high, pt not taking cardizem , unsure why might not have refills   Not compliant with meds  Continue   ACEI, cardizem, metoprolol and daily demadex  Need home monitoring     Aortic regurgitation - no acute symptoms. Chronic diastolic CHF   Well controlled and improved Edema with demadex 20 mg daily   Pt unfortunately taking lasix and demadex, meds corrected  Lost about 20 lbs since last time, no signs of dehydration  Check BMP  Elevate legs    F/w Dr. Arnulfo Gonsalez abuse  - has quit smoking 3 yrs ago   pulmonary nodules- need f/w had ct done last week     Chronic back pain - previously seen Dr. Marcelo Bolden  Not seeing him any more    on zanaflex and lyrica.     Osteoporosis - on  prolia      Had pna, covid flu vaccines  Need prevnar  Need shingrix  Need colonoscopy, mammo- pt not interested  
23-Oct-2023 22:43
[id number] [place] [id number]

## 2023-10-23 NOTE — ED PROVIDER NOTE - OBJECTIVE STATEMENT
89-year-old female with past medical history of hypertension and hyperlipidemia presents today due to a fall out of bed.  Patient reports that she sustained a right forearm laceration along with a head injury.  Patient admits to taking aspirin.  Patient reports mild headache.  Patient is not up-to-date with tetanus.  Patient denies numbness, weakness, LOC, vomiting, hip pain, back pain, chest pain, shortness of breath, or any other complaints.

## 2023-10-23 NOTE — ED ADULT NURSE REASSESSMENT NOTE - NS ED NURSE REASSESS COMMENT FT1
forearm laceration sutured by ANASTASIA Macdonald.  medications removed from pyxis.  unable to medicate pt at this time, pt off unit in CT scan.  will medicate upon return to unit.  family at bedside notified.

## 2023-10-23 NOTE — ED PROVIDER NOTE - CARE PLAN
1 Principal Discharge DX:	Laceration of right forearm  Secondary Diagnosis:	Fall at home  Secondary Diagnosis:	Head trauma

## 2023-10-24 PROBLEM — K86.89 OTHER SPECIFIED DISEASES OF PANCREAS: Chronic | Status: ACTIVE | Noted: 2022-09-23

## 2023-11-06 ENCOUNTER — INPATIENT (INPATIENT)
Facility: HOSPITAL | Age: 88
LOS: 2 days | Discharge: HOME | DRG: 189 | End: 2023-11-09
Attending: INTERNAL MEDICINE | Admitting: STUDENT IN AN ORGANIZED HEALTH CARE EDUCATION/TRAINING PROGRAM
Payer: MEDICARE

## 2023-11-06 VITALS
DIASTOLIC BLOOD PRESSURE: 90 MMHG | OXYGEN SATURATION: 95 % | TEMPERATURE: 97 F | RESPIRATION RATE: 18 BRPM | SYSTOLIC BLOOD PRESSURE: 172 MMHG | WEIGHT: 100.09 LBS | HEIGHT: 60 IN | HEART RATE: 78 BPM

## 2023-11-06 DIAGNOSIS — Z98.89 OTHER SPECIFIED POSTPROCEDURAL STATES: Chronic | ICD-10-CM

## 2023-11-06 DIAGNOSIS — I50.9 HEART FAILURE, UNSPECIFIED: ICD-10-CM

## 2023-11-06 DIAGNOSIS — R09.02 HYPOXEMIA: ICD-10-CM

## 2023-11-06 DIAGNOSIS — E11.9 TYPE 2 DIABETES MELLITUS WITHOUT COMPLICATIONS: ICD-10-CM

## 2023-11-06 DIAGNOSIS — K27.9 PEPTIC ULCER, SITE UNSPECIFIED, UNSPECIFIED AS ACUTE OR CHRONIC, WITHOUT HEMORRHAGE OR PERFORATION: ICD-10-CM

## 2023-11-06 DIAGNOSIS — Z29.9 ENCOUNTER FOR PROPHYLACTIC MEASURES, UNSPECIFIED: ICD-10-CM

## 2023-11-06 DIAGNOSIS — R30.0 DYSURIA: ICD-10-CM

## 2023-11-06 DIAGNOSIS — J96.01 ACUTE RESPIRATORY FAILURE WITH HYPOXIA: ICD-10-CM

## 2023-11-06 DIAGNOSIS — R93.89 ABNORMAL FINDINGS ON DIAGNOSTIC IMAGING OF OTHER SPECIFIED BODY STRUCTURES: ICD-10-CM

## 2023-11-06 DIAGNOSIS — T86.8481 OTHER COMPLICATIONS OF CORNEAL TRANSPLANT, RIGHT EYE: ICD-10-CM

## 2023-11-06 DIAGNOSIS — E78.5 HYPERLIPIDEMIA, UNSPECIFIED: ICD-10-CM

## 2023-11-06 DIAGNOSIS — I48.91 UNSPECIFIED ATRIAL FIBRILLATION: ICD-10-CM

## 2023-11-06 DIAGNOSIS — I73.9 PERIPHERAL VASCULAR DISEASE, UNSPECIFIED: ICD-10-CM

## 2023-11-06 DIAGNOSIS — Z96.60 PRESENCE OF UNSPECIFIED ORTHOPEDIC JOINT IMPLANT: Chronic | ICD-10-CM

## 2023-11-06 DIAGNOSIS — I10 ESSENTIAL (PRIMARY) HYPERTENSION: ICD-10-CM

## 2023-11-06 LAB
ALBUMIN SERPL ELPH-MCNC: 3.3 G/DL — SIGNIFICANT CHANGE UP (ref 3.3–5)
ALBUMIN SERPL ELPH-MCNC: 3.3 G/DL — SIGNIFICANT CHANGE UP (ref 3.3–5)
ALP SERPL-CCNC: 104 U/L — SIGNIFICANT CHANGE UP (ref 40–120)
ALP SERPL-CCNC: 104 U/L — SIGNIFICANT CHANGE UP (ref 40–120)
ALT FLD-CCNC: 36 U/L — SIGNIFICANT CHANGE UP (ref 12–78)
ALT FLD-CCNC: 36 U/L — SIGNIFICANT CHANGE UP (ref 12–78)
ANION GAP SERPL CALC-SCNC: 6 MMOL/L — SIGNIFICANT CHANGE UP (ref 5–17)
ANION GAP SERPL CALC-SCNC: 6 MMOL/L — SIGNIFICANT CHANGE UP (ref 5–17)
APTT BLD: 28.1 SEC — SIGNIFICANT CHANGE UP (ref 24.5–35.6)
APTT BLD: 28.1 SEC — SIGNIFICANT CHANGE UP (ref 24.5–35.6)
AST SERPL-CCNC: 23 U/L — SIGNIFICANT CHANGE UP (ref 15–37)
AST SERPL-CCNC: 23 U/L — SIGNIFICANT CHANGE UP (ref 15–37)
BASOPHILS # BLD AUTO: 0.04 K/UL — SIGNIFICANT CHANGE UP (ref 0–0.2)
BASOPHILS # BLD AUTO: 0.04 K/UL — SIGNIFICANT CHANGE UP (ref 0–0.2)
BASOPHILS NFR BLD AUTO: 0.5 % — SIGNIFICANT CHANGE UP (ref 0–2)
BASOPHILS NFR BLD AUTO: 0.5 % — SIGNIFICANT CHANGE UP (ref 0–2)
BILIRUB SERPL-MCNC: 1.2 MG/DL — SIGNIFICANT CHANGE UP (ref 0.2–1.2)
BILIRUB SERPL-MCNC: 1.2 MG/DL — SIGNIFICANT CHANGE UP (ref 0.2–1.2)
BUN SERPL-MCNC: 31 MG/DL — HIGH (ref 7–23)
BUN SERPL-MCNC: 31 MG/DL — HIGH (ref 7–23)
CALCIUM SERPL-MCNC: 9 MG/DL — SIGNIFICANT CHANGE UP (ref 8.5–10.1)
CALCIUM SERPL-MCNC: 9 MG/DL — SIGNIFICANT CHANGE UP (ref 8.5–10.1)
CHLORIDE SERPL-SCNC: 99 MMOL/L — SIGNIFICANT CHANGE UP (ref 96–108)
CHLORIDE SERPL-SCNC: 99 MMOL/L — SIGNIFICANT CHANGE UP (ref 96–108)
CO2 SERPL-SCNC: 33 MMOL/L — HIGH (ref 22–31)
CO2 SERPL-SCNC: 33 MMOL/L — HIGH (ref 22–31)
CREAT SERPL-MCNC: 0.75 MG/DL — SIGNIFICANT CHANGE UP (ref 0.5–1.3)
CREAT SERPL-MCNC: 0.75 MG/DL — SIGNIFICANT CHANGE UP (ref 0.5–1.3)
EGFR: 73 ML/MIN/1.73M2 — SIGNIFICANT CHANGE UP
EGFR: 73 ML/MIN/1.73M2 — SIGNIFICANT CHANGE UP
EOSINOPHIL # BLD AUTO: 0.04 K/UL — SIGNIFICANT CHANGE UP (ref 0–0.5)
EOSINOPHIL # BLD AUTO: 0.04 K/UL — SIGNIFICANT CHANGE UP (ref 0–0.5)
EOSINOPHIL NFR BLD AUTO: 0.5 % — SIGNIFICANT CHANGE UP (ref 0–6)
EOSINOPHIL NFR BLD AUTO: 0.5 % — SIGNIFICANT CHANGE UP (ref 0–6)
GLUCOSE SERPL-MCNC: 177 MG/DL — HIGH (ref 70–99)
GLUCOSE SERPL-MCNC: 177 MG/DL — HIGH (ref 70–99)
HCT VFR BLD CALC: 45.4 % — HIGH (ref 34.5–45)
HCT VFR BLD CALC: 45.4 % — HIGH (ref 34.5–45)
HGB BLD-MCNC: 14.7 G/DL — SIGNIFICANT CHANGE UP (ref 11.5–15.5)
HGB BLD-MCNC: 14.7 G/DL — SIGNIFICANT CHANGE UP (ref 11.5–15.5)
IMM GRANULOCYTES NFR BLD AUTO: 0.4 % — SIGNIFICANT CHANGE UP (ref 0–0.9)
IMM GRANULOCYTES NFR BLD AUTO: 0.4 % — SIGNIFICANT CHANGE UP (ref 0–0.9)
INR BLD: 0.98 RATIO — SIGNIFICANT CHANGE UP (ref 0.85–1.18)
INR BLD: 0.98 RATIO — SIGNIFICANT CHANGE UP (ref 0.85–1.18)
LYMPHOCYTES # BLD AUTO: 1.77 K/UL — SIGNIFICANT CHANGE UP (ref 1–3.3)
LYMPHOCYTES # BLD AUTO: 1.77 K/UL — SIGNIFICANT CHANGE UP (ref 1–3.3)
LYMPHOCYTES # BLD AUTO: 22.2 % — SIGNIFICANT CHANGE UP (ref 13–44)
LYMPHOCYTES # BLD AUTO: 22.2 % — SIGNIFICANT CHANGE UP (ref 13–44)
MAGNESIUM SERPL-MCNC: 2.4 MG/DL — SIGNIFICANT CHANGE UP (ref 1.6–2.6)
MAGNESIUM SERPL-MCNC: 2.4 MG/DL — SIGNIFICANT CHANGE UP (ref 1.6–2.6)
MCHC RBC-ENTMCNC: 29.3 PG — SIGNIFICANT CHANGE UP (ref 27–34)
MCHC RBC-ENTMCNC: 29.3 PG — SIGNIFICANT CHANGE UP (ref 27–34)
MCHC RBC-ENTMCNC: 32.4 GM/DL — SIGNIFICANT CHANGE UP (ref 32–36)
MCHC RBC-ENTMCNC: 32.4 GM/DL — SIGNIFICANT CHANGE UP (ref 32–36)
MCV RBC AUTO: 90.6 FL — SIGNIFICANT CHANGE UP (ref 80–100)
MCV RBC AUTO: 90.6 FL — SIGNIFICANT CHANGE UP (ref 80–100)
MONOCYTES # BLD AUTO: 0.67 K/UL — SIGNIFICANT CHANGE UP (ref 0–0.9)
MONOCYTES # BLD AUTO: 0.67 K/UL — SIGNIFICANT CHANGE UP (ref 0–0.9)
MONOCYTES NFR BLD AUTO: 8.4 % — SIGNIFICANT CHANGE UP (ref 2–14)
MONOCYTES NFR BLD AUTO: 8.4 % — SIGNIFICANT CHANGE UP (ref 2–14)
NEUTROPHILS # BLD AUTO: 5.44 K/UL — SIGNIFICANT CHANGE UP (ref 1.8–7.4)
NEUTROPHILS # BLD AUTO: 5.44 K/UL — SIGNIFICANT CHANGE UP (ref 1.8–7.4)
NEUTROPHILS NFR BLD AUTO: 68 % — SIGNIFICANT CHANGE UP (ref 43–77)
NEUTROPHILS NFR BLD AUTO: 68 % — SIGNIFICANT CHANGE UP (ref 43–77)
NRBC # BLD: 0 /100 WBCS — SIGNIFICANT CHANGE UP (ref 0–0)
NRBC # BLD: 0 /100 WBCS — SIGNIFICANT CHANGE UP (ref 0–0)
NT-PROBNP SERPL-SCNC: 993 PG/ML — HIGH (ref 0–450)
NT-PROBNP SERPL-SCNC: 993 PG/ML — HIGH (ref 0–450)
PLATELET # BLD AUTO: 180 K/UL — SIGNIFICANT CHANGE UP (ref 150–400)
PLATELET # BLD AUTO: 180 K/UL — SIGNIFICANT CHANGE UP (ref 150–400)
POTASSIUM SERPL-MCNC: 4.7 MMOL/L — SIGNIFICANT CHANGE UP (ref 3.5–5.3)
POTASSIUM SERPL-MCNC: 4.7 MMOL/L — SIGNIFICANT CHANGE UP (ref 3.5–5.3)
POTASSIUM SERPL-SCNC: 4.7 MMOL/L — SIGNIFICANT CHANGE UP (ref 3.5–5.3)
POTASSIUM SERPL-SCNC: 4.7 MMOL/L — SIGNIFICANT CHANGE UP (ref 3.5–5.3)
PROT SERPL-MCNC: 6.3 G/DL — SIGNIFICANT CHANGE UP (ref 6–8.3)
PROT SERPL-MCNC: 6.3 G/DL — SIGNIFICANT CHANGE UP (ref 6–8.3)
PROTHROM AB SERPL-ACNC: 11.5 SEC — SIGNIFICANT CHANGE UP (ref 9.5–13)
PROTHROM AB SERPL-ACNC: 11.5 SEC — SIGNIFICANT CHANGE UP (ref 9.5–13)
RAPID RVP RESULT: DETECTED
RAPID RVP RESULT: DETECTED
RBC # BLD: 5.01 M/UL — SIGNIFICANT CHANGE UP (ref 3.8–5.2)
RBC # BLD: 5.01 M/UL — SIGNIFICANT CHANGE UP (ref 3.8–5.2)
RBC # FLD: 12.8 % — SIGNIFICANT CHANGE UP (ref 10.3–14.5)
RBC # FLD: 12.8 % — SIGNIFICANT CHANGE UP (ref 10.3–14.5)
RV+EV RNA SPEC QL NAA+PROBE: DETECTED
RV+EV RNA SPEC QL NAA+PROBE: DETECTED
SARS-COV-2 RNA SPEC QL NAA+PROBE: SIGNIFICANT CHANGE UP
SARS-COV-2 RNA SPEC QL NAA+PROBE: SIGNIFICANT CHANGE UP
SODIUM SERPL-SCNC: 138 MMOL/L — SIGNIFICANT CHANGE UP (ref 135–145)
SODIUM SERPL-SCNC: 138 MMOL/L — SIGNIFICANT CHANGE UP (ref 135–145)
TROPONIN I, HIGH SENSITIVITY RESULT: 22.3 NG/L — SIGNIFICANT CHANGE UP
TROPONIN I, HIGH SENSITIVITY RESULT: 22.3 NG/L — SIGNIFICANT CHANGE UP
WBC # BLD: 7.99 K/UL — SIGNIFICANT CHANGE UP (ref 3.8–10.5)
WBC # BLD: 7.99 K/UL — SIGNIFICANT CHANGE UP (ref 3.8–10.5)
WBC # FLD AUTO: 7.99 K/UL — SIGNIFICANT CHANGE UP (ref 3.8–10.5)
WBC # FLD AUTO: 7.99 K/UL — SIGNIFICANT CHANGE UP (ref 3.8–10.5)

## 2023-11-06 PROCEDURE — 99223 1ST HOSP IP/OBS HIGH 75: CPT | Mod: GC

## 2023-11-06 PROCEDURE — 99497 ADVNCD CARE PLAN 30 MIN: CPT | Mod: GC,25

## 2023-11-06 PROCEDURE — 99285 EMERGENCY DEPT VISIT HI MDM: CPT | Mod: FS

## 2023-11-06 PROCEDURE — 71275 CT ANGIOGRAPHY CHEST: CPT | Mod: 26,MA

## 2023-11-06 PROCEDURE — 71045 X-RAY EXAM CHEST 1 VIEW: CPT | Mod: 26

## 2023-11-06 PROCEDURE — 93010 ELECTROCARDIOGRAM REPORT: CPT

## 2023-11-06 RX ORDER — LANOLIN ALCOHOL/MO/W.PET/CERES
3 CREAM (GRAM) TOPICAL AT BEDTIME
Refills: 0 | Status: DISCONTINUED | OUTPATIENT
Start: 2023-11-06 | End: 2023-11-09

## 2023-11-06 RX ORDER — ALUMINUM ZIRCONIUM TRICHLOROHYDREX GLY 0.2 G/G
1 STICK TOPICAL
Refills: 0 | DISCHARGE

## 2023-11-06 RX ORDER — ASPIRIN/CALCIUM CARB/MAGNESIUM 324 MG
81 TABLET ORAL DAILY
Refills: 0 | Status: DISCONTINUED | OUTPATIENT
Start: 2023-11-06 | End: 2023-11-08

## 2023-11-06 RX ORDER — ONDANSETRON 8 MG/1
4 TABLET, FILM COATED ORAL EVERY 8 HOURS
Refills: 0 | Status: DISCONTINUED | OUTPATIENT
Start: 2023-11-06 | End: 2023-11-09

## 2023-11-06 RX ORDER — HEPARIN SODIUM 5000 [USP'U]/ML
3500 INJECTION INTRAVENOUS; SUBCUTANEOUS EVERY 6 HOURS
Refills: 0 | Status: DISCONTINUED | OUTPATIENT
Start: 2023-11-06 | End: 2023-11-07

## 2023-11-06 RX ORDER — MULTIVIT-MIN/FERROUS GLUCONATE 9 MG/15 ML
1 LIQUID (ML) ORAL
Qty: 0 | Refills: 0 | DISCHARGE

## 2023-11-06 RX ORDER — POLYETHYLENE GLYCOL 3350 17 G/17G
17 POWDER, FOR SOLUTION ORAL DAILY
Refills: 0 | Status: DISCONTINUED | OUTPATIENT
Start: 2023-11-06 | End: 2023-11-08

## 2023-11-06 RX ORDER — INSULIN LISPRO 100/ML
VIAL (ML) SUBCUTANEOUS
Refills: 0 | Status: DISCONTINUED | OUTPATIENT
Start: 2023-11-06 | End: 2023-11-08

## 2023-11-06 RX ORDER — FAMOTIDINE 10 MG/ML
20 INJECTION INTRAVENOUS DAILY
Refills: 0 | Status: DISCONTINUED | OUTPATIENT
Start: 2023-11-06 | End: 2023-11-09

## 2023-11-06 RX ORDER — DEXTROSE 50 % IN WATER 50 %
12.5 SYRINGE (ML) INTRAVENOUS ONCE
Refills: 0 | Status: DISCONTINUED | OUTPATIENT
Start: 2023-11-06 | End: 2023-11-09

## 2023-11-06 RX ORDER — PREDNISOLONE SODIUM PHOSPHATE 1 %
1 DROPS OPHTHALMIC (EYE)
Qty: 0 | Refills: 0 | DISCHARGE

## 2023-11-06 RX ORDER — CHOLECALCIFEROL (VITAMIN D3) 125 MCG
1 CAPSULE ORAL
Qty: 0 | Refills: 0 | DISCHARGE

## 2023-11-06 RX ORDER — ACETYLCYSTEINE 200 MG/ML
4 VIAL (ML) MISCELLANEOUS
Refills: 0 | Status: DISCONTINUED | OUTPATIENT
Start: 2023-11-06 | End: 2023-11-07

## 2023-11-06 RX ORDER — INSULIN LISPRO 100/ML
VIAL (ML) SUBCUTANEOUS AT BEDTIME
Refills: 0 | Status: DISCONTINUED | OUTPATIENT
Start: 2023-11-06 | End: 2023-11-08

## 2023-11-06 RX ORDER — DEXTROSE 50 % IN WATER 50 %
25 SYRINGE (ML) INTRAVENOUS ONCE
Refills: 0 | Status: DISCONTINUED | OUTPATIENT
Start: 2023-11-06 | End: 2023-11-09

## 2023-11-06 RX ORDER — DEXTROSE 50 % IN WATER 50 %
15 SYRINGE (ML) INTRAVENOUS ONCE
Refills: 0 | Status: DISCONTINUED | OUTPATIENT
Start: 2023-11-06 | End: 2023-11-09

## 2023-11-06 RX ORDER — ATORVASTATIN CALCIUM 80 MG/1
20 TABLET, FILM COATED ORAL AT BEDTIME
Refills: 0 | Status: DISCONTINUED | OUTPATIENT
Start: 2023-11-06 | End: 2023-11-09

## 2023-11-06 RX ORDER — SODIUM CHLORIDE 9 MG/ML
1000 INJECTION, SOLUTION INTRAVENOUS
Refills: 0 | Status: DISCONTINUED | OUTPATIENT
Start: 2023-11-06 | End: 2023-11-09

## 2023-11-06 RX ORDER — HEPARIN SODIUM 5000 [USP'U]/ML
3500 INJECTION INTRAVENOUS; SUBCUTANEOUS ONCE
Refills: 0 | Status: COMPLETED | OUTPATIENT
Start: 2023-11-06 | End: 2023-11-07

## 2023-11-06 RX ORDER — POLYETHYLENE GLYCOL 3350 17 G/17G
17 POWDER, FOR SOLUTION ORAL
Refills: 0 | DISCHARGE

## 2023-11-06 RX ORDER — METHYLCELLULOSE 500 MG
2 TABLET ORAL
Refills: 0 | DISCHARGE

## 2023-11-06 RX ORDER — IPRATROPIUM/ALBUTEROL SULFATE 18-103MCG
3 AEROSOL WITH ADAPTER (GRAM) INHALATION ONCE
Refills: 0 | Status: COMPLETED | OUTPATIENT
Start: 2023-11-06 | End: 2023-11-07

## 2023-11-06 RX ORDER — FUROSEMIDE 40 MG
40 TABLET ORAL DAILY
Refills: 0 | Status: DISCONTINUED | OUTPATIENT
Start: 2023-11-06 | End: 2023-11-07

## 2023-11-06 RX ORDER — METOPROLOL TARTRATE 50 MG
25 TABLET ORAL DAILY
Refills: 0 | Status: DISCONTINUED | OUTPATIENT
Start: 2023-11-06 | End: 2023-11-09

## 2023-11-06 RX ORDER — CHOLECALCIFEROL (VITAMIN D3) 125 MCG
2000 CAPSULE ORAL DAILY
Refills: 0 | Status: DISCONTINUED | OUTPATIENT
Start: 2023-11-06 | End: 2023-11-09

## 2023-11-06 RX ORDER — ACETAMINOPHEN 500 MG
650 TABLET ORAL EVERY 6 HOURS
Refills: 0 | Status: DISCONTINUED | OUTPATIENT
Start: 2023-11-06 | End: 2023-11-09

## 2023-11-06 RX ORDER — LOSARTAN POTASSIUM 100 MG/1
50 TABLET, FILM COATED ORAL DAILY
Refills: 0 | Status: DISCONTINUED | OUTPATIENT
Start: 2023-11-06 | End: 2023-11-09

## 2023-11-06 RX ORDER — HEPARIN SODIUM 5000 [USP'U]/ML
INJECTION INTRAVENOUS; SUBCUTANEOUS
Qty: 25000 | Refills: 0 | Status: DISCONTINUED | OUTPATIENT
Start: 2023-11-06 | End: 2023-11-07

## 2023-11-06 RX ORDER — MULTIVIT-MIN/FERROUS GLUCONATE 9 MG/15 ML
1 LIQUID (ML) ORAL DAILY
Refills: 0 | Status: DISCONTINUED | OUTPATIENT
Start: 2023-11-06 | End: 2023-11-09

## 2023-11-06 RX ORDER — TOBRAMYCIN AND DEXAMETHASONE 1; 3 MG/ML; MG/ML
2 SUSPENSION/ DROPS OPHTHALMIC
Refills: 0 | DISCHARGE

## 2023-11-06 RX ORDER — GLUCAGON INJECTION, SOLUTION 0.5 MG/.1ML
1 INJECTION, SOLUTION SUBCUTANEOUS ONCE
Refills: 0 | Status: DISCONTINUED | OUTPATIENT
Start: 2023-11-06 | End: 2023-11-09

## 2023-11-06 RX ORDER — HEPARIN SODIUM 5000 [USP'U]/ML
1500 INJECTION INTRAVENOUS; SUBCUTANEOUS EVERY 6 HOURS
Refills: 0 | Status: DISCONTINUED | OUTPATIENT
Start: 2023-11-06 | End: 2023-11-07

## 2023-11-06 RX ADMIN — Medication 81 MILLIGRAM(S): at 23:55

## 2023-11-06 RX ADMIN — POLYETHYLENE GLYCOL 3350 17 GRAM(S): 17 POWDER, FOR SOLUTION ORAL at 23:55

## 2023-11-06 RX ADMIN — ATORVASTATIN CALCIUM 20 MILLIGRAM(S): 80 TABLET, FILM COATED ORAL at 23:55

## 2023-11-06 RX ADMIN — Medication 2000 UNIT(S): at 23:55

## 2023-11-06 RX ADMIN — FAMOTIDINE 20 MILLIGRAM(S): 10 INJECTION INTRAVENOUS at 23:55

## 2023-11-06 RX ADMIN — Medication 4 MILLILITER(S): at 23:57

## 2023-11-06 NOTE — H&P ADULT - CONVERSATION DETAILS
Met with the patient at bedside for Goals of Care conversation. Reviewed the patient's medical and social history as well as the events leading up to patient's hospitalization. Discussed the patient's current diagnoses, medical condition, current treatment and prognosis. Inquired about the patient's wishes regarding extent of medical care, including escalation to ICU level of care, use of vasopressor support, cardiopulmonary resuscitation, artificial nutrition, antibiotics and further diagnostic studies such as blood draws and radiology. At this time, patient would like to be DNR/DNI. Patient showed good insight into her medical condition. All questions answered. Will continue to remain available for further conversations and support. Met with the patient at bedside for Goals of Care conversation. Reviewed the patient's medical and social history as well as the events leading up to patient's hospitalization. Discussed the patient's current diagnoses, medical condition, current treatment and prognosis. Inquired about the patient's wishes regarding extent of medical care, including escalation to ICU level of care, use of vasopressor support, cardiopulmonary resuscitation, artificial nutrition, antibiotics and further diagnostic studies such as blood draws and radiology. At this time, patient would like to be DNR/DNI. Patient showed good insight into her medical condition. All questions answered. Will continue to remain available for further conversations and support. MANDYST completed and placed in chart       time spent 20 minutes

## 2023-11-06 NOTE — H&P ADULT - NSHPPHYSICALEXAM_GEN_ALL_CORE
T(C): 36.1 (11-06-23 @ 14:33), Max: 36.1 (11-06-23 @ 14:33)  HR: 93 (11-06-23 @ 19:37) (78 - 93)  BP: 126/81 (11-06-23 @ 19:37) (126/81 - 172/90)  RR: 18 (11-06-23 @ 19:37) (18 - 18)  SpO2: 99% (11-06-23 @ 19:37) (95% - 99%)    GENERAL: patient is a pleasant, frail elderly woman who appears well, in no acute distress, appropriately interactive   EYES: sclera clear, no exudates  ENMT:  moist mucous membranes  NECK: supple, soft  LUNGS: coarse breath sounds on the right, decreased breath sounds bilaterally, productive cough   HEART: Afib appreciated, no murmurs noted, +1 nonpitting bilateral lower extremity edema  GASTROINTESTINAL: abdomen is soft, mild suprapubic tenderness, nondistended, normoactive bowel sounds  INTEGUMENT: bilateral lower extremity chronic skin changes and ecchymosis   MUSCULOSKELETAL: no clubbing or cyanosis, no obvious deformity  NEUROLOGIC: awake, alert, oriented x3, good muscle tone in all 4 extremities T(C): 36.1 (11-06-23 @ 14:33), Max: 36.1 (11-06-23 @ 14:33)  HR: 93 (11-06-23 @ 19:37) (78 - 93)  BP: 126/81 (11-06-23 @ 19:37) (126/81 - 172/90)  RR: 18 (11-06-23 @ 19:37) (18 - 18)  SpO2: 99% (11-06-23 @ 19:37) (95% - 99%)  GENERAL: patient is a pleasant, frail elderly woman who appears well, in no acute distress, appropriately interactive   EYES: sclera clear, no exudates  ENMT:  moist mucous membranes  NECK: supple, soft  LUNGS: coarse breath sounds on the right, decreased breath sounds bilaterally, productive cough   HEART: Afib appreciated, no murmurs noted, +1 nonpitting bilateral lower extremity edema  GASTROINTESTINAL: abdomen is soft, mild suprapubic tenderness, nondistended, normoactive bowel sounds  INTEGUMENT: bilateral lower extremity chronic skin changes and ecchymosis   MUSCULOSKELETAL: no clubbing or cyanosis, no obvious deformity  NEUROLOGIC: awake, alert, oriented x3, good muscle tone in all 4 extremities

## 2023-11-06 NOTE — ED PROVIDER NOTE - PROGRESS NOTE DETAILS
On reexam patient was found to be hypoxic at 92% room air.  CT shows equivocal findings.  Will admit for hypoxia/pulmonology evaluation and echo to assess if blood clot in the atrial chamber.  Discussed finding with Dr. Andres from internal medicine.

## 2023-11-06 NOTE — H&P ADULT - HISTORY OF PRESENT ILLNESS
89 yo F with PMHx of HTN, HLD, Glaucoma, PVD, and Afib presents to the ED with 3 weeks of persistent cough and cold symptoms.    Denies fever, chills, chest pain, palpitations, SOB, cough, abdominal pain, nausea, vomiting, diarrhea, constipation, urinary frequency, urgency, or dysuria, headaches, changes in vision, dizziness, numbness, tingling.  Denies recent travel, recent antibiotic use, or sick contacts.    ED Course:   Vitals: BP: 172/90, HR: 78, Temp: 97, RR: 18, SpO2: 95% on 4 L NC   Labs: CO2 33, BUN 31, Serum Glucose 177, ProBNP 993, Entero/Rhino+   CXR: No change heart mediastinum. No consolidation pneumothorax or effusion. Old bilateral rib fractures. Degenerative change bilateral shoulders   CT Chest and Angio: No pulmonary arterial emboli. Non-opacification of the upper aspect of the left atrial appendage. Differential diagnosis include slow blood flow/mixing artifact versus thrombus. Contrast-enhanced chest CT with delayed imaging can be performed for further evaluation as clinically warranted.  CT Head and Cervical Spine: No acute intracranial hemorrhage or mass effect. No CT evidence of cervical spine fracture.  EK bpm, Atrial fibrillation Moderate voltage criteria for LVH, ST & T wave abnormality, consider lateral ischemia  Received in the ED: No interventions provided    89 yo F with PMHx of HTN, HLD, T2DM, R Corneal Transplant, PVD, PUD, and Afib not on home AC presents to the ED with 3 weeks of persistent cough and cold symptoms. Pt states that 3 weeks ago she saw her Vascular doctor to address worsening lower extremity edema and advised leg elevation. Pt elevated her legs at night and saw reduction in swelling, but developed a persistent cough since. Pt saw her PCP 2 weeks ago who prescribed a course of Zithromax without improvement of symptoms. Pt seen in [facility] ED on 10/23 following a fall and was also found to be mildly volume overloaded and started a short course of Lasix with improvement of swelling. Pt endorses productive cough, increased sputum production, intermittent palpitations, numbness and tingling in bilateral feet 2/2 PVD, and fatigue. Recently, pt has endorses suprapubic discomfort and dysuria. No other complaints at this time.     ED Course:   Vitals: BP: 172/90, HR: 78, Temp: 97, RR: 18, SpO2: 95% on 4 L NC   Labs: CO2 33, BUN 31, Serum Glucose 177, ProBNP 993, Entero/Rhino+   CXR: No change heart mediastinum. No consolidation pneumothorax or effusion. Old bilateral rib fractures. Degenerative change bilateral shoulders   CT Chest and Angio: No pulmonary arterial emboli. Non-opacification of the upper aspect of the left atrial appendage. Differential diagnosis include slow blood flow/mixing artifact versus thrombus. Contrast-enhanced chest CT with delayed imaging can be performed for further evaluation as clinically warranted.  CT Head and Cervical Spine: No acute intracranial hemorrhage or mass effect. No CT evidence of cervical spine fracture.  EK bpm, Atrial fibrillation Moderate voltage criteria for LVH, ST & T wave abnormality, consider lateral ischemia  Received in the ED: No interventions provided    89 yo F with PMHx of HTN, HLD, T2DM, R Corneal Transplant, PVD, PUD, and Afib not on home AC presents to the ED with 3 weeks of persistent cough and cold symptoms. Pt states that 3 weeks ago she saw her Vascular doctor to address worsening lower extremity edema and advised leg elevation. Pt elevated her legs at night and saw reduction in swelling, but developed a persistent cough since. Pt saw her PCP 2 weeks ago who prescribed a course of Zithromax without improvement of symptoms. Pt seen in [facility] ED on 10/23 following a fall and was also found to be mildly volume overloaded and started a short course of Lasix with improvement of swelling. Pt endorses productive cough, increased sputum production, intermittent palpitations, numbness and tingling in bilateral feet 2/2 PVD, and fatigue. Recently, pt has endorses suprapubic discomfort and dysuria. No other complaints at this time.   ED Course:   Vitals: BP: 172/90, HR: 78, Temp: 97, RR: 18, SpO2: 95% on 4 L NC   Labs: CO2 33, BUN 31, Serum Glucose 177, ProBNP 993, Entero/Rhino+   CXR: No change heart mediastinum. No consolidation pneumothorax or effusion. Old bilateral rib fractures. Degenerative change bilateral shoulders   CT Chest and Angio: No pulmonary arterial emboli. Non-opacification of the upper aspect of the left atrial appendage. Differential diagnosis include slow blood flow/mixing artifact versus thrombus. Contrast-enhanced chest CT with delayed imaging can be performed for further evaluation as clinically warranted.  CT Head and Cervical Spine: No acute intracranial hemorrhage or mass effect. No CT evidence of cervical spine fracture.  EK bpm, Atrial fibrillation Moderate voltage criteria for LVH, ST & T wave abnormality, consider lateral ischemia  Received in the ED: No interventions provided

## 2023-11-06 NOTE — H&P ADULT - PROBLEM SELECTOR PLAN 10
- Heparin gtt - Home medication of Tobramycin-Dexamethasone eye droplets BID   - Medication at bedside, to be verified by pharmacy and administered to patient twice a day per Opthalmology to reduce risk of transplant failure

## 2023-11-06 NOTE — H&P ADULT - PROBLEM SELECTOR PLAN 6
Chronic, History of DM2  - Not on home medications, diet controlled   - Serum glucose elevated to 177 on admission   - Low dose insulin corrective scale  - Hypoglycemia protocol, Accuchecks AC&HS  - Diet regular food with DASH/TLC  - F/u HbA1c AM

## 2023-11-06 NOTE — ED PROVIDER NOTE - OBJECTIVE STATEMENT
89-year-old female, history of hypertension, HLD, atrial fibrillation on aspirin, brought by family for evaluation of cough and chest congestion x3 weeks.  Gradual onset of cough and chest congestion without any fever.  At times feeling short of breath.  Was evaluated by PMD and was treated with Medrol pack, levalbuterol, Z-Nikunj without any improvement of symptoms.  Also has been taking Mucinex without any relief of the cough.  Patient also noticed bilateral lower extremity swelling which primary care doctor put her on Lasix which she finished taking yesterday.  Patient underwent outpatient blood work and showed BNP 2800.  Also x-ray of the chest showed pleural effusions as per family member.  Today no improvement of symptoms so PMD referred to the hospital for further evaluation of possible heart failure.  Patient denies any chest pain, shortness of breath, dizziness, palpitations or any other complaints.

## 2023-11-06 NOTE — H&P ADULT - PROBLEM SELECTOR PLAN 3
- CT Chest and Angio: No pulmonary arterial emboli. Non-opacification of the upper aspect of the left atrial appendage. Differential diagnosis include slow blood flow/mixing artifact versus thrombus   - Possible thrombus given hx of afib and not on AC   - TTE ordered for AM, follow up results  - Heparin gtt   - Cardiology consulted, Dr. Contreras, follow up recommendations - CT Chest and Angio: No pulmonary arterial emboli. Non-opacification of the upper aspect of the left atrial appendage. Differential diagnosis include slow blood flow/mixing artifact versus thrombus   - Possible thrombus given hx of afib and not on AC   - TTE ordered for AM, follow up results  - Heparin gtt pending above   - Cardiology consulted, Dr. Contreras, follow up recommendations

## 2023-11-06 NOTE — H&P ADULT - PROBLEM SELECTOR PLAN 2
- ProBNP 993  - CXR: No change heart mediastinum. No consolidation pneumothorax or effusion. Old bilateral rib fractures. Degenerative change bilateral shoulders   - Recent trial of lasix for chronic bilateral lower extremity edema with improvement of volume status, tolerated without allergic reaction  - Pt sounds wet on exam with residual LE edema  - Continue Lasix 40 mg IVP qD   - Possible exacerbation due to Entero/Rhino virus - ProBNP 993  - CXR: No change heart mediastinum. No consolidation pneumothorax or effusion. Old bilateral rib fractures. Degenerative change bilateral shoulders   - Recent trial of lasix for chronic bilateral lower extremity edema with improvement of volume status, tolerated without allergic reaction  - Pt sounds wet on exam with residual LE edema  - Continue Lasix 40 mg IVP qD   - Possible exacerbation due to Entero/Rhino virus  - cardiology consulted, fu recs

## 2023-11-06 NOTE — ED PROVIDER NOTE - ATTENDING APP SHARED VISIT CONTRIBUTION OF CARE
89yo F ho htn, Afib, PVD, pw 3+ weeks of wet cough with mucus production. was placed on steroids, albuterol, with no improvement, then noted to have pleural effusions elevated prbnp so was starte don lasix, no improvement, continues to have cough, gernalized weakness and fatigue and some exeriotnal dyspnea so was sent to ED for eval. no fevers or chills, noc hest pain  on exam well appearing ekg with rate controlled afib  lungs with course breath sounds, rhonchi at bases  will eval for pna, pulm edema, acs, CHF, RVP for viral causes, reassess brian admit

## 2023-11-06 NOTE — H&P ADULT - PROBLEM SELECTOR PLAN 4
- Home medication of Metoprolol without AC given hx of falls   - EK bpm, Atrial fibrillation Moderate voltage criteria for LVH, ST & T wave abnormality, consider lateral ischemia  - CT Chest and Angio: No pulmonary arterial emboli  - Continue home medications   - Heparin gtt  - Plan per above - Home medication of Metoprolol without AC given hx of falls   - EK bpm, Atrial fibrillation Moderate voltage criteria for LVH, ST & T wave abnormality, consider lateral ischemia  - CT Chest and Angio: No pulmonary arterial emboli  - Continue home medications   - Heparin gtt initiated 2/2 possible atrial thrombus, DW patient and daughter, will dc if echo negative for thrombus per patient/daughter wishes   - Plan per above

## 2023-11-06 NOTE — H&P ADULT - NSHPSOCIALHISTORY_GEN_ALL_CORE
Lives: Alone with home health aids for 18 hrs/day   ADLs: Independently   Diet: DASH/TLC   Alcohol Use: Denies   Tobacco Use: Denies   Recreational Drug Use: Denies

## 2023-11-06 NOTE — H&P ADULT - PROBLEM SELECTOR PLAN 1
- Entero/Rhino+   - CXR: No change heart mediastinum. No consolidation pneumothorax or effusion. Old bilateral rib fractures. Degenerative change bilateral shoulders   - Saturating well on 4L NC, wean as tolerated   - Supportive care; duonebs, mucomyst   - Contact precautions multifactorial 2/2 enterorhinovirus, CHF   - Entero/Rhino+   - CXR: No change heart mediastinum. No consolidation pneumothorax or effusion. Old bilateral rib fractures. Degenerative change bilateral shoulders   - Saturating well on 4L NC, wean as tolerated   - Supportive care; dubrandi, mucomyst

## 2023-11-06 NOTE — ED PROVIDER NOTE - CLINICAL SUMMARY MEDICAL DECISION MAKING FREE TEXT BOX
89-year-old female, presents for evaluation of persistent cough x3 weeks.  Well-appearing, no signs of distress.  Mildly hypertensive in triage.  On exam no signs of acute heart failure.  EKG shows slow A-fib at 72 bpm.  T wave inversions in V4/V5/V6 which are not new since 2022.  At this time we will do an chest x-ray to assess for signs of heart failure, pneumonia or effusion.  We will do labs, viral swab and reassess.

## 2023-11-06 NOTE — H&P ADULT - ASSESSMENT
89 yo F with PMHx of HTN, HLD, T2DM, R Corneal Transplant, PVD, PUD, and Afib not on home AC presents to the ED with 3 weeks of persistent cough and cold symptoms, admitted for acute hypoxic respiratory failure with abnormal imagining findings

## 2023-11-06 NOTE — ED ADULT NURSE NOTE - NSFALLHARMRISKINTERV_ED_ALL_ED

## 2023-11-06 NOTE — ED ADULT NURSE NOTE - ED STAT RN HANDOFF DETAILS
pt report given to DEEP Jc patient is A&Ox4. vital signs within normal limits for patient. patient denies chest pain, or shortness of breath.  medications tolerated well. heparin initiated. safety precautions maintained.  will continue to assess and monitor for safety.

## 2023-11-06 NOTE — H&P ADULT - ATTENDING COMMENTS
89 yo F with PMHx of HTN, HLD, T2DM, R Corneal Transplant, PVD, PUD, and Afib not on home AC presents to the ED with 3 weeks of persistent cough and cold symptoms, admitted for acute hypoxic respiratory failure with abnormal imagining findings    Agree with above. Edited where appropriate

## 2023-11-06 NOTE — H&P ADULT - PROBLEM SELECTOR PLAN 5
- 2 weeks of dysuria with new suprapubic tenderness  - Urinalysis ordered, follow up results   - WBC wnl, afebrile  - Continue to monitor off Abx

## 2023-11-06 NOTE — ED ADULT NURSE NOTE - OBJECTIVE STATEMENT
Pt presents to the ED s/p cough and SOB. Pt removed the nasal canula from her nose, states" I don't need it now." 02 sat = 96 RA. Pt placed on cardiac monitor, continuous pulse ox.

## 2023-11-06 NOTE — ED ADULT TRIAGE NOTE - CHIEF COMPLAINT QUOTE
Patient A/Ox4 with clear speech. BIBA from home for SOB. Was told by cardiology to come in for CHF. 4L NC placed by EMS.

## 2023-11-07 LAB
A1C WITH ESTIMATED AVERAGE GLUCOSE RESULT: 8.4 % — HIGH (ref 4–5.6)
A1C WITH ESTIMATED AVERAGE GLUCOSE RESULT: 8.4 % — HIGH (ref 4–5.6)
ALBUMIN SERPL ELPH-MCNC: 3.3 G/DL — SIGNIFICANT CHANGE UP (ref 3.3–5)
ALBUMIN SERPL ELPH-MCNC: 3.3 G/DL — SIGNIFICANT CHANGE UP (ref 3.3–5)
ALP SERPL-CCNC: 106 U/L — SIGNIFICANT CHANGE UP (ref 40–120)
ALP SERPL-CCNC: 106 U/L — SIGNIFICANT CHANGE UP (ref 40–120)
ALT FLD-CCNC: 39 U/L — SIGNIFICANT CHANGE UP (ref 12–78)
ALT FLD-CCNC: 39 U/L — SIGNIFICANT CHANGE UP (ref 12–78)
ANION GAP SERPL CALC-SCNC: 8 MMOL/L — SIGNIFICANT CHANGE UP (ref 5–17)
ANION GAP SERPL CALC-SCNC: 8 MMOL/L — SIGNIFICANT CHANGE UP (ref 5–17)
APPEARANCE UR: CLEAR — SIGNIFICANT CHANGE UP
APPEARANCE UR: CLEAR — SIGNIFICANT CHANGE UP
APTT BLD: > 200 SEC (ref 24.5–35.6)
APTT BLD: > 200 SEC (ref 24.5–35.6)
AST SERPL-CCNC: 22 U/L — SIGNIFICANT CHANGE UP (ref 15–37)
AST SERPL-CCNC: 22 U/L — SIGNIFICANT CHANGE UP (ref 15–37)
BASOPHILS # BLD AUTO: 0.07 K/UL — SIGNIFICANT CHANGE UP (ref 0–0.2)
BASOPHILS # BLD AUTO: 0.07 K/UL — SIGNIFICANT CHANGE UP (ref 0–0.2)
BASOPHILS NFR BLD AUTO: 0.9 % — SIGNIFICANT CHANGE UP (ref 0–2)
BASOPHILS NFR BLD AUTO: 0.9 % — SIGNIFICANT CHANGE UP (ref 0–2)
BILIRUB SERPL-MCNC: 1.1 MG/DL — SIGNIFICANT CHANGE UP (ref 0.2–1.2)
BILIRUB SERPL-MCNC: 1.1 MG/DL — SIGNIFICANT CHANGE UP (ref 0.2–1.2)
BILIRUB UR-MCNC: NEGATIVE — SIGNIFICANT CHANGE UP
BILIRUB UR-MCNC: NEGATIVE — SIGNIFICANT CHANGE UP
BUN SERPL-MCNC: 26 MG/DL — HIGH (ref 7–23)
BUN SERPL-MCNC: 26 MG/DL — HIGH (ref 7–23)
CALCIUM SERPL-MCNC: 9.1 MG/DL — SIGNIFICANT CHANGE UP (ref 8.5–10.1)
CALCIUM SERPL-MCNC: 9.1 MG/DL — SIGNIFICANT CHANGE UP (ref 8.5–10.1)
CHLORIDE SERPL-SCNC: 101 MMOL/L — SIGNIFICANT CHANGE UP (ref 96–108)
CHLORIDE SERPL-SCNC: 101 MMOL/L — SIGNIFICANT CHANGE UP (ref 96–108)
CHOLEST SERPL-MCNC: 168 MG/DL — SIGNIFICANT CHANGE UP
CHOLEST SERPL-MCNC: 168 MG/DL — SIGNIFICANT CHANGE UP
CO2 SERPL-SCNC: 34 MMOL/L — HIGH (ref 22–31)
CO2 SERPL-SCNC: 34 MMOL/L — HIGH (ref 22–31)
COLOR SPEC: YELLOW — SIGNIFICANT CHANGE UP
COLOR SPEC: YELLOW — SIGNIFICANT CHANGE UP
CREAT SERPL-MCNC: 0.66 MG/DL — SIGNIFICANT CHANGE UP (ref 0.5–1.3)
CREAT SERPL-MCNC: 0.66 MG/DL — SIGNIFICANT CHANGE UP (ref 0.5–1.3)
DIFF PNL FLD: NEGATIVE — SIGNIFICANT CHANGE UP
DIFF PNL FLD: NEGATIVE — SIGNIFICANT CHANGE UP
EGFR: 81 ML/MIN/1.73M2 — SIGNIFICANT CHANGE UP
EGFR: 81 ML/MIN/1.73M2 — SIGNIFICANT CHANGE UP
EOSINOPHIL # BLD AUTO: 0.08 K/UL — SIGNIFICANT CHANGE UP (ref 0–0.5)
EOSINOPHIL # BLD AUTO: 0.08 K/UL — SIGNIFICANT CHANGE UP (ref 0–0.5)
EOSINOPHIL NFR BLD AUTO: 1 % — SIGNIFICANT CHANGE UP (ref 0–6)
EOSINOPHIL NFR BLD AUTO: 1 % — SIGNIFICANT CHANGE UP (ref 0–6)
ESTIMATED AVERAGE GLUCOSE: 194 MG/DL — HIGH (ref 68–114)
ESTIMATED AVERAGE GLUCOSE: 194 MG/DL — HIGH (ref 68–114)
GLUCOSE SERPL-MCNC: 166 MG/DL — HIGH (ref 70–99)
GLUCOSE SERPL-MCNC: 166 MG/DL — HIGH (ref 70–99)
GLUCOSE UR QL: NEGATIVE MG/DL — SIGNIFICANT CHANGE UP
GLUCOSE UR QL: NEGATIVE MG/DL — SIGNIFICANT CHANGE UP
HCT VFR BLD CALC: 48.1 % — HIGH (ref 34.5–45)
HCT VFR BLD CALC: 48.1 % — HIGH (ref 34.5–45)
HDLC SERPL-MCNC: 47 MG/DL — LOW
HDLC SERPL-MCNC: 47 MG/DL — LOW
HGB BLD-MCNC: 15.3 G/DL — SIGNIFICANT CHANGE UP (ref 11.5–15.5)
HGB BLD-MCNC: 15.3 G/DL — SIGNIFICANT CHANGE UP (ref 11.5–15.5)
IMM GRANULOCYTES NFR BLD AUTO: 0.4 % — SIGNIFICANT CHANGE UP (ref 0–0.9)
IMM GRANULOCYTES NFR BLD AUTO: 0.4 % — SIGNIFICANT CHANGE UP (ref 0–0.9)
KETONES UR-MCNC: NEGATIVE MG/DL — SIGNIFICANT CHANGE UP
KETONES UR-MCNC: NEGATIVE MG/DL — SIGNIFICANT CHANGE UP
LEUKOCYTE ESTERASE UR-ACNC: NEGATIVE — SIGNIFICANT CHANGE UP
LEUKOCYTE ESTERASE UR-ACNC: NEGATIVE — SIGNIFICANT CHANGE UP
LIPID PNL WITH DIRECT LDL SERPL: 101 MG/DL — HIGH
LIPID PNL WITH DIRECT LDL SERPL: 101 MG/DL — HIGH
LYMPHOCYTES # BLD AUTO: 2.46 K/UL — SIGNIFICANT CHANGE UP (ref 1–3.3)
LYMPHOCYTES # BLD AUTO: 2.46 K/UL — SIGNIFICANT CHANGE UP (ref 1–3.3)
LYMPHOCYTES # BLD AUTO: 30.6 % — SIGNIFICANT CHANGE UP (ref 13–44)
LYMPHOCYTES # BLD AUTO: 30.6 % — SIGNIFICANT CHANGE UP (ref 13–44)
MCHC RBC-ENTMCNC: 28.8 PG — SIGNIFICANT CHANGE UP (ref 27–34)
MCHC RBC-ENTMCNC: 28.8 PG — SIGNIFICANT CHANGE UP (ref 27–34)
MCHC RBC-ENTMCNC: 31.8 GM/DL — LOW (ref 32–36)
MCHC RBC-ENTMCNC: 31.8 GM/DL — LOW (ref 32–36)
MCV RBC AUTO: 90.6 FL — SIGNIFICANT CHANGE UP (ref 80–100)
MCV RBC AUTO: 90.6 FL — SIGNIFICANT CHANGE UP (ref 80–100)
MONOCYTES # BLD AUTO: 0.65 K/UL — SIGNIFICANT CHANGE UP (ref 0–0.9)
MONOCYTES # BLD AUTO: 0.65 K/UL — SIGNIFICANT CHANGE UP (ref 0–0.9)
MONOCYTES NFR BLD AUTO: 8.1 % — SIGNIFICANT CHANGE UP (ref 2–14)
MONOCYTES NFR BLD AUTO: 8.1 % — SIGNIFICANT CHANGE UP (ref 2–14)
NEUTROPHILS # BLD AUTO: 4.75 K/UL — SIGNIFICANT CHANGE UP (ref 1.8–7.4)
NEUTROPHILS # BLD AUTO: 4.75 K/UL — SIGNIFICANT CHANGE UP (ref 1.8–7.4)
NEUTROPHILS NFR BLD AUTO: 59 % — SIGNIFICANT CHANGE UP (ref 43–77)
NEUTROPHILS NFR BLD AUTO: 59 % — SIGNIFICANT CHANGE UP (ref 43–77)
NITRITE UR-MCNC: NEGATIVE — SIGNIFICANT CHANGE UP
NITRITE UR-MCNC: NEGATIVE — SIGNIFICANT CHANGE UP
NON HDL CHOLESTEROL: 121 MG/DL — SIGNIFICANT CHANGE UP
NON HDL CHOLESTEROL: 121 MG/DL — SIGNIFICANT CHANGE UP
NRBC # BLD: 0 /100 WBCS — SIGNIFICANT CHANGE UP (ref 0–0)
NRBC # BLD: 0 /100 WBCS — SIGNIFICANT CHANGE UP (ref 0–0)
PH UR: 7.5 — SIGNIFICANT CHANGE UP (ref 5–8)
PH UR: 7.5 — SIGNIFICANT CHANGE UP (ref 5–8)
PLATELET # BLD AUTO: 197 K/UL — SIGNIFICANT CHANGE UP (ref 150–400)
PLATELET # BLD AUTO: 197 K/UL — SIGNIFICANT CHANGE UP (ref 150–400)
POTASSIUM SERPL-MCNC: 4.2 MMOL/L — SIGNIFICANT CHANGE UP (ref 3.5–5.3)
POTASSIUM SERPL-MCNC: 4.2 MMOL/L — SIGNIFICANT CHANGE UP (ref 3.5–5.3)
POTASSIUM SERPL-SCNC: 4.2 MMOL/L — SIGNIFICANT CHANGE UP (ref 3.5–5.3)
POTASSIUM SERPL-SCNC: 4.2 MMOL/L — SIGNIFICANT CHANGE UP (ref 3.5–5.3)
PROT SERPL-MCNC: 6.3 G/DL — SIGNIFICANT CHANGE UP (ref 6–8.3)
PROT SERPL-MCNC: 6.3 G/DL — SIGNIFICANT CHANGE UP (ref 6–8.3)
PROT UR-MCNC: NEGATIVE MG/DL — SIGNIFICANT CHANGE UP
PROT UR-MCNC: NEGATIVE MG/DL — SIGNIFICANT CHANGE UP
RBC # BLD: 5.31 M/UL — HIGH (ref 3.8–5.2)
RBC # BLD: 5.31 M/UL — HIGH (ref 3.8–5.2)
RBC # FLD: 12.9 % — SIGNIFICANT CHANGE UP (ref 10.3–14.5)
RBC # FLD: 12.9 % — SIGNIFICANT CHANGE UP (ref 10.3–14.5)
SODIUM SERPL-SCNC: 143 MMOL/L — SIGNIFICANT CHANGE UP (ref 135–145)
SODIUM SERPL-SCNC: 143 MMOL/L — SIGNIFICANT CHANGE UP (ref 135–145)
SP GR SPEC: 1.03 — SIGNIFICANT CHANGE UP (ref 1–1.03)
SP GR SPEC: 1.03 — SIGNIFICANT CHANGE UP (ref 1–1.03)
TRIGL SERPL-MCNC: 112 MG/DL — SIGNIFICANT CHANGE UP
TRIGL SERPL-MCNC: 112 MG/DL — SIGNIFICANT CHANGE UP
UROBILINOGEN FLD QL: 1 MG/DL — SIGNIFICANT CHANGE UP (ref 0.2–1)
UROBILINOGEN FLD QL: 1 MG/DL — SIGNIFICANT CHANGE UP (ref 0.2–1)
WBC # BLD: 8.04 K/UL — SIGNIFICANT CHANGE UP (ref 3.8–10.5)
WBC # BLD: 8.04 K/UL — SIGNIFICANT CHANGE UP (ref 3.8–10.5)
WBC # FLD AUTO: 8.04 K/UL — SIGNIFICANT CHANGE UP (ref 3.8–10.5)
WBC # FLD AUTO: 8.04 K/UL — SIGNIFICANT CHANGE UP (ref 3.8–10.5)

## 2023-11-07 PROCEDURE — 99223 1ST HOSP IP/OBS HIGH 75: CPT

## 2023-11-07 PROCEDURE — 93306 TTE W/DOPPLER COMPLETE: CPT | Mod: 26

## 2023-11-07 RX ORDER — INFLUENZA VIRUS VACCINE 15; 15; 15; 15 UG/.5ML; UG/.5ML; UG/.5ML; UG/.5ML
0.7 SUSPENSION INTRAMUSCULAR ONCE
Refills: 0 | Status: DISCONTINUED | OUTPATIENT
Start: 2023-11-07 | End: 2023-11-09

## 2023-11-07 RX ORDER — SODIUM CHLORIDE 9 MG/ML
4 INJECTION INTRAMUSCULAR; INTRAVENOUS; SUBCUTANEOUS EVERY 12 HOURS
Refills: 0 | Status: DISCONTINUED | OUTPATIENT
Start: 2023-11-07 | End: 2023-11-09

## 2023-11-07 RX ORDER — TOBRAMYCIN AND DEXAMETHASONE 1; 3 MG/ML; MG/ML
2 SUSPENSION/ DROPS OPHTHALMIC
Refills: 0 | Status: DISCONTINUED | OUTPATIENT
Start: 2023-11-07 | End: 2023-11-09

## 2023-11-07 RX ORDER — APIXABAN 2.5 MG/1
2.5 TABLET, FILM COATED ORAL EVERY 12 HOURS
Refills: 0 | Status: DISCONTINUED | OUTPATIENT
Start: 2023-11-07 | End: 2023-11-09

## 2023-11-07 RX ORDER — ALBUTEROL 90 UG/1
2.5 AEROSOL, METERED ORAL EVERY 4 HOURS
Refills: 0 | Status: DISCONTINUED | OUTPATIENT
Start: 2023-11-07 | End: 2023-11-09

## 2023-11-07 RX ORDER — FUROSEMIDE 40 MG
20 TABLET ORAL DAILY
Refills: 0 | Status: DISCONTINUED | OUTPATIENT
Start: 2023-11-08 | End: 2023-11-09

## 2023-11-07 RX ORDER — SODIUM CHLORIDE 0.65 %
1 AEROSOL, SPRAY (ML) NASAL
Refills: 0 | Status: DISCONTINUED | OUTPATIENT
Start: 2023-11-07 | End: 2023-11-09

## 2023-11-07 RX ADMIN — Medication 25 MILLIGRAM(S): at 05:29

## 2023-11-07 RX ADMIN — Medication 1 SPRAY(S): at 17:04

## 2023-11-07 RX ADMIN — SODIUM CHLORIDE 4 MILLILITER(S): 9 INJECTION INTRAMUSCULAR; INTRAVENOUS; SUBCUTANEOUS at 20:03

## 2023-11-07 RX ADMIN — Medication 1: at 22:00

## 2023-11-07 RX ADMIN — ATORVASTATIN CALCIUM 20 MILLIGRAM(S): 80 TABLET, FILM COATED ORAL at 22:00

## 2023-11-07 RX ADMIN — Medication 1 TABLET(S): at 11:48

## 2023-11-07 RX ADMIN — Medication 3 MILLILITER(S): at 08:36

## 2023-11-07 RX ADMIN — Medication 81 MILLIGRAM(S): at 11:49

## 2023-11-07 RX ADMIN — APIXABAN 2.5 MILLIGRAM(S): 2.5 TABLET, FILM COATED ORAL at 12:47

## 2023-11-07 RX ADMIN — HEPARIN SODIUM 3500 UNIT(S): 5000 INJECTION INTRAVENOUS; SUBCUTANEOUS at 00:22

## 2023-11-07 RX ADMIN — HEPARIN SODIUM 0 UNIT(S)/HR: 5000 INJECTION INTRAVENOUS; SUBCUTANEOUS at 08:02

## 2023-11-07 RX ADMIN — Medication 2: at 11:48

## 2023-11-07 RX ADMIN — ALBUTEROL 2.5 MILLIGRAM(S): 90 AEROSOL, METERED ORAL at 20:03

## 2023-11-07 RX ADMIN — ONDANSETRON 4 MILLIGRAM(S): 8 TABLET, FILM COATED ORAL at 17:04

## 2023-11-07 RX ADMIN — LOSARTAN POTASSIUM 50 MILLIGRAM(S): 100 TABLET, FILM COATED ORAL at 05:29

## 2023-11-07 RX ADMIN — FAMOTIDINE 20 MILLIGRAM(S): 10 INJECTION INTRAVENOUS at 11:48

## 2023-11-07 RX ADMIN — Medication 2000 UNIT(S): at 11:48

## 2023-11-07 RX ADMIN — ALBUTEROL 2.5 MILLIGRAM(S): 90 AEROSOL, METERED ORAL at 16:55

## 2023-11-07 RX ADMIN — HEPARIN SODIUM 800 UNIT(S)/HR: 5000 INJECTION INTRAVENOUS; SUBCUTANEOUS at 07:21

## 2023-11-07 RX ADMIN — Medication 40 MILLIGRAM(S): at 05:28

## 2023-11-07 RX ADMIN — TOBRAMYCIN AND DEXAMETHASONE 2 DROP(S): 1; 3 SUSPENSION/ DROPS OPHTHALMIC at 18:00

## 2023-11-07 RX ADMIN — Medication 1: at 08:14

## 2023-11-07 RX ADMIN — TOBRAMYCIN AND DEXAMETHASONE 2 DROP(S): 1; 3 SUSPENSION/ DROPS OPHTHALMIC at 05:06

## 2023-11-07 RX ADMIN — HEPARIN SODIUM 800 UNIT(S)/HR: 5000 INJECTION INTRAVENOUS; SUBCUTANEOUS at 00:23

## 2023-11-07 RX ADMIN — Medication 2: at 17:04

## 2023-11-07 RX ADMIN — POLYETHYLENE GLYCOL 3350 17 GRAM(S): 17 POWDER, FOR SOLUTION ORAL at 08:32

## 2023-11-07 NOTE — ED ADULT NURSE REASSESSMENT NOTE - NS ED NURSE REASSESS COMMENT FT1
pt report taken from DEEP Green patient is A&Ox4. vital signs within normal limits for patient. patient denies chest pain, or shortness of breath.  medications tolerated well.  safety precautions maintained.  will continue to assess and monitor for safety.

## 2023-11-07 NOTE — PATIENT CHOICE NOTE. - NSPTCHOICESTATE_GEN_ALL_CORE

## 2023-11-07 NOTE — CAREGIVER ENGAGEMENT NOTE - CAREGIVER OUTREACH NOTES - FREE TEXT
CM speak to caregiver to explain role and transitions of care.  All questions answered to the best of my abilities.  CM remains available throughout the hospital stay.

## 2023-11-07 NOTE — PATIENT PROFILE ADULT - FUNCTIONAL ASSESSMENT - BASIC MOBILITY 6.
3-calculated by average/Not able to assess (calculate score using [facility] averaging method)

## 2023-11-07 NOTE — CONSULT NOTE ADULT - ATTENDING COMMENTS
89 yo F with PMHx of HTN, HLD, T2DM, R Corneal Transplant, PVD, PUD, and Afib not on home AC presents to the ED with 3 weeks of persistent cough and cold symptoms. Consulted for LE edema, and left atrial appendage.    LE edema improved with IV Lasix.  Transition to PO  Has ? YESSI thrombus.  Can argue that she should be on ac either way given AF and elevated chadsvasc.  To start Eliquis 2.5 bid  Continue remainder of mgmt as above  Follow up echo  TO follow with her cardiologist on dc

## 2023-11-07 NOTE — PATIENT PROFILE ADULT - FALL HARM RISK - HARM RISK INTERVENTIONS

## 2023-11-07 NOTE — CONSULT NOTE ADULT - ASSESSMENT
89 yo F with PMHx of HTN, HLD, T2DM, R Corneal Transplant, PVD, PUD, and Afib not on home AC presents to the ED with 3 weeks of persistent cough and cold symptoms. Consulted for LE edema,     #Acute on chronic CHF exacerbation   - Pro bnp [phone]  - S/p Lasix 40mg IV in ED  - Echo from 3/22: LVEF 15-20, trace MR, moderate TR   - Will repeat TTE, follow up results   - On home lasix 40mg Qd ; Continue IV Lasix 40mg Qd with hold parameters   - Pt has ESRD on HD; Scheduled for HD today; pt is non compliant with HD (schedule M W F)   - Trend renal indices   - Continue Fluid restriction  - Strict I/Os, daily weights    #Ischemia   - No clear evidence of acute ischemia  - Troponin negative x 1, CK  460 CK MB 5.6  - EKG: NSR, RBBB, VR 86bpm, unchanged from prior EKG in 3/22   - Hx of CAD: Continue atorvastatin 40mg Qd and aspirin 81mg   - Continue to monitor for signs or symptoms of ischemia     #Arrhythmia  - EKG: NSR, RBBB, VR 65bpm, unchanged from prior EKG  - Monitor and replete lytes, keep K>4, Mg>2.    #HTN  - BP stable currently  - Continue home meds: Losartan 50mg Qd, Coreg 5.25mg BID  - Continue to monitor hemodynamics     - Other cardiovascular workup will depend on clinical course.  - All other workup per primary team.  - Will continue to follow.       89 yo F with PMHx of HTN, HLD, T2DM, R Corneal Transplant, PVD, PUD, and Afib not on home AC presents to the ED with 3 weeks of persistent cough and cold symptoms. Consulted for LE edema, and left atrial appendage.      #left atrial appendage:  - CTA chest: No pulmonary arterial emboli. Non-opacification of the upper aspect of the left atrial appendage. Differential diagnosis include slow blood flow/mixing artifact versus thrombus. Contrast-enhanced chest CT with delayed imaging can be performed for further evaluation as clinically warranted.  - unclear if true thrombus or artifact  - however patient with hx of afib not on home AC  - recommend to stop heparin gtt and switch to eliquis 2.5mg BID    #LE edema  - Pro bnp 993  - no hx of HF as per patient  - no pleural effusions or signs of HF on CTA chest  - S/p Lasix 40mg IV in ED  - recommend to switch lasix to 20mg PO qd  - Will repeat TTE, follow up results       #Afib  - ECG: atrial fibrillation, moderate voltage criteria for LVH, ST and T wave abnormalities  - hx of afib not on home AC  - recommend to stop heparin gtt and switch to eliquis 2.5mg BID  - Monitor and replete lytes, keep K>4, Mg>2.    #Ischemia   - No clear evidence of acute ischemia  - Troponin negative x 1  - ECG: atrial fibrillation, moderate voltage criteria for LVH, ST and T wave abnormalities  - continue home atorvastatin     #HTN  - BP stable currently  - Continue home meds: Losartan and Toprolol with home paramters  - Continue to monitor hemodynamics     - Other cardiovascular workup will depend on clinical course.  - All other workup per primary team.  - Will continue to follow.

## 2023-11-07 NOTE — CONSULT NOTE ADULT - ASSESSMENT
89 yo F with PMHx of HTN, HLD, T2DM, R Corneal Transplant, PVD, PUD, and Afib not on home AC presents to the ED with 3 weeks of persistent cough and cold symptoms'  AF  HF  OP  OA  URI  Bronchitis  Upper Airway Cough Syndrome  HLD  HTN  DM  PVD  PUD  Atelectasis with Tracheal Secretions on CT chest    cough rx regimen - tessalon - robitussin  hypersal mucolytics  nasal saline  cvs rx regimen  diuresis as per Cardio  I and O  serial labs  replete lytes  BP control  on AC - AF  CT neg for PE  URI sx management  wean fio2  GOC discussion

## 2023-11-07 NOTE — CONSULT NOTE ADULT - SUBJECTIVE AND OBJECTIVE BOX
[facility] DIVISION of INFECTIOUS DISEASE  Bandar Odom MD PhD, Bernadette Dc MD, Kathleen Nesbitt MD, Sharon Solorzano MD, Des Rodriguez MD  and providing coverage with Mannie Paredes MD  Providing Infectious Disease Consultations at [facility], [facility], [place], [facility], [facility]'s    Office# [phone] to schedule follow up appointments  Answering Service for urgent calls or New Consults [phone]  Cell# to text for urgent issues Bandar Odom [phone]     HPI:  89 yo F with PMHx of HTN, HLD, T2DM, R Corneal Transplant, PVD, PUD, and Afib not on home AC presents to the ED with 3 weeks of persistent cough and cold symptoms. Pt states that 3 weeks ago she saw her Vascular doctor to address worsening lower extremity edema and advised leg elevation. Pt elevated her legs at night and saw reduction in swelling, but developed a persistent cough since. Pt saw her PCP 2 weeks ago who prescribed a course of Zithromax without improvement of symptoms. Pt seen in [facility] ED on 10/23 following a fall and was also found to be mildly volume overloaded and started a short course of Lasix with improvement of swelling. Pt endorses productive cough, increased sputum production, intermittent palpitations, numbness and tingling in bilateral feet 2/2 PVD, and fatigue. Recently, pt has endorses suprapubic discomfort and dysuria. No other complaints at this time.   ED Course:   Vitals: BP: 172/90, HR: 78, Temp: 97, RR: 18, SpO2: 95% on 4 L NC   Labs: CO2 33, BUN 31, Serum Glucose 177, ProBNP 993, Entero/Rhino+   CXR: No change heart mediastinum. No consolidation pneumothorax or effusion. Old bilateral rib fractures. Degenerative change bilateral shoulders   CT Chest and Angio: No pulmonary arterial emboli. Non-opacification of the upper aspect of the left atrial appendage. Differential diagnosis include slow blood flow/mixing artifact versus thrombus. Contrast-enhanced chest CT with delayed imaging can be performed for further evaluation as clinically warranted.  CT Head and Cervical Spine: No acute intracranial hemorrhage or mass effect. No CT evidence of cervical spine fracture.  EK bpm, Atrial fibrillation Moderate voltage criteria for LVH, ST & T wave abnormality, consider lateral ischemia  Received in the ED: No interventions provided    (2023 20:45)      PAST MEDICAL & SURGICAL HISTORY:  Hyperlipidemia      Peptic ulcer      HTN (hypertension)      Osteoporosis      Irritable bowel syndrome      Cellulitis      Fractured pelvis      Pancreatic mass      S/P hip replacement  partial, left      History of appendectomy      History of tonsillectomy          Antimicrobials      Immunological  influenza  Vaccine (HIGH DOSE) 0.7 milliLiter(s) IntraMuscular once      Other  acetaminophen     Tablet .. 650 milliGRAM(s) Oral every 6 hours PRN  albuterol    0.083% 2.5 milliGRAM(s) Nebulizer every 4 hours PRN  aluminum hydroxide/magnesium hydroxide/simethicone Suspension 30 milliLiter(s) Oral every 4 hours PRN  apixaban 2.5 milliGRAM(s) Oral every 12 hours  aspirin enteric coated 81 milliGRAM(s) Oral daily  atorvastatin 20 milliGRAM(s) Oral at bedtime  benzonatate 100 milliGRAM(s) Oral every 8 hours PRN  cholecalciferol 2000 Unit(s) Oral daily  dextrose 5%. 1000 milliLiter(s) IV Continuous <Continuous>  dextrose 5%. 1000 milliLiter(s) IV Continuous <Continuous>  dextrose 50% Injectable 25 Gram(s) IV Push once  dextrose 50% Injectable 12.5 Gram(s) IV Push once  dextrose 50% Injectable 25 Gram(s) IV Push once  dextrose Oral Gel 15 Gram(s) Oral once PRN  famotidine    Tablet 20 milliGRAM(s) Oral daily  glucagon  Injectable 1 milliGRAM(s) IntraMuscular once  guaiFENesin Oral Liquid (Sugar-Free) 200 milliGRAM(s) Oral every 6 hours PRN  insulin lispro (ADMELOG) corrective regimen sliding scale   SubCutaneous three times a day before meals  insulin lispro (ADMELOG) corrective regimen sliding scale   SubCutaneous at bedtime  losartan 50 milliGRAM(s) Oral daily  melatonin 3 milliGRAM(s) Oral at bedtime PRN  metoprolol succinate ER 25 milliGRAM(s) Oral daily  multivitamin/minerals 1 Tablet(s) Oral daily  ondansetron Injectable 4 milliGRAM(s) IV Push every 8 hours PRN  polyethylene glycol 3350 17 Gram(s) Oral daily  sodium chloride 0.65% Nasal 1 Spray(s) Both Nostrils two times a day  sodium chloride 3%  Inhalation 4 milliLiter(s) Inhalation every 12 hours  tobramycin/dexamethasone Suspension 2 Drop(s) Both EYES two times a day      Allergies    sulfa drugs (Hives)    Intolerances        SOCIAL HISTORY:  Social History:  Lives: Alone with home health aids for 18 hrs/day   ADLs: Independently   Diet: DASH/TLC   Alcohol Use: Denies   Tobacco Use: Denies   Recreational Drug Use: Denies (2023 20:45)      FAMILY HISTORY:  FH: hypertension        ROS:    EYES:  Negative  blurry vision or double vision  GASTROINTESTINAL:  Negative for nausea, vomiting, diarrhea  -otherwise negative except for subjective    Vital Signs Last 24 Hrs  T(C): 36.3 (2023 11:45), Max: 36.7 (2023 23:54)  T(F): 97.3 (2023 11:45), Max: 98.1 (2023 23:54)  HR: 91 (2023 11:45) (71 - 94)  BP: 107/72 (2023 11:45) (107/72 - 172/90)  BP(mean): --  RR: 18 (2023 11:45) (17 - 18)  SpO2: 95% (2023 11:45) (95% - 99%)    Parameters below as of 2023 11:45  Patient On (Oxygen Delivery Method): room air        PE:  In no distress  HEENT:  NC, PERRL, sclerae anicteric, conjunctivae clear, EOMI.  Sinuses nontender, no nasal exudate.  No buccal or pharyngeal lesions, erythema or exudate  Neck:  Supple, no adenopathy, elevated JVP  Lungs:  No accessory muscle use, bilaterally scattered wet crackles  Cor:  distant  Abd:  Symmetric, normoactive BS.  Soft, nontender, no masses, guarding or rebound.  Liver and spleen not enlarged  Extrem:  No cyanosis, LE edema  Skin:  No rashes.  Neuro: grossly intact  Musc: moving all limbs freely, no focal deficits        LABS:                        15.3   8.04  )-----------( 197      ( 2023 05:59 )             48.1       WBC Count: 8.04 K/uL (23 @ 05:59)  WBC Count: 7.99 K/uL (23 @ 16:20)          143  |  101  |  26<H>  ----------------------------<  166<H>  4.2   |  34<H>  |  0.66    Ca    9.1      2023 05:59  Mg     2.4         TPro  6.3  /  Alb  3.3  /  TBili  1.1  /  DBili  x   /  AST  22  /  ALT  39  /  AlkPhos  106        Creatinine: 0.66 mg/dL (23 @ 05:59)  Creatinine: 0.75 mg/dL (23 @ 16:20)      Urinalysis Basic - ( 2023 06:30 )    Color: Yellow / Appearance: Clear / S.028 / pH: x  Gluc: x / Ketone: Negative mg/dL  / Bili: Negative / Urobili: 1.0 mg/dL   Blood: x / Protein: Negative mg/dL / Nitrite: Negative   Leuk Esterase: Negative / RBC: x / WBC x   Sq Epi: x / Non Sq Epi: x / Bacteria: x      MICROBIOLOGY:    Entero/Rhinovirus (RapRVP): Detected (23 @ 18:20)      RADIOLOGY & ADDITIONAL STUDIES:    --< from: CT Angio Chest PE Protocol w/ IV Cont (23 @ 19:06) >    ACC: [id number] [facility]   ORDERED BY:  OSBALDO PATRICK     PROCEDURE DATE:  2023          INTERPRETATION:  Clinical indication: Evaluate for pulmonary embolism.    CT angiogram of the chest was performed following intravenous   administration of 70 cc of Omnipaque-350. 30 cc of contrast was   discarded. MIP images are submitted.    No prior chest CTs are available for comparison. No pulmonary arterial   emboli.    No enlarged axillary, mediastinal or hilar lymph nodes.    No pericardial effusion. Heart size is enlarged with biatrial dilatation.   The left atrium measures about 5.1 cm in anteroposterior dimension.   Decreased or nonopacification of the upper aspect of the left atrial   appendage. Vascular calcifications with involvement of the aorta and the   coronary arteries.    No pleural effusion.    Evaluation of the upper abdomen demonstrate partially imaged   cholecystectomy clips.    Evaluation of the lungs demonstrate minimal bilateral lower lung areas of   linear or subsegmental atelectasis, left greater than right. Nonspecific   mild bilateral upper lung interlobular septal thickening appears   unchanged since the prior cervical spine CT of 2021.    No central endobronchial lesions. Secretions within the trachea extending   into the left mainstem bronchus. Small cluster of a few 2 or 3 mm nodular   opacities representing foci of impacted distal airways within the right   upper lobe may be due to mucus.    Degenerative changes of the spine and the shoulders. Old healed left rib   fractures.    Partially imaged mucosal thickening within the right maxillary sinus.    IMPRESSION: No pulmonary arterial emboli.    Nonopacification of the upper aspect of the left atrial appendage.   Differential diagnosis include slow blood flow/mixing artifact versus   thrombus. Contrast-enhanced chest CT with delayed imaging can be   performed for further evaluation as clinically warranted.

## 2023-11-07 NOTE — PROGRESS NOTE ADULT - SUBJECTIVE AND OBJECTIVE BOX
Patient is a 89y old  Female who presents with a chief complaint of Acute Hypoxic Respiratory Failure (2023 12:26)      INTERVAL HPI/OVERNIGHT EVENTS: noted  pt seen and examined this am   events noted  +cough, weakness      Vital Signs Last 24 Hrs  T(C): 36.3 (2023 11:45), Max: 36.7 (2023 23:54)  T(F): 97.3 (2023 11:45), Max: 98.1 (2023 23:54)  HR: 91 (2023 11:45) (71 - 94)  BP: 107/72 (2023 11:45) (107/72 - 172/90)  BP(mean): --  RR: 18 (2023 11:45) (17 - 18)  SpO2: 95% (2023 11:45) (95% - 99%)    Parameters below as of 2023 11:45  Patient On (Oxygen Delivery Method): room air        acetaminophen     Tablet .. 650 milliGRAM(s) Oral every 6 hours PRN  albuterol    0.083% 2.5 milliGRAM(s) Nebulizer every 4 hours PRN  aluminum hydroxide/magnesium hydroxide/simethicone Suspension 30 milliLiter(s) Oral every 4 hours PRN  apixaban 2.5 milliGRAM(s) Oral every 12 hours  aspirin enteric coated 81 milliGRAM(s) Oral daily  atorvastatin 20 milliGRAM(s) Oral at bedtime  benzonatate 100 milliGRAM(s) Oral every 8 hours PRN  cholecalciferol 2000 Unit(s) Oral daily  dextrose 5%. 1000 milliLiter(s) IV Continuous <Continuous>  dextrose 5%. 1000 milliLiter(s) IV Continuous <Continuous>  dextrose 50% Injectable 25 Gram(s) IV Push once  dextrose 50% Injectable 12.5 Gram(s) IV Push once  dextrose 50% Injectable 25 Gram(s) IV Push once  dextrose Oral Gel 15 Gram(s) Oral once PRN  famotidine    Tablet 20 milliGRAM(s) Oral daily  glucagon  Injectable 1 milliGRAM(s) IntraMuscular once  guaiFENesin Oral Liquid (Sugar-Free) 200 milliGRAM(s) Oral every 6 hours PRN  influenza  Vaccine (HIGH DOSE) 0.7 milliLiter(s) IntraMuscular once  insulin lispro (ADMELOG) corrective regimen sliding scale   SubCutaneous three times a day before meals  insulin lispro (ADMELOG) corrective regimen sliding scale   SubCutaneous at bedtime  losartan 50 milliGRAM(s) Oral daily  melatonin 3 milliGRAM(s) Oral at bedtime PRN  metoprolol succinate ER 25 milliGRAM(s) Oral daily  multivitamin/minerals 1 Tablet(s) Oral daily  ondansetron Injectable 4 milliGRAM(s) IV Push every 8 hours PRN  polyethylene glycol 3350 17 Gram(s) Oral daily  sodium chloride 0.65% Nasal 1 Spray(s) Both Nostrils two times a day  sodium chloride 3%  Inhalation 4 milliLiter(s) Inhalation every 12 hours  tobramycin/dexamethasone Suspension 2 Drop(s) Both EYES two times a day      PHYSICAL EXAM:  GENERAL: NAD,   EYES: conjunctiva and sclera clear  ENMT: Moist mucous membranes  NECK: Supple, No JVD, Normal thyroid  CHEST/LUNG: non labored, cta b/l  HEART: Regular rate and rhythm; No murmurs, rubs, or gallops  ABDOMEN: Soft, Nontender, Nondistended; Bowel sounds present  EXTREMITIES:  2+ Peripheral Pulses, No clubbing, cyanosis, or edema  LYMPH: No lymphadenopathy noted  SKIN: No rashes or lesions    Consultant(s) Notes Reviewed:  [x ] YES  [ ] NO  Care Discussed with Consultants/Other Providers [ x] YES  [ ] NO    LABS:                        15.3   8.04  )-----------( 197      ( 2023 05:59 )             48.1     11-    143  |  101  |  26<H>  ----------------------------<  166<H>  4.2   |  34<H>  |  0.66    Ca    9.1      2023 05:59  Mg     2.4     -    TPro  6.3  /  Alb  3.3  /  TBili  1.1  /  DBili  x   /  AST  22  /  ALT  39  /  AlkPhos  106  11-    PT/INR - ( 2023 16:20 )   PT: 11.5 sec;   INR: 0.98 ratio         PTT - ( 2023 05:59 )  PTT:> 200 sec  Urinalysis Basic - ( 2023 06:30 )    Color: Yellow / Appearance: Clear / S.028 / pH: x  Gluc: x / Ketone: Negative mg/dL  / Bili: Negative / Urobili: 1.0 mg/dL   Blood: x / Protein: Negative mg/dL / Nitrite: Negative   Leuk Esterase: Negative / RBC: x / WBC x   Sq Epi: x / Non Sq Epi: x / Bacteria: x      CAPILLARY BLOOD GLUCOSE      POCT Blood Glucose.: 218 mg/dL (2023 11:35)  POCT Blood Glucose.: 183 mg/dL (2023 07:54)  POCT Blood Glucose.: 206 mg/dL (2023 23:38)        Urinalysis Basic - ( 2023 06:30 )    Color: Yellow / Appearance: Clear / S.028 / pH: x  Gluc: x / Ketone: Negative mg/dL  / Bili: Negative / Urobili: 1.0 mg/dL   Blood: x / Protein: Negative mg/dL / Nitrite: Negative   Leuk Esterase: Negative / RBC: x / WBC x   Sq Epi: x / Non Sq Epi: x / Bacteria: x          RADIOLOGY & ADDITIONAL TESTS:    Imaging Personally Reviewed:  [x ] YES  [ ] NO

## 2023-11-07 NOTE — CONSULT NOTE ADULT - ASSESSMENT
89 yo F with PMHx of HTN, HLD, T2DM, R Corneal Transplant, PVD, PUD, and Afib not on home AC presents to the ED with 3 weeks of persistent cough and cold symptoms. Worsening lower extremity edema Productive cough, increased sputum production, intermittent palpitations, numbness and tingling in bilateral feet 2/2 PVD, and fatigue. Endorses suprapubic discomfort and dysuria., Entero/Rhino+   CXR: No change heart mediastinum. No consolidation pneumothorax or effusion. Old bilateral rib fractures. Degenerative change bilateral shoulders   CT Chest and Angio: No pulmonary arterial emboli. Non-opacification of the upper aspect of the left atrial appendage. Differential diagnosis include slow blood flow/mixing artifact versus thrombus. Contrast-enhanced chest CT with delayed imaging can be performed for further evaluation as clinically warranted.    RECOMMENDATIONS  1-Dyspnea/Hypoxemia low suspicion for bacterial PNA so rec supportive care and obs off abx  agree with cardiology involvement to address cardiac and fluid issues    Thank you for consulting us and involving us in the management of this most interesting and challenging case.  We will follow along in the care of this patient. Please call us at [phone] or text me directly on my cell# at [phone] with any concerns.

## 2023-11-07 NOTE — PROGRESS NOTE ADULT - ASSESSMENT
89 yo F with PMHx of HTN, HLD, T2DM, R Corneal Transplant, PVD, PUD, and Afib not on home AC presents to the ED with 3 weeks of persistent cough and cold symptoms, admitted for acute hypoxic respiratory failure with abnormal imagining findings    #Acute hypoxic respiratory failure.    multifactorial 2/2 enterorhinovirus, atelectasis   Entero/Rhino+   wean O2 NC  as tolerated   Supportive care; duonebs, mucomyst.  pulm cs and ID cs appreciated  swallow eval    ProBNP 993  Recent trial of lasix for chronic bilateral lower extremity edema with improvement   cardiology consulted, fu recs- no e/o chf    ##Afib.  high CHADSvasc2 score  Home medication of Metoprolol   left atrial appendage-CTA chest: No pulmonary arterial emboli. Non-opacification of the upper aspect of the left atrial appendage.  TTE- ruled out LA thrombus  however patient with hx of afib not on home AC dt falls  heparin gtt started in ED switched to eliquis 2.5mg BID after cards discussed risks vs benefits w dgtr who agreed     HTN (hypertension).   Continue home Metoprolol and Losartan    # History of DM2  - Not on home medications, diet controlled    #PUD (peptic ulcer disease).    Continue home Famotidine.    #weakness- PT eval and dc planning    GOC/adv directives -d/w dgtr at bedside at length- dgtr is hcp-pt full code    Dr Sales will be covering  starting 11/8/23  please call [facility] @ [phone] for questions or concerns

## 2023-11-07 NOTE — PROVIDER CONTACT NOTE (CRITICAL VALUE NOTIFICATION) - RECOMMENDATIONS
RN spoke with Ritika Smith NP made aware above critical lab. RN followed the heparin protocol nomogram heparin drip placed on hold x1 hour

## 2023-11-07 NOTE — CARE COORDINATION ASSESSMENT. - OTHER PERTINENT REFERRAL INFORMATION
CM met with patient and daughter Eleazar at bedside to explain role and transitions of care. Patient lives with alone with private hire aides for 18 hrs a day in a private house with 0 steps to get in and 12 to the second floor (do not use).  Patient identify her daughter Eleazar as her caregiver.  No home care prior to admission but, she is known to [facility] [phone].  Patient uses a walker for ambulation and also owns a transport w/c and a shower chair.   Daughter will transport patient home when ready to be discharge.  Unclear needs at this time.  CM explained  home care expectation, process, insurance provision and home safety with good understanding.  CM to make referral if appropriate. Patient and daughter verbalized understanding of plans after discharge and are in agreement.  Resource folder left at bedside.  All questions answered to the best of my abilities.  CM remains available throughout the hospital stay.

## 2023-11-07 NOTE — CARE COORDINATION ASSESSMENT. - NSPASTMEDSURGHISTORY_GEN_ALL_CORE_FT
PAST MEDICAL & SURGICAL HISTORY:  Irritable bowel syndrome      Osteoporosis      HTN (hypertension)      Peptic ulcer      Hyperlipidemia      History of tonsillectomy      History of appendectomy      S/P hip replacement  partial, left      Cellulitis      Fractured pelvis      Pancreatic mass

## 2023-11-07 NOTE — CARE COORDINATION ASSESSMENT. - NSCAREPROVIDERS_GEN_ALL_CORE_FT
CARE PROVIDERS:  Accepting Physician: Oanh Andres  Administration: Bob Chaparro  Administration: Aki Sutherland  Admitting: Oanh Andres  Attending: Day Forde  Cardiology Technician: Kanwal Yang  Consultant: Amadou Foster  Consultant: Ritesh Anderson  Consultant: Sussy Suárez  Covering Team: Akira Alexandre  Covering Team: Oanh Andres  Covering Team: Claude Espinosa  ED ACP: Keshav Rodriguez  ED Attending: Dennise Richards  ED Nurse: Layo Diaz  Information Services: Noreen Jaimes  Nurse: Zoie Moody  Nurse: Simin Huff  Nurse: Crystal Cerrato  Nurse: Maegan Yeung  Nurse: Layo Diaz  Nurse: Vane Kirk  Ordered: ADM, User  Ordered: Doctor, Unknown  Ordered: Physician, Ordering  Outpatient Provider: Day Forde  Override: Crystal Cerrato  PCA/Nursing Assistant: Hailee Terry  PCA/Nursing Assistant: Heidy Lake  Physical Therapy: Manasa Blake  Primary Team: Ritika Rao  Primary Team: Miguel A Shahid  Primary Team: Kei Moreau  Registered Dietitian: Dena Gonsalez  Respiratory Therapy: Preethi Estrada  : Lizbeth Goldman

## 2023-11-07 NOTE — CONSULT NOTE ADULT - SUBJECTIVE AND OBJECTIVE BOX
Patient is a 89y old  Female who presents with a chief complaint of Acute Hypoxic Respiratory Failure (2023 08:01)      HPI:  89 yo F with PMHx of HTN, HLD, T2DM, R Corneal Transplant, PVD, PUD, and Afib not on home AC presents to the ED with 3 weeks of persistent cough and cold symptoms. Pt states that 3 weeks ago she saw her Vascular doctor to address worsening lower extremity edema and advised leg elevation. Pt elevated her legs at night and saw reduction in swelling, but developed a persistent cough since. Pt saw her PCP 2 weeks ago who prescribed a course of Zithromax without improvement of symptoms. Pt seen in [facility] ED on 10/23 following a fall and was also found to be mildly volume overloaded and started a short course of Lasix with improvement of swelling. Pt endorses productive cough, increased sputum production, intermittent palpitations, numbness and tingling in bilateral feet 2/2 PVD, and fatigue. Recently, pt has endorses suprapubic discomfort and dysuria. No other complaints at this time.   ED Course:   Vitals: BP: 172/90, HR: 78, Temp: 97, RR: 18, SpO2: 95% on 4 L NC   Labs: CO2 33, BUN 31, Serum Glucose 177, ProBNP 993, Entero/Rhino+   CXR: No change heart mediastinum. No consolidation pneumothorax or effusion. Old bilateral rib fractures. Degenerative change bilateral shoulders   CT Chest and Angio: No pulmonary arterial emboli. Non-opacification of the upper aspect of the left atrial appendage. Differential diagnosis include slow blood flow/mixing artifact versus thrombus. Contrast-enhanced chest CT with delayed imaging can be performed for further evaluation as clinically warranted.  CT Head and Cervical Spine: No acute intracranial hemorrhage or mass effect. No CT evidence of cervical spine fracture.  EK bpm, Atrial fibrillation Moderate voltage criteria for LVH, ST & T wave abnormality, consider lateral ischemia  Received in the ED: No interventions provided    (2023 20:45)      PAST MEDICAL & SURGICAL HISTORY:  Hyperlipidemia      Peptic ulcer      HTN (hypertension)      Osteoporosis      Irritable bowel syndrome      Cellulitis      Fractured pelvis      Pancreatic mass      S/P hip replacement  partial, left      History of appendectomy      History of tonsillectomy                ECHO  FINDINGS:      MEDICATIONS  (STANDING):  aspirin enteric coated 81 milliGRAM(s) Oral daily  atorvastatin 20 milliGRAM(s) Oral at bedtime  cholecalciferol 2000 Unit(s) Oral daily  dextrose 5%. 1000 milliLiter(s) (50 mL/Hr) IV Continuous <Continuous>  dextrose 5%. 1000 milliLiter(s) (100 mL/Hr) IV Continuous <Continuous>  dextrose 50% Injectable 25 Gram(s) IV Push once  dextrose 50% Injectable 12.5 Gram(s) IV Push once  dextrose 50% Injectable 25 Gram(s) IV Push once  famotidine    Tablet 20 milliGRAM(s) Oral daily  furosemide   Injectable 40 milliGRAM(s) IV Push daily  glucagon  Injectable 1 milliGRAM(s) IntraMuscular once  heparin  Infusion.  Unit(s)/Hr (8 mL/Hr) IV Continuous <Continuous>  influenza  Vaccine (HIGH DOSE) 0.7 milliLiter(s) IntraMuscular once  insulin lispro (ADMELOG) corrective regimen sliding scale   SubCutaneous three times a day before meals  insulin lispro (ADMELOG) corrective regimen sliding scale   SubCutaneous at bedtime  losartan 50 milliGRAM(s) Oral daily  metoprolol succinate ER 25 milliGRAM(s) Oral daily  multivitamin/minerals 1 Tablet(s) Oral daily  polyethylene glycol 3350 17 Gram(s) Oral daily  sodium chloride 0.65% Nasal 1 Spray(s) Both Nostrils two times a day  sodium chloride 3%  Inhalation 4 milliLiter(s) Inhalation every 12 hours  tobramycin/dexamethasone Suspension 2 Drop(s) Both EYES two times a day    MEDICATIONS  (PRN):  acetaminophen     Tablet .. 650 milliGRAM(s) Oral every 6 hours PRN Temp greater or equal to 38C (100.4F), Mild Pain (1 - 3)  albuterol    0.083% 2.5 milliGRAM(s) Nebulizer every 4 hours PRN Shortness of Breath and/or Wheezing  albuterol/ipratropium for Nebulization. 3 milliLiter(s) Nebulizer once PRN Shortness of Breath and/or Wheezing  aluminum hydroxide/magnesium hydroxide/simethicone Suspension 30 milliLiter(s) Oral every 4 hours PRN Dyspepsia  benzonatate 100 milliGRAM(s) Oral every 8 hours PRN Cough  dextrose Oral Gel 15 Gram(s) Oral once PRN Blood Glucose LESS THAN 70 milliGRAM(s)/deciliter  guaiFENesin Oral Liquid (Sugar-Free) 200 milliGRAM(s) Oral every 6 hours PRN Cough  heparin   Injectable 3500 Unit(s) IV Push every 6 hours PRN For aPTT less than 40  heparin   Injectable 1500 Unit(s) IV Push every 6 hours PRN For aPTT between 40 - 57  melatonin 3 milliGRAM(s) Oral at bedtime PRN Insomnia  ondansetron Injectable 4 milliGRAM(s) IV Push every 8 hours PRN Nausea and/or Vomiting      FAMILY HISTORY:  FH: hypertension      Denies Family history of CAD or early MI    ROS:  Constitutional: denies fever, chills  HEENT: denies blurry vision, difficulty hearing  Respiratory: denies SOB, SOLIS, cough  Cardiovascular: denies CP, palpitations, orthopnea, PND, LE edema  Gastrointestinal: denies nausea, vomiting, abdominal pain  Genitourinary: denies urinary changes  Skin: Denies rashes, itching  Neurologic: denies headache, weakness, dizziness  Hematology/Oncology: denies bleeding, easy bruising  ROS negative except as noted above      SOCIAL HISTORY:    No tobacco, Alcohol or Ddrug use    Vital Signs Last 24 Hrs  T(C): 36.4 (2023 04:38), Max: 36.7 (2023 23:54)  T(F): 97.6 (2023 04:38), Max: 98.1 (2023 23:54)  HR: 71 (2023 04:38) (71 - 93)  BP: 147/89 (2023 04:38) (126/81 - 172/90)  BP(mean): --  RR: 17 (2023 04:38) (17 - 18)  SpO2: 97% (2023 04:38) (95% - 99%)    Parameters below as of 2023 04:38  Patient On (Oxygen Delivery Method): nasal cannula  O2 Flow (L/min): 2      Physical Exam:  General: Well developed, well nourished, NAD  HEENT: NCAT, PERRLA, EOMI bl, moist mucous membranes   Neck: Supple, nontender, no mass  Neurology: A&Ox3, nonfocal, sensation intact   Respiratory: CTA B/L, No W/R/R  CV: RRR, +S1/S2, no murmurs, rubs or gallops  Abdominal: Soft, NT, ND +BSx4, no palpable masses  Extremities: No C/C/E, + peripheral pulses  MSK: Normal ROM, no joint erythema or warmth, no joint swelling   Heme: No obvious ecchymosis or petechiae   Skin: warm, dry, normal color      ECG:    I&O's Detail    2023 07:01  -  2023 07:00  --------------------------------------------------------  IN:  Total IN: 0 mL    OUT:    Voided (mL): 200 mL  Total OUT: 200 mL    Total NET: -200 mL          LABS:                        15.3   8.04  )-----------( 197      ( 2023 05:59 )             48.1     11    143  |  101  |  26<H>  ----------------------------<  166<H>  4.2   |  34<H>  |  0.66    Ca    9.1      2023 05:59  Mg     2.4         TPro  6.3  /  Alb  3.3  /  TBili  1.1  /  DBili  x   /  AST  22  /  ALT  39  /  AlkPhos  106  11-07        PT/INR - ( 2023 16:20 )   PT: 11.5 sec;   INR: 0.98 ratio         PTT - ( 2023 05:59 )  PTT:> 200 sec  Urinalysis Basic - ( 2023 06:30 )    Color: Yellow / Appearance: Clear / S.028 / pH: x  Gluc: x / Ketone: Negative mg/dL  / Bili: Negative / Urobili: 1.0 mg/dL   Blood: x / Protein: Negative mg/dL / Nitrite: Negative   Leuk Esterase: Negative / RBC: x / WBC x   Sq Epi: x / Non Sq Epi: x / Bacteria: x      I&O's Summary    2023 07:01  -  2023 07:00  --------------------------------------------------------  IN: 0 mL / OUT: 200 mL / NET: -200 mL      BNP  RADIOLOGY & ADDITIONAL STUDIES: Patient is a 89y old  Female who presents with a chief complaint of Acute Hypoxic Respiratory Failure (2023 08:01)      HPI:  89 yo F with PMHx of HTN, HLD, T2DM, R Corneal Transplant, PVD, PUD, and Afib not on home AC presents to the ED with 3 weeks of persistent cough and cold symptoms. Pt states that 3 weeks ago she saw her Vascular doctor to address worsening lower extremity edema and advised leg elevation. Pt elevated her legs at night and saw reduction in swelling, but developed a persistent cough since. Pt saw her PCP 2 weeks ago who prescribed a course of Zithromax without improvement of symptoms. Pt seen in [facility] ED on 10/23 following a fall and was also found to be mildly volume overloaded and started a short course of Lasix with improvement of swelling. Pt endorses productive cough, increased sputum production, intermittent palpitations, numbness and tingling in bilateral feet 2/2 PVD, and fatigue. Recently, pt has endorses suprapubic discomfort and dysuria. Pt still complains of cough with some improvement with current treatment. Denies fever, chills, dizziness, CP, palpitations currently, SOB, nausea, abd pain.    ED Course:   Vitals: BP: 172/90, HR: 78, Temp: 97, RR: 18, SpO2: 95% on 4 L NC   Labs: CO2 33, BUN 31, Serum Glucose 177, ProBNP 993, Entero/Rhino+   CXR: No change heart mediastinum. No consolidation pneumothorax or effusion. Old bilateral rib fractures. Degenerative change bilateral shoulders   CT Chest and Angio: No pulmonary arterial emboli. Non-opacification of the upper aspect of the left atrial appendage. Differential diagnosis include slow blood flow/mixing artifact versus thrombus. Contrast-enhanced chest CT with delayed imaging can be performed for further evaluation as clinically warranted.  CT Head and Cervical Spine: No acute intracranial hemorrhage or mass effect. No CT evidence of cervical spine fracture.  EK bpm, Atrial fibrillation Moderate voltage criteria for LVH, ST & T wave abnormality, consider lateral ischemia  Received in the ED: No interventions provided    (2023 20:45)      PAST MEDICAL & SURGICAL HISTORY:  Hyperlipidemia      Peptic ulcer      HTN (hypertension)      Osteoporosis      Irritable bowel syndrome      Cellulitis      Fractured pelvis      Pancreatic mass      S/P hip replacement  partial, left      History of appendectomy      History of tonsillectomy          MEDICATIONS  (STANDING):  aspirin enteric coated 81 milliGRAM(s) Oral daily  atorvastatin 20 milliGRAM(s) Oral at bedtime  cholecalciferol 2000 Unit(s) Oral daily  dextrose 5%. 1000 milliLiter(s) (50 mL/Hr) IV Continuous <Continuous>  dextrose 5%. 1000 milliLiter(s) (100 mL/Hr) IV Continuous <Continuous>  dextrose 50% Injectable 25 Gram(s) IV Push once  dextrose 50% Injectable 12.5 Gram(s) IV Push once  dextrose 50% Injectable 25 Gram(s) IV Push once  famotidine    Tablet 20 milliGRAM(s) Oral daily  furosemide   Injectable 40 milliGRAM(s) IV Push daily  glucagon  Injectable 1 milliGRAM(s) IntraMuscular once  heparin  Infusion.  Unit(s)/Hr (8 mL/Hr) IV Continuous <Continuous>  influenza  Vaccine (HIGH DOSE) 0.7 milliLiter(s) IntraMuscular once  insulin lispro (ADMELOG) corrective regimen sliding scale   SubCutaneous three times a day before meals  insulin lispro (ADMELOG) corrective regimen sliding scale   SubCutaneous at bedtime  losartan 50 milliGRAM(s) Oral daily  metoprolol succinate ER 25 milliGRAM(s) Oral daily  multivitamin/minerals 1 Tablet(s) Oral daily  polyethylene glycol 3350 17 Gram(s) Oral daily  sodium chloride 0.65% Nasal 1 Spray(s) Both Nostrils two times a day  sodium chloride 3%  Inhalation 4 milliLiter(s) Inhalation every 12 hours  tobramycin/dexamethasone Suspension 2 Drop(s) Both EYES two times a day    MEDICATIONS  (PRN):  acetaminophen     Tablet .. 650 milliGRAM(s) Oral every 6 hours PRN Temp greater or equal to 38C (100.4F), Mild Pain (1 - 3)  albuterol    0.083% 2.5 milliGRAM(s) Nebulizer every 4 hours PRN Shortness of Breath and/or Wheezing  albuterol/ipratropium for Nebulization. 3 milliLiter(s) Nebulizer once PRN Shortness of Breath and/or Wheezing  aluminum hydroxide/magnesium hydroxide/simethicone Suspension 30 milliLiter(s) Oral every 4 hours PRN Dyspepsia  benzonatate 100 milliGRAM(s) Oral every 8 hours PRN Cough  dextrose Oral Gel 15 Gram(s) Oral once PRN Blood Glucose LESS THAN 70 milliGRAM(s)/deciliter  guaiFENesin Oral Liquid (Sugar-Free) 200 milliGRAM(s) Oral every 6 hours PRN Cough  heparin   Injectable 3500 Unit(s) IV Push every 6 hours PRN For aPTT less than 40  heparin   Injectable 1500 Unit(s) IV Push every 6 hours PRN For aPTT between 40 - 57  melatonin 3 milliGRAM(s) Oral at bedtime PRN Insomnia  ondansetron Injectable 4 milliGRAM(s) IV Push every 8 hours PRN Nausea and/or Vomiting      FAMILY HISTORY:  FH: MI of father at age 60    ROS:  Constitutional: +fatigue. denies fever, chills  HEENT: +cough. denies blurry vision, difficulty hearing  Respiratory: denies SOB, cough  Cardiovascular: denies CP, palpitations, orthopnea, PND. +LE edema  Gastrointestinal: denies nausea, vomiting, abdominal pain  Genitourinary: denies urinary changes  Skin: Denies rashes, itching  Neurologic: denies headache, weakness, dizziness  Hematology/Oncology: denies bleeding, easy bruising  ROS negative except as noted above      SOCIAL HISTORY:    No tobacco, Alcohol or Ddrug use    Vital Signs Last 24 Hrs  T(C): 36.4 (2023 04:38), Max: 36.7 (2023 23:54)  T(F): 97.6 (2023 04:38), Max: 98.1 (2023 23:54)  HR: 71 (2023 04:38) (71 - 93)  BP: 147/89 (2023 04:38) (126/81 - 172/90)  BP(mean): --  RR: 17 (2023 04:38) (17 - 18)  SpO2: 97% (2023 04:38) (95% - 99%)    Parameters below as of 2023 04:38  Patient On (Oxygen Delivery Method): nasal cannula  O2 Flow (L/min): 2      Physical Exam:  General: pleasant elderly female,, NAD  HEENT: NCAT, PERRLA, EOMI bl, moist mucous membranes   Neck: Supple, nontender, no mass  Neurology: A&Ox3, nonfocal, sensation intact   Respiratory: +coarse breathe sounds of right lung base. No Wheezing or rales  CV: irregular rate and rhythm, +S1/S2, no murmurs, rubs or gallops  Abdominal: Soft, NT, ND +BSx4, no palpable masses  Extremities: No C/C/E, + peripheral pulses  MSK: Normal ROM, no joint erythema or warmth, no joint swelling   Heme: No obvious ecchymosis or petechiae   Skin: bilateral lower extremity chronic skin changes and ecchymosis. dry      ECG: atrial fibrillation, moderate voltage criteria for LVH, ST and T wave abnormalities    I&O's Detail    2023 07:01  -  2023 07:00  --------------------------------------------------------  IN:  Total IN: 0 mL    OUT:    Voided (mL): 200 mL  Total OUT: 200 mL    Total NET: -200 mL          LABS:                        15.3   8.04  )-----------( 197      ( 2023 05:59 )             48.1     11-07    143  |  101  |  26<H>  ----------------------------<  166<H>  4.2   |  34<H>  |  0.66    Ca    9.1      2023 05:59  Mg     2.4     11-06    TPro  6.3  /  Alb  3.3  /  TBili  1.1  /  DBili  x   /  AST  22  /  ALT  39  /  AlkPhos  106  11-07        PT/INR - ( 2023 16:20 )   PT: 11.5 sec;   INR: 0.98 ratio         PTT - ( 2023 05:59 )  PTT:> 200 sec  Urinalysis Basic - ( 2023 06:30 )    Color: Yellow / Appearance: Clear / S.028 / pH: x  Gluc: x / Ketone: Negative mg/dL  / Bili: Negative / Urobili: 1.0 mg/dL   Blood: x / Protein: Negative mg/dL / Nitrite: Negative   Leuk Esterase: Negative / RBC: x / WBC x   Sq Epi: x / Non Sq Epi: x / Bacteria: x      I&O's Summary    2023 07:01  -  2023 07:00  --------------------------------------------------------  IN: 0 mL / OUT: 200 mL / NET: -200 mL    RADIOLOGY & ADDITIONAL STUDIES:    < from: CT Angio Chest PE Protocol w/ IV Cont (23 @ 19:06) >    ACC: [id number] EXAM:  CT ANGIO CHEST PULM [facility]   ORDERED BY:  OSBALDO PATRICK     PROCEDURE DATE:  2023          INTERPRETATION:  Clinical indication: Evaluate for pulmonary embolism.    CT angiogram of the chest was performed following intravenous   administration of 70 cc of Omnipaque-350. 30 cc of contrast was   discarded. MIP images are submitted.    No prior chest CTs are available for comparison. No pulmonary arterial   emboli.    No enlarged axillary, mediastinal or hilar lymph nodes.    No pericardial effusion. Heart size is enlarged with biatrial dilatation.   The left atrium measures about 5.1 cm in anteroposterior dimension.   Decreased or nonopacification of the upper aspect of the left atrial   appendage. Vascular calcifications with involvement of the aorta and the   coronary arteries.    No pleural effusion.    Evaluation of the upper abdomen demonstrate partially imaged   cholecystectomy clips.    Evaluation of the lungs demonstrate minimal bilateral lower lung areas of   linear or subsegmental atelectasis, left greater than right. Nonspecific   mild bilateral upper lung interlobular septal thickening appears   unchanged since the prior cervical spine CT of 2021.    No central endobronchial lesions. Secretions within the trachea extending   into the left mainstem bronchus. Small cluster of a few 2 or 3 mm nodular   opacities representing foci of impacted distal airways within the right   upper lobe may be due to mucus.    Degenerative changes of the spine and the shoulders. Old healed left rib   fractures.    Partially imaged mucosal thickening within the right maxillary sinus.    IMPRESSION: No pulmonary arterial emboli.    Nonopacification of the upper aspect of the left atrial appendage.   Differential diagnosis include slow blood flow/mixing artifact versus   thrombus. Contrast-enhanced chest CT with delayed imaging can be   performed for further evaluation as clinically warranted.    --- End of Report ---            ELIZABETH REINA MD; Attending Radiologist  This document has been electronically signed. 2023  7:20PM    < end of copied text >

## 2023-11-07 NOTE — CONSULT NOTE ADULT - SUBJECTIVE AND OBJECTIVE BOX
Date/Time Patient Seen:  		  Referring MD:   Data Reviewed	       Patient is a 89y old  Female who presents with a chief complaint of Acute Hypoxic Respiratory Failure (06 Nov 2023 20:45)      Subjective/HPI   89 yo F with PMHx of HTN, HLD, T2DM, R Corneal Transplant, PVD, PUD, and Afib not on home AC presents to the ED with 3 weeks of persistent cough and cold symptoms. Pt states that 3 weeks ago she saw her Vascular doctor to address worsening lower extremity edema and advised leg elevation. Pt elevated her legs at night and saw reduction in swelling, but developed a persistent cough since. Pt saw her PCP 2 weeks ago who prescribed a course of Zithromax without improvement of symptoms. Pt seen in [facility] ED on 10/23 following a fall and was also found to be mildly volume overloaded and started a short course of Lasix with improvement of swelling. Pt endorses productive cough, increased sputum production, intermittent palpitations, numbness and tingling in bilateral feet 2/2 PVD, and fatigue. Recently, pt has endorses suprapubic discomfort and dysuria. No other complaints at this time.   PAST MEDICAL & SURGICAL HISTORY:  Hyperlipidemia    Peptic ulcer    HTN (hypertension)    Osteoporosis    Irritable bowel syndrome    DM (diabetes mellitus)  type 2    Cellulitis    Fractured pelvis    Pancreatic mass    S/P hip replacement  partial, left    History of appendectomy    History of tonsillectomy      FAMILY HISTORY:  FH: hypertension.     Social History:  · Substance use	No  · Social History (marital status, living situation, occupation, and sexual history)	Lives: Alone with home health aids for 18 hrs/day   ADLs: Independently   Diet: DASH/TLC   Alcohol Use: Denies   Tobacco Use: Denies   Recreational Drug Use: Denies     Tobacco Screening:  · Core Measure Site	Yes  · Has the patient used tobacco in the past 30 days?	No    Risk Assessment:    Present on Admission:  Deep Venous Thrombosis	no  Pulmonary Embolus	no     HIV Screening:  · In accordance with [place] State law, we offer every patient who comes to our ED an HIV test. Would you like to be tested today?	Opt out        Medication list         MEDICATIONS  (STANDING):  acetylcysteine 10%  Inhalation 4 milliLiter(s) Inhalation two times a day  aspirin enteric coated 81 milliGRAM(s) Oral daily  atorvastatin 20 milliGRAM(s) Oral at bedtime  cholecalciferol 2000 Unit(s) Oral daily  dextrose 5%. 1000 milliLiter(s) (50 mL/Hr) IV Continuous <Continuous>  dextrose 5%. 1000 milliLiter(s) (100 mL/Hr) IV Continuous <Continuous>  dextrose 50% Injectable 25 Gram(s) IV Push once  dextrose 50% Injectable 12.5 Gram(s) IV Push once  dextrose 50% Injectable 25 Gram(s) IV Push once  famotidine    Tablet 20 milliGRAM(s) Oral daily  furosemide   Injectable 40 milliGRAM(s) IV Push daily  glucagon  Injectable 1 milliGRAM(s) IntraMuscular once  heparin  Infusion.  Unit(s)/Hr (8 mL/Hr) IV Continuous <Continuous>  influenza  Vaccine (HIGH DOSE) 0.7 milliLiter(s) IntraMuscular once  insulin lispro (ADMELOG) corrective regimen sliding scale   SubCutaneous three times a day before meals  insulin lispro (ADMELOG) corrective regimen sliding scale   SubCutaneous at bedtime  losartan 50 milliGRAM(s) Oral daily  metoprolol succinate ER 25 milliGRAM(s) Oral daily  multivitamin/minerals 1 Tablet(s) Oral daily  polyethylene glycol 3350 17 Gram(s) Oral daily  tobramycin/dexamethasone Suspension 2 Drop(s) Both EYES two times a day    MEDICATIONS  (PRN):  acetaminophen     Tablet .. 650 milliGRAM(s) Oral every 6 hours PRN Temp greater or equal to 38C (100.4F), Mild Pain (1 - 3)  albuterol/ipratropium for Nebulization. 3 milliLiter(s) Nebulizer once PRN Shortness of Breath and/or Wheezing  aluminum hydroxide/magnesium hydroxide/simethicone Suspension 30 milliLiter(s) Oral every 4 hours PRN Dyspepsia  dextrose Oral Gel 15 Gram(s) Oral once PRN Blood Glucose LESS THAN 70 milliGRAM(s)/deciliter  heparin   Injectable 3500 Unit(s) IV Push every 6 hours PRN For aPTT less than 40  heparin   Injectable 1500 Unit(s) IV Push every 6 hours PRN For aPTT between 40 - 57  melatonin 3 milliGRAM(s) Oral at bedtime PRN Insomnia  ondansetron Injectable 4 milliGRAM(s) IV Push every 8 hours PRN Nausea and/or Vomiting         Vitals log        ICU Vital Signs Last 24 Hrs  T(C): 36.4 (07 Nov 2023 04:38), Max: 36.7 (06 Nov 2023 23:54)  T(F): 97.6 (07 Nov 2023 04:38), Max: 98.1 (06 Nov 2023 23:54)  HR: 71 (07 Nov 2023 04:38) (71 - 93)  BP: 147/89 (07 Nov 2023 04:38) (126/81 - 172/90)  BP(mean): --  ABP: --  ABP(mean): --  RR: 17 (07 Nov 2023 04:38) (17 - 18)  SpO2: 97% (07 Nov 2023 04:38) (95% - 99%)    O2 Parameters below as of 07 Nov 2023 04:38  Patient On (Oxygen Delivery Method): nasal cannula  O2 Flow (L/min): 2               Input and Output:  I&O's Detail    06 Nov 2023 07:01  -  07 Nov 2023 07:00  --------------------------------------------------------  IN:  Total IN: 0 mL    OUT:    Voided (mL): 200 mL  Total OUT: 200 mL    Total NET: -200 mL          Lab Data                        15.3   8.04  )-----------( 197      ( 07 Nov 2023 05:59 )             48.1     11-07    143  |  101  |  26<H>  ----------------------------<  166<H>  4.2   |  34<H>  |  0.66    Ca    9.1      07 Nov 2023 05:59  Mg     2.4     11-06    TPro  6.3  /  Alb  3.3  /  TBili  1.1  /  DBili  x   /  AST  22  /  ALT  39  /  AlkPhos  106  11-07            Review of Systems	  on o2 support  cough  weakness      Objective     Physical Examination  heart s1s2  lung dc BS  head nc  verbal  alert  cn grossly int        Pertinent Lab findings & Imaging      Tawanda:  NO   Adequate UO     I&O's Detail    06 Nov 2023 07:01  -  07 Nov 2023 07:00  --------------------------------------------------------  IN:  Total IN: 0 mL    OUT:    Voided (mL): 200 mL  Total OUT: 200 mL    Total NET: -200 mL               Discussed with:     Cultures:	        Radiology      ACC: [id number] EXAM:  CT ANGIO CHEST PULM [facility]   ORDERED BY:  OSBALDO PATRICK     PROCEDURE DATE:  11/06/2023          INTERPRETATION:  Clinical indication: Evaluate for pulmonary embolism.    CT angiogram of the chest was performed following intravenous   administration of 70 cc of Omnipaque-350. 30 cc of contrast was   discarded. MIP images are submitted.    No prior chest CTs are available for comparison. No pulmonary arterial   emboli.    No enlarged axillary, mediastinal or hilar lymph nodes.    No pericardial effusion. Heart size is enlarged with biatrial dilatation.   The left atrium measures about 5.1 cm in anteroposterior dimension.   Decreased or nonopacification of the upper aspect of the left atrial   appendage. Vascular calcifications with involvement of the aorta and the   coronary arteries.    No pleural effusion.    Evaluation of the upper abdomen demonstrate partially imaged   cholecystectomy clips.    Evaluation of the lungs demonstrate minimal bilateral lower lung areas of   linear or subsegmental atelectasis, left greater than right. Nonspecific   mild bilateral upper lung interlobular septal thickening appears   unchanged since the prior cervical spine CT of July 13, 2021.    No central endobronchial lesions. Secretions within the trachea extending   into the left mainstem bronchus. Small cluster of a few 2 or 3 mm nodular   opacities representing foci of impacted distal airways within the right   upper lobe may be due to mucus.    Degenerative changes of the spine and the shoulders. Old healed left rib   fractures.    Partially imaged mucosal thickening within the right maxillary sinus.    IMPRESSION: No pulmonary arterial emboli.    Nonopacification of the upper aspect of the left atrial appendage.   Differential diagnosis include slow blood flow/mixing artifact versus   thrombus. Contrast-enhanced chest CT with delayed imaging can be   performed for further evaluation as clinically warranted.    --- End of Report ---            ELIZABETH REINA MD; Attending Radiologist  This document has been electronically signed. Nov 6 2023  7:20PM

## 2023-11-08 LAB
ANION GAP SERPL CALC-SCNC: 8 MMOL/L — SIGNIFICANT CHANGE UP (ref 5–17)
ANION GAP SERPL CALC-SCNC: 8 MMOL/L — SIGNIFICANT CHANGE UP (ref 5–17)
APTT BLD: 33.5 SEC — SIGNIFICANT CHANGE UP (ref 24.5–35.6)
APTT BLD: 33.5 SEC — SIGNIFICANT CHANGE UP (ref 24.5–35.6)
BUN SERPL-MCNC: 38 MG/DL — HIGH (ref 7–23)
BUN SERPL-MCNC: 38 MG/DL — HIGH (ref 7–23)
CALCIUM SERPL-MCNC: 9.2 MG/DL — SIGNIFICANT CHANGE UP (ref 8.5–10.1)
CALCIUM SERPL-MCNC: 9.2 MG/DL — SIGNIFICANT CHANGE UP (ref 8.5–10.1)
CHLORIDE SERPL-SCNC: 102 MMOL/L — SIGNIFICANT CHANGE UP (ref 96–108)
CHLORIDE SERPL-SCNC: 102 MMOL/L — SIGNIFICANT CHANGE UP (ref 96–108)
CO2 SERPL-SCNC: 32 MMOL/L — HIGH (ref 22–31)
CO2 SERPL-SCNC: 32 MMOL/L — HIGH (ref 22–31)
CREAT SERPL-MCNC: 0.91 MG/DL — SIGNIFICANT CHANGE UP (ref 0.5–1.3)
CREAT SERPL-MCNC: 0.91 MG/DL — SIGNIFICANT CHANGE UP (ref 0.5–1.3)
EGFR: 58 ML/MIN/1.73M2 — LOW
EGFR: 58 ML/MIN/1.73M2 — LOW
GLUCOSE SERPL-MCNC: 165 MG/DL — HIGH (ref 70–99)
GLUCOSE SERPL-MCNC: 165 MG/DL — HIGH (ref 70–99)
HCT VFR BLD CALC: 46 % — HIGH (ref 34.5–45)
HCT VFR BLD CALC: 46 % — HIGH (ref 34.5–45)
HGB BLD-MCNC: 14.8 G/DL — SIGNIFICANT CHANGE UP (ref 11.5–15.5)
HGB BLD-MCNC: 14.8 G/DL — SIGNIFICANT CHANGE UP (ref 11.5–15.5)
MCHC RBC-ENTMCNC: 29.1 PG — SIGNIFICANT CHANGE UP (ref 27–34)
MCHC RBC-ENTMCNC: 29.1 PG — SIGNIFICANT CHANGE UP (ref 27–34)
MCHC RBC-ENTMCNC: 32.2 GM/DL — SIGNIFICANT CHANGE UP (ref 32–36)
MCHC RBC-ENTMCNC: 32.2 GM/DL — SIGNIFICANT CHANGE UP (ref 32–36)
MCV RBC AUTO: 90.6 FL — SIGNIFICANT CHANGE UP (ref 80–100)
MCV RBC AUTO: 90.6 FL — SIGNIFICANT CHANGE UP (ref 80–100)
NRBC # BLD: 0 /100 WBCS — SIGNIFICANT CHANGE UP (ref 0–0)
NRBC # BLD: 0 /100 WBCS — SIGNIFICANT CHANGE UP (ref 0–0)
PLATELET # BLD AUTO: 186 K/UL — SIGNIFICANT CHANGE UP (ref 150–400)
PLATELET # BLD AUTO: 186 K/UL — SIGNIFICANT CHANGE UP (ref 150–400)
POTASSIUM SERPL-MCNC: 5.1 MMOL/L — SIGNIFICANT CHANGE UP (ref 3.5–5.3)
POTASSIUM SERPL-MCNC: 5.1 MMOL/L — SIGNIFICANT CHANGE UP (ref 3.5–5.3)
POTASSIUM SERPL-SCNC: 5.1 MMOL/L — SIGNIFICANT CHANGE UP (ref 3.5–5.3)
POTASSIUM SERPL-SCNC: 5.1 MMOL/L — SIGNIFICANT CHANGE UP (ref 3.5–5.3)
RBC # BLD: 5.08 M/UL — SIGNIFICANT CHANGE UP (ref 3.8–5.2)
RBC # BLD: 5.08 M/UL — SIGNIFICANT CHANGE UP (ref 3.8–5.2)
RBC # FLD: 12.8 % — SIGNIFICANT CHANGE UP (ref 10.3–14.5)
RBC # FLD: 12.8 % — SIGNIFICANT CHANGE UP (ref 10.3–14.5)
SODIUM SERPL-SCNC: 142 MMOL/L — SIGNIFICANT CHANGE UP (ref 135–145)
SODIUM SERPL-SCNC: 142 MMOL/L — SIGNIFICANT CHANGE UP (ref 135–145)
WBC # BLD: 8.27 K/UL — SIGNIFICANT CHANGE UP (ref 3.8–10.5)
WBC # BLD: 8.27 K/UL — SIGNIFICANT CHANGE UP (ref 3.8–10.5)
WBC # FLD AUTO: 8.27 K/UL — SIGNIFICANT CHANGE UP (ref 3.8–10.5)
WBC # FLD AUTO: 8.27 K/UL — SIGNIFICANT CHANGE UP (ref 3.8–10.5)

## 2023-11-08 PROCEDURE — 99233 SBSQ HOSP IP/OBS HIGH 50: CPT

## 2023-11-08 PROCEDURE — 93010 ELECTROCARDIOGRAM REPORT: CPT

## 2023-11-08 RX ORDER — INSULIN LISPRO 100/ML
VIAL (ML) SUBCUTANEOUS EVERY 6 HOURS
Refills: 0 | Status: DISCONTINUED | OUTPATIENT
Start: 2023-11-08 | End: 2023-11-09

## 2023-11-08 RX ORDER — FLUTICASONE PROPIONATE 50 MCG
1 SPRAY, SUSPENSION NASAL
Refills: 0 | Status: DISCONTINUED | OUTPATIENT
Start: 2023-11-08 | End: 2023-11-09

## 2023-11-08 RX ORDER — MINERAL OIL
133 OIL (ML) MISCELLANEOUS DAILY
Refills: 0 | Status: DISCONTINUED | OUTPATIENT
Start: 2023-11-08 | End: 2023-11-09

## 2023-11-08 RX ADMIN — APIXABAN 2.5 MILLIGRAM(S): 2.5 TABLET, FILM COATED ORAL at 12:11

## 2023-11-08 RX ADMIN — Medication 1 SPRAY(S): at 17:02

## 2023-11-08 RX ADMIN — Medication 2: at 12:14

## 2023-11-08 RX ADMIN — Medication 1 SPRAY(S): at 17:00

## 2023-11-08 RX ADMIN — Medication 1 SPRAY(S): at 06:24

## 2023-11-08 RX ADMIN — TOBRAMYCIN AND DEXAMETHASONE 2 DROP(S): 1; 3 SUSPENSION/ DROPS OPHTHALMIC at 17:07

## 2023-11-08 RX ADMIN — Medication 81 MILLIGRAM(S): at 12:12

## 2023-11-08 RX ADMIN — TOBRAMYCIN AND DEXAMETHASONE 2 DROP(S): 1; 3 SUSPENSION/ DROPS OPHTHALMIC at 06:32

## 2023-11-08 RX ADMIN — Medication 25 MILLIGRAM(S): at 06:20

## 2023-11-08 RX ADMIN — Medication 100 MILLIGRAM(S): at 21:57

## 2023-11-08 RX ADMIN — ONDANSETRON 4 MILLIGRAM(S): 8 TABLET, FILM COATED ORAL at 17:04

## 2023-11-08 RX ADMIN — LOSARTAN POTASSIUM 50 MILLIGRAM(S): 100 TABLET, FILM COATED ORAL at 06:19

## 2023-11-08 RX ADMIN — APIXABAN 2.5 MILLIGRAM(S): 2.5 TABLET, FILM COATED ORAL at 03:17

## 2023-11-08 RX ADMIN — Medication 20 MILLIGRAM(S): at 06:20

## 2023-11-08 RX ADMIN — FAMOTIDINE 20 MILLIGRAM(S): 10 INJECTION INTRAVENOUS at 12:11

## 2023-11-08 RX ADMIN — Medication 1 ENEMA: at 13:44

## 2023-11-08 RX ADMIN — Medication 2000 UNIT(S): at 12:11

## 2023-11-08 RX ADMIN — Medication 1: at 17:05

## 2023-11-08 RX ADMIN — Medication 1 TABLET(S): at 12:11

## 2023-11-08 RX ADMIN — POLYETHYLENE GLYCOL 3350 17 GRAM(S): 17 POWDER, FOR SOLUTION ORAL at 12:14

## 2023-11-08 RX ADMIN — ATORVASTATIN CALCIUM 20 MILLIGRAM(S): 80 TABLET, FILM COATED ORAL at 21:57

## 2023-11-08 RX ADMIN — Medication 1: at 08:10

## 2023-11-08 NOTE — PROGRESS NOTE ADULT - ASSESSMENT
89 yo F with PMHx of HTN, HLD, T2DM, R Corneal Transplant, PVD, PUD, and Afib not on home AC presents to the ED with 3 weeks of persistent cough and cold symptoms, admitted for acute hypoxic respiratory failure with abnormal imaging findings    #Acute hypoxic respiratory failure.   multifactorial 2/2 entero/rhinovirus, atelectasis, post nasal drip, ?aspiration, ?dCHF  on room air  switch to tesalon as does not feel relief with tussin  pulm cs and ID cs appreciated  MBS tomorrow    ?HFpEF  +orthopnea and edema  ProBNP 993  Recent trial of lasix for chronic bilateral lower extremity edema with improvement   cardiology following    ##Afib.  high CHADSvasc2 score  Home medication of Metoprolol   concern for left atrial appendage clot  TTE wnl  however patient with hx of afib not on home AC dt falls  eliquis 2.5mg BID after cards discussed risks vs benefits w dgtr who agreed     HTN (hypertension).   Continue home Metoprolol and Losartan    # History of DM2  - Not on home medications, diet controlled    #PUD (peptic ulcer disease).    Continue home Famotidine.

## 2023-11-08 NOTE — PHYSICAL THERAPY INITIAL EVALUATION ADULT - ADDITIONAL COMMENTS
Pt lives in a house w/ 1step to enter.  Pt report stays on main level of house.  Pt report is a community ambulator.  Pt has a home health aide 18 hours/day x 7 days/wk.

## 2023-11-08 NOTE — DIETITIAN INITIAL EVALUATION ADULT - PERTINENT LABORATORY DATA
11-08    142  |  102  |  38<H>  ----------------------------<  165<H>  5.1   |  32<H>  |  0.91    Ca    9.2      08 Nov 2023 06:37  Mg     2.4     11-06    TPro  6.3  /  Alb  3.3  /  TBili  1.1  /  DBili  x   /  AST  22  /  ALT  39  /  AlkPhos  106  11-07  POCT Blood Glucose.: 226 mg/dL (11-08-23 @ 11:44)  A1C with Estimated Average Glucose Result: 8.4 % (11-07-23 @ 05:59)

## 2023-11-08 NOTE — PROGRESS NOTE ADULT - ASSESSMENT
89 yo F with PMHx of HTN, HLD, T2DM, R Corneal Transplant, PVD, PUD, and Afib not on home AC presents to the ED with 3 weeks of persistent cough and cold symptoms.     Afib   - p/w persistent cough and cold sx x 3 weeks  - CTA chest: No pulmonary arterial emboli. Non-opacification of the upper aspect of the left atrial appendage. Differential diagnosis include slow blood flow/mixing artifact versus thrombus. Contrast-enhanced chest CT with delayed imaging can be performed for further evaluation as clinically warranted.  - unclear if true thrombus or artifact    - TTE (11/7/2023) LVSF normal, EF 58%  increased LV mass and concentric hypertrophy, mod LVH, mild MR, AR. No echocardiographic evidence of pulmonary hypertension.    - known hx of Afib not on home AC d/t ecchymosis   - was on heparin gtt now on Eliquis 2.5mg BID    - LE edema improved, s/p Lasix IV in ED   - Pro BNP  993  - continue Lasix 20 mg PO daily     - ECG: atrial fibrillation, moderate voltage criteria for LVH, ST and T wave abnormalities  - No clear evidence of acute ischemia  - Troponin negative x 1  - continue home atorvastatin     - BP, HR stable and controlled   - Continue Losartan and Toprol with hold  parameters  - Continue to monitor hemodynamics     - Monitor and replete Lytes. Keep K > 4 and Mg > 2  - Will continue to follow.    Celi Sheets [facility]  Nurse Practitioner - Cardiology   call TEAMS     89 yo F with PMHx of HTN, HLD, T2DM, R Corneal Transplant, PVD, PUD, and Afib not on home AC presents to the ED with 3 weeks of persistent cough and cold symptoms.     Afib   - p/w persistent cough and cold sx x 3 weeks  - CTA chest: No pulmonary arterial emboli. Non-opacification of the upper aspect of the left atrial appendage. Differential diagnosis include slow blood flow/mixing artifact versus thrombus. Contrast-enhanced chest CT with delayed imaging can be performed for further evaluation as clinically warranted.  - unclear if true thrombus or artifact    - TTE (11/7/2023) LVSF normal, EF 58%  increased LV mass and concentric hypertrophy, mod LVH, mild MR, AR. No echocardiographic evidence of pulmonary hypertension. The left ventricular diastolic function is indeterminate. Analysis of left ventricular diastolic function and filling pressure is made challenging by the presence of atrial fibrillation    - known hx of Afib not on home AC d/t ecchymosis   - CHADS-VASc : 6   - continue Eliquis 2.5mg BID    - LE edema improved, s/p Lasix IV in ED   - Pro BNP  993  - continue Lasix 20 mg PO daily     - ECG: atrial fibrillation, moderate voltage criteria for LVH, ST and T wave abnormalities  - No clear evidence of acute ischemia  - Troponin negative x 1  - continue home atorvastatin     - BP, HR stable and controlled   - Continue Losartan and Toprol with hold  parameters  - Continue to monitor hemodynamics     - Monitor and replete Lytes. Keep K > 4 and Mg > 2  - will follow up with her o/p Card post discharge   - Will continue to follow.    Celi Sheets [facility]  Nurse Practitioner - Cardiology   call TEAMS

## 2023-11-08 NOTE — PROGRESS NOTE ADULT - SUBJECTIVE AND OBJECTIVE BOX
Date/Time Patient Seen:  		  Referring MD:   Data Reviewed	       Patient is a 89y old  Female who presents with a chief complaint of Acute Hypoxic Respiratory Failure (07 Nov 2023 12:56)      Subjective/HPI     PAST MEDICAL & SURGICAL HISTORY:  Hyperlipidemia    Peptic ulcer    HTN (hypertension)    Osteoporosis    Irritable bowel syndrome    DM (diabetes mellitus)  type 2    Cellulitis    Fractured pelvis    Pancreatic mass    S/P hip replacement  partial, left    History of appendectomy    History of tonsillectomy          Medication list         MEDICATIONS  (STANDING):  apixaban 2.5 milliGRAM(s) Oral every 12 hours  aspirin enteric coated 81 milliGRAM(s) Oral daily  atorvastatin 20 milliGRAM(s) Oral at bedtime  cholecalciferol 2000 Unit(s) Oral daily  dextrose 5%. 1000 milliLiter(s) (100 mL/Hr) IV Continuous <Continuous>  dextrose 5%. 1000 milliLiter(s) (50 mL/Hr) IV Continuous <Continuous>  dextrose 50% Injectable 12.5 Gram(s) IV Push once  dextrose 50% Injectable 25 Gram(s) IV Push once  dextrose 50% Injectable 25 Gram(s) IV Push once  famotidine    Tablet 20 milliGRAM(s) Oral daily  furosemide    Tablet 20 milliGRAM(s) Oral daily  glucagon  Injectable 1 milliGRAM(s) IntraMuscular once  influenza  Vaccine (HIGH DOSE) 0.7 milliLiter(s) IntraMuscular once  insulin lispro (ADMELOG) corrective regimen sliding scale   SubCutaneous three times a day before meals  insulin lispro (ADMELOG) corrective regimen sliding scale   SubCutaneous at bedtime  losartan 50 milliGRAM(s) Oral daily  metoprolol succinate ER 25 milliGRAM(s) Oral daily  multivitamin/minerals 1 Tablet(s) Oral daily  polyethylene glycol 3350 17 Gram(s) Oral daily  sodium chloride 0.65% Nasal 1 Spray(s) Both Nostrils two times a day  sodium chloride 3%  Inhalation 4 milliLiter(s) Inhalation every 12 hours  tobramycin/dexamethasone Suspension 2 Drop(s) Both EYES two times a day    MEDICATIONS  (PRN):  acetaminophen     Tablet .. 650 milliGRAM(s) Oral every 6 hours PRN Temp greater or equal to 38C (100.4F), Mild Pain (1 - 3)  albuterol    0.083% 2.5 milliGRAM(s) Nebulizer every 4 hours PRN Shortness of Breath and/or Wheezing  aluminum hydroxide/magnesium hydroxide/simethicone Suspension 30 milliLiter(s) Oral every 4 hours PRN Dyspepsia  benzonatate 100 milliGRAM(s) Oral every 8 hours PRN Cough  dextrose Oral Gel 15 Gram(s) Oral once PRN Blood Glucose LESS THAN 70 milliGRAM(s)/deciliter  guaiFENesin Oral Liquid (Sugar-Free) 200 milliGRAM(s) Oral every 6 hours PRN Cough  melatonin 3 milliGRAM(s) Oral at bedtime PRN Insomnia  ondansetron Injectable 4 milliGRAM(s) IV Push every 8 hours PRN Nausea and/or Vomiting         Vitals log        ICU Vital Signs Last 24 Hrs  T(C): 36.3 (07 Nov 2023 20:56), Max: 36.3 (07 Nov 2023 11:45)  T(F): 97.3 (07 Nov 2023 20:56), Max: 97.3 (07 Nov 2023 11:45)  HR: 91 (07 Nov 2023 20:56) (71 - 94)  BP: 120/79 (07 Nov 2023 20:56) (107/72 - 120/79)  BP(mean): --  ABP: --  ABP(mean): --  RR: 17 (07 Nov 2023 20:56) (17 - 18)  SpO2: 91% (07 Nov 2023 20:56) (91% - 96%)    O2 Parameters below as of 07 Nov 2023 20:56  Patient On (Oxygen Delivery Method): room air                 Input and Output:  I&O's Detail    06 Nov 2023 07:01  -  07 Nov 2023 07:00  --------------------------------------------------------  IN:    Heparin Infusion: 8 mL  Total IN: 8 mL    OUT:    Voided (mL): 200 mL  Total OUT: 200 mL    Total NET: -192 mL      07 Nov 2023 07:01  -  08 Nov 2023 05:02  --------------------------------------------------------  IN:    Heparin Infusion: 8 mL    Oral Fluid: 720 mL  Total IN: 728 mL    OUT:    Voided (mL): 900 mL  Total OUT: 900 mL    Total NET: -172 mL          Lab Data                        15.3   8.04  )-----------( 197      ( 07 Nov 2023 05:59 )             48.1     11-07    143  |  101  |  26<H>  ----------------------------<  166<H>  4.2   |  34<H>  |  0.66    Ca    9.1      07 Nov 2023 05:59  Mg     2.4     11-06    TPro  6.3  /  Alb  3.3  /  TBili  1.1  /  DBili  x   /  AST  22  /  ALT  39  /  AlkPhos  106  11-07            Review of Systems	      Objective     Physical Examination    heart s1s2  lung dec BS  head nc      Pertinent Lab findings & Imaging      Tawanda:  NO   Adequate UO     I&O's Detail    06 Nov 2023 07:01  -  07 Nov 2023 07:00  --------------------------------------------------------  IN:    Heparin Infusion: 8 mL  Total IN: 8 mL    OUT:    Voided (mL): 200 mL  Total OUT: 200 mL    Total NET: -192 mL      07 Nov 2023 07:01  -  08 Nov 2023 05:02  --------------------------------------------------------  IN:    Heparin Infusion: 8 mL    Oral Fluid: 720 mL  Total IN: 728 mL    OUT:    Voided (mL): 900 mL  Total OUT: 900 mL    Total NET: -172 mL               Discussed with:     Cultures:	        Radiology

## 2023-11-08 NOTE — SWALLOW BEDSIDE ASSESSMENT ADULT - SWALLOW EVAL: DIAGNOSIS
1. Functional oral phase for puree, mildly-thick and thin liquids marked by adequate acceptance and containment, adequate bolus manipulation, adequate oral transit and adequate oral clearance. 2. Moderate pharyngeal dysphagia for aforementioned consistencies marked by hyolaryngeal excursion present upon palpation, noted effortful swallows with multiple swallows likely indicative of reduced pharyngeal clearance, and presence of overt s/s of penetration/aspiration evidenced by immediate coughing with puree and delayed coughing with mildly-thick and thin liquids. Coughing appears to be exacerbated by PO intake. Patient further endorses a stuck sensation which is resolved with use of multiple effortful swallows.

## 2023-11-08 NOTE — PHYSICAL THERAPY INITIAL EVALUATION ADULT - PERTINENT HX OF CURRENT PROBLEM, REHAB EVAL
89 yo F with PMHx of HTN, HLD, T2DM, R Corneal Transplant, PVD, PUD, and Afib not on home AC presents to the ED with 3 weeks of persistent cough and cold symptoms. p/w persistent cough and cold sx x 3 weeks CTA chest: No pulmonary arterial emboli. Non-opacification of the upper aspect of the left atrial appendage. Differential diagnosis include slow blood flow/mixing artifact versus thrombus. +RVP

## 2023-11-08 NOTE — DIETITIAN INITIAL EVALUATION ADULT - PROBLEM SELECTOR PLAN 3
- CT Chest and Angio: No pulmonary arterial emboli. Non-opacification of the upper aspect of the left atrial appendage. Differential diagnosis include slow blood flow/mixing artifact versus thrombus   - Possible thrombus given hx of afib and not on AC   - TTE ordered for AM, follow up results  - Heparin gtt pending above   - Cardiology consulted, Dr. Contreras, follow up recommendations

## 2023-11-08 NOTE — SWALLOW BEDSIDE ASSESSMENT ADULT - COMMENTS
As per Internal Medicine note dated 11/7/23 "89 yo F with PMHx of HTN, HLD, T2DM, R Corneal Transplant, PVD, PUD, and Afib not on home AC presents to the ED with 3 weeks of persistent cough and cold symptoms, admitted for acute hypoxic respiratory failure with abnormal imagining findings"    CXR 11/6/23 "IMPRESSION: No change heart mediastinum. No consolidation pneumothorax or effusion. Old bilateral rib fractures. Degenerative change bilateral shoulders."    CT Chest and Angio 11/6/23 "IMPRESSION: No pulmonary arterial emboli. Nonopacification of the upper aspect of the left atrial appendage. Differential diagnosis include slow blood flow/mixing artifact versus thrombus. Contrast-enhanced chest CT with delayed imaging can be performed for further evaluation as clinically warranted."    Patient visited at bedside for clinical swallow evaluation with family friend present. Patient presents as awake and alert, able to follow 1-step directives and make basic wants/needs known. Patient endorses new onset of coughing for ~3 weeks with complaint of post nasal drip and mucus "stuck" in throat. Patient further endorses increased difficulty swallowing and needing to "swallow hard." Baseline congested cough noted.

## 2023-11-08 NOTE — DIETITIAN INITIAL EVALUATION ADULT - PROBLEM/PLAN-5
Airway patent, Nasal mucosa clear. Mouth with normal mucosa. Throat has no vesicles, no oropharyngeal exudates and uvula is midline.
DISPLAY PLAN FREE TEXT

## 2023-11-08 NOTE — PROGRESS NOTE ADULT - ASSESSMENT
89 yo F with PMHx of HTN, HLD, T2DM, R Corneal Transplant, PVD, PUD, and Afib not on home AC presents to the ED with 3 weeks of persistent cough and cold symptoms'  AF  HF  OP  OA  URI  Bronchitis  Upper Airway Cough Syndrome  HLD  HTN  DM  PVD  PUD  Atelectasis with Tracheal Secretions on CT chest    ID and Cardio Eval noted  TTE reviewed    cough rx regimen - tessalon - robitussin  hypersal mucolytics  nasal saline  [facility] rx regimen  diuresis as per Cardio  I and O  serial labs  replete lytes  BP control  on AC - AF  CT neg for PE  URI sx management  wean fio2  GOC discussion

## 2023-11-08 NOTE — DIETITIAN INITIAL EVALUATION ADULT - OTHER INFO
Reason for Admission: Acute Hypoxic Respiratory Failure  History of Present Illness:   89 yo F with PMHx of HTN, HLD, T2DM, R Corneal Transplant, PVD, PUD, and Afib not on home AC presents to the ED with 3 weeks of persistent cough and cold symptoms. Pt states that 3 weeks ago she saw her Vascular doctor to address worsening lower extremity edema and advised leg elevation. Pt elevated her legs at night and saw reduction in swelling, but developed a persistent cough since. Pt saw her PCP 2 weeks ago who prescribed a course of Zithromax without improvement of symptoms. Pt seen in [facility] ED on 10/23 following a fall and was also found to be mildly volume overloaded and started a short course of Lasix with improvement of swelling. Pt endorses productive cough, increased sputum production, intermittent palpitations, numbness and tingling in bilateral feet 2/2 PVD  weight 100# noted 2019 #

## 2023-11-08 NOTE — PROGRESS NOTE ADULT - SUBJECTIVE AND OBJECTIVE BOX
Patient is a 89y old  Female who presents with a chief complaint of Hypoxemia     (08 Nov 2023 13:22)        INTERVAL HPI/OVERNIGHT EVENTS:   complains of persistent cough, nasal drip and congestion  pt seen and examined         Vital Signs Last 24 Hrs  T(C): 36.3 (08 Nov 2023 13:03), Max: 36.7 (08 Nov 2023 05:13)  T(F): 97.3 (08 Nov 2023 13:03), Max: 98 (08 Nov 2023 05:13)  HR: 80 (08 Nov 2023 13:03) (71 - 99)  BP: 109/74 (08 Nov 2023 13:03) (109/74 - 120/79)  BP(mean): --  RR: 17 (08 Nov 2023 13:03) (17 - 18)  SpO2: 93% (08 Nov 2023 13:03) (90% - 96%)    Parameters below as of 08 Nov 2023 13:03  Patient On (Oxygen Delivery Method): room air        acetaminophen     Tablet .. 650 milliGRAM(s) Oral every 6 hours PRN  albuterol    0.083% 2.5 milliGRAM(s) Nebulizer every 4 hours PRN  aluminum hydroxide/magnesium hydroxide/simethicone Suspension 30 milliLiter(s) Oral every 4 hours PRN  apixaban 2.5 milliGRAM(s) Oral every 12 hours  atorvastatin 20 milliGRAM(s) Oral at bedtime  benzonatate 100 milliGRAM(s) Oral three times a day  cholecalciferol 2000 Unit(s) Oral daily  dextrose 5%. 1000 milliLiter(s) IV Continuous <Continuous>  dextrose 5%. 1000 milliLiter(s) IV Continuous <Continuous>  dextrose 50% Injectable 25 Gram(s) IV Push once  dextrose 50% Injectable 25 Gram(s) IV Push once  dextrose 50% Injectable 12.5 Gram(s) IV Push once  dextrose Oral Gel 15 Gram(s) Oral once PRN  famotidine    Tablet 20 milliGRAM(s) Oral daily  fluticasone propionate 50 MICROgram(s)/spray Nasal Spray 1 Spray(s) Both Nostrils two times a day  furosemide    Tablet 20 milliGRAM(s) Oral daily  glucagon  Injectable 1 milliGRAM(s) IntraMuscular once  guaiFENesin Oral Liquid (Sugar-Free) 200 milliGRAM(s) Oral every 6 hours PRN  influenza  Vaccine (HIGH DOSE) 0.7 milliLiter(s) IntraMuscular once  insulin lispro (ADMELOG) corrective regimen sliding scale   SubCutaneous three times a day before meals  insulin lispro (ADMELOG) corrective regimen sliding scale   SubCutaneous at bedtime  losartan 50 milliGRAM(s) Oral daily  melatonin 3 milliGRAM(s) Oral at bedtime PRN  metoprolol succinate ER 25 milliGRAM(s) Oral daily  mineral oil enema 133 milliLiter(s) Rectal daily PRN  multivitamin/minerals 1 Tablet(s) Oral daily  ondansetron Injectable 4 milliGRAM(s) IV Push every 8 hours PRN  sodium chloride 0.65% Nasal 1 Spray(s) Both Nostrils two times a day  sodium chloride 3%  Inhalation 4 milliLiter(s) Inhalation every 12 hours  tobramycin/dexamethasone Suspension 2 Drop(s) Both EYES two times a day      PHYSICAL EXAM:  GENERAL: NAD   EYES: conjunctiva and sclera clear  ENMT: Moist mucous membranes  NECK: Supple, No JVD, Normal thyroid  CHEST/LUNG: non labored, cta b/l  HEART: Regular rate and rhythm; No murmurs, rubs, or gallops  ABDOMEN: Soft, Nontender, Nondistended; Bowel sounds present  EXTREMITIES:  2+ Peripheral Pulses, No clubbing, no cyanosis, + edema  LYMPH: No lymphadenopathy noted  SKIN: No rashes or lesions  NEURO: no focal deficits    Consultant(s) Notes Reviewed:  [x ] YES  [ ] NO  Care Discussed with Consultants/Other Providers [ x] YES  [ ] NO    LABS:                        14.8   8.27  )-----------( 186      ( 08 Nov 2023 06:37 )             46.0     11-08    142  |  102  |  38<H>  ----------------------------<  165<H>  5.1   |  32<H>  |  0.91    Ca    9.2      08 Nov 2023 06:37    TPro  6.3  /  Alb  3.3  /  TBili  1.1  /  DBili  x   /  AST  22  /  ALT  39  /  AlkPhos  106  11-07    PTT - ( 08 Nov 2023 06:37 )  PTT:33.5 sec  Urinalysis Basic - ( 08 Nov 2023 06:37 )    Color: x / Appearance: x / SG: x / pH: x  Gluc: 165 mg/dL / Ketone: x  / Bili: x / Urobili: x   Blood: x / Protein: x / Nitrite: x   Leuk Esterase: x / RBC: x / WBC x   Sq Epi: x / Non Sq Epi: x / Bacteria: x      CAPILLARY BLOOD GLUCOSE      POCT Blood Glucose.: 173 mg/dL (08 Nov 2023 16:52)  POCT Blood Glucose.: 226 mg/dL (08 Nov 2023 11:44)  POCT Blood Glucose.: 171 mg/dL (08 Nov 2023 07:39)  POCT Blood Glucose.: 267 mg/dL (07 Nov 2023 21:41)        Urinalysis Basic - ( 08 Nov 2023 06:37 )    Color: x / Appearance: x / SG: x / pH: x  Gluc: 165 mg/dL / Ketone: x  / Bili: x / Urobili: x   Blood: x / Protein: x / Nitrite: x   Leuk Esterase: x / RBC: x / WBC x   Sq Epi: x / Non Sq Epi: x / Bacteria: x          RADIOLOGY & ADDITIONAL TESTS:    Imaging Personally Reviewed  Reviewed consultants input

## 2023-11-08 NOTE — DIETITIAN INITIAL EVALUATION ADULT - PROBLEM SELECTOR PLAN 4
- Home medication of Metoprolol without AC given hx of falls   - EK bpm, Atrial fibrillation Moderate voltage criteria for LVH, ST & T wave abnormality, consider lateral ischemia  - CT Chest and Angio: No pulmonary arterial emboli  - Continue home medications   - Heparin gtt initiated 2/2 possible atrial thrombus, DW patient and daughter, will dc if echo negative for thrombus per patient/daughter wishes   - Plan per above

## 2023-11-08 NOTE — DIETITIAN INITIAL EVALUATION ADULT - PROBLEM SELECTOR PLAN 10
- Home medication of Tobramycin-Dexamethasone eye droplets BID   - Medication at bedside, to be verified by pharmacy and administered to patient twice a day per Opthalmology to reduce risk of transplant failure

## 2023-11-08 NOTE — PROGRESS NOTE ADULT - ASSESSMENT
89 yo F with PMHx of HTN, HLD, T2DM, R Corneal Transplant, PVD, PUD, and Afib not on home AC presents to the ED with 3 weeks of persistent cough and cold symptoms. Worsening lower extremity edema Productive cough, increased sputum production, intermittent palpitations, numbness and tingling in bilateral feet 2/2 PVD, and fatigue. Endorses suprapubic discomfort and dysuria., Entero/Rhino+   CXR: No change heart mediastinum. No consolidation pneumothorax or effusion. Old bilateral rib fractures. Degenerative change bilateral shoulders   CT Chest and Angio: No pulmonary arterial emboli. Non-opacification of the upper aspect of the left atrial appendage. Differential diagnosis include slow blood flow/mixing artifact versus thrombus. Contrast-enhanced chest CT with delayed imaging can be performed for further evaluation as clinically warranted.    RECOMMENDATIONS  1-Dyspnea/Hypoxemia low suspicion for bacterial PNA so rec supportive care and   obs off abx  agree with cardiology involvement to address cardiac and fluid issues    2-Constipation pt reports using fleets glycerol enemas at home    Thank you for consulting us and involving us in the management of this most interesting and challenging case.  We will follow along in the care of this patient. Please call us at [phone] or text me directly on my cell# at [phone] with any concerns.

## 2023-11-08 NOTE — DIETITIAN INITIAL EVALUATION ADULT - PROBLEM SELECTOR PLAN 2
- ProBNP 993  - CXR: No change heart mediastinum. No consolidation pneumothorax or effusion. Old bilateral rib fractures. Degenerative change bilateral shoulders   - Recent trial of lasix for chronic bilateral lower extremity edema with improvement of volume status, tolerated without allergic reaction  - Pt sounds wet on exam with residual LE edema  - Continue Lasix 40 mg IVP qD   - Possible exacerbation due to Entero/Rhino virus  - cardiology consulted, fu recs

## 2023-11-08 NOTE — PHYSICAL THERAPY INITIAL EVALUATION ADULT - RANGE OF MOTION EXAMINATION, REHAB EVAL
except b/l shoulder decrease 50%/bilateral upper extremity ROM was WFL (within functional limits)/bilateral lower extremity ROM was WFL (within functional limits)

## 2023-11-08 NOTE — SWALLOW BEDSIDE ASSESSMENT ADULT - ADDITIONAL RECOMMENDATIONS
3. This service to follow-up as schedule permits to determine candidacy for oral intake 4. Medical team further advised to reconsult this department with any change in medical status and/or observed change in tolerance of recommended PO diet.

## 2023-11-08 NOTE — SWALLOW BEDSIDE ASSESSMENT ADULT - ASR SWALLOW RECOMMEND DIAG
2. Modified Barium Swallow study to objectively assess the swallow and determine whether cough is dysphagia related

## 2023-11-08 NOTE — DIETITIAN INITIAL EVALUATION ADULT - ORAL INTAKE PTA/DIET HISTORY
patient seen with family In room . patient with good PO PTA . no difficulties chewing with her own teeth PTA. follows low Na diet family prepares fresh soups no added salt in cooking. admits to taking sweets A1c 8.4% diet controlled DM. family now states patient is NPO with speech recommending MBS tomorrow.   patient has been coughing per family for 3 weeks PTA . patient from home with aide  no food allergies, MOLST in chart  family states familiar with diet no further education at this time

## 2023-11-08 NOTE — DIETITIAN INITIAL EVALUATION ADULT - PROBLEM SELECTOR PLAN 1
multifactorial 2/2 enterorhinovirus, CHF   - Entero/Rhino+   - CXR: No change heart mediastinum. No consolidation pneumothorax or effusion. Old bilateral rib fractures. Degenerative change bilateral shoulders   - Saturating well on 4L NC, wean as tolerated   - Supportive care; dubrandi, mucomyst

## 2023-11-08 NOTE — CASE MANAGEMENT PROGRESS NOTE - NSCMPROGRESSNOTE_GEN_ALL_CORE
STAR pt for HF/known to [facility] well [facility] PTA/ 18hr a day aides at home/94% on RA now/ PTE recommending HCPT/started on Eliquis for A-FIb (was not on AC prior to hospitalization due to risk of falls). Being treated with Tobramycin eye drops now. Daughter involved in her care. CM following for post acute needs.

## 2023-11-08 NOTE — DIETITIAN INITIAL EVALUATION ADULT - PERTINENT MEDS FT
MEDICATIONS  (STANDING):  apixaban 2.5 milliGRAM(s) Oral every 12 hours  atorvastatin 20 milliGRAM(s) Oral at bedtime  cholecalciferol 2000 Unit(s) Oral daily  dextrose 5%. 1000 milliLiter(s) (50 mL/Hr) IV Continuous <Continuous>  dextrose 5%. 1000 milliLiter(s) (100 mL/Hr) IV Continuous <Continuous>  dextrose 50% Injectable 25 Gram(s) IV Push once  dextrose 50% Injectable 12.5 Gram(s) IV Push once  dextrose 50% Injectable 25 Gram(s) IV Push once  famotidine    Tablet 20 milliGRAM(s) Oral daily  furosemide    Tablet 20 milliGRAM(s) Oral daily  glucagon  Injectable 1 milliGRAM(s) IntraMuscular once  influenza  Vaccine (HIGH DOSE) 0.7 milliLiter(s) IntraMuscular once  insulin lispro (ADMELOG) corrective regimen sliding scale   SubCutaneous three times a day before meals  insulin lispro (ADMELOG) corrective regimen sliding scale   SubCutaneous at bedtime  losartan 50 milliGRAM(s) Oral daily  metoprolol succinate ER 25 milliGRAM(s) Oral daily  multivitamin/minerals 1 Tablet(s) Oral daily  polyethylene glycol 3350 17 Gram(s) Oral daily  saline laxative (FLEET) Rectal Enema 1 Enema Rectal once  sodium chloride 0.65% Nasal 1 Spray(s) Both Nostrils two times a day  sodium chloride 3%  Inhalation 4 milliLiter(s) Inhalation every 12 hours  tobramycin/dexamethasone Suspension 2 Drop(s) Both EYES two times a day    MEDICATIONS  (PRN):  acetaminophen     Tablet .. 650 milliGRAM(s) Oral every 6 hours PRN Temp greater or equal to 38C (100.4F), Mild Pain (1 - 3)  albuterol    0.083% 2.5 milliGRAM(s) Nebulizer every 4 hours PRN Shortness of Breath and/or Wheezing  aluminum hydroxide/magnesium hydroxide/simethicone Suspension 30 milliLiter(s) Oral every 4 hours PRN Dyspepsia  benzonatate 100 milliGRAM(s) Oral every 8 hours PRN Cough  dextrose Oral Gel 15 Gram(s) Oral once PRN Blood Glucose LESS THAN 70 milliGRAM(s)/deciliter  guaiFENesin Oral Liquid (Sugar-Free) 200 milliGRAM(s) Oral every 6 hours PRN Cough  melatonin 3 milliGRAM(s) Oral at bedtime PRN Insomnia  ondansetron Injectable 4 milliGRAM(s) IV Push every 8 hours PRN Nausea and/or Vomiting

## 2023-11-08 NOTE — PROGRESS NOTE ADULT - SUBJECTIVE AND OBJECTIVE BOX
[facility] DIVISION of INFECTIOUS DISEASE  Bandar Odom MD PhD, Bernadette Dc MD, Kathleen Nesbitt MD, Sharon Solorzano MD, Des Rodriguez MD  and providing coverage with Mannie Paredes MD  Providing Infectious Disease Consultations at [facility], [facility], [facility]'s    Office# [phone] to schedule follow up appointments  Answering Service for urgent calls or New Consults [phone]  Cell# to text for urgent issues Bandar Odom [phone]     infectious diseases progress note:    LEVON SRIVASTAVA is a 89y y. o. Female patient    Overnight and events of the last 24hrs reviewed    Allergies    sulfa drugs (Hives)    Intolerances        ANTIBIOTICS/RELEVANT:  antimicrobials    immunologic:  influenza  Vaccine (HIGH DOSE) 0.7 milliLiter(s) IntraMuscular once    OTHER:  acetaminophen     Tablet .. 650 milliGRAM(s) Oral every 6 hours PRN  albuterol    0.083% 2.5 milliGRAM(s) Nebulizer every 4 hours PRN  aluminum hydroxide/magnesium hydroxide/simethicone Suspension 30 milliLiter(s) Oral every 4 hours PRN  apixaban 2.5 milliGRAM(s) Oral every 12 hours  aspirin enteric coated 81 milliGRAM(s) Oral daily  atorvastatin 20 milliGRAM(s) Oral at bedtime  benzonatate 100 milliGRAM(s) Oral every 8 hours PRN  cholecalciferol 2000 Unit(s) Oral daily  dextrose 5%. 1000 milliLiter(s) IV Continuous <Continuous>  dextrose 5%. 1000 milliLiter(s) IV Continuous <Continuous>  dextrose 50% Injectable 25 Gram(s) IV Push once  dextrose 50% Injectable 25 Gram(s) IV Push once  dextrose 50% Injectable 12.5 Gram(s) IV Push once  dextrose Oral Gel 15 Gram(s) Oral once PRN  famotidine    Tablet 20 milliGRAM(s) Oral daily  furosemide    Tablet 20 milliGRAM(s) Oral daily  glucagon  Injectable 1 milliGRAM(s) IntraMuscular once  guaiFENesin Oral Liquid (Sugar-Free) 200 milliGRAM(s) Oral every 6 hours PRN  insulin lispro (ADMELOG) corrective regimen sliding scale   SubCutaneous three times a day before meals  insulin lispro (ADMELOG) corrective regimen sliding scale   SubCutaneous at bedtime  losartan 50 milliGRAM(s) Oral daily  melatonin 3 milliGRAM(s) Oral at bedtime PRN  metoprolol succinate ER 25 milliGRAM(s) Oral daily  multivitamin/minerals 1 Tablet(s) Oral daily  ondansetron Injectable 4 milliGRAM(s) IV Push every 8 hours PRN  polyethylene glycol 3350 17 Gram(s) Oral daily  sodium chloride 0.65% Nasal 1 Spray(s) Both Nostrils two times a day  sodium chloride 3%  Inhalation 4 milliLiter(s) Inhalation every 12 hours  tobramycin/dexamethasone Suspension 2 Drop(s) Both EYES two times a day      Objective:  Vital Signs Last 24 Hrs  T(C): 36.7 (08 Nov 2023 05:13), Max: 36.7 (08 Nov 2023 05:13)  T(F): 98 (08 Nov 2023 05:13), Max: 98 (08 Nov 2023 05:13)  HR: 99 (08 Nov 2023 09:14) (71 - 99)  BP: 117/74 (08 Nov 2023 09:14) (117/74 - 120/79)  BP(mean): --  RR: 18 (08 Nov 2023 05:13) (17 - 18)  SpO2: 94% (08 Nov 2023 09:14) (90% - 96%)    Parameters below as of 08 Nov 2023 09:14  Patient On (Oxygen Delivery Method): room air        T(C): 36.7 (11-08-23 @ 05:13), Max: 36.7 (11-06-23 @ 23:54)  T(C): 36.7 (11-08-23 @ 05:13), Max: 36.7 (11-06-23 @ 23:54)  T(C): 36.7 (11-08-23 @ 05:13), Max: 36.7 (11-06-23 @ 23:54)    PHYSICAL EXAM:  HEENT: NC atraumatic  Neck: supple  Respiratory: no accessory muscle use, breathing comfortably  Cardiovascular: distant  Gastrointestinal: normal appearing, nondistended  Extremities: no clubbing, no cyanosis,        LABS:                          14.8   8.27  )-----------( 186      ( 08 Nov 2023 06:37 )             46.0       WBC  8.27 11-08 @ 06:37  8.04 11-07 @ 05:59  7.99 11-06 @ 16:20      11-08    142  |  102  |  38<H>  ----------------------------<  165<H>  5.1   |  32<H>  |  0.91    Ca    9.2      08 Nov 2023 06:37  Mg     2.4     11-06    TPro  6.3  /  Alb  3.3  /  TBili  1.1  /  DBili  x   /  AST  22  /  ALT  39  /  AlkPhos  106  11-07      Creatinine: 0.91 mg/dL (11-08-23 @ 06:37)  Creatinine: 0.66 mg/dL (11-07-23 @ 05:59)  Creatinine: 0.75 mg/dL (11-06-23 @ 16:20)      PT/INR - ( 06 Nov 2023 16:20 )   PT: 11.5 sec;   INR: 0.98 ratio         PTT - ( 08 Nov 2023 06:37 )  PTT:33.5 sec  Urinalysis Basic - ( 08 Nov 2023 06:37 )    Color: x / Appearance: x / SG: x / pH: x  Gluc: 165 mg/dL / Ketone: x  / Bili: x / Urobili: x   Blood: x / Protein: x / Nitrite: x   Leuk Esterase: x / RBC: x / WBC x   Sq Epi: x / Non Sq Epi: x / Bacteria: x            INFLAMMATORY MARKERS      MICROBIOLOGY:              RADIOLOGY & ADDITIONAL STUDIES:

## 2023-11-08 NOTE — SWALLOW BEDSIDE ASSESSMENT ADULT - PHARYNGEAL PHASE
effortful swallows/Cough post oral intake/Complaints of pharyngeal stasis/Multiple swallows Delayed cough post oral intake/Complaints of pharyngeal stasis/Multiple swallows

## 2023-11-08 NOTE — SWALLOW BEDSIDE ASSESSMENT ADULT - SWALLOW EVAL: RECOMMENDED DIET
1. Consider NPO with consideration for short-term non-oral means of nutrition/hydration/medication administration

## 2023-11-08 NOTE — PROGRESS NOTE ADULT - SUBJECTIVE AND OBJECTIVE BOX
[facility] -- Rowena Mathews Pannella, Patel, Savella, Goodger, Cohen  Office # [phone]    Follow Up:  LE edema, ? YESSI thrombus    Subjective/Observations: seen and examined, awake, alert, resting comfortably in bed, denies chest pain, dyspnea, palpitations, orthopnea and PND. c/o cough, on O2 via NC, no events overnight     REVIEW OF SYSTEMS: All other review of systems is negative unless indicated above  PAST MEDICAL & SURGICAL HISTORY:  Hyperlipidemia      Peptic ulcer      HTN (hypertension)      Osteoporosis      Irritable bowel syndrome      Cellulitis      Fractured pelvis      Pancreatic mass      S/P hip replacement  partial, left      History of appendectomy      History of tonsillectomy        MEDICATIONS  (STANDING):  apixaban 2.5 milliGRAM(s) Oral every 12 hours  aspirin enteric coated 81 milliGRAM(s) Oral daily  atorvastatin 20 milliGRAM(s) Oral at bedtime  cholecalciferol 2000 Unit(s) Oral daily  dextrose 5%. 1000 milliLiter(s) (50 mL/Hr) IV Continuous <Continuous>  dextrose 5%. 1000 milliLiter(s) (100 mL/Hr) IV Continuous <Continuous>  dextrose 50% Injectable 25 Gram(s) IV Push once  dextrose 50% Injectable 12.5 Gram(s) IV Push once  dextrose 50% Injectable 25 Gram(s) IV Push once  famotidine    Tablet 20 milliGRAM(s) Oral daily  furosemide    Tablet 20 milliGRAM(s) Oral daily  glucagon  Injectable 1 milliGRAM(s) IntraMuscular once  influenza  Vaccine (HIGH DOSE) 0.7 milliLiter(s) IntraMuscular once  insulin lispro (ADMELOG) corrective regimen sliding scale   SubCutaneous three times a day before meals  insulin lispro (ADMELOG) corrective regimen sliding scale   SubCutaneous at bedtime  losartan 50 milliGRAM(s) Oral daily  metoprolol succinate ER 25 milliGRAM(s) Oral daily  multivitamin/minerals 1 Tablet(s) Oral daily  polyethylene glycol 3350 17 Gram(s) Oral daily  sodium chloride 0.65% Nasal 1 Spray(s) Both Nostrils two times a day  sodium chloride 3%  Inhalation 4 milliLiter(s) Inhalation every 12 hours  tobramycin/dexamethasone Suspension 2 Drop(s) Both EYES two times a day    MEDICATIONS  (PRN):  acetaminophen     Tablet .. 650 milliGRAM(s) Oral every 6 hours PRN Temp greater or equal to 38C (100.4F), Mild Pain (1 - 3)  albuterol    0.083% 2.5 milliGRAM(s) Nebulizer every 4 hours PRN Shortness of Breath and/or Wheezing  aluminum hydroxide/magnesium hydroxide/simethicone Suspension 30 milliLiter(s) Oral every 4 hours PRN Dyspepsia  benzonatate 100 milliGRAM(s) Oral every 8 hours PRN Cough  dextrose Oral Gel 15 Gram(s) Oral once PRN Blood Glucose LESS THAN 70 milliGRAM(s)/deciliter  guaiFENesin Oral Liquid (Sugar-Free) 200 milliGRAM(s) Oral every 6 hours PRN Cough  melatonin 3 milliGRAM(s) Oral at bedtime PRN Insomnia  ondansetron Injectable 4 milliGRAM(s) IV Push every 8 hours PRN Nausea and/or Vomiting    Allergies    sulfa drugs (Hives)    Intolerances      Vital Signs Last 24 Hrs  T(C): 36.7 (08 Nov 2023 05:13), Max: 36.7 (08 Nov 2023 05:13)  T(F): 98 (08 Nov 2023 05:13), Max: 98 (08 Nov 2023 05:13)  HR: 91 (08 Nov 2023 05:13) (71 - 91)  BP: 119/84 (08 Nov 2023 05:13) (107/72 - 120/79)  BP(mean): --  RR: 18 (08 Nov 2023 05:13) (17 - 18)  SpO2: 90% (08 Nov 2023 05:13) (90% - 96%)    Parameters below as of 08 Nov 2023 05:13  Patient On (Oxygen Delivery Method): room air      I&O's Summary    07 Nov 2023 07:01  -  08 Nov 2023 07:00  --------------------------------------------------------  IN: 728 mL / OUT: 900 mL / NET: -172 mL          TELE:  80-90's   PHYSICAL EXAM:  Constitutional: NAD, awake, frail   HEENT: Moist Mucous Membranes, Anicteric  Pulmonary: Non-labored, course ant >post breathe sounds, No wheezing, rales or rhonchi  Cardiovascular: IRRR, S1 and S2, No murmurs, rubs, gallops or clicks  Gastrointestinal: Bowel Sounds present, soft, nontender.   Lymph: No peripheral edema. No lymphadenopathy.  Skin: dry, bilateral lower extremity chronic skin changes and ecchymosis, dry  Psych:  Mood & affect appropriate  LABS: All Labs Reviewed:                        14.8   8.27  )-----------( 186      ( 08 Nov 2023 06:37 )             46.0                         15.3   8.04  )-----------( 197      ( 07 Nov 2023 05:59 )             48.1                         14.7   7.99  )-----------( 180      ( 06 Nov 2023 16:20 )             45.4     07 Nov 2023 05:59    143    |  101    |  26     ----------------------------<  166    4.2     |  34     |  0.66   06 Nov 2023 16:20    138    |  99     |  31     ----------------------------<  177    4.7     |  33     |  0.75     Ca    9.1        07 Nov 2023 05:59  Ca    9.0        06 Nov 2023 16:20  Mg     2.4       06 Nov 2023 16:20    TPro  6.3    /  Alb  3.3    /  TBili  1.1    /  DBili  x      /  AST  22     /  ALT  39     /  AlkPhos  106    07 Nov 2023 05:59  TPro  6.3    /  Alb  3.3    /  TBili  1.2    /  DBili  x      /  AST  23     /  ALT  36     /  AlkPhos  104    06 Nov 2023 16:20    PT/INR - ( 06 Nov 2023 16:20 )   PT: 11.5 sec;   INR: 0.98 ratio         PTT - ( 07 Nov 2023 05:59 )  PTT:> 200 sec      12 Lead ECG:   Ventricular Rate 72 BPM    QRS Duration 86 ms    Q-T Interval 408 ms    QTC Calculation(Bazett) 446 ms    R Axis -8 degrees    T Axis 42 degrees    Diagnosis Line Atrial fibrillation  Moderate voltage criteria for LVH, may be normal variant ( R in aVL , Merritt product )  ST & T wave abnormality, consider lateral ischemia    Confirmed by PAT CID ([id number]) on 11/7/2023 7:03:37 AM  Also confirmed by PAT CID ([id number]),  JEREMY DALTON ([id number])  on 11/7/2023 7:20:04 AM (11-06-23 @ 14:48)      TRANSTHORACIC ECHOCARDIOGRAM REPORT  ________________________________________________________________________________                                      _______       Pt. Name:       LEVON SRIVASTAVA Study Date:    11/7/2023  MRN:            [id number]    YOB: 1928  Accession #:    [id number]    Age:           89 years  Account#:       [id number]   Gender:        F  Heart Rate:                  Height:        59.84 in (152.00 cm)  Rhythm:                      Weight:        99.21 lb (45.00 kg)  Blood Pressure: 147/89 mmHg  BSA/BMI:       1.38 m² / 19.48 kg/m²  ________________________________________________________________________________________  Referring Physician:    [id number] Oanh Andres  Interpreting Physician: Chasity ROQUE  Primary Sonographer:    Kanwal Yang [facility]    CPT:               ECHO TTE WO CON COMP W DOPP - 90252.m  Indication(s):     Heart failure, unspecified - I50.9  Procedure:         Transthoracic echocardiogram with 2-D, M-mode and complete                    spectral and color flow Doppler.  Ordering Location: [facility]  Study Information: Image quality for this study is technically difficult.    _______________________________________________________________________________________     CONCLUSIONS:      1. Technically difficult image quality.   2. Left ventricular systolic function is normal with an ejection fraction of 58 % by Lyon's method of disks.   3. There is increased LV mass and concentric hypertrophy.   4. Moderate left ventricular hypertrophy.   5. Normal right ventricular cavity size, wall thickness, and systolic function.   6. The left ventricular diastolic function is indeterminate. Analysis of left ventricular diastolic function and filling pressure is made challenging by the presence of atrial fibrillation.   7. The left atrium is severely dilated in size.   8. Mild mitral regurgitation.   9. Mild aortic regurgitation.  10. No echocardiographic evidence of pulmonary hypertension.    ________________________________________________________________________________________  FINDINGS:     Left Ventricle:  Left ventricular systolic function is normal with a calculated ejection fraction of 58 % by the Lyon's biplane method of disks. The left ventricular diastolic function is indeterminate. Analysis of left ventricular diastolic function and filling pressure is made challenging by the presence of atrial fibrillation. Moderate left ventricular hypertrophy. There is increased LV mass and concentric hypertrophy.     Right Ventricle:  The right ventricular cavity is normal in size, normal wall thickness and normal systolic function.     Left Atrium:  The left atrium is severely dilated in size with an indexed volume of 52.64 ml/m².     Right Atrium:  The right atrium is normal in size with an indexed volume of 31.09 ml/m².     Aortic Valve:  The aortic valve appears trileaflet with reduced systolic excursion. There is moderate calcification of the aortic valve leaflets. There is mild thickening of the aortic valve leaflets. There is no aortic valve stenosis. There is mild aortic regurgitation.     Mitral Valve:  Structurally normal mitral valve with normal leaflet excursion. There is no mitral valve stenosis. There is mild mitral regurgitation.     Tricuspid Valve:  There is mild to moderate tricuspid regurgitation. There is no echocardiographic evidence of pulmonary hypertension. Estimated pulmonary artery systolic pressure is 28 mmHg.     Pulmonic Valve:  The pulmonic valve was not well visualized. There is trace pulmonic regurgitation.     Aorta:  The aortic root at the sinuses of Valsalva is borderline dilated, measuring 3.10 cm (indexed 2.24 cm/m²). The aortic arch diameter is normal in size, measuring 2.2 cm (indexed 1.59 cm/m²).     Pericardium:  No pericardial effusion seen.     Systemic Veins:  The inferior vena cava was not well visualized, PASP is at least 28 mmHg.  ____________________________________________________________________  Quantitative Data:  Left Ventricle Measurements: (Indexed to BSA)     IVSd (2D):   1.5 cm  LVPWd (2D):  1.3 cm  LVIDd (2D):  3.3 cm  LVIDs (2D):  2.3 cm  LV Mass:     165 g  119.3 g/m²  LV Vol d, MOD A2C: 35.3 ml 25.52 ml/m²  LV Vol d, MOD A4C: 38.7 ml 27.98 ml/m²  LV Vol d, MOD BP:  36.8 ml 26.64 ml/m²  LV Vol s, MOD A2C: 16.3 ml 11.79 ml/m²  LV Vol s, MOD A4C: 14.7 ml 10.63 ml/m²  LV Vol s, MOD BP:  15.4 ml 11.10 ml/m²  LVOT SV MOD BP:    21.5 ml  LV EF% MOD BP:     58 %     MV E Vmax:    0.71 m/s  e' lateral:   6.96 cm/s  e' medial:13.30 cm/s  E/e' lateral: 10.16  E/e' medial:  5.32  E/e' Average: 6.98  MV DT:        169 msec    Aorta Measurements: (normal range) (Indexed to BSA)     Sinuses of Valsalva: 3.10 cm (2.7 - 3.3 cm)  Ao Arch:             2.2 cm       Left Atrium Measurements: (Indexed to BSA)  LA Diam 2D: 4.10 cm    Right Atrial Measurements:     RA Vol:       43.00 ml  RA Vol Index: 31.09 ml/m²    Mitral Valve Measurements:     MV E Vmax: 0.7 m/s         MR Vmax:          2.87 m/s                             MR Peak Gradient: 32.9 mmHg       Tricuspid Valve Measurements:     TR Vmax:          2.5 m/s  TR Peak Gradient: 24.8 mmHg  RA Pressure:      3 mmHg  PASP:             28 mmHg    ________________________________________________________________________________________  Electronically signed on 11/7/2023 at 10:38:06 AM by Sussy Suárez MD         *** Final ***

## 2023-11-09 ENCOUNTER — TRANSCRIPTION ENCOUNTER (OUTPATIENT)
Age: 88
End: 2023-11-09

## 2023-11-09 VITALS
SYSTOLIC BLOOD PRESSURE: 102 MMHG | OXYGEN SATURATION: 93 % | DIASTOLIC BLOOD PRESSURE: 68 MMHG | RESPIRATION RATE: 18 BRPM | HEART RATE: 70 BPM | TEMPERATURE: 97 F

## 2023-11-09 LAB
ANION GAP SERPL CALC-SCNC: 8 MMOL/L — SIGNIFICANT CHANGE UP (ref 5–17)
ANION GAP SERPL CALC-SCNC: 8 MMOL/L — SIGNIFICANT CHANGE UP (ref 5–17)
APPEARANCE UR: ABNORMAL
APPEARANCE UR: ABNORMAL
BILIRUB UR-MCNC: ABNORMAL
BILIRUB UR-MCNC: ABNORMAL
BUN SERPL-MCNC: 42 MG/DL — HIGH (ref 7–23)
BUN SERPL-MCNC: 42 MG/DL — HIGH (ref 7–23)
CALCIUM SERPL-MCNC: 9 MG/DL — SIGNIFICANT CHANGE UP (ref 8.5–10.1)
CALCIUM SERPL-MCNC: 9 MG/DL — SIGNIFICANT CHANGE UP (ref 8.5–10.1)
CHLORIDE SERPL-SCNC: 101 MMOL/L — SIGNIFICANT CHANGE UP (ref 96–108)
CHLORIDE SERPL-SCNC: 101 MMOL/L — SIGNIFICANT CHANGE UP (ref 96–108)
CO2 SERPL-SCNC: 34 MMOL/L — HIGH (ref 22–31)
CO2 SERPL-SCNC: 34 MMOL/L — HIGH (ref 22–31)
COLOR SPEC: SIGNIFICANT CHANGE UP
COLOR SPEC: SIGNIFICANT CHANGE UP
CREAT SERPL-MCNC: 0.91 MG/DL — SIGNIFICANT CHANGE UP (ref 0.5–1.3)
CREAT SERPL-MCNC: 0.91 MG/DL — SIGNIFICANT CHANGE UP (ref 0.5–1.3)
DIFF PNL FLD: NEGATIVE — SIGNIFICANT CHANGE UP
DIFF PNL FLD: NEGATIVE — SIGNIFICANT CHANGE UP
EGFR: 58 ML/MIN/1.73M2 — LOW
EGFR: 58 ML/MIN/1.73M2 — LOW
GLUCOSE SERPL-MCNC: 126 MG/DL — HIGH (ref 70–99)
GLUCOSE SERPL-MCNC: 126 MG/DL — HIGH (ref 70–99)
GLUCOSE UR QL: NEGATIVE — SIGNIFICANT CHANGE UP
GLUCOSE UR QL: NEGATIVE — SIGNIFICANT CHANGE UP
HCT VFR BLD CALC: 44.9 % — SIGNIFICANT CHANGE UP (ref 34.5–45)
HCT VFR BLD CALC: 44.9 % — SIGNIFICANT CHANGE UP (ref 34.5–45)
HGB BLD-MCNC: 14.3 G/DL — SIGNIFICANT CHANGE UP (ref 11.5–15.5)
HGB BLD-MCNC: 14.3 G/DL — SIGNIFICANT CHANGE UP (ref 11.5–15.5)
KETONES UR-MCNC: ABNORMAL
KETONES UR-MCNC: ABNORMAL
LEUKOCYTE ESTERASE UR-ACNC: ABNORMAL
LEUKOCYTE ESTERASE UR-ACNC: ABNORMAL
MCHC RBC-ENTMCNC: 29 PG — SIGNIFICANT CHANGE UP (ref 27–34)
MCHC RBC-ENTMCNC: 29 PG — SIGNIFICANT CHANGE UP (ref 27–34)
MCHC RBC-ENTMCNC: 31.8 GM/DL — LOW (ref 32–36)
MCHC RBC-ENTMCNC: 31.8 GM/DL — LOW (ref 32–36)
MCV RBC AUTO: 91.1 FL — SIGNIFICANT CHANGE UP (ref 80–100)
MCV RBC AUTO: 91.1 FL — SIGNIFICANT CHANGE UP (ref 80–100)
NITRITE UR-MCNC: NEGATIVE — SIGNIFICANT CHANGE UP
NITRITE UR-MCNC: NEGATIVE — SIGNIFICANT CHANGE UP
NRBC # BLD: 0 /100 WBCS — SIGNIFICANT CHANGE UP (ref 0–0)
NRBC # BLD: 0 /100 WBCS — SIGNIFICANT CHANGE UP (ref 0–0)
PH UR: 5 — SIGNIFICANT CHANGE UP (ref 5–8)
PH UR: 5 — SIGNIFICANT CHANGE UP (ref 5–8)
PLATELET # BLD AUTO: 197 K/UL — SIGNIFICANT CHANGE UP (ref 150–400)
PLATELET # BLD AUTO: 197 K/UL — SIGNIFICANT CHANGE UP (ref 150–400)
POTASSIUM SERPL-MCNC: 4.3 MMOL/L — SIGNIFICANT CHANGE UP (ref 3.5–5.3)
POTASSIUM SERPL-MCNC: 4.3 MMOL/L — SIGNIFICANT CHANGE UP (ref 3.5–5.3)
POTASSIUM SERPL-SCNC: 4.3 MMOL/L — SIGNIFICANT CHANGE UP (ref 3.5–5.3)
POTASSIUM SERPL-SCNC: 4.3 MMOL/L — SIGNIFICANT CHANGE UP (ref 3.5–5.3)
PROT UR-MCNC: NEGATIVE — SIGNIFICANT CHANGE UP
PROT UR-MCNC: NEGATIVE — SIGNIFICANT CHANGE UP
RBC # BLD: 4.93 M/UL — SIGNIFICANT CHANGE UP (ref 3.8–5.2)
RBC # BLD: 4.93 M/UL — SIGNIFICANT CHANGE UP (ref 3.8–5.2)
RBC # FLD: 13 % — SIGNIFICANT CHANGE UP (ref 10.3–14.5)
RBC # FLD: 13 % — SIGNIFICANT CHANGE UP (ref 10.3–14.5)
SODIUM SERPL-SCNC: 143 MMOL/L — SIGNIFICANT CHANGE UP (ref 135–145)
SODIUM SERPL-SCNC: 143 MMOL/L — SIGNIFICANT CHANGE UP (ref 135–145)
SP GR SPEC: 1.03 — SIGNIFICANT CHANGE UP (ref 1–1.03)
SP GR SPEC: 1.03 — SIGNIFICANT CHANGE UP (ref 1–1.03)
UROBILINOGEN FLD QL: 1 — SIGNIFICANT CHANGE UP (ref 0.2–1)
UROBILINOGEN FLD QL: 1 — SIGNIFICANT CHANGE UP (ref 0.2–1)
WBC # BLD: 9.3 K/UL — SIGNIFICANT CHANGE UP (ref 3.8–10.5)
WBC # BLD: 9.3 K/UL — SIGNIFICANT CHANGE UP (ref 3.8–10.5)
WBC # FLD AUTO: 9.3 K/UL — SIGNIFICANT CHANGE UP (ref 3.8–10.5)
WBC # FLD AUTO: 9.3 K/UL — SIGNIFICANT CHANGE UP (ref 3.8–10.5)

## 2023-11-09 PROCEDURE — 85027 COMPLETE CBC AUTOMATED: CPT

## 2023-11-09 PROCEDURE — 80048 BASIC METABOLIC PNL TOTAL CA: CPT

## 2023-11-09 PROCEDURE — 99221 1ST HOSP IP/OBS SF/LOW 40: CPT

## 2023-11-09 PROCEDURE — 82962 GLUCOSE BLOOD TEST: CPT

## 2023-11-09 PROCEDURE — 36415 COLL VENOUS BLD VENIPUNCTURE: CPT

## 2023-11-09 PROCEDURE — A9698: CPT

## 2023-11-09 PROCEDURE — 99232 SBSQ HOSP IP/OBS MODERATE 35: CPT

## 2023-11-09 PROCEDURE — 97162 PT EVAL MOD COMPLEX 30 MIN: CPT

## 2023-11-09 PROCEDURE — 94640 AIRWAY INHALATION TREATMENT: CPT

## 2023-11-09 PROCEDURE — 94760 N-INVAS EAR/PLS OXIMETRY 1: CPT

## 2023-11-09 PROCEDURE — 71045 X-RAY EXAM CHEST 1 VIEW: CPT

## 2023-11-09 PROCEDURE — 71275 CT ANGIOGRAPHY CHEST: CPT | Mod: MA

## 2023-11-09 PROCEDURE — 84484 ASSAY OF TROPONIN QUANT: CPT

## 2023-11-09 PROCEDURE — 92610 EVALUATE SWALLOWING FUNCTION: CPT

## 2023-11-09 PROCEDURE — 85610 PROTHROMBIN TIME: CPT

## 2023-11-09 PROCEDURE — 74230 X-RAY XM SWLNG FUNCJ C+: CPT

## 2023-11-09 PROCEDURE — 0225U NFCT DS DNA&RNA 21 SARSCOV2: CPT

## 2023-11-09 PROCEDURE — 83880 ASSAY OF NATRIURETIC PEPTIDE: CPT

## 2023-11-09 PROCEDURE — 83735 ASSAY OF MAGNESIUM: CPT

## 2023-11-09 PROCEDURE — 74230 X-RAY XM SWLNG FUNCJ C+: CPT | Mod: 26

## 2023-11-09 PROCEDURE — 81003 URINALYSIS AUTO W/O SCOPE: CPT

## 2023-11-09 PROCEDURE — 99285 EMERGENCY DEPT VISIT HI MDM: CPT | Mod: 25

## 2023-11-09 PROCEDURE — 80061 LIPID PANEL: CPT

## 2023-11-09 PROCEDURE — 93005 ELECTROCARDIOGRAM TRACING: CPT

## 2023-11-09 PROCEDURE — 81001 URINALYSIS AUTO W/SCOPE: CPT

## 2023-11-09 PROCEDURE — 85730 THROMBOPLASTIN TIME PARTIAL: CPT

## 2023-11-09 PROCEDURE — 71045 X-RAY EXAM CHEST 1 VIEW: CPT | Mod: 26

## 2023-11-09 PROCEDURE — 85025 COMPLETE CBC W/AUTO DIFF WBC: CPT

## 2023-11-09 PROCEDURE — 80053 COMPREHEN METABOLIC PANEL: CPT

## 2023-11-09 PROCEDURE — 83036 HEMOGLOBIN GLYCOSYLATED A1C: CPT

## 2023-11-09 PROCEDURE — 93306 TTE W/DOPPLER COMPLETE: CPT

## 2023-11-09 RX ORDER — GUAIFENESIN/DEXTROMETHORPHAN 600MG-30MG
10 TABLET, EXTENDED RELEASE 12 HR ORAL
Qty: 300 | Refills: 0
Start: 2023-11-09

## 2023-11-09 RX ORDER — APIXABAN 2.5 MG/1
1 TABLET, FILM COATED ORAL
Qty: 60 | Refills: 0
Start: 2023-11-09

## 2023-11-09 RX ORDER — FLUTICASONE PROPIONATE 50 MCG
1 SPRAY, SUSPENSION NASAL
Qty: 1 | Refills: 0
Start: 2023-11-09

## 2023-11-09 RX ORDER — ALBUTEROL 90 UG/1
2 AEROSOL, METERED ORAL
Qty: 1 | Refills: 0
Start: 2023-11-09

## 2023-11-09 RX ADMIN — TOBRAMYCIN AND DEXAMETHASONE 2 DROP(S): 1; 3 SUSPENSION/ DROPS OPHTHALMIC at 17:27

## 2023-11-09 RX ADMIN — FAMOTIDINE 20 MILLIGRAM(S): 10 INJECTION INTRAVENOUS at 12:15

## 2023-11-09 RX ADMIN — Medication 1 SPRAY(S): at 17:27

## 2023-11-09 RX ADMIN — Medication 100 MILLIGRAM(S): at 14:57

## 2023-11-09 RX ADMIN — Medication 20 MILLIGRAM(S): at 05:42

## 2023-11-09 RX ADMIN — Medication 100 MILLIGRAM(S): at 05:42

## 2023-11-09 RX ADMIN — Medication 1: at 12:16

## 2023-11-09 RX ADMIN — TOBRAMYCIN AND DEXAMETHASONE 2 DROP(S): 1; 3 SUSPENSION/ DROPS OPHTHALMIC at 05:43

## 2023-11-09 RX ADMIN — Medication 1 SPRAY(S): at 05:43

## 2023-11-09 RX ADMIN — LOSARTAN POTASSIUM 50 MILLIGRAM(S): 100 TABLET, FILM COATED ORAL at 05:42

## 2023-11-09 RX ADMIN — Medication 25 MILLIGRAM(S): at 05:42

## 2023-11-09 RX ADMIN — Medication 1 SPRAY(S): at 17:26

## 2023-11-09 RX ADMIN — Medication 2: at 17:26

## 2023-11-09 RX ADMIN — APIXABAN 2.5 MILLIGRAM(S): 2.5 TABLET, FILM COATED ORAL at 12:15

## 2023-11-09 RX ADMIN — APIXABAN 2.5 MILLIGRAM(S): 2.5 TABLET, FILM COATED ORAL at 00:12

## 2023-11-09 RX ADMIN — Medication 2000 UNIT(S): at 12:15

## 2023-11-09 RX ADMIN — Medication 1 TABLET(S): at 12:15

## 2023-11-09 NOTE — PROGRESS NOTE ADULT - NS ATTEND AMEND GEN_ALL_CORE FT
89 yo F with PMHx of HTN, HLD, T2DM, R Corneal Transplant, PVD, PUD, and Afib not on home AC presents to the ED with 3 weeks of persistent cough and cold symptoms. Consulted for LE edema, and left atrial appendage.    LE edema improved with IV Lasix.  cont po  Has ? YESSI thrombus.  Can argue that she should be on ac either way given AF and elevated chadsvasc. cont Eliquis 2.5 BID (dose reduced in the setting of weight and age)  TTE with preserved LV function, LVH  Continue Eliquis. Would stop ASA as no clear indication and is at elevated bleeding risk (with history of PUD).   To follow with her cardiologist on dc .
89 yo F with PMHx of HTN, HLD, T2DM, R Corneal Transplant, PVD, PUD, and Afib not on home AC presents to the ED with 3 weeks of persistent cough and cold symptoms. Consulted for LE edema, and left atrial appendage.    LE edema improved with IV Lasix.  Transition to PO  Has ? YESSI thrombus.  Can argue that she should be on ac either way given AF and elevated chadsvasc.  To start Eliquis 2.5 BID (dose reduced in the setting of weight and age)  TTE with preserved LV function, LVH  Continue Eliquis. Would stop ASA as no clear indication and is at elevated bleeding risk (with history of PUD).   would obtain 12 lead EKG today for symptoms of palpitations.   To follow with her cardiologist on dc .

## 2023-11-09 NOTE — SWALLOW VFSS/MBS ASSESSMENT ADULT - COMMENTS
H&P: 89 yo F with PMHx of HTN, HLD, T2DM, R Corneal Transplant, PVD, PUD, and Afib not on home AC presents to the ED with 3 weeks of persistent cough and cold symptoms, admitted for acute hypoxic respiratory failure with abnormal imagining findings"    CXR 11/6/23 "IMPRESSION: No change heart mediastinum. No consolidation pneumothorax or effusion. Old bilateral rib fractures. Degenerative change bilateral shoulders."    CT Chest and Angio 11/6/23 "IMPRESSION: No pulmonary arterial emboli. Nonopacification of the upper aspect of the left atrial appendage. Differential diagnosis include slow blood flow/mixing artifact versus thrombus. Contrast-enhanced chest CT with delayed imaging can be performed for further evaluation as clinically warranted."    Pt is known to this dept. and seen for a bedside swallow evaluation 11/6/23 and rx NPO and MBS. Please see full report in chart for details.    Pt was received upright in the [facility] chair in the lateral plane. She was awake, alert, in NAD, able to follow commands and communicated verbally with SLP. Pt has a baseline productive cough.

## 2023-11-09 NOTE — SWALLOW VFSS/MBS ASSESSMENT ADULT - ROSENBEK'S PENETRATION ASPIRATION SCALE
(1) no aspiration, contrast does not enter airway 1-2/(2) contrast enters airway, remains above the vocal cords, no residue remains (penetration)

## 2023-11-09 NOTE — PROGRESS NOTE ADULT - SUBJECTIVE AND OBJECTIVE BOX
Date/Time Patient Seen:  		  Referring MD:   Data Reviewed	       Patient is a 89y old  Female who presents with a chief complaint of Acute Hypoxic Respiratory Failure (08 Nov 2023 18:50)      Subjective/HPI     PAST MEDICAL & SURGICAL HISTORY:  Hyperlipidemia    Peptic ulcer    HTN (hypertension)    Osteoporosis    Irritable bowel syndrome    DM (diabetes mellitus)  type 2    Cellulitis    Fractured pelvis    Pancreatic mass    S/P hip replacement  partial, left    History of appendectomy    History of tonsillectomy          Medication list         MEDICATIONS  (STANDING):  apixaban 2.5 milliGRAM(s) Oral every 12 hours  atorvastatin 20 milliGRAM(s) Oral at bedtime  benzonatate 100 milliGRAM(s) Oral three times a day  cholecalciferol 2000 Unit(s) Oral daily  dextrose 5%. 1000 milliLiter(s) (100 mL/Hr) IV Continuous <Continuous>  dextrose 5%. 1000 milliLiter(s) (50 mL/Hr) IV Continuous <Continuous>  dextrose 50% Injectable 25 Gram(s) IV Push once  dextrose 50% Injectable 12.5 Gram(s) IV Push once  dextrose 50% Injectable 25 Gram(s) IV Push once  famotidine    Tablet 20 milliGRAM(s) Oral daily  fluticasone propionate 50 MICROgram(s)/spray Nasal Spray 1 Spray(s) Both Nostrils two times a day  furosemide    Tablet 20 milliGRAM(s) Oral daily  glucagon  Injectable 1 milliGRAM(s) IntraMuscular once  influenza  Vaccine (HIGH DOSE) 0.7 milliLiter(s) IntraMuscular once  insulin lispro (ADMELOG) corrective regimen sliding scale   SubCutaneous every 6 hours  losartan 50 milliGRAM(s) Oral daily  metoprolol succinate ER 25 milliGRAM(s) Oral daily  multivitamin/minerals 1 Tablet(s) Oral daily  sodium chloride 0.65% Nasal 1 Spray(s) Both Nostrils two times a day  sodium chloride 3%  Inhalation 4 milliLiter(s) Inhalation every 12 hours  tobramycin/dexamethasone Suspension 2 Drop(s) Both EYES two times a day    MEDICATIONS  (PRN):  acetaminophen     Tablet .. 650 milliGRAM(s) Oral every 6 hours PRN Temp greater or equal to 38C (100.4F), Mild Pain (1 - 3)  albuterol    0.083% 2.5 milliGRAM(s) Nebulizer every 4 hours PRN Shortness of Breath and/or Wheezing  aluminum hydroxide/magnesium hydroxide/simethicone Suspension 30 milliLiter(s) Oral every 4 hours PRN Dyspepsia  dextrose Oral Gel 15 Gram(s) Oral once PRN Blood Glucose LESS THAN 70 milliGRAM(s)/deciliter  guaiFENesin Oral Liquid (Sugar-Free) 200 milliGRAM(s) Oral every 6 hours PRN Cough  melatonin 3 milliGRAM(s) Oral at bedtime PRN Insomnia  mineral oil enema 133 milliLiter(s) Rectal daily PRN constipation  ondansetron Injectable 4 milliGRAM(s) IV Push every 8 hours PRN Nausea and/or Vomiting         Vitals log        ICU Vital Signs Last 24 Hrs  T(C): 36.4 (08 Nov 2023 21:25), Max: 36.7 (08 Nov 2023 05:13)  T(F): 97.6 (08 Nov 2023 21:25), Max: 98 (08 Nov 2023 05:13)  HR: 81 (08 Nov 2023 21:25) (80 - 99)  BP: 102/61 (08 Nov 2023 21:25) (102/61 - 119/84)  BP(mean): --  ABP: --  ABP(mean): --  RR: 18 (08 Nov 2023 21:25) (17 - 18)  SpO2: 92% (08 Nov 2023 21:25) (90% - 94%)    O2 Parameters below as of 08 Nov 2023 21:25  Patient On (Oxygen Delivery Method): room air                 Input and Output:  I&O's Detail    07 Nov 2023 07:01  -  08 Nov 2023 07:00  --------------------------------------------------------  IN:    Heparin Infusion: 8 mL    Oral Fluid: 720 mL  Total IN: 728 mL    OUT:    Voided (mL): 900 mL  Total OUT: 900 mL    Total NET: -172 mL          Lab Data                        14.8   8.27  )-----------( 186      ( 08 Nov 2023 06:37 )             46.0     11-08    142  |  102  |  38<H>  ----------------------------<  165<H>  5.1   |  32<H>  |  0.91    Ca    9.2      08 Nov 2023 06:37    TPro  6.3  /  Alb  3.3  /  TBili  1.1  /  DBili  x   /  AST  22  /  ALT  39  /  AlkPhos  106  11-07            Review of Systems	      Objective     Physical Examination  heart s1s2  lung dc BS  head nc        Pertinent Lab findings & Imaging      Tawanda:  NO   Adequate UO     I&O's Detail    07 Nov 2023 07:01  -  08 Nov 2023 07:00  --------------------------------------------------------  IN:    Heparin Infusion: 8 mL    Oral Fluid: 720 mL  Total IN: 728 mL    OUT:    Voided (mL): 900 mL  Total OUT: 900 mL    Total NET: -172 mL               Discussed with:     Cultures:	        Radiology

## 2023-11-09 NOTE — CONSULT NOTE ADULT - RESPIRATORY AND THORAX
Patient: Yen Castellanos    Procedure Summary       Date: 10/27/23 Room / Location: [facility]    Anesthesia Start: 1035 Anesthesia Stop: 1109    Procedures:       COLONOSCOPY      EGD Diagnosis:       Diarrhea, unspecified type      Epigastric pain      Nausea    Scheduled Providers: Willam Nicole MD; Maggie Vazquez RN Responsible Provider: SWATI Mcallister    Anesthesia Type: MAC ASA Status: 2            Anesthesia Type: MAC    Vitals Value Taken Time   /67 10/27/23 1109   Temp 36.7 10/27/23 1109   Pulse 103 10/27/23 1109   Resp 19 10/27/23 1109   SpO2 96 10/27/23 1109       Anesthesia Post Evaluation    Patient location during evaluation: bedside  Patient participation: complete - patient participated  Level of consciousness: awake  Pain score: 0  Pain management: adequate  Airway patency: patent  Cardiovascular status: acceptable  Respiratory status: acceptable  Hydration status: acceptable      There were no known notable events for this encounter.    
details…

## 2023-11-09 NOTE — CONSULT NOTE ADULT - ASSESSMENT
Physical Exam:   Vital Signs Last 24 Hrs  T(C): 36.4 (09 Nov 2023 05:00), Max: 36.4 (08 Nov 2023 21:25)  T(F): 97.6 (09 Nov 2023 05:00), Max: 97.6 (08 Nov 2023 21:25)  HR: 89 (09 Nov 2023 05:00) (80 - 89)  BP: 121/73 (09 Nov 2023 05:00) (102/61 - 121/73)  BP(mean): --  RR: 18 (09 Nov 2023 05:00) (17 - 18)  SpO2: 94% (09 Nov 2023 05:00) (90% - 94%)    Parameters below as of 09 Nov 2023 05:00  Patient On (Oxygen Delivery Method): room air      CAPILLARY BLOOD GLUCOSE      POCT Blood Glucose.: 118 mg/dL (09 Nov 2023 06:38)  POCT Blood Glucose.: 130 mg/dL (09 Nov 2023 00:03)  POCT Blood Glucose.: 125 mg/dL (08 Nov 2023 21:36)  POCT Blood Glucose.: 173 mg/dL (08 Nov 2023 16:52)  POCT Blood Glucose.: 226 mg/dL (08 Nov 2023 11:44)      Cholesterol, Serum: 113 mg/dL (05.19.21 @ 08:36)     HDL Cholesterol, Serum: 22 mg/dL (05.19.21 @ 08:36)     LDL Cholesterol Calculated: 66 mg/dL (05.19.21 @ 08:36)     DIET: CC  >50%

## 2023-11-09 NOTE — DISCHARGE NOTE PROVIDER - CARE PROVIDER_API CALL
Cale Aleman  Cardiovascular Disease  [address]  [place] [address]  Phone: [phone]  Fax: [phone]  Follow Up Time:     Jerson Feng  [address], [address]  [place] [address]  Phone: [phone]  Fax: [phone]  Follow Up Time:

## 2023-11-09 NOTE — PROGRESS NOTE ADULT - ASSESSMENT
89 yo F with PMHx of HTN, HLD, T2DM, R Corneal Transplant, PVD, PUD, and Afib not on home AC presented to the ED with 3 weeks of persistent cough and cold symptoms. Worsening lower extremity edema Productive cough, increased sputum production, intermittent palpitations, numbness and tingling in bilateral feet 2/2 PVD, and fatigue. Endorses suprapubic discomfort and dysuria., Entero/Rhino+       RECOMMENDATIONS  1-Dyspnea/Hypoxemia low suspicion for bacterial PNA so rec supportive care and   obs off abx    2-Constipation pt reports using fleets glycerol enemas at home and these have helped here    From an ID standpoint no further requirement for inpatient status for the management of ID issues. Fine with discharge from ID standpoint when other medical issues no longer require inpatient care and social issues allow for a safe discharge plan. To schedule an outpatient ID follow up appointment please call our office at [phone]      Thank you for consulting us and involving us in the management of this most interesting and challenging case.  We will follow along in the care of this patient. Please call us at [phone] or text me directly on my cell# at [phone] with any concerns.

## 2023-11-09 NOTE — DISCHARGE NOTE PROVIDER - HOSPITAL COURSE
89 yo F with PMHx of HTN, HLD, T2DM, R Corneal Transplant, PVD, PUD, and Afib not on home AC presents to the ED with 3 weeks of persistent cough and cold symptoms. Pt states that 3 weeks ago she saw her Vascular doctor to address worsening lower extremity edema and advised leg elevation. Pt elevated her legs at night and saw reduction in swelling, but developed a persistent cough since. Pt saw her PCP 2 weeks ago who prescribed a course of Zithromax without improvement of symptoms. Pt seen in [facility] ED on 10/23 following a fall and was also found to be mildly volume overloaded and started a short course of Lasix with improvement of swelling. Pt endorses productive cough, increased sputum production, intermittent palpitations, numbness and tingling in bilateral feet 2/2 PVD, and fatigue. Recently, pt has endorses suprapubic discomfort and dysuria. No other complaints at this time. pt was found to have rhinovirus. PE was ruled out with CTA, but there was concern for an YESSI thrombus. She was started on AC. TTE did not reveal any thrombus. given the indication for AC with AF anyway, PHILL was not performed.   LABS:                      14.3   9.30  )-----------( 197      ( 09 Nov 2023 06:41 )             44.9   11-09  143  |  101  |  42<H>  ----------------------------<  126<H>  4.3   |  34<H>  |  0.91  Ca    9.0      09 Nov 2023 06:41  RADIOLOGY & ADDITIONAL TESTS:  < from: CT Angio Chest PE Protocol w/ IV Cont (11.06.23 @ 19:06) >  No prior chest CTs are available for comparison. No pulmonary arterial   emboli.  No enlarged axillary, mediastinal or hilar lymph nodes.  No pericardial effusion. Heart size is enlarged with biatrial dilatation.   The left atrium measures about 5.1 cm in anteroposterior dimension.   Decreased or nonopacification of the upper aspect of the left atrial   appendage. Vascular calcifications with involvement of the aorta and the   coronary arteries.  No pleural effusion.  Evaluation of the upper abdomen demonstrate partially imaged   cholecystectomy clips.  Evaluation of the lungs demonstrate minimal bilateral lower lung areas of   linear or subsegmental atelectasis, left greater than right. Nonspecific   mild bilateral upper lung interlobular septal thickening appears   unchanged since the prior cervical spine CT of July 13, 2021.  No central endobronchial lesions. Secretions within the trachea extending   into the left mainstem bronchus. Small cluster of a few 2 or 3 mm nodular   opacities representing foci of impacted distal airways within the right   upper lobe may be due to mucus.  Degenerative changes of the spine and the shoulders. Old healed left rib   fractures.  Partially imaged mucosal thickening within the right maxillary sinus.  IMPRESSION: No pulmonary arterial emboli.  Nonopacification of the upper aspect of the left atrial appendage.   Differential diagnosis include slow blood flow/mixing artifact versus   thrombus. Contrast-enhanced chest CT with delayed imaging can be   performed for further evaluation as clinically warranted.  < from: TTE W or WO Ultrasound Enhancing Agent (11.07.23 @ 07:35) >     1. Technically difficult image quality.   2. Left ventricular systolic function is normal with an ejection fraction of 58 % by Lyon's method of disks.   3. There is increased LV mass and concentric hypertrophy.   4. Moderate left ventricular hypertrophy.   5. Normal right ventricular cavity size, wall thickness, and systolic function.   6. The left ventricular diastolic function is indeterminate. Analysis of left ventricular diastolic function and filling pressure is made challenging by the presence of atrial fibrillation.   7. The left atrium is severely dilated in size.   8. Mild mitral regurgitation.   9. Mild aortic regurgitation.  10. No echocardiographic evidence of pulmonary hypertension.    ________________________________________________________________________________________  FINDINGS:     Left Ventricle:  Left ventricular systolic function is normal with a calculated ejection fraction of 58 % by the Lyon's biplane method of disks. The left ventricular diastolic function is indeterminate. Analysis of left ventricular diastolic function and filling pressure is made challenging by the presence of atrial fibrillation. Moderate left ventricular hypertrophy. There is increased LV mass and concentric hypertrophy.     Right Ventricle:  The right ventricular cavity is normal in size, normal wall thickness and normal systolic function.     Left Atrium:  The left atrium is severely dilated in size with an indexed volume of 52.64 ml/m².     Right Atrium:  The right atrium is normal in size with an indexed volume of 31.09 ml/m².     Aortic Valve:  The aortic valve appears trileaflet with reduced systolic excursion. There is moderate calcification of the aortic valve leaflets. There is mild thickening of the aortic valve leaflets. There is no aortic valve stenosis. There is mild aortic regurgitation.     Mitral Valve:  Structurally normal mitral valve with normal leaflet excursion. There is no mitral valve stenosis. There is mild mitral regurgitation.     Tricuspid Valve:  There is mild to moderate tricuspid regurgitation. There is no echocardiographic evidence of pulmonary hypertension. Estimated pulmonary artery systolic pressure is 28 mmHg.     Pulmonic Valve:  The pulmonic valve was not well visualized. There is trace pulmonic regurgitation.     Aorta:  The aortic root at the sinuses of Valsalva is borderline dilated, measuring 3.10 cm (indexed 2.24 cm/m²). The aortic arch diameter is normal in size, measuring 2.2 cm (indexed 1.59 cm/m²).     Pericardium:  No pericardial effusion seen.     Systemic Veins:  The inferior vena cava was not well visualized, PASP is at least 28 mmHg.    < end of copied text >

## 2023-11-09 NOTE — SWALLOW VFSS/MBS ASSESSMENT ADULT - PHARYNGEAL PHASE COMMENTS
Prompt swallow initiation, mildly reduced tongue base retraction, mildly reduced pharyngeal contractibility, mildly reduced hyolaryngeal elevation/epiglottic closure. Intermittent penetration above the TVF with full retrieval. Prompt swallow initiation, mildly reduced tongue base retraction, mildly reduced pharyngeal contractibility, mildly reduced hyolaryngeal elevation/epiglottic closure. No penetration or aspiration viewed. Trace residue viewed in the valleculae.

## 2023-11-09 NOTE — DISCHARGE NOTE NURSING/CASE MANAGEMENT/SOCIAL WORK - PATIENT PORTAL LINK FT
You can access the FollowMyHealth Patient Portal offered by [facility] by registering at the following website: [email or web address]. By joining [employer]’s FollowMyHealth portal, you will also be able to view your health information using other applications (apps) compatible with our system.

## 2023-11-09 NOTE — PROGRESS NOTE ADULT - SUBJECTIVE AND OBJECTIVE BOX
[facility] DIVISION of INFECTIOUS DISEASE  Bandar Odom MD PhD, Bernadette Dc MD, Kathleen Nesbitt MD, Sharon Solorzano MD, Des Rodriguez MD  and providing coverage with Mannie Paredes MD  Providing Infectious Disease Consultations at [facility], [facility], [facility]    Office# [phone] to schedule follow up appointments  Answering Service for urgent calls or New Consults [phone]  Cell# to text for urgent issues Bandar Odom [phone]     infectious diseases progress note:    LEVON SRIVASTAVA is a 89y y. o. Female patient    Overnight and events of the last 24hrs reviewed    Allergies    sulfa drugs (Hives)    Intolerances        ANTIBIOTICS/RELEVANT:  antimicrobials    immunologic:  influenza  Vaccine (HIGH DOSE) 0.7 milliLiter(s) IntraMuscular once    OTHER:  acetaminophen     Tablet .. 650 milliGRAM(s) Oral every 6 hours PRN  albuterol    0.083% 2.5 milliGRAM(s) Nebulizer every 4 hours PRN  aluminum hydroxide/magnesium hydroxide/simethicone Suspension 30 milliLiter(s) Oral every 4 hours PRN  apixaban 2.5 milliGRAM(s) Oral every 12 hours  atorvastatin 20 milliGRAM(s) Oral at bedtime  benzonatate 100 milliGRAM(s) Oral three times a day  cholecalciferol 2000 Unit(s) Oral daily  dextrose 5%. 1000 milliLiter(s) IV Continuous <Continuous>  dextrose 5%. 1000 milliLiter(s) IV Continuous <Continuous>  dextrose 50% Injectable 25 Gram(s) IV Push once  dextrose 50% Injectable 12.5 Gram(s) IV Push once  dextrose 50% Injectable 25 Gram(s) IV Push once  dextrose Oral Gel 15 Gram(s) Oral once PRN  famotidine    Tablet 20 milliGRAM(s) Oral daily  fluticasone propionate 50 MICROgram(s)/spray Nasal Spray 1 Spray(s) Both Nostrils two times a day  furosemide    Tablet 20 milliGRAM(s) Oral daily  glucagon  Injectable 1 milliGRAM(s) IntraMuscular once  guaiFENesin Oral Liquid (Sugar-Free) 200 milliGRAM(s) Oral every 6 hours PRN  insulin lispro (ADMELOG) corrective regimen sliding scale   SubCutaneous every 6 hours  losartan 50 milliGRAM(s) Oral daily  melatonin 3 milliGRAM(s) Oral at bedtime PRN  metoprolol succinate ER 25 milliGRAM(s) Oral daily  mineral oil enema 133 milliLiter(s) Rectal daily PRN  multivitamin/minerals 1 Tablet(s) Oral daily  ondansetron Injectable 4 milliGRAM(s) IV Push every 8 hours PRN  sodium chloride 0.65% Nasal 1 Spray(s) Both Nostrils two times a day  sodium chloride 3%  Inhalation 4 milliLiter(s) Inhalation every 12 hours  tobramycin/dexamethasone Suspension 2 Drop(s) Both EYES two times a day      Objective:  Vital Signs Last 24 Hrs  T(C): 36.4 (09 Nov 2023 05:00), Max: 36.4 (08 Nov 2023 21:25)  T(F): 97.6 (09 Nov 2023 05:00), Max: 97.6 (08 Nov 2023 21:25)  HR: 89 (09 Nov 2023 05:00) (80 - 89)  BP: 121/73 (09 Nov 2023 05:00) (102/61 - 121/73)  BP(mean): --  RR: 18 (09 Nov 2023 05:00) (17 - 18)  SpO2: 94% (09 Nov 2023 05:00) (90% - 94%)    Parameters below as of 09 Nov 2023 05:00  Patient On (Oxygen Delivery Method): room air        T(C): 36.4 (11-09-23 @ 05:00), Max: 36.7 (11-08-23 @ 05:13)  T(C): 36.4 (11-09-23 @ 05:00), Max: 36.7 (11-06-23 @ 23:54)  T(C): 36.4 (11-09-23 @ 05:00), Max: 36.7 (11-06-23 @ 23:54)    PHYSICAL EXAM:  HEENT: NC atraumatic  Neck: supple  Respiratory: no accessory muscle use, breathing comfortably  Cardiovascular: distant  Gastrointestinal: normal appearing, nondistended  Extremities: no clubbing, no cyanosis,        LABS:                          14.3   9.30  )-----------( 197      ( 09 Nov 2023 06:41 )             44.9       WBC  9.30 11-09 @ 06:41  8.27 11-08 @ 06:37  8.04 11-07 @ 05:59  7.99 11-06 @ 16:20      11-09    143  |  101  |  42<H>  ----------------------------<  126<H>  4.3   |  34<H>  |  0.91    Ca    9.0      09 Nov 2023 06:41        Creatinine: 0.91 mg/dL (11-09-23 @ 06:41)  Creatinine: 0.91 mg/dL (11-08-23 @ 06:37)  Creatinine: 0.66 mg/dL (11-07-23 @ 05:59)  Creatinine: 0.75 mg/dL (11-06-23 @ 16:20)      PTT - ( 08 Nov 2023 06:37 )  PTT:33.5 sec  Urinalysis Basic - ( 09 Nov 2023 06:41 )    Color: x / Appearance: x / SG: x / pH: x  Gluc: 126 mg/dL / Ketone: x  / Bili: x / Urobili: x   Blood: x / Protein: x / Nitrite: x   Leuk Esterase: x / RBC: x / WBC x   Sq Epi: x / Non Sq Epi: x / Bacteria: x            INFLAMMATORY MARKERS      MICROBIOLOGY:              RADIOLOGY & ADDITIONAL STUDIES:

## 2023-11-09 NOTE — CONSULT NOTE ADULT - SUBJECTIVE AND OBJECTIVE BOX
Patient is a 89y old  Female who presents with a chief complaint of Acute Hypoxic Respiratory Failure (2023 10:23)    Type 2 DM DX "a few years" no known complications, managed by PCP Dr. Jerson Feng, seen every3 months, current A1c 8.4%. discussed average BG and A1c goals 7-8%. no medications, does not do f/s @ home. discussed CC diet and carb identification, portion control, balanced plate method prioritizing protein and nonstarchy vegetables. pt states she eats a lot of fruit, bananas, oranges, grapes, blueberryies, reviewed serving sizes. discussed 150min/week mod intensity aerobic exercise as tolerated. verbal educaiton and handouts provided.  questions answered  diabetes education provided- A1c measure and BG targets  fasting, <180 2 hours postmeal. inhospital BGM frequency and insulin administration, s/s of hyperglycemia/hypoglycemia and management, glycemic control and preventing complications, consistent carb diet, balanced plate method, consistent meal planning. sick day management, provider f/u  Hx CHF, PVD, Afib    HPI:  89 yo F with PMHx of HTN, HLD, T2DM, R Corneal Transplant, PVD, PUD, and Afib not on home AC presents to the ED with 3 weeks of persistent cough and cold symptoms. Pt states that 3 weeks ago she saw her Vascular doctor to address worsening lower extremity edema and advised leg elevation. Pt elevated her legs at night and saw reduction in swelling, but developed a persistent cough since. Pt saw her PCP 2 weeks ago who prescribed a course of Zithromax without improvement of symptoms. Pt seen in [facility] ED on 10/23 following a fall and was also found to be mildly volume overloaded and started a short course of Lasix with improvement of swelling. Pt endorses productive cough, increased sputum production, intermittent palpitations, numbness and tingling in bilateral feet 2/2 PVD, and fatigue. Recently, pt has endorses suprapubic discomfort and dysuria. No other complaints at this time.   ED Course:   Vitals: BP: 172/90, HR: 78, Temp: 97, RR: 18, SpO2: 95% on 4 L NC   Labs: CO2 33, BUN 31, Serum Glucose 177, ProBNP 993, Entero/Rhino+   CXR: No change heart mediastinum. No consolidation pneumothorax or effusion. Old bilateral rib fractures. Degenerative change bilateral shoulders   CT Chest and Angio: No pulmonary arterial emboli. Non-opacification of the upper aspect of the left atrial appendage. Differential diagnosis include slow blood flow/mixing artifact versus thrombus. Contrast-enhanced chest CT with delayed imaging can be performed for further evaluation as clinically warranted.  CT Head and Cervical Spine: No acute intracranial hemorrhage or mass effect. No CT evidence of cervical spine fracture.  EK bpm, Atrial fibrillation Moderate voltage criteria for LVH, ST & T wave abnormality, consider lateral ischemia  Received in the ED: No interventions provided    (2023 20:45)      PAST MEDICAL & SURGICAL HISTORY:  Hyperlipidemia      Peptic ulcer      HTN (hypertension)      Osteoporosis      Irritable bowel syndrome      Cellulitis      Fractured pelvis      Pancreatic mass      S/P hip replacement  partial, left      History of appendectomy      History of tonsillectomy    Allergies    sulfa drugs (Hives)    Intolerances        MEDICATIONS  (STANDING):  apixaban 2.5 milliGRAM(s) Oral every 12 hours  atorvastatin 20 milliGRAM(s) Oral at bedtime  benzonatate 100 milliGRAM(s) Oral three times a day  cholecalciferol 2000 Unit(s) Oral daily  dextrose 5%. 1000 milliLiter(s) (50 mL/Hr) IV Continuous <Continuous>  dextrose 5%. 1000 milliLiter(s) (100 mL/Hr) IV Continuous <Continuous>  dextrose 50% Injectable 25 Gram(s) IV Push once  dextrose 50% Injectable 12.5 Gram(s) IV Push once  dextrose 50% Injectable 25 Gram(s) IV Push once  famotidine    Tablet 20 milliGRAM(s) Oral daily  fluticasone propionate 50 MICROgram(s)/spray Nasal Spray 1 Spray(s) Both Nostrils two times a day  furosemide    Tablet 20 milliGRAM(s) Oral daily  glucagon  Injectable 1 milliGRAM(s) IntraMuscular once  influenza  Vaccine (HIGH DOSE) 0.7 milliLiter(s) IntraMuscular once  insulin lispro (ADMELOG) corrective regimen sliding scale   SubCutaneous every 6 hours  losartan 50 milliGRAM(s) Oral daily  metoprolol succinate ER 25 milliGRAM(s) Oral daily  multivitamin/minerals 1 Tablet(s) Oral daily  sodium chloride 0.65% Nasal 1 Spray(s) Both Nostrils two times a day  sodium chloride 3%  Inhalation 4 milliLiter(s) Inhalation every 12 hours  tobramycin/dexamethasone Suspension 2 Drop(s) Both EYES two times a day

## 2023-11-09 NOTE — DISCHARGE NOTE PROVIDER - NSDCHHNEEDSERVICE_GEN_ALL_CORE
- presumably 2/2 to IVVD from diarrheal illness and poor intake  - sCr 2.6 (baseline 1.8-2.0)  - holding ACE-I and diuretics  - continue IV fluid hydration    I   Rehabilitation services/Teaching and training

## 2023-11-09 NOTE — SWALLOW VFSS/MBS ASSESSMENT ADULT - DIAGNOSTIC IMPRESSIONS
Pt presents with mild pharyngeal dysphagia for thin liquids marked by intermittent penetration above the vocal cords with full retrieval and airway protection maintained. No penetration or aspiration for puree, regular solids and small sips of thin fluids. Trace residue viewed post swallow of puree/solids, predominantly in the valleculae.

## 2023-11-09 NOTE — DISCHARGE NOTE NURSING/CASE MANAGEMENT/SOCIAL WORK - NSDCVIVACCINE_GEN_ALL_CORE_FT
Tdap; 23-Oct-2023 22:07; Raegan Corrigan (RN); [employer]; [id number] (Exp. Date: 20-Jul-2025); IntraMuscular; Deltoid Right.; 0.5 milliLiter(s); VIS (VIS Published: 20-Jul-2025, VIS Presented: 23-Oct-2023);

## 2023-11-09 NOTE — PROGRESS NOTE ADULT - ASSESSMENT
89 yo F with PMHx of HTN, HLD, T2DM, R Corneal Transplant, PVD, PUD, and Afib not on home AC presents to the ED with 3 weeks of persistent cough and cold symptoms.     Afib   - p/w persistent cough and cold sx x 3 weeks  - CTA chest: No pulmonary arterial emboli. Non-opacification of the upper aspect of the left atrial appendage. Differential diagnosis include slow blood flow/mixing artifact versus thrombus. Contrast-enhanced chest CT with delayed imaging can be performed for further evaluation as clinically warranted.  - unclear if true thrombus or artifact    - TTE (11/7/2023) LVSF normal, EF 58%  increased LV mass and concentric hypertrophy, mod LVH, mild MR, AR. No echocardiographic evidence of pulmonary hypertension. The left ventricular diastolic function is indeterminate. Analysis of left ventricular diastolic function and filling pressure is made challenging by the presence of atrial fibrillation    - known hx of Afib not on home AC d/t ecchymosis   - CHADS-VASc : 6   - continue Eliquis 2.5mg BID  - continue BB    - LE edema improved, s/p Lasix IV in ED   - Pro BNP  993  - can hold lasix while NPO, resume lasix 20mg po when tolerating po     - ECG: atrial fibrillation, moderate voltage criteria for LVH, ST and T wave abnormalities  - No clear evidence of acute ischemia  - Troponin negative x 1  - continue home atorvastatin     - BP, HR stable and controlled   - Continue Losartan and Toprol with hold  parameters    - Monitor and replete lytes, keep K>4, Mg>2.  - Will continue to follow.    Doris Petit NP  Nurse Practitioner- Cardiology   Call TEAMS

## 2023-11-09 NOTE — CONSULT NOTE ADULT - CONSULT REASON
Hypox
Lower extremity edema, left atrial appendage
sob  khalil  weakness  cough  on o2 support
89y A1C with Estimated Average Glucose Result: 8.4 % (11-07-23 @ 05:59)   diabetes mellitus uncontrolled type 2

## 2023-11-09 NOTE — CONSULT NOTE ADULT - GENERAL
Patient: Anne-Marie Hayes    Procedure Summary     Date: 03/15/23 Room / Location: [facility] CATH LAB    Anesthesia Start: 0955 Anesthesia Stop: 1002    Procedure: CARDIOVERSION EXTERNAL IN CARDIOLOGY DEPARTMENT Diagnosis: (PAF)    Scheduled Providers: Koko Tavares MD Provider: Flip Oropeza CRNA    Anesthesia Type: MAC ASA Status: 3          Anesthesia Type: MAC    Vitals  Vitals Value Taken Time   /69 03/15/23 1046   Temp     Pulse 77 03/15/23 1048   Resp 16 03/15/23 1030   SpO2 96 % 03/15/23 1048   Vitals shown include unvalidated device data.        Post Anesthesia Care and Evaluation    Patient location during evaluation: PHASE II  Patient participation: complete - patient participated  Level of consciousness: awake and alert  Pain score: 0  Pain management: adequate    Airway patency: patent  Anesthetic complications: No anesthetic complications  PONV Status: none  Cardiovascular status: acceptable and blood pressure returned to baseline  Respiratory status: acceptable  Hydration status: acceptable  No anesthesia care post op    
details…

## 2023-11-09 NOTE — DISCHARGE NOTE PROVIDER - NSDCCPCAREPLAN_GEN_ALL_CORE_FT
PRINCIPAL DISCHARGE DIAGNOSIS  Diagnosis: Viral URI with cough  Assessment and Plan of Treatment:       SECONDARY DISCHARGE DIAGNOSES  Diagnosis: Acute hypoxic respiratory failure  Assessment and Plan of Treatment:     Diagnosis: HTN (hypertension)  Assessment and Plan of Treatment:     Diagnosis: T2DM (type 2 diabetes mellitus)  Assessment and Plan of Treatment:     Diagnosis: Afib  Assessment and Plan of Treatment:     Diagnosis: Viral URI with cough  Assessment and Plan of Treatment:

## 2023-11-09 NOTE — CONSULT NOTE ADULT - PROBLEM SELECTOR RECOMMENDATION 9
Type 2 A1c 8.4% adm hypoxemia  c/w ANA CRISTINA  Recommend endocrine-Perlman onconsult  FU appt: PCP  DSC recommendations: return to home with lifestyle and diet modifications  diabetes education provided as documented above  Diabetes support info and cell # [phone] given   Goal 100-180 mg/dL; 140-180 mg/dL in critical care areas

## 2023-11-09 NOTE — PROGRESS NOTE ADULT - PROVIDER SPECIALTY LIST ADULT
Hospitalist
Infectious Disease
Pulmonology
Infectious Disease
Internal Medicine
Pulmonology
Cardiology
Cardiology

## 2023-11-09 NOTE — PROGRESS NOTE ADULT - SUBJECTIVE AND OBJECTIVE BOX
[facility] -- Bisi Kumar,  Rowena, Rashad Cid Savella, Goodger  Office # [phone]    Follow Up:   LE edema, ? YESSI thrombus      Subjective/Observations: Pt seen and examined, awake, alert, resting comfortably in bed. NPO, reports difficulty swallowing. c/o productive cough.  Denies chest pain, dyspnea, palpitations, orthopnea and PND.     REVIEW OF SYSTEMS: All other review of systems is negative unless indicated above  PAST MEDICAL & SURGICAL HISTORY:  Hyperlipidemia      Peptic ulcer      HTN (hypertension)      Osteoporosis      Irritable bowel syndrome      Cellulitis      Fractured pelvis      Pancreatic mass      S/P hip replacement  partial, left      History of appendectomy      History of tonsillectomy        MEDICATIONS  (STANDING):  apixaban 2.5 milliGRAM(s) Oral every 12 hours  atorvastatin 20 milliGRAM(s) Oral at bedtime  benzonatate 100 milliGRAM(s) Oral three times a day  cholecalciferol 2000 Unit(s) Oral daily  dextrose 5%. 1000 milliLiter(s) (100 mL/Hr) IV Continuous <Continuous>  dextrose 5%. 1000 milliLiter(s) (50 mL/Hr) IV Continuous <Continuous>  dextrose 50% Injectable 25 Gram(s) IV Push once  dextrose 50% Injectable 25 Gram(s) IV Push once  dextrose 50% Injectable 12.5 Gram(s) IV Push once  famotidine    Tablet 20 milliGRAM(s) Oral daily  fluticasone propionate 50 MICROgram(s)/spray Nasal Spray 1 Spray(s) Both Nostrils two times a day  furosemide    Tablet 20 milliGRAM(s) Oral daily  glucagon  Injectable 1 milliGRAM(s) IntraMuscular once  influenza  Vaccine (HIGH DOSE) 0.7 milliLiter(s) IntraMuscular once  insulin lispro (ADMELOG) corrective regimen sliding scale   SubCutaneous every 6 hours  losartan 50 milliGRAM(s) Oral daily  metoprolol succinate ER 25 milliGRAM(s) Oral daily  multivitamin/minerals 1 Tablet(s) Oral daily  sodium chloride 0.65% Nasal 1 Spray(s) Both Nostrils two times a day  sodium chloride 3%  Inhalation 4 milliLiter(s) Inhalation every 12 hours  tobramycin/dexamethasone Suspension 2 Drop(s) Both EYES two times a day    MEDICATIONS  (PRN):  acetaminophen     Tablet .. 650 milliGRAM(s) Oral every 6 hours PRN Temp greater or equal to 38C (100.4F), Mild Pain (1 - 3)  albuterol    0.083% 2.5 milliGRAM(s) Nebulizer every 4 hours PRN Shortness of Breath and/or Wheezing  aluminum hydroxide/magnesium hydroxide/simethicone Suspension 30 milliLiter(s) Oral every 4 hours PRN Dyspepsia  dextrose Oral Gel 15 Gram(s) Oral once PRN Blood Glucose LESS THAN 70 milliGRAM(s)/deciliter  guaiFENesin Oral Liquid (Sugar-Free) 200 milliGRAM(s) Oral every 6 hours PRN Cough  melatonin 3 milliGRAM(s) Oral at bedtime PRN Insomnia  mineral oil enema 133 milliLiter(s) Rectal daily PRN constipation  ondansetron Injectable 4 milliGRAM(s) IV Push every 8 hours PRN Nausea and/or Vomiting    Allergies    sulfa drugs (Hives)    Intolerances      Vital Signs Last 24 Hrs  T(C): 36.4 (09 Nov 2023 05:00), Max: 36.4 (08 Nov 2023 21:25)  T(F): 97.6 (09 Nov 2023 05:00), Max: 97.6 (08 Nov 2023 21:25)  HR: 89 (09 Nov 2023 05:00) (80 - 99)  BP: 121/73 (09 Nov 2023 05:00) (102/61 - 121/73)  BP(mean): --  RR: 18 (09 Nov 2023 05:00) (17 - 18)  SpO2: 94% (09 Nov 2023 05:00) (90% - 94%)    Parameters below as of 09 Nov 2023 05:00  Patient On (Oxygen Delivery Method): room air      I&O's Summary      TELE: AF    PHYSICAL EXAM:  Constitutional: NAD, awake, frail   HEENT: Moist Mucous Membranes, Anicteric  Pulmonary: Non-labored, course breathe sounds, No wheezing, rales or rhonchi  Cardiovascular: IRRR, S1 and S2, No murmurs, rubs, gallops or clicks  Gastrointestinal: Bowel Sounds present, soft, nontender.   Lymph: No peripheral edema. No lymphadenopathy.  Skin: dry, bilateral lower extremity chronic skin changes and ecchymosis, dry  Psych:  Mood & affect appropriate    LABS: All Labs Reviewed:                        14.3   9.30  )-----------( 197      ( 09 Nov 2023 06:41 )             44.9                         14.8   8.27  )-----------( 186      ( 08 Nov 2023 06:37 )             46.0                         15.3   8.04  )-----------( 197      ( 07 Nov 2023 05:59 )             48.1     09 Nov 2023 06:41    143    |  101    |  42     ----------------------------<  126    4.3     |  34     |  0.91   08 Nov 2023 06:37    142    |  102    |  38     ----------------------------<  165    5.1     |  32     |  0.91   07 Nov 2023 05:59    143    |  101    |  26     ----------------------------<  166    4.2     |  34     |  0.66     Ca    9.0        09 Nov 2023 06:41  Ca    9.2        08 Nov 2023 06:37  Ca    9.1        07 Nov 2023 05:59  Mg     2.4       06 Nov 2023 16:20    TPro  6.3    /  Alb  3.3    /  TBili  1.1    /  DBili  x      /  AST  22     /  ALT  39     /  AlkPhos  106    07 Nov 2023 05:59  TPro  6.3    /  Alb  3.3    /  TBili  1.2    /  DBili  x      /  AST  23     /  ALT  36     /  AlkPhos  104    06 Nov 2023 16:20    PTT - ( 08 Nov 2023 06:37 )  PTT:33.5 sec      12 Lead ECG:   Ventricular Rate 72 BPM    QRS Duration 86 ms    Q-T Interval 408 ms    QTC Calculation(Bazett) 446 ms    R Axis -8 degrees    T Axis 42 degrees    Diagnosis Line Atrial fibrillation  Moderate voltage criteria for LVH, may be normal variant ( R in aVL , [place] product )  ST & T wave abnormality, consider lateral ischemia    Confirmed by PAT CID ([id number]) on 11/7/2023 7:03:37 AM  Also confirmed by PAT ICD ([id number]),  JEREMY DALTON ([id number])  on 11/7/2023 7:20:04 AM (11-06-23 @ 14:48)       EXAM:  ECHO TTE WO CON COMP W DOPPLR         PROCEDURE DATE:  10/21/2019        INTERPRETATION:  INDICATION: Edema    Blood Pressure 134/74    Height 154.9 cm     Weight 45.4 kg       BSA   1.41 sq m    Dimensions:    LA 3.6       Normal Values: 2.0 - 4.0 cm    Ao 3.2        Normal Values: 2.0 - 3.8 cm  SEPTUM 1.4       Normal Values: 0.6 - 1.2 cm  PWT 1.2       Normal Values: 0.6 - 1.1 cm  LVIDd 3.6         Normal Values: 3.0 - 5.6 cm  LVIDs 2.0         Normal Values: 1.8 - 4.0 cm      OBSERVATIONS:    Mitral Valve: normal, moderate MR.  Aortic Valve/Aorta: Sclerotic trileaflet aortic valve with normal   opening. Mild AI  Tricuspid Valve: normal with mild TR.  Pulmonic Valve: Not well-visualized  Left Atrium: Enlarged  Right Atrium: normal  Left Ventricle: normal LV size and systolic function, estimated LVEF of   65%. Mild LVH  Right Ventricle: Grossly normal size and systolic function.  Pericardium/Pleura: normal, trace pericardial effusion.  Pulmonary/RV Pressure: estimated PA systolic pressure of 23.7 mmHg   assuming an RA pressure of 8 mmHg.      Conclusion:     Mild concentric LVH with normal internal dimensions and systolic   function, estimated LVEF of 65%.   Grossly normal RV size and systolic function.   Left atrial enlargement  Sclerotic trileaflet aortic valve with normal opening, mild AI.   Moderate MR   Mild TR.    Estimated PA systolic pressure of 23.7 mmHg. The IVC is normal in size.    Trace pericardial effusion.                  TONA CAMILO   This document has been electronically signed. Oct 22 2019  9:02AM                  [FreeTextEntry1] : This 33 y/o previous patient of Dr. Juancho Baron with a hx of abnormal pap smear from 2/10/22 revealing HSIL, HPV HR positive. Patient had reported never having an abnormal pap smear and reported feeling fully vaccinated with Gardisil . Dr. Baron performed a colposcopy, mild changes were noted at 9, and 11 o'clock, biopsies of these areas were performed as well as an ECC. \par \par Dr. Baron discussed with patient that 2/3 biopsies were normal and explained that 1 of her biopsies revealed low grade dysplasia which no intervention was recommended at the time. Patient was advised to follow up in 6 months for a pap smear. She reported no new gynecological symptoms. \par \par She was recently seen by me on 9/7/22 for a follow up cervical pap smear which revealed HPV detection, negative for 16/18/45, cytology with ASC-[facility]. She returns to the office today for a colposcopy. I had discussed with patient consideration of AHCC supplementation as it has been shown to reduce risk of HPV infection by 60% in one study.  [facility] -- Bisi Kumar,  Rowena, Rashad Cid Savella, Goodger, Cohen  Office # [phone]    Follow Up:   LE edema, ? YESSI thrombus      Subjective/Observations: Pt seen and examined, awake, alert, resting comfortably in bed. NPO, reports difficulty swallowing. c/o productive cough.  Denies chest pain, dyspnea, palpitations, orthopnea and PND.     REVIEW OF SYSTEMS: All other review of systems is negative unless indicated above  PAST MEDICAL & SURGICAL HISTORY:  Hyperlipidemia      Peptic ulcer      HTN (hypertension)      Osteoporosis      Irritable bowel syndrome      Cellulitis      Fractured pelvis      Pancreatic mass      S/P hip replacement  partial, left      History of appendectomy      History of tonsillectomy        MEDICATIONS  (STANDING):  apixaban 2.5 milliGRAM(s) Oral every 12 hours  atorvastatin 20 milliGRAM(s) Oral at bedtime  benzonatate 100 milliGRAM(s) Oral three times a day  cholecalciferol 2000 Unit(s) Oral daily  dextrose 5%. 1000 milliLiter(s) (100 mL/Hr) IV Continuous <Continuous>  dextrose 5%. 1000 milliLiter(s) (50 mL/Hr) IV Continuous <Continuous>  dextrose 50% Injectable 25 Gram(s) IV Push once  dextrose 50% Injectable 25 Gram(s) IV Push once  dextrose 50% Injectable 12.5 Gram(s) IV Push once  famotidine    Tablet 20 milliGRAM(s) Oral daily  fluticasone propionate 50 MICROgram(s)/spray Nasal Spray 1 Spray(s) Both Nostrils two times a day  furosemide    Tablet 20 milliGRAM(s) Oral daily  glucagon  Injectable 1 milliGRAM(s) IntraMuscular once  influenza  Vaccine (HIGH DOSE) 0.7 milliLiter(s) IntraMuscular once  insulin lispro (ADMELOG) corrective regimen sliding scale   SubCutaneous every 6 hours  losartan 50 milliGRAM(s) Oral daily  metoprolol succinate ER 25 milliGRAM(s) Oral daily  multivitamin/minerals 1 Tablet(s) Oral daily  sodium chloride 0.65% Nasal 1 Spray(s) Both Nostrils two times a day  sodium chloride 3%  Inhalation 4 milliLiter(s) Inhalation every 12 hours  tobramycin/dexamethasone Suspension 2 Drop(s) Both EYES two times a day    MEDICATIONS  (PRN):  acetaminophen     Tablet .. 650 milliGRAM(s) Oral every 6 hours PRN Temp greater or equal to 38C (100.4F), Mild Pain (1 - 3)  albuterol    0.083% 2.5 milliGRAM(s) Nebulizer every 4 hours PRN Shortness of Breath and/or Wheezing  aluminum hydroxide/magnesium hydroxide/simethicone Suspension 30 milliLiter(s) Oral every 4 hours PRN Dyspepsia  dextrose Oral Gel 15 Gram(s) Oral once PRN Blood Glucose LESS THAN 70 milliGRAM(s)/deciliter  guaiFENesin Oral Liquid (Sugar-Free) 200 milliGRAM(s) Oral every 6 hours PRN Cough  melatonin 3 milliGRAM(s) Oral at bedtime PRN Insomnia  mineral oil enema 133 milliLiter(s) Rectal daily PRN constipation  ondansetron Injectable 4 milliGRAM(s) IV Push every 8 hours PRN Nausea and/or Vomiting    Allergies    sulfa drugs (Hives)    Intolerances      Vital Signs Last 24 Hrs  T(C): 36.4 (09 Nov 2023 05:00), Max: 36.4 (08 Nov 2023 21:25)  T(F): 97.6 (09 Nov 2023 05:00), Max: 97.6 (08 Nov 2023 21:25)  HR: 89 (09 Nov 2023 05:00) (80 - 99)  BP: 121/73 (09 Nov 2023 05:00) (102/61 - 121/73)  BP(mean): --  RR: 18 (09 Nov 2023 05:00) (17 - 18)  SpO2: 94% (09 Nov 2023 05:00) (90% - 94%)    Parameters below as of 09 Nov 2023 05:00  Patient On (Oxygen Delivery Method): room air      I&O's Summary      TELE: AF    PHYSICAL EXAM:  Constitutional: NAD, awake, frail   HEENT: Moist Mucous Membranes, Anicteric  Pulmonary: Non-labored, course breathe sounds, No wheezing, rales or rhonchi  Cardiovascular: IRRR, S1 and S2, No murmurs, rubs, gallops or clicks  Gastrointestinal: Bowel Sounds present, soft, nontender.   Lymph: No peripheral edema. No lymphadenopathy.  Skin: dry, bilateral lower extremity chronic skin changes and ecchymosis, dry  Psych:  Mood & affect appropriate    LABS: All Labs Reviewed:                        14.3   9.30  )-----------( 197      ( 09 Nov 2023 06:41 )             44.9                         14.8   8.27  )-----------( 186      ( 08 Nov 2023 06:37 )             46.0                         15.3   8.04  )-----------( 197      ( 07 Nov 2023 05:59 )             48.1     09 Nov 2023 06:41    143    |  101    |  42     ----------------------------<  126    4.3     |  34     |  0.91   08 Nov 2023 06:37    142    |  102    |  38     ----------------------------<  165    5.1     |  32     |  0.91   07 Nov 2023 05:59    143    |  101    |  26     ----------------------------<  166    4.2     |  34     |  0.66     Ca    9.0        09 Nov 2023 06:41  Ca    9.2        08 Nov 2023 06:37  Ca    9.1        07 Nov 2023 05:59  Mg     2.4       06 Nov 2023 16:20    TPro  6.3    /  Alb  3.3    /  TBili  1.1    /  DBili  x      /  AST  22     /  ALT  39     /  AlkPhos  106    07 Nov 2023 05:59  TPro  6.3    /  Alb  3.3    /  TBili  1.2    /  DBili  x      /  AST  23     /  ALT  36     /  AlkPhos  104    06 Nov 2023 16:20    PTT - ( 08 Nov 2023 06:37 )  PTT:33.5 sec      12 Lead ECG:   Ventricular Rate 72 BPM    QRS Duration 86 ms    Q-T Interval 408 ms    QTC Calculation(Bazett) 446 ms    R Axis -8 degrees    T Axis 42 degrees    Diagnosis Line Atrial fibrillation  Moderate voltage criteria for LVH, may be normal variant ( R in aVL , [place] product )  ST & T wave abnormality, consider lateral ischemia    Confirmed by PAT CID ([id number]) on 11/7/2023 7:03:37 AM  Also confirmed by PAT CID ([id number]),  JEREMY DALTON ([id number])  on 11/7/2023 7:20:04 AM (11-06-23 @ 14:48)       EXAM:  ECHO TTE WO CON COMP W DOPPLR         PROCEDURE DATE:  10/21/2019        INTERPRETATION:  INDICATION: Edema    Blood Pressure 134/74    Height 154.9 cm     Weight 45.4 kg       BSA   1.41 sq m    Dimensions:    LA 3.6       Normal Values: 2.0 - 4.0 cm    Ao 3.2        Normal Values: 2.0 - 3.8 cm  SEPTUM 1.4       Normal Values: 0.6 - 1.2 cm  PWT 1.2       Normal Values: 0.6 - 1.1 cm  LVIDd 3.6         Normal Values: 3.0 - 5.6 cm  LVIDs 2.0         Normal Values: 1.8 - 4.0 cm      OBSERVATIONS:    Mitral Valve: normal, moderate MR.  Aortic Valve/Aorta: Sclerotic trileaflet aortic valve with normal   opening. Mild AI  Tricuspid Valve: normal with mild TR.  Pulmonic Valve: Not well-visualized  Left Atrium: Enlarged  Right Atrium: normal  Left Ventricle: normal LV size and systolic function, estimated LVEF of   65%. Mild LVH  Right Ventricle: Grossly normal size and systolic function.  Pericardium/Pleura: normal, trace pericardial effusion.  Pulmonary/RV Pressure: estimated PA systolic pressure of 23.7 mmHg   assuming an RA pressure of 8 mmHg.      Conclusion:     Mild concentric LVH with normal internal dimensions and systolic   function, estimated LVEF of 65%.   Grossly normal RV size and systolic function.   Left atrial enlargement  Sclerotic trileaflet aortic valve with normal opening, mild AI.   Moderate MR   Mild TR.    Estimated PA systolic pressure of 23.7 mmHg. The IVC is normal in size.    Trace pericardial effusion.                  TONA CAMILO   This document has been electronically signed. Oct 22 2019  9:02AM

## 2023-11-09 NOTE — SWALLOW VFSS/MBS ASSESSMENT ADULT - ORAL PHASE
Delayed oral transit time/Reduced anterior - posterior transport/Uncontrolled bolus / spillover in faizan-pharynx Uncontrolled bolus / spillover in hypopharynx

## 2023-11-09 NOTE — DISCHARGE NOTE PROVIDER - NSDCMRMEDTOKEN_GEN_ALL_CORE_FT
Albuterol (Eqv-ProAir HFA) 90 mcg/inh inhalation aerosol: 2 puff(s) inhaled 4 times a day as needed for  shortness of breath and/or wheezing  Align 4 mg oral capsule: 1 cap(s) orally once a day  apixaban 2.5 mg oral tablet: 1 tab(s) orally every 12 hours  aspirin 81 mg oral delayed release tablet: 1 tab(s) orally once a day  atorvastatin 20 mg oral tablet: 1 tab(s) orally once a day (at bedtime)  Citrucel 2 g/19 g oral powder for reconstitution: 2 gram(s) orally once a day  Diabetic Tussin DM 20 mg-200 mg/10 mL oral liquid: 10 milliliter(s) orally 4 times a day  Eye Multivitamin oral tablet: 1 tab(s) orally once a day  famotidine 20 mg oral tablet: 1 tab(s) orally once a day  fluticasone 50 mcg/inh nasal spray: 1 spray(s) nasal 2 times a day  losartan 50 mg oral tablet: 1 tab(s) orally once a day  metoprolol succinate 25 mg oral tablet, extended release: 1 tab(s) orally once a day  MiraLax oral powder for reconstitution: 17 each orally once a day  tobramycin-dexamethasone 0.3%-0.1% ophthalmic suspension: 2 drop(s) in each eye 2 times a day  Vitamin D3: 1 tab(s) orally once a day   Albuterol (Eqv-ProAir HFA) 90 mcg/inh inhalation aerosol: 2 puff(s) inhaled 4 times a day as needed for  shortness of breath and/or wheezing  Align 4 mg oral capsule: 1 cap(s) orally once a day  apixaban 2.5 mg oral tablet: 1 tab(s) orally every 12 hours  atorvastatin 20 mg oral tablet: 1 tab(s) orally once a day (at bedtime)  Citrucel 2 g/19 g oral powder for reconstitution: 2 gram(s) orally once a day  Diabetic Tussin DM 20 mg-200 mg/10 mL oral liquid: 10 milliliter(s) orally 4 times a day  Eye Multivitamin oral tablet: 1 tab(s) orally once a day  famotidine 20 mg oral tablet: 1 tab(s) orally once a day  fluticasone 50 mcg/inh nasal spray: 1 spray(s) nasal 2 times a day  losartan 50 mg oral tablet: 1 tab(s) orally once a day  metoprolol succinate 25 mg oral tablet, extended release: 1 tab(s) orally once a day  MiraLax oral powder for reconstitution: 17 each orally once a day  tobramycin-dexamethasone 0.3%-0.1% ophthalmic suspension: 2 drop(s) in each eye 2 times a day  Vitamin D3: 1 tab(s) orally once a day

## 2023-11-09 NOTE — SWALLOW VFSS/MBS ASSESSMENT ADULT - ADDITIONAL RECOMMENDATIONS
1. SLP will continue to follow while patient is in house as schedule permits to monitor tolerance of diet. Pending diet tolerance, this dept. to sign off.

## 2023-11-09 NOTE — DISCHARGE NOTE NURSING/CASE MANAGEMENT/SOCIAL WORK - NSDCPEFALRISK_GEN_ALL_CORE
For information on Fall & Injury Prevention, visit: [email or web address] OR  [email or web address] OR  https://www.cdc.gov/steadi/patient.html

## 2023-11-09 NOTE — PROGRESS NOTE ADULT - ASSESSMENT
89 yo F with PMHx of HTN, HLD, T2DM, R Corneal Transplant, PVD, PUD, and Afib not on home AC presents to the ED with 3 weeks of persistent cough and cold symptoms'  AF  HF  OP  OA  URI  Bronchitis  Upper Airway Cough Syndrome  HLD  HTN  DM  PVD  PUD  Atelectasis with Tracheal Secretions on CT chest    ID and Cardio Eval noted  TTE reviewed    cough rx regimen - tessalon - robitussin  hypersal mucolytics  nasal saline  cvs rx regimen  diuresis as per Cardio  I and O  serial labs  replete lytes  BP control  on AC - AF  CT neg for PE  URI sx management  GOC discussion

## 2023-11-10 ENCOUNTER — TRANSCRIPTION ENCOUNTER (OUTPATIENT)
Age: 88
End: 2023-11-10

## 2023-11-10 ENCOUNTER — APPOINTMENT (OUTPATIENT)
Age: 88
End: 2023-11-10
Payer: MEDICARE

## 2023-11-10 DIAGNOSIS — K59.09 OTHER CONSTIPATION: ICD-10-CM

## 2023-11-10 DIAGNOSIS — B34.8 OTHER VIRAL INFECTIONS OF UNSPECIFIED SITE: ICD-10-CM

## 2023-11-10 DIAGNOSIS — R11.0 NAUSEA: ICD-10-CM

## 2023-11-10 DIAGNOSIS — E11.9 TYPE 2 DIABETES MELLITUS W/OUT COMPLICATIONS: ICD-10-CM

## 2023-11-10 PROCEDURE — 99349 HOME/RES VST EST MOD MDM 40: CPT | Mod: 95

## 2023-11-10 RX ORDER — DICLOFENAC SODIUM 1% 10 MG/G
1 GEL TOPICAL
Qty: 2 | Refills: 0 | Status: DISCONTINUED | COMMUNITY
Start: 2021-03-02 | End: 2023-11-10

## 2023-11-10 RX ORDER — LOSARTAN POTASSIUM 50 MG/1
50 TABLET, FILM COATED ORAL
Refills: 0 | Status: ACTIVE | COMMUNITY

## 2023-11-10 RX ORDER — METOPROLOL SUCCINATE 25 MG/1
25 TABLET, EXTENDED RELEASE ORAL
Refills: 0 | Status: ACTIVE | COMMUNITY

## 2023-11-10 RX ORDER — ONDANSETRON 8 MG/1
1 TABLET, FILM COATED ORAL
Qty: 60 | Refills: 0
Start: 2023-11-10

## 2023-11-10 RX ORDER — ATORVASTATIN CALCIUM 20 MG/1
20 TABLET, FILM COATED ORAL
Refills: 0 | Status: ACTIVE | COMMUNITY

## 2023-11-10 RX ORDER — APIXABAN 2.5 MG/1
2.5 TABLET, FILM COATED ORAL
Refills: 0 | Status: ACTIVE | COMMUNITY

## 2023-11-10 RX ORDER — FAMOTIDINE 20 MG/1
20 TABLET, FILM COATED ORAL
Refills: 0 | Status: ACTIVE | COMMUNITY

## 2023-11-10 RX ORDER — ALBUTEROL 90 MCG
90 AEROSOL (GRAM) INHALATION
Refills: 0 | Status: ACTIVE | COMMUNITY

## 2023-11-10 RX ORDER — TOBRAMYCIN AND DEXAMETHASONE 3; 1 MG/ML; MG/ML
0.3-0.1 SUSPENSION/ DROPS OPHTHALMIC
Refills: 0 | Status: ACTIVE | COMMUNITY

## 2023-11-10 RX ORDER — FLUTICASONE PROPIONATE 50 UG/1
50 SPRAY, METERED NASAL
Refills: 0 | Status: ACTIVE | COMMUNITY

## 2023-11-10 RX ORDER — BIFIDOBACTERIUM LONGUM 10MM CELL
4 CAPSULE ORAL
Refills: 0 | Status: ACTIVE | COMMUNITY

## 2023-11-10 RX ORDER — CODEINE PHOSPHATE/GUAIFENESIN 10-200MG/5
200-10 LIQUID (ML) ORAL
Refills: 0 | Status: ACTIVE | COMMUNITY

## 2023-11-10 RX ORDER — ONDANSETRON 4 MG/1
4 TABLET ORAL EVERY 8 HOURS
Qty: 9 | Refills: 0 | Status: ACTIVE | COMMUNITY
Start: 2023-11-10 | End: 1900-01-01

## 2023-11-16 ENCOUNTER — TRANSCRIPTION ENCOUNTER (OUTPATIENT)
Age: 88
End: 2023-11-16

## 2024-01-10 NOTE — ASU PREOP CHECKLIST - ALLERGY BAND ON
Recommendation Preamble: The following recommendations were made during the visit: IPL quoted $675 for series of 3
Render Risk Assessment In Note?: no
Detail Level: Zone
done

## 2024-03-28 ENCOUNTER — NON-APPOINTMENT (OUTPATIENT)
Age: 89
End: 2024-03-28

## 2024-03-28 DIAGNOSIS — L60.8 OTHER NAIL DISORDERS: ICD-10-CM

## 2024-03-28 DIAGNOSIS — L84 CORNS AND CALLOSITIES: ICD-10-CM

## 2024-03-28 DIAGNOSIS — M79.674 PAIN IN RIGHT TOE(S): ICD-10-CM

## 2024-03-28 DIAGNOSIS — M79.671 PAIN IN RIGHT FOOT: ICD-10-CM

## 2024-03-28 DIAGNOSIS — K31.9 DISEASE OF STOMACH AND DUODENUM, UNSPECIFIED: ICD-10-CM

## 2024-03-28 DIAGNOSIS — M79.672 PAIN IN RIGHT FOOT: ICD-10-CM

## 2024-03-28 DIAGNOSIS — M79.675 PAIN IN RIGHT TOE(S): ICD-10-CM

## 2024-04-19 ENCOUNTER — APPOINTMENT (OUTPATIENT)
Dept: PODIATRY | Facility: CLINIC | Age: 89
End: 2024-04-19
Payer: MEDICARE

## 2024-04-19 DIAGNOSIS — I10 ESSENTIAL (PRIMARY) HYPERTENSION: ICD-10-CM

## 2024-04-19 DIAGNOSIS — M21.619 BUNION OF UNSPECIFIED FOOT: ICD-10-CM

## 2024-04-19 DIAGNOSIS — Z83.3 FAMILY HISTORY OF DIABETES MELLITUS: ICD-10-CM

## 2024-04-19 DIAGNOSIS — Z86.79 PERSONAL HISTORY OF OTHER DISEASES OF THE CIRCULATORY SYSTEM: ICD-10-CM

## 2024-04-19 DIAGNOSIS — Z98.890 OTHER SPECIFIED POSTPROCEDURAL STATES: ICD-10-CM

## 2024-04-19 DIAGNOSIS — M20.41 OTHER HAMMER TOE(S) (ACQUIRED), RIGHT FOOT: ICD-10-CM

## 2024-04-19 DIAGNOSIS — Z80.41 FAMILY HISTORY OF MALIGNANT NEOPLASM OF OVARY: ICD-10-CM

## 2024-04-19 DIAGNOSIS — Z82.49 FAMILY HISTORY OF ISCHEMIC HEART DISEASE AND OTHER DISEASES OF THE CIRCULATORY SYSTEM: ICD-10-CM

## 2024-04-19 DIAGNOSIS — M20.42 OTHER HAMMER TOE(S) (ACQUIRED), RIGHT FOOT: ICD-10-CM

## 2024-04-19 PROCEDURE — 99212 OFFICE O/P EST SF 10 MIN: CPT | Mod: 25

## 2024-04-19 PROCEDURE — 11056 PARNG/CUTG B9 HYPRKR LES 2-4: CPT | Mod: Q8

## 2024-04-19 PROCEDURE — 11721 DEBRIDE NAIL 6 OR MORE: CPT | Mod: 59

## 2024-04-19 RX ORDER — FUROSEMIDE 80 MG/1
TABLET ORAL
Refills: 0 | Status: ACTIVE | COMMUNITY

## 2024-05-24 ENCOUNTER — APPOINTMENT (OUTPATIENT)
Dept: PODIATRY | Facility: CLINIC | Age: 89
End: 2024-05-24
Payer: MEDICARE

## 2024-05-24 PROCEDURE — 11056 PARNG/CUTG B9 HYPRKR LES 2-4: CPT | Mod: Q8

## 2024-05-24 PROCEDURE — 11719 TRIM NAIL(S) ANY NUMBER: CPT | Mod: Q8

## 2024-05-24 PROCEDURE — 99212 OFFICE O/P EST SF 10 MIN: CPT | Mod: 25

## 2024-05-24 NOTE — REVIEW OF SYSTEMS
[Negative] : Constitutional [FreeTextEntry5] : PVD [de-identified] : corn, fungal toenails, long toenail

## 2024-05-24 NOTE — REASON FOR VISIT
[Follow-Up Visit] : a follow-up visit for [Infection] : infection [FreeTextEntry2] : at risk foot care

## 2024-05-24 NOTE — PHYSICAL EXAM
[FreeTextEntry3] :  Vascular Exam: DP Pulse (left): Absent. DP Pulse (right): Absent. PT Pulse (left): Absent. PT Pulse (right): Absent. Capillary Return - L: Delayed. Capillary Return - R: Delayed. Temperature Grad - L: Warm to Cool. Temperature Grad - R: Warm to Cool. Class findings (Q8) indicated due to abs L posterior tibial pulse, abs R posterior tibial pulse, abs L dorsalis pedis pulse, abs R dorsalis pedis pulse, hair growth decreased or absent, nail changes (thickening), pigmentary changes (discoloration), skin texture L (thin, shiny), skin texture R (thin, shiny) and temperature changes.   [de-identified] : Orthopedic Exam: Claw Toes left 4th toe.  Prominent 5th met head right.  Large bunions bilaterally. [FreeTextEntry1] : Neurological Exam: Sharp / Dull - L: WNL. Sharp / Dull - R: WNL. Light Touch/pressure - L: WNL. Light Touch/pressure - R: WNL. Hot/cold - L: WNL. Hot/cold - R: WNL. Vibratory - L: WNL. Vibratory - R: WNL.

## 2024-05-24 NOTE — PROCEDURE
[FreeTextEntry1] : Plan:  Sub-metatarsal 5 right and sub-metatarsal 1 left - Keratotic lesions were debrided. (Hf=[id number]).  Exam.  Right 1st toenail, right 3rd toenail, right 4th toenail, right 5th toenail, left 1st toenail, left 2nd toenail, left 3rd toenail, left 4th toenail and left 5th toenail.  Fungal nails were debrided using manual cutters and electrical  to patient tolerance.  Patient to resume Formula 7 daily. (Gw=[id number]). Right 2nd toenail - The remainder of nails were trimmed. (Bm=82580).   Patient to be seen again: 1 Month

## 2024-05-24 NOTE — HISTORY OF PRESENT ILLNESS
[FreeTextEntry1] : Noreen is a 89-year-old female who presents with her aide for continued foot care.  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  She uses Formula 7 with the help of her aide daily.  Trenary Pain: Subject to breakdown. Mycotic Nail Pain: On palpation. Non-Dystrophic Nail Pain: None. DLS by Referring doctor: 02/16/24

## 2024-06-28 ENCOUNTER — APPOINTMENT (OUTPATIENT)
Dept: PODIATRY | Facility: CLINIC | Age: 89
End: 2024-06-28
Payer: MEDICARE

## 2024-06-28 DIAGNOSIS — I70.203 UNSPECIFIED ATHEROSCLEROSIS OF NATIVE ARTERIES OF EXTREMITIES, BILATERAL LEGS: ICD-10-CM

## 2024-06-28 DIAGNOSIS — B35.1 TINEA UNGUIUM: ICD-10-CM

## 2024-06-28 PROCEDURE — 11719 TRIM NAIL(S) ANY NUMBER: CPT | Mod: Q8

## 2024-06-28 PROCEDURE — 99212 OFFICE O/P EST SF 10 MIN: CPT | Mod: 25

## 2024-06-28 PROCEDURE — 11056 PARNG/CUTG B9 HYPRKR LES 2-4: CPT | Mod: Q8

## 2024-07-12 NOTE — H&P ADULT - PROBLEM SELECTOR PROBLEM 3
Detail Level: Detailed Sunscreen Recommendations: Broad-spectrum daily sunscreen with SPF 30+. Reapply every 2 hours of sun exposure. Detail Level: Generalized Sunscreen Recommendations: Broad-spectrum daily sunscreen with SPF 30 or above. Ideally a brand with zinc oxide such as Elta MD or Escobar [place] Detail Level: Zone Sunscreen Recommendations: Broad-spectrum daily sunscreen with SPF 30 or higher. Ideally a brand with zinc oxide such as Elta MD or CeraVe AM. Leukocytosis, unspecified type HTN (hypertension)

## 2024-08-16 ENCOUNTER — APPOINTMENT (OUTPATIENT)
Dept: PODIATRY | Facility: CLINIC | Age: 89
End: 2024-08-16

## 2024-08-23 ENCOUNTER — APPOINTMENT (OUTPATIENT)
Dept: PODIATRY | Facility: CLINIC | Age: 89
End: 2024-08-23
Payer: MEDICARE

## 2024-08-23 DIAGNOSIS — B35.1 TINEA UNGUIUM: ICD-10-CM

## 2024-08-23 PROCEDURE — 99213 OFFICE O/P EST LOW 20 MIN: CPT

## 2024-08-23 RX ORDER — CICLOPIROX 71.3 MG/ML
8 SOLUTION TOPICAL
Qty: 1 | Refills: 0 | Status: ACTIVE | COMMUNITY
Start: 2024-08-23 | End: 1900-01-01

## 2024-08-23 NOTE — HISTORY OF PRESENT ILLNESS
[FreeTextEntry1] : Noreen is a 89-year-old female who presents to the office this afternoon with her home health aide, Karen for continued care of a fungus infection in her toenails. She recently ran out of her Formula 7.

## 2024-08-23 NOTE — PROCEDURE
[FreeTextEntry1] : Plan:  Examination.  (Fw=[id number]).  Right 1st toenail, right 3rd toenail, right 4th toenail, right 5th toenail, left 1st toenail, left 2nd toenail, left 3rd toenail, left 4th toenail and left 5th toenail.  Fungal nails were debrided using manual cutters and electrical  to patient tolerance.  RX: Penlac nail gel to be applied daily.  Patient to be seen again: 1 Month

## 2024-08-23 NOTE — PHYSICAL EXAM
[FreeTextEntry3] :  Vascular Exam: DP Pulse (left): Absent. DP Pulse (right): Absent. PT Pulse (left): Absent. PT Pulse (right): Absent. Capillary Return - L: Delayed. Capillary Return - R: Delayed. Temperature Grad - L: Warm to Cool. Temperature Grad - R: Warm to Cool.   [de-identified] : Orthopedic Exam:  Patient presents with bunions and hammertoes that become painful at times. [FreeTextEntry1] : Neurological Exam: Sharp / Dull - L: WNL. Sharp / Dull - R: WNL. Light Touch/pressure - L: WNL. Light Touch/pressure - R: WNL. Hot/cold - L: WNL. Hot/cold - R: WNL. Vibratory - L: WNL. Vibratory - R: WNL.

## 2024-08-23 NOTE — REVIEW OF SYSTEMS
[Negative] : Constitutional [FreeTextEntry5] : PVD [de-identified] : fungal toenails, long toenails, corns, callus

## 2024-10-06 ENCOUNTER — EMERGENCY (EMERGENCY)
Facility: HOSPITAL | Age: 89
LOS: 1 days | Discharge: HOME | End: 2024-10-06
Attending: EMERGENCY MEDICINE | Admitting: EMERGENCY MEDICINE
Payer: MEDICARE

## 2024-10-06 VITALS
DIASTOLIC BLOOD PRESSURE: 77 MMHG | WEIGHT: 89.95 LBS | TEMPERATURE: 98 F | SYSTOLIC BLOOD PRESSURE: 142 MMHG | OXYGEN SATURATION: 97 % | RESPIRATION RATE: 18 BRPM | HEART RATE: 56 BPM | HEIGHT: 65 IN

## 2024-10-06 DIAGNOSIS — Z96.60 PRESENCE OF UNSPECIFIED ORTHOPEDIC JOINT IMPLANT: Chronic | ICD-10-CM

## 2024-10-06 DIAGNOSIS — Z98.89 OTHER SPECIFIED POSTPROCEDURAL STATES: Chronic | ICD-10-CM

## 2024-10-06 LAB
ALBUMIN SERPL ELPH-MCNC: 3.4 G/DL — SIGNIFICANT CHANGE UP (ref 3.3–5)
ALP SERPL-CCNC: 85 U/L — SIGNIFICANT CHANGE UP (ref 40–120)
ALT FLD-CCNC: 29 U/L — SIGNIFICANT CHANGE UP (ref 12–78)
ANION GAP SERPL CALC-SCNC: 5 MMOL/L — SIGNIFICANT CHANGE UP (ref 5–17)
APTT BLD: 31.8 SEC — SIGNIFICANT CHANGE UP (ref 24.5–35.6)
AST SERPL-CCNC: 25 U/L — SIGNIFICANT CHANGE UP (ref 15–37)
BASOPHILS # BLD AUTO: 0.1 K/UL — SIGNIFICANT CHANGE UP (ref 0–0.2)
BASOPHILS NFR BLD AUTO: 1.4 % — SIGNIFICANT CHANGE UP (ref 0–2)
BILIRUB SERPL-MCNC: 1.1 MG/DL — SIGNIFICANT CHANGE UP (ref 0.2–1.2)
BUN SERPL-MCNC: 29 MG/DL — HIGH (ref 7–23)
CALCIUM SERPL-MCNC: 9.6 MG/DL — SIGNIFICANT CHANGE UP (ref 8.5–10.1)
CHLORIDE SERPL-SCNC: 108 MMOL/L — SIGNIFICANT CHANGE UP (ref 96–108)
CO2 SERPL-SCNC: 28 MMOL/L — SIGNIFICANT CHANGE UP (ref 22–31)
CREAT SERPL-MCNC: 0.85 MG/DL — SIGNIFICANT CHANGE UP (ref 0.5–1.3)
EGFR: 63 ML/MIN/1.73M2 — SIGNIFICANT CHANGE UP
EOSINOPHIL # BLD AUTO: 0.12 K/UL — SIGNIFICANT CHANGE UP (ref 0–0.5)
EOSINOPHIL NFR BLD AUTO: 1.7 % — SIGNIFICANT CHANGE UP (ref 0–6)
GLUCOSE SERPL-MCNC: 159 MG/DL — HIGH (ref 70–99)
HCT VFR BLD CALC: 42.4 % — SIGNIFICANT CHANGE UP (ref 34.5–45)
HGB BLD-MCNC: 13.5 G/DL — SIGNIFICANT CHANGE UP (ref 11.5–15.5)
IMM GRANULOCYTES NFR BLD AUTO: 0.3 % — SIGNIFICANT CHANGE UP (ref 0–0.9)
INR BLD: 1.03 RATIO — SIGNIFICANT CHANGE UP (ref 0.85–1.16)
LYMPHOCYTES # BLD AUTO: 1.95 K/UL — SIGNIFICANT CHANGE UP (ref 1–3.3)
LYMPHOCYTES # BLD AUTO: 27.7 % — SIGNIFICANT CHANGE UP (ref 13–44)
MCHC RBC-ENTMCNC: 28.1 PG — SIGNIFICANT CHANGE UP (ref 27–34)
MCHC RBC-ENTMCNC: 31.8 GM/DL — LOW (ref 32–36)
MCV RBC AUTO: 88.3 FL — SIGNIFICANT CHANGE UP (ref 80–100)
MONOCYTES # BLD AUTO: 0.51 K/UL — SIGNIFICANT CHANGE UP (ref 0–0.9)
MONOCYTES NFR BLD AUTO: 7.2 % — SIGNIFICANT CHANGE UP (ref 2–14)
NEUTROPHILS # BLD AUTO: 4.35 K/UL — SIGNIFICANT CHANGE UP (ref 1.8–7.4)
NEUTROPHILS NFR BLD AUTO: 61.7 % — SIGNIFICANT CHANGE UP (ref 43–77)
NRBC # BLD: 0 /100 WBCS — SIGNIFICANT CHANGE UP (ref 0–0)
NT-PROBNP SERPL-SCNC: 1365 PG/ML — HIGH (ref 0–450)
PLATELET # BLD AUTO: 185 K/UL — SIGNIFICANT CHANGE UP (ref 150–400)
POTASSIUM SERPL-MCNC: 4.2 MMOL/L — SIGNIFICANT CHANGE UP (ref 3.5–5.3)
POTASSIUM SERPL-SCNC: 4.2 MMOL/L — SIGNIFICANT CHANGE UP (ref 3.5–5.3)
PROT SERPL-MCNC: 6.6 G/DL — SIGNIFICANT CHANGE UP (ref 6–8.3)
PROTHROM AB SERPL-ACNC: 12.1 SEC — SIGNIFICANT CHANGE UP (ref 9.9–13.4)
RBC # BLD: 4.8 M/UL — SIGNIFICANT CHANGE UP (ref 3.8–5.2)
RBC # FLD: 13.2 % — SIGNIFICANT CHANGE UP (ref 10.3–14.5)
SODIUM SERPL-SCNC: 141 MMOL/L — SIGNIFICANT CHANGE UP (ref 135–145)
TROPONIN I, HIGH SENSITIVITY RESULT: 15.3 NG/L — SIGNIFICANT CHANGE UP
WBC # BLD: 7.05 K/UL — SIGNIFICANT CHANGE UP (ref 3.8–10.5)
WBC # FLD AUTO: 7.05 K/UL — SIGNIFICANT CHANGE UP (ref 3.8–10.5)

## 2024-10-06 PROCEDURE — 93010 ELECTROCARDIOGRAM REPORT: CPT

## 2024-10-06 PROCEDURE — 99285 EMERGENCY DEPT VISIT HI MDM: CPT | Mod: FS

## 2024-10-06 PROCEDURE — 70450 CT HEAD/BRAIN W/O DYE: CPT | Mod: 26,MC

## 2024-10-06 PROCEDURE — 71045 X-RAY EXAM CHEST 1 VIEW: CPT | Mod: 26

## 2024-10-06 NOTE — ED PROVIDER NOTE - PATIENT PORTAL LINK FT
You can access the FollowMyHealth Patient Portal offered by [facility] by registering at the following website: [email or web address]. By joining [facility]’s FollowMyHealth portal, you will also be able to view your health information using other applications (apps) compatible with our system.

## 2024-10-06 NOTE — ED PROVIDER NOTE - OBJECTIVE STATEMENT
89-year-old female with past medical history of hypertension, hyperlipidemia, prediabetes, A-fib on Eliquis presents today due to dizziness since yesterday afternoon around 2 PM.  Patient reports that at that time she was on a walk in which she was walking longer than she normally would.  At that point she became tired, her legs were hurting, she felt short of breath, she felt lightheaded, and began to breathe heavily.  Patient does report that she had some facial numbness at that time which has resolved.  Patient also reports that she is uncertain if the facial discomfort that she was having was due to her teeth as she picks at them.  Patient does admit to having a mild headache which has resolved.  Patient denies vertigo, facial droop, slurred speech, extremity numbness/weakness, chest pain, fever, cough, or any other complaints.

## 2024-10-06 NOTE — ED PROVIDER NOTE - PHYSICAL EXAMINATION
Constitutional: Awake, Alert, non-toxic. NAD  HEAD: Normocephalic, atraumatic.   EYES: PERRL, EOM intact, conjunctiva and sclera are clear bilaterally. No raccoon eyes.   ENT: No rhinorrhea, normal pharynx, patent, no tonsillar exudate or enlargement, mucous membranes pink/moist, no erythema, no drooling or stridor.   NECK: Supple, non-tender  CARDIOVASCULAR: Normal S1, S2; regular rate and rhythm.  RESPIRATORY: Normal respiratory effort; breath sounds CTAB, no wheezes, rhonchi, or rales. Speaking in full sentences. No accessory muscle use.   ABDOMEN: Soft; non-tender, non-distended  EXTREMITIES: Full passive and active ROM in all extremities; non-tender to palpation; distal pulses palpable and symmetric, no edema, no crepitus or step off  SKIN: Warm, dry; good skin turgor, no apparent lesions or rashes, no ecchymosis, brisk capillary refill.  NEURO: A&O x3. Sensory and motor functions are grossly intact. Speech is normal. Appearance and judgement seem appropriate for gender and age. No neurological deficits. Neurovascular sensation intact motor function 5/5 of upper and lower extremities, CN II-XII grossly intact, no ataxia, absent pronator drift, intact cerebellar function. Speech clear, without articulation or word-finding difficulties. Eyes- PERRL bilaterally. EOMs in tact. No nystagmus. No facial droop. NIH- 0

## 2024-10-06 NOTE — ED ADULT NURSE NOTE - OBJECTIVE STATEMENT
Patient received complaining of dizziness and weakness x1 day, states was walking outside when she started feeling a weaker and SOB, managed to get back home and rested, denies syncope. Called ambulance today because weakness continued. At this time denies CP, SOB, dizziness, nausea and vomiting, headache, changes in vision. Patient is AOx4, safety precautions in place, awaiting evaluation

## 2024-10-06 NOTE — ED ADULT TRIAGE NOTE - CHIEF COMPLAINT QUOTE
Patient BIBEMS from home c/o dizziness since yesterday 2pm . Also states she has some facial numbness since yesterday. Per MES patient was 88% room air - placd on 2l with spo2 of 97%

## 2024-10-06 NOTE — ED PROVIDER NOTE - PROGRESS NOTE DETAILS
MD advised that patient leaving AMA. MD spoke with patient along with patient daughter. he discussed findings and risks of leaving AMA.

## 2024-10-06 NOTE — ED ADULT NURSE REASSESSMENT NOTE - NS ED NURSE REASSESS COMMENT FT1
Patient assigned to this RN during lunch break.  patient is alert and oriented x 4, she states she has periods of light headedness intermittently, and she experienced light headedness yesterday into today.  NIH stroke scale score 0, patient passed dysphagia screen.  Home health aid at bedside, patient denies needs at this time.  She is waiting to go to CT scan.  She states she hopes that she will be able to go home/

## 2024-10-06 NOTE — ED PROVIDER NOTE - CLINICAL SUMMARY MEDICAL DECISION MAKING FREE TEXT BOX
Patient is a 89-year-old female with a history of A-fib , HTN, HLD, diabetes.  Who presents with dental pain, facial numbness, dyspnea on exertion and easy fatigue after going for a walk yesterday.  She denies trauma.  She denies cough fever chills.  She endorses shortness of breath.  She has the usual use of bilateral lower extremity compression stockings and stated that they were very tight and uncomfortable yesterday.  According to her nursing assistant who is at the bedside patient appears much better today than she did yesterday.    On evaluation is a well-developed well-nourished female who is awake and alert and oriented eating a turkey sandwich in no apparent distress.  Able to speak full sentences without difficulty.  Sitting up in bed.  With permission from the patient the aide at the bedside called the patient's daughter, and we spoke by phone and I did my physical examination with the daughter on the phone and the aide at the bedside.  Cardiac exam is regular rate and rhythm with systolic murmur.  Lungs clear to auscultation.  Abdomen soft nontender without guarding or rebound.  Neck is supple.  Spine is kyphotic.  Musculoskeletal examination is unremarkable except patient has peripheral vascular disease with hyperesthesias to the lower extremities skin is very sensitive.    Plan of care includes CT imaging laboratory studies EKG chest x-ray.  It was recommended the patient get admitted for the dyspnea on exertion and rec cardiac monitoring EKG and telemetry monitoring.  But patient refuses.  This was in a discussion held with both the patient he ate in the the daughter.  They will take her to see her primary care and cardiologist in the morning.  She was made aware that she may have a cardiac arrhythmia not seen on the EKG, and may while here get an echocardiogram and further testing.  Patient refuses and prefers to go home.  She agrees to and understands that she will be leaving AGAINST MEDICAL ADVICE.

## 2024-10-09 ENCOUNTER — TRANSCRIPTION ENCOUNTER (OUTPATIENT)
Age: 89
End: 2024-10-09

## 2024-10-16 ENCOUNTER — APPOINTMENT (OUTPATIENT)
Dept: PODIATRY | Facility: CLINIC | Age: 89
End: 2024-10-16
Payer: MEDICARE

## 2024-10-16 DIAGNOSIS — I70.203 UNSPECIFIED ATHEROSCLEROSIS OF NATIVE ARTERIES OF EXTREMITIES, BILATERAL LEGS: ICD-10-CM

## 2024-10-16 DIAGNOSIS — M77.41 METATARSALGIA, RIGHT FOOT: ICD-10-CM

## 2024-10-16 DIAGNOSIS — M77.42 METATARSALGIA, RIGHT FOOT: ICD-10-CM

## 2024-10-16 DIAGNOSIS — B35.1 TINEA UNGUIUM: ICD-10-CM

## 2024-10-16 PROCEDURE — 99212 OFFICE O/P EST SF 10 MIN: CPT | Mod: 25

## 2024-10-16 PROCEDURE — 11721 DEBRIDE NAIL 6 OR MORE: CPT | Mod: 59,Q8

## 2024-10-16 PROCEDURE — 11056 PARNG/CUTG B9 HYPRKR LES 2-4: CPT | Mod: Q8

## 2024-11-20 ENCOUNTER — APPOINTMENT (OUTPATIENT)
Dept: WOUND CARE | Facility: HOSPITAL | Age: 89
End: 2024-11-20

## 2024-11-20 ENCOUNTER — NON-APPOINTMENT (OUTPATIENT)
Age: 89
End: 2024-11-20

## 2024-11-20 ENCOUNTER — OUTPATIENT (OUTPATIENT)
Dept: OUTPATIENT SERVICES | Facility: HOSPITAL | Age: 88
LOS: 1 days | Discharge: HOME | End: 2024-11-20
Payer: MEDICARE

## 2024-11-20 VITALS
DIASTOLIC BLOOD PRESSURE: 82 MMHG | RESPIRATION RATE: 16 BRPM | SYSTOLIC BLOOD PRESSURE: 135 MMHG | HEIGHT: 61 IN | TEMPERATURE: 97.6 F | WEIGHT: 110 LBS | HEART RATE: 79 BPM | BODY MASS INDEX: 20.77 KG/M2

## 2024-11-20 DIAGNOSIS — Z96.60 PRESENCE OF UNSPECIFIED ORTHOPEDIC JOINT IMPLANT: Chronic | ICD-10-CM

## 2024-11-20 DIAGNOSIS — S51.811A LACERATION W/OUT FOREIGN BODY OF RIGHT FOREARM, INITIAL ENCOUNTER: ICD-10-CM

## 2024-11-20 DIAGNOSIS — S51.801A UNSPECIFIED OPEN WOUND OF RIGHT FOREARM, INITIAL ENCOUNTER: ICD-10-CM

## 2024-11-20 DIAGNOSIS — Z98.89 OTHER SPECIFIED POSTPROCEDURAL STATES: Chronic | ICD-10-CM

## 2024-11-20 PROCEDURE — 99183 HYPERBARIC OXYGEN THERAPY: CPT

## 2024-11-20 PROCEDURE — 70450 CT HEAD/BRAIN W/O DYE: CPT | Mod: MC

## 2024-11-20 PROCEDURE — 84484 ASSAY OF TROPONIN QUANT: CPT

## 2024-11-20 PROCEDURE — 83880 ASSAY OF NATRIURETIC PEPTIDE: CPT

## 2024-11-20 PROCEDURE — G0463: CPT

## 2024-11-20 PROCEDURE — 99285 EMERGENCY DEPT VISIT HI MDM: CPT | Mod: 25

## 2024-11-20 PROCEDURE — 85610 PROTHROMBIN TIME: CPT

## 2024-11-20 PROCEDURE — 85025 COMPLETE CBC W/AUTO DIFF WBC: CPT

## 2024-11-20 PROCEDURE — 80053 COMPREHEN METABOLIC PANEL: CPT

## 2024-11-20 PROCEDURE — 99203 OFFICE O/P NEW LOW 30 MIN: CPT

## 2024-11-20 PROCEDURE — 71045 X-RAY EXAM CHEST 1 VIEW: CPT

## 2024-11-20 PROCEDURE — 93005 ELECTROCARDIOGRAM TRACING: CPT

## 2024-11-20 PROCEDURE — 36415 COLL VENOUS BLD VENIPUNCTURE: CPT

## 2024-11-20 PROCEDURE — 85730 THROMBOPLASTIN TIME PARTIAL: CPT

## 2024-11-25 PROBLEM — S51.811A LACERATION OF FOREARM, RIGHT: Status: ACTIVE | Noted: 2024-11-20

## 2024-11-27 ENCOUNTER — APPOINTMENT (OUTPATIENT)
Dept: WOUND CARE | Facility: HOSPITAL | Age: 89
End: 2024-11-27
Payer: MEDICARE

## 2024-11-27 ENCOUNTER — OUTPATIENT (OUTPATIENT)
Dept: OUTPATIENT SERVICES | Facility: HOSPITAL | Age: 88
LOS: 1 days | Discharge: HOME | End: 2024-11-27
Payer: MEDICARE

## 2024-11-27 VITALS
BODY MASS INDEX: 20.77 KG/M2 | HEIGHT: 61 IN | OXYGEN SATURATION: 93 % | DIASTOLIC BLOOD PRESSURE: 76 MMHG | RESPIRATION RATE: 16 BRPM | HEART RATE: 66 BPM | WEIGHT: 110 LBS | TEMPERATURE: 97.5 F | SYSTOLIC BLOOD PRESSURE: 141 MMHG

## 2024-11-27 DIAGNOSIS — Z98.89 OTHER SPECIFIED POSTPROCEDURAL STATES: Chronic | ICD-10-CM

## 2024-11-27 DIAGNOSIS — S51.801D UNSPECIFIED OPEN WOUND OF RIGHT FOREARM, SUBSEQUENT ENCOUNTER: ICD-10-CM

## 2024-11-27 DIAGNOSIS — Z96.60 PRESENCE OF UNSPECIFIED ORTHOPEDIC JOINT IMPLANT: Chronic | ICD-10-CM

## 2024-11-27 DIAGNOSIS — S51.811D LACERATION W/OUT FOREIGN BODY OF RIGHT FOREARM, SUBSEQUENT ENCOUNTER: ICD-10-CM

## 2024-11-27 PROCEDURE — G0463: CPT

## 2024-11-27 PROCEDURE — 99203 OFFICE O/P NEW LOW 30 MIN: CPT
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
## 2024-11-28 DIAGNOSIS — Z85.828 PERSONAL HISTORY OF OTHER MALIGNANT NEOPLASM OF SKIN: ICD-10-CM

## 2024-11-28 DIAGNOSIS — Z90.49 ACQUIRED ABSENCE OF OTHER SPECIFIED PARTS OF DIGESTIVE TRACT: ICD-10-CM

## 2024-11-28 DIAGNOSIS — Z88.2 ALLERGY STATUS TO SULFONAMIDES: ICD-10-CM
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
## 2024-11-28 DIAGNOSIS — S51.811A LACERATION WITHOUT FOREIGN BODY OF RIGHT FOREARM, INITIAL ENCOUNTER: ICD-10-CM

## 2024-11-28 DIAGNOSIS — Z98.49 CATARACT EXTRACTION STATUS, UNSPECIFIED EYE: ICD-10-CM

## 2024-11-28 DIAGNOSIS — M81.0 AGE-RELATED OSTEOPOROSIS WITHOUT CURRENT PATHOLOGICAL FRACTURE: ICD-10-CM

## 2024-11-28 DIAGNOSIS — Z96.649 PRESENCE OF UNSPECIFIED ARTIFICIAL HIP JOINT: ICD-10-CM

## 2024-11-28 DIAGNOSIS — Z82.61 FAMILY HISTORY OF ARTHRITIS: ICD-10-CM

## 2024-11-28 DIAGNOSIS — Z98.890 OTHER SPECIFIED POSTPROCEDURAL STATES: ICD-10-CM

## 2024-11-28 DIAGNOSIS — E11.9 TYPE 2 DIABETES MELLITUS WITHOUT COMPLICATIONS: ICD-10-CM
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
## 2024-11-28 DIAGNOSIS — Z82.49 FAMILY HISTORY OF ISCHEMIC HEART DISEASE AND OTHER DISEASES OF THE CIRCULATORY SYSTEM: ICD-10-CM

## 2024-11-28 DIAGNOSIS — Z83.3 FAMILY HISTORY OF DIABETES MELLITUS: ICD-10-CM

## 2024-11-28 DIAGNOSIS — Z80.41 FAMILY HISTORY OF MALIGNANT NEOPLASM OF OVARY: ICD-10-CM

## 2024-11-28 DIAGNOSIS — I10 ESSENTIAL (PRIMARY) HYPERTENSION: ICD-10-CM

## 2024-11-28 DIAGNOSIS — E78.00 PURE HYPERCHOLESTEROLEMIA, UNSPECIFIED: ICD-10-CM

## 2024-11-29 DIAGNOSIS — S51.811A LACERATION WITHOUT FOREIGN BODY OF RIGHT FOREARM, INITIAL ENCOUNTER: ICD-10-CM

## 2024-11-29 DIAGNOSIS — I10 ESSENTIAL (PRIMARY) HYPERTENSION: ICD-10-CM
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
## 2024-11-29 DIAGNOSIS — E78.00 PURE HYPERCHOLESTEROLEMIA, UNSPECIFIED: ICD-10-CM

## 2024-11-29 DIAGNOSIS — M81.0 AGE-RELATED OSTEOPOROSIS WITHOUT CURRENT PATHOLOGICAL FRACTURE: ICD-10-CM

## 2024-11-29 DIAGNOSIS — Z98.890 OTHER SPECIFIED POSTPROCEDURAL STATES: ICD-10-CM

## 2024-11-29 DIAGNOSIS — Z82.61 FAMILY HISTORY OF ARTHRITIS: ICD-10-CM

## 2024-11-29 DIAGNOSIS — Z98.49 CATARACT EXTRACTION STATUS, UNSPECIFIED EYE: ICD-10-CM
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
## 2024-11-29 DIAGNOSIS — Z96.649 PRESENCE OF UNSPECIFIED ARTIFICIAL HIP JOINT: ICD-10-CM

## 2024-11-29 DIAGNOSIS — Z83.3 FAMILY HISTORY OF DIABETES MELLITUS: ICD-10-CM

## 2024-11-29 DIAGNOSIS — E11.9 TYPE 2 DIABETES MELLITUS WITHOUT COMPLICATIONS: ICD-10-CM

## 2024-11-29 DIAGNOSIS — Z90.49 ACQUIRED ABSENCE OF OTHER SPECIFIED PARTS OF DIGESTIVE TRACT: ICD-10-CM
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
## 2024-11-29 DIAGNOSIS — Z82.49 FAMILY HISTORY OF ISCHEMIC HEART DISEASE AND OTHER DISEASES OF THE CIRCULATORY SYSTEM: ICD-10-CM

## 2024-11-29 DIAGNOSIS — Z88.2 ALLERGY STATUS TO SULFONAMIDES: ICD-10-CM
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
## 2024-11-29 DIAGNOSIS — Z80.41 FAMILY HISTORY OF MALIGNANT NEOPLASM OF OVARY: ICD-10-CM

## 2024-11-29 DIAGNOSIS — Z85.828 PERSONAL HISTORY OF OTHER MALIGNANT NEOPLASM OF SKIN: ICD-10-CM

## 2024-12-11 ENCOUNTER — APPOINTMENT (OUTPATIENT)
Dept: WOUND CARE | Facility: HOSPITAL | Age: 88
End: 2024-12-11

## 2024-12-13 ENCOUNTER — APPOINTMENT (OUTPATIENT)
Dept: PODIATRY | Facility: CLINIC | Age: 88
End: 2024-12-13
Payer: MEDICARE

## 2024-12-13 ENCOUNTER — NON-APPOINTMENT (OUTPATIENT)
Age: 88
End: 2024-12-13

## 2024-12-13 DIAGNOSIS — B35.1 TINEA UNGUIUM: ICD-10-CM

## 2024-12-13 DIAGNOSIS — S90.221A CONTUSION OF RIGHT LESSER TOE(S) WITH DAMAGE TO NAIL, INITIAL ENCOUNTER: ICD-10-CM

## 2024-12-13 PROCEDURE — 99212 OFFICE O/P EST SF 10 MIN: CPT

## 2024-12-16 PROBLEM — S90.221A: Status: ACTIVE | Noted: 2024-12-16

## 2025-01-24 DIAGNOSIS — S51.811A LACERATION W/OUT FOREIGN BODY OF RIGHT FOREARM, INITIAL ENCOUNTER: ICD-10-CM

## 2025-02-11 ENCOUNTER — APPOINTMENT (OUTPATIENT)
Dept: WOUND CARE | Facility: HOSPITAL | Age: 89
End: 2025-02-11

## 2025-03-28 ENCOUNTER — NON-APPOINTMENT (OUTPATIENT)
Age: 89
End: 2025-03-28

## 2025-03-28 ENCOUNTER — APPOINTMENT (OUTPATIENT)
Dept: PODIATRY | Facility: CLINIC | Age: 89
End: 2025-03-28
Payer: MEDICARE

## 2025-03-28 DIAGNOSIS — I70.203 UNSPECIFIED ATHEROSCLEROSIS OF NATIVE ARTERIES OF EXTREMITIES, BILATERAL LEGS: ICD-10-CM

## 2025-03-28 DIAGNOSIS — B35.1 TINEA UNGUIUM: ICD-10-CM

## 2025-03-28 PROCEDURE — 99212 OFFICE O/P EST SF 10 MIN: CPT | Mod: 25

## 2025-03-28 PROCEDURE — 11719 TRIM NAIL(S) ANY NUMBER: CPT | Mod: Q8

## 2025-03-28 PROCEDURE — 11056 PARNG/CUTG B9 HYPRKR LES 2-4: CPT | Mod: Q8

## 2025-04-17 ENCOUNTER — OUTPATIENT (OUTPATIENT)
Dept: OUTPATIENT SERVICES | Facility: HOSPITAL | Age: 89
LOS: 1 days | End: 2025-04-17
Payer: MEDICARE

## 2025-04-17 ENCOUNTER — APPOINTMENT (OUTPATIENT)
Dept: WOUND CARE | Facility: HOSPITAL | Age: 89
End: 2025-04-17
Payer: MEDICARE

## 2025-04-17 VITALS
TEMPERATURE: 98.4 F | OXYGEN SATURATION: 97 % | WEIGHT: 110 LBS | BODY MASS INDEX: 20.77 KG/M2 | RESPIRATION RATE: 20 BRPM | DIASTOLIC BLOOD PRESSURE: 87 MMHG | HEIGHT: 61 IN | SYSTOLIC BLOOD PRESSURE: 149 MMHG | HEART RATE: 68 BPM

## 2025-04-17 DIAGNOSIS — R73.03 PREDIABETES.: ICD-10-CM

## 2025-04-17 DIAGNOSIS — Z98.89 OTHER SPECIFIED POSTPROCEDURAL STATES: Chronic | ICD-10-CM

## 2025-04-17 DIAGNOSIS — S41.111A LACERATION W/OUT FOREIGN BODY OF RIGHT UPPER ARM, INITIAL ENCOUNTER: ICD-10-CM

## 2025-04-17 DIAGNOSIS — S51.811A LACERATION WITHOUT FOREIGN BODY OF RIGHT FOREARM, INITIAL ENCOUNTER: ICD-10-CM

## 2025-04-17 DIAGNOSIS — Z96.60 PRESENCE OF UNSPECIFIED ORTHOPEDIC JOINT IMPLANT: Chronic | ICD-10-CM

## 2025-04-17 PROCEDURE — G0463: CPT

## 2025-04-17 PROCEDURE — 99213 OFFICE O/P EST LOW 20 MIN: CPT

## 2025-04-17 RX ORDER — HYDROPHILIC CREAM
PASTE (GRAM) TOPICAL
Qty: 60 | Refills: 6 | Status: ACTIVE | COMMUNITY
Start: 2025-04-17 | End: 1900-01-01
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
## 2025-04-21 DIAGNOSIS — M81.0 AGE-RELATED OSTEOPOROSIS WITHOUT CURRENT PATHOLOGICAL FRACTURE: ICD-10-CM

## 2025-04-21 DIAGNOSIS — Z98.49 CATARACT EXTRACTION STATUS, UNSPECIFIED EYE: ICD-10-CM

## 2025-04-21 DIAGNOSIS — E78.00 PURE HYPERCHOLESTEROLEMIA, UNSPECIFIED: ICD-10-CM

## 2025-04-21 DIAGNOSIS — Z98.890 OTHER SPECIFIED POSTPROCEDURAL STATES: ICD-10-CM

## 2025-04-21 DIAGNOSIS — Z85.828 PERSONAL HISTORY OF OTHER MALIGNANT NEOPLASM OF SKIN: ICD-10-CM

## 2025-04-21 DIAGNOSIS — E11.9 TYPE 2 DIABETES MELLITUS WITHOUT COMPLICATIONS: ICD-10-CM

## 2025-04-21 DIAGNOSIS — Z88.2 ALLERGY STATUS TO SULFONAMIDES: ICD-10-CM
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
## 2025-04-21 DIAGNOSIS — L89.322 PRESSURE ULCER OF LEFT BUTTOCK, STAGE 2: ICD-10-CM

## 2025-04-21 DIAGNOSIS — Z96.649 PRESENCE OF UNSPECIFIED ARTIFICIAL HIP JOINT: ICD-10-CM

## 2025-04-21 DIAGNOSIS — Z80.41 FAMILY HISTORY OF MALIGNANT NEOPLASM OF OVARY: ICD-10-CM

## 2025-04-21 DIAGNOSIS — Z83.3 FAMILY HISTORY OF DIABETES MELLITUS: ICD-10-CM

## 2025-04-21 DIAGNOSIS — I10 ESSENTIAL (PRIMARY) HYPERTENSION: ICD-10-CM

## 2025-04-21 DIAGNOSIS — Z82.61 FAMILY HISTORY OF ARTHRITIS: ICD-10-CM

## 2025-04-21 DIAGNOSIS — S41.111A LACERATION WITHOUT FOREIGN BODY OF RIGHT UPPER ARM, INITIAL ENCOUNTER: ICD-10-CM
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
## 2025-04-21 DIAGNOSIS — Z82.49 FAMILY HISTORY OF ISCHEMIC HEART DISEASE AND OTHER DISEASES OF THE CIRCULATORY SYSTEM: ICD-10-CM

## 2025-04-24 ENCOUNTER — APPOINTMENT (OUTPATIENT)
Dept: WOUND CARE | Facility: HOSPITAL | Age: 89
End: 2025-04-24
Payer: MEDICARE

## 2025-04-24 ENCOUNTER — OUTPATIENT (OUTPATIENT)
Dept: OUTPATIENT SERVICES | Facility: HOSPITAL | Age: 89
LOS: 1 days | End: 2025-04-24
Payer: MEDICARE

## 2025-04-24 DIAGNOSIS — Z96.60 PRESENCE OF UNSPECIFIED ORTHOPEDIC JOINT IMPLANT: Chronic | ICD-10-CM

## 2025-04-24 DIAGNOSIS — Z98.89 OTHER SPECIFIED POSTPROCEDURAL STATES: Chronic | ICD-10-CM

## 2025-04-24 DIAGNOSIS — S41.111D LACERATION W/OUT FOREIGN BODY OF RIGHT UPPER ARM, SUBSEQUENT ENCOUNTER: ICD-10-CM

## 2025-04-24 DIAGNOSIS — L89.322 PRESSURE ULCER OF LEFT BUTTOCK, STAGE 2: ICD-10-CM

## 2025-04-24 DIAGNOSIS — E11.40 TYPE 2 DIABETES MELLITUS WITH DIABETIC NEUROPATHY, UNSPECIFIED: ICD-10-CM

## 2025-04-24 DIAGNOSIS — S51.811D LACERATION WITHOUT FOREIGN BODY OF RIGHT FOREARM, SUBSEQUENT ENCOUNTER: ICD-10-CM

## 2025-04-24 PROCEDURE — G0463: CPT

## 2025-04-24 PROCEDURE — 99213 OFFICE O/P EST LOW 20 MIN: CPT

## 2025-04-27 DIAGNOSIS — Z82.61 FAMILY HISTORY OF ARTHRITIS: ICD-10-CM

## 2025-04-27 DIAGNOSIS — E11.40 TYPE 2 DIABETES MELLITUS WITH DIABETIC NEUROPATHY, UNSPECIFIED: ICD-10-CM

## 2025-04-27 DIAGNOSIS — S41.111D LACERATION WITHOUT FOREIGN BODY OF RIGHT UPPER ARM, SUBSEQUENT ENCOUNTER: ICD-10-CM

## 2025-04-27 DIAGNOSIS — I10 ESSENTIAL (PRIMARY) HYPERTENSION: ICD-10-CM

## 2025-04-27 DIAGNOSIS — Z98.890 OTHER SPECIFIED POSTPROCEDURAL STATES: ICD-10-CM

## 2025-04-27 DIAGNOSIS — Z82.49 FAMILY HISTORY OF ISCHEMIC HEART DISEASE AND OTHER DISEASES OF THE CIRCULATORY SYSTEM: ICD-10-CM

## 2025-04-27 DIAGNOSIS — Z85.828 PERSONAL HISTORY OF OTHER MALIGNANT NEOPLASM OF SKIN: ICD-10-CM

## 2025-04-27 DIAGNOSIS — M81.0 AGE-RELATED OSTEOPOROSIS WITHOUT CURRENT PATHOLOGICAL FRACTURE: ICD-10-CM

## 2025-04-27 DIAGNOSIS — Z96.649 PRESENCE OF UNSPECIFIED ARTIFICIAL HIP JOINT: ICD-10-CM
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
## 2025-04-27 DIAGNOSIS — Z83.3 FAMILY HISTORY OF DIABETES MELLITUS: ICD-10-CM
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
## 2025-04-27 DIAGNOSIS — L89.322 PRESSURE ULCER OF LEFT BUTTOCK, STAGE 2: ICD-10-CM

## 2025-04-27 DIAGNOSIS — E78.00 PURE HYPERCHOLESTEROLEMIA, UNSPECIFIED: ICD-10-CM

## 2025-04-27 DIAGNOSIS — Z88.2 ALLERGY STATUS TO SULFONAMIDES: ICD-10-CM
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
## 2025-04-27 DIAGNOSIS — Z98.49 CATARACT EXTRACTION STATUS, UNSPECIFIED EYE: ICD-10-CM

## 2025-05-08 ENCOUNTER — OUTPATIENT (OUTPATIENT)
Dept: OUTPATIENT SERVICES | Facility: HOSPITAL | Age: 89
LOS: 1 days | End: 2025-05-08
Payer: MEDICARE

## 2025-05-08 ENCOUNTER — APPOINTMENT (OUTPATIENT)
Dept: WOUND CARE | Facility: HOSPITAL | Age: 89
End: 2025-05-08
Payer: MEDICARE

## 2025-05-08 VITALS
HEART RATE: 63 BPM | SYSTOLIC BLOOD PRESSURE: 149 MMHG | OXYGEN SATURATION: 98 % | TEMPERATURE: 98.1 F | DIASTOLIC BLOOD PRESSURE: 82 MMHG | HEIGHT: 61 IN | WEIGHT: 110 LBS | BODY MASS INDEX: 20.77 KG/M2 | RESPIRATION RATE: 20 BRPM

## 2025-05-08 DIAGNOSIS — Z98.89 OTHER SPECIFIED POSTPROCEDURAL STATES: Chronic | ICD-10-CM

## 2025-05-08 DIAGNOSIS — S41.111D LACERATION W/OUT FOREIGN BODY OF RIGHT UPPER ARM, SUBSEQUENT ENCOUNTER: ICD-10-CM

## 2025-05-08 DIAGNOSIS — E11.40 TYPE 2 DIABETES MELLITUS WITH DIABETIC NEUROPATHY, UNSPECIFIED: ICD-10-CM

## 2025-05-08 DIAGNOSIS — Z96.60 PRESENCE OF UNSPECIFIED ORTHOPEDIC JOINT IMPLANT: Chronic | ICD-10-CM

## 2025-05-08 DIAGNOSIS — L89.322 PRESSURE ULCER OF LEFT BUTTOCK, STAGE 2: ICD-10-CM

## 2025-05-08 DIAGNOSIS — S51.801A UNSPECIFIED OPEN WOUND OF RIGHT FOREARM, INITIAL ENCOUNTER: ICD-10-CM

## 2025-05-08 PROCEDURE — G0463: CPT

## 2025-05-08 PROCEDURE — 99213 OFFICE O/P EST LOW 20 MIN: CPT

## 2025-05-12 DIAGNOSIS — Z85.828 PERSONAL HISTORY OF OTHER MALIGNANT NEOPLASM OF SKIN: ICD-10-CM

## 2025-05-12 DIAGNOSIS — I10 ESSENTIAL (PRIMARY) HYPERTENSION: ICD-10-CM

## 2025-05-12 DIAGNOSIS — L89.322 PRESSURE ULCER OF LEFT BUTTOCK, STAGE 2: ICD-10-CM
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
## 2025-05-12 DIAGNOSIS — Z83.3 FAMILY HISTORY OF DIABETES MELLITUS: ICD-10-CM

## 2025-05-12 DIAGNOSIS — Z90.49 ACQUIRED ABSENCE OF OTHER SPECIFIED PARTS OF DIGESTIVE TRACT: ICD-10-CM

## 2025-05-12 DIAGNOSIS — E11.40 TYPE 2 DIABETES MELLITUS WITH DIABETIC NEUROPATHY, UNSPECIFIED: ICD-10-CM

## 2025-05-12 DIAGNOSIS — Z82.61 FAMILY HISTORY OF ARTHRITIS: ICD-10-CM

## 2025-05-12 DIAGNOSIS — E78.00 PURE HYPERCHOLESTEROLEMIA, UNSPECIFIED: ICD-10-CM

## 2025-05-12 DIAGNOSIS — S41.111D LACERATION WITHOUT FOREIGN BODY OF RIGHT UPPER ARM, SUBSEQUENT ENCOUNTER: ICD-10-CM

## 2025-05-12 DIAGNOSIS — Z88.2 ALLERGY STATUS TO SULFONAMIDES: ICD-10-CM

## 2025-05-12 DIAGNOSIS — Z98.890 OTHER SPECIFIED POSTPROCEDURAL STATES: ICD-10-CM

## 2025-05-12 DIAGNOSIS — Z98.49 CATARACT EXTRACTION STATUS, UNSPECIFIED EYE: ICD-10-CM

## 2025-05-12 DIAGNOSIS — Z82.49 FAMILY HISTORY OF ISCHEMIC HEART DISEASE AND OTHER DISEASES OF THE CIRCULATORY SYSTEM: ICD-10-CM
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
## 2025-05-12 DIAGNOSIS — Z96.649 PRESENCE OF UNSPECIFIED ARTIFICIAL HIP JOINT: ICD-10-CM

## 2025-05-12 DIAGNOSIS — M81.0 AGE-RELATED OSTEOPOROSIS WITHOUT CURRENT PATHOLOGICAL FRACTURE: ICD-10-CM

## 2025-06-13 ENCOUNTER — APPOINTMENT (OUTPATIENT)
Dept: PODIATRY | Facility: CLINIC | Age: 89
End: 2025-06-13
Payer: MEDICARE

## 2025-06-13 PROCEDURE — 99212 OFFICE O/P EST SF 10 MIN: CPT | Mod: 25

## 2025-06-13 PROCEDURE — 11056 PARNG/CUTG B9 HYPRKR LES 2-4: CPT | Mod: Q8

## 2025-06-13 PROCEDURE — 11719 TRIM NAIL(S) ANY NUMBER: CPT | Mod: Q8

## 2025-08-29 ENCOUNTER — APPOINTMENT (OUTPATIENT)
Dept: PODIATRY | Facility: CLINIC | Age: 89
End: 2025-08-29

## 2025-08-29 PROCEDURE — 11719 TRIM NAIL(S) ANY NUMBER: CPT | Mod: Q8

## 2025-08-29 PROCEDURE — 11056 PARNG/CUTG B9 HYPRKR LES 2-4: CPT | Mod: Q8

## 2025-08-29 PROCEDURE — 99212 OFFICE O/P EST SF 10 MIN: CPT | Mod: 25

## 2025-08-29 RX ORDER — CICLOPIROX 71.3 MG/ML
8 SOLUTION TOPICAL
Qty: 6.6 | Refills: 2 | Status: ACTIVE | COMMUNITY
Start: 2025-08-29 | End: 1900-01-01